# Patient Record
Sex: FEMALE | Race: WHITE | NOT HISPANIC OR LATINO | Employment: UNEMPLOYED | URBAN - METROPOLITAN AREA
[De-identification: names, ages, dates, MRNs, and addresses within clinical notes are randomized per-mention and may not be internally consistent; named-entity substitution may affect disease eponyms.]

---

## 2022-04-12 ENCOUNTER — APPOINTMENT (OUTPATIENT)
Dept: GENERAL RADIOLOGY | Facility: HOSPITAL | Age: 57
End: 2022-04-12

## 2022-04-12 ENCOUNTER — APPOINTMENT (OUTPATIENT)
Dept: CT IMAGING | Facility: HOSPITAL | Age: 57
End: 2022-04-12

## 2022-04-12 ENCOUNTER — HOSPITAL ENCOUNTER (INPATIENT)
Facility: HOSPITAL | Age: 57
LOS: 6 days | Discharge: HOME | End: 2022-04-18
Attending: EMERGENCY MEDICINE | Admitting: FAMILY MEDICINE

## 2022-04-12 DIAGNOSIS — J96.02 ACUTE RESPIRATORY FAILURE WITH HYPOXIA AND HYPERCAPNIA: Primary | ICD-10-CM

## 2022-04-12 DIAGNOSIS — R41.82 ALTERED MENTAL STATUS, UNSPECIFIED ALTERED MENTAL STATUS TYPE: ICD-10-CM

## 2022-04-12 DIAGNOSIS — R26.2 DIFFICULTY WALKING: ICD-10-CM

## 2022-04-12 DIAGNOSIS — J96.01 ACUTE RESPIRATORY FAILURE WITH HYPOXIA AND HYPERCAPNIA: Primary | ICD-10-CM

## 2022-04-12 DIAGNOSIS — J18.9 PNEUMONIA DUE TO INFECTIOUS ORGANISM, UNSPECIFIED LATERALITY, UNSPECIFIED PART OF LUNG: ICD-10-CM

## 2022-04-12 DIAGNOSIS — Z86.16 HISTORY OF COVID-19: ICD-10-CM

## 2022-04-12 PROBLEM — J96.90 RESPIRATORY FAILURE: Status: ACTIVE | Noted: 2022-04-12

## 2022-04-12 LAB
ALBUMIN SERPL-MCNC: 4.7 G/DL (ref 3.5–5.2)
ALBUMIN SERPL-MCNC: 4.9 G/DL (ref 3.5–5.2)
ALBUMIN/GLOB SERPL: 1.5 G/DL
ALBUMIN/GLOB SERPL: 1.8 G/DL
ALP SERPL-CCNC: 103 U/L (ref 39–117)
ALP SERPL-CCNC: 137 U/L (ref 39–117)
ALT SERPL W P-5'-P-CCNC: 16 U/L (ref 1–33)
ALT SERPL W P-5'-P-CCNC: 20 U/L (ref 1–33)
AMPHET+METHAMPHET UR QL: POSITIVE
ANION GAP SERPL CALCULATED.3IONS-SCNC: 12.3 MMOL/L (ref 5–15)
ANION GAP SERPL CALCULATED.3IONS-SCNC: 13.1 MMOL/L (ref 5–15)
ANION GAP SERPL CALCULATED.3IONS-SCNC: 14.9 MMOL/L (ref 5–15)
ARTERIAL PATENCY WRIST A: ABNORMAL
ARTERIAL PATENCY WRIST A: POSITIVE
AST SERPL-CCNC: 16 U/L (ref 1–32)
AST SERPL-CCNC: 25 U/L (ref 1–32)
BACTERIA BLD CULT: ABNORMAL
BARBITURATES UR QL SCN: NEGATIVE
BASE EXCESS BLDA CALC-SCNC: -3.8 MMOL/L (ref -2–2)
BASE EXCESS BLDA CALC-SCNC: 1.7 MMOL/L (ref -2–2)
BASOPHILS # BLD AUTO: 0 10*3/MM3 (ref 0–0.2)
BASOPHILS # BLD AUTO: 0.01 10*3/MM3 (ref 0–0.2)
BASOPHILS # BLD AUTO: 0.03 10*3/MM3 (ref 0–0.2)
BASOPHILS NFR BLD AUTO: 0 % (ref 0–1.5)
BASOPHILS NFR BLD AUTO: 0.1 % (ref 0–1.5)
BASOPHILS NFR BLD AUTO: 0.3 % (ref 0–1.5)
BDY SITE: ABNORMAL
BDY SITE: ABNORMAL
BENZODIAZ UR QL SCN: NEGATIVE
BILIRUB SERPL-MCNC: 0.2 MG/DL (ref 0–1.2)
BILIRUB SERPL-MCNC: 0.3 MG/DL (ref 0–1.2)
BUN SERPL-MCNC: 20 MG/DL (ref 6–20)
BUN SERPL-MCNC: 20 MG/DL (ref 6–20)
BUN SERPL-MCNC: 21 MG/DL (ref 6–20)
BUN/CREAT SERPL: 21.1 (ref 7–25)
BUN/CREAT SERPL: 25 (ref 7–25)
BUN/CREAT SERPL: 27.8 (ref 7–25)
CA-I BLDA-SCNC: 1.12 MMOL/L (ref 1.13–1.32)
CA-I BLDA-SCNC: 1.17 MMOL/L (ref 1.13–1.32)
CALCIUM SPEC-SCNC: 10 MG/DL (ref 8.6–10.5)
CALCIUM SPEC-SCNC: 9.4 MG/DL (ref 8.6–10.5)
CALCIUM SPEC-SCNC: 9.5 MG/DL (ref 8.6–10.5)
CANNABINOIDS SERPL QL: POSITIVE
CHLORIDE BLDA-SCNC: 102 MMOL/L (ref 98–106)
CHLORIDE BLDA-SCNC: 104 MMOL/L (ref 98–106)
CHLORIDE SERPL-SCNC: 101 MMOL/L (ref 98–107)
CHLORIDE SERPL-SCNC: 103 MMOL/L (ref 98–107)
CHLORIDE SERPL-SCNC: 104 MMOL/L (ref 98–107)
CO2 SERPL-SCNC: 27.9 MMOL/L (ref 22–29)
CO2 SERPL-SCNC: 28.7 MMOL/L (ref 22–29)
CO2 SERPL-SCNC: 29.1 MMOL/L (ref 22–29)
COCAINE UR QL: NEGATIVE
COHGB MFR BLD: 0.4 % (ref 0–1.5)
COHGB MFR BLD: 0.6 % (ref 0–1.5)
CREAT SERPL-MCNC: 0.72 MG/DL (ref 0.57–1)
CREAT SERPL-MCNC: 0.84 MG/DL (ref 0.57–1)
CREAT SERPL-MCNC: 0.95 MG/DL (ref 0.57–1)
D DIMER PPP FEU-MCNC: 0.69 MG/L (FEU) (ref 0–0.59)
D-LACTATE SERPL-SCNC: 1.6 MMOL/L (ref 0.5–2)
DEPRECATED RDW RBC AUTO: 45 FL (ref 37–54)
DEPRECATED RDW RBC AUTO: 45.3 FL (ref 37–54)
DEPRECATED RDW RBC AUTO: 46.5 FL (ref 37–54)
EGFRCR SERPLBLD CKD-EPI 2021: 70 ML/MIN/1.73
EGFRCR SERPLBLD CKD-EPI 2021: 81.2 ML/MIN/1.73
EGFRCR SERPLBLD CKD-EPI 2021: 97.7 ML/MIN/1.73
EOSINOPHIL # BLD AUTO: 0 10*3/MM3 (ref 0–0.4)
EOSINOPHIL # BLD AUTO: 0.01 10*3/MM3 (ref 0–0.4)
EOSINOPHIL # BLD AUTO: 0.12 10*3/MM3 (ref 0–0.4)
EOSINOPHIL NFR BLD AUTO: 0 % (ref 0.3–6.2)
EOSINOPHIL NFR BLD AUTO: 0.1 % (ref 0.3–6.2)
EOSINOPHIL NFR BLD AUTO: 1 % (ref 0.3–6.2)
ERYTHROCYTE [DISTWIDTH] IN BLOOD BY AUTOMATED COUNT: 13.8 % (ref 12.3–15.4)
ERYTHROCYTE [DISTWIDTH] IN BLOOD BY AUTOMATED COUNT: 13.8 % (ref 12.3–15.4)
ERYTHROCYTE [DISTWIDTH] IN BLOOD BY AUTOMATED COUNT: 14 % (ref 12.3–15.4)
FHHB: 3.4 % (ref 0–5)
FHHB: 7.2 % (ref 0–5)
FLUAV AG NPH QL: NEGATIVE
FLUBV AG NPH QL IA: NEGATIVE
GAS FLOW AIRWAY: 15 LPM
GAS FLOW AIRWAY: ABNORMAL L/MIN
GLOBULIN UR ELPH-MCNC: 2.6 GM/DL
GLOBULIN UR ELPH-MCNC: 3.3 GM/DL
GLUCOSE BLDA-MCNC: 166 MMOL/L (ref 65–99)
GLUCOSE BLDA-MCNC: 302 MMOL/L (ref 65–99)
GLUCOSE BLDC GLUCOMTR-MCNC: 201 MG/DL (ref 70–99)
GLUCOSE SERPL-MCNC: 162 MG/DL (ref 65–99)
GLUCOSE SERPL-MCNC: 214 MG/DL (ref 65–99)
GLUCOSE SERPL-MCNC: 269 MG/DL (ref 65–99)
HCO3 BLDA-SCNC: 26.9 MMOL/L (ref 22–26)
HCO3 BLDA-SCNC: 27.5 MMOL/L (ref 22–26)
HCT VFR BLD AUTO: 42.6 % (ref 34–46.6)
HCT VFR BLD AUTO: 43.5 % (ref 34–46.6)
HCT VFR BLD AUTO: 48.4 % (ref 34–46.6)
HGB BLD-MCNC: 14 G/DL (ref 12–15.9)
HGB BLD-MCNC: 14.1 G/DL (ref 12–15.9)
HGB BLD-MCNC: 15.3 G/DL (ref 12–15.9)
HGB BLDA-MCNC: 14.6 G/DL (ref 11.7–14.6)
HGB BLDA-MCNC: 14.8 G/DL (ref 11.7–14.6)
HOLD SPECIMEN: NORMAL
HOLD SPECIMEN: NORMAL
IMM GRANULOCYTES # BLD AUTO: 0.02 10*3/MM3 (ref 0–0.05)
IMM GRANULOCYTES # BLD AUTO: 0.04 10*3/MM3 (ref 0–0.05)
IMM GRANULOCYTES # BLD AUTO: 0.05 10*3/MM3 (ref 0–0.05)
IMM GRANULOCYTES NFR BLD AUTO: 0.3 % (ref 0–0.5)
IMM GRANULOCYTES NFR BLD AUTO: 0.4 % (ref 0–0.5)
IMM GRANULOCYTES NFR BLD AUTO: 0.5 % (ref 0–0.5)
INHALED O2 CONCENTRATION: 100 %
INHALED O2 CONCENTRATION: <21 %
L PNEUMO1 AG UR QL IA: NEGATIVE
LACTATE BLDA-SCNC: 0.64 MMOL/L (ref 0.5–2)
LACTATE BLDA-SCNC: 0.98 MMOL/L (ref 0.5–2)
LYMPHOCYTES # BLD AUTO: 0.32 10*3/MM3 (ref 0.7–3.1)
LYMPHOCYTES # BLD AUTO: 0.55 10*3/MM3 (ref 0.7–3.1)
LYMPHOCYTES # BLD AUTO: 3.68 10*3/MM3 (ref 0.7–3.1)
LYMPHOCYTES NFR BLD AUTO: 31.7 % (ref 19.6–45.3)
LYMPHOCYTES NFR BLD AUTO: 4.5 % (ref 19.6–45.3)
LYMPHOCYTES NFR BLD AUTO: 6.3 % (ref 19.6–45.3)
M PNEUMO IGM SER QL: POSITIVE
MAGNESIUM SERPL-MCNC: 1.8 MG/DL (ref 1.6–2.6)
MCH RBC QN AUTO: 28.7 PG (ref 26.6–33)
MCH RBC QN AUTO: 28.7 PG (ref 26.6–33)
MCH RBC QN AUTO: 29.3 PG (ref 26.6–33)
MCHC RBC AUTO-ENTMCNC: 31.6 G/DL (ref 31.5–35.7)
MCHC RBC AUTO-ENTMCNC: 32.2 G/DL (ref 31.5–35.7)
MCHC RBC AUTO-ENTMCNC: 33.1 G/DL (ref 31.5–35.7)
MCV RBC AUTO: 88.6 FL (ref 79–97)
MCV RBC AUTO: 89.1 FL (ref 79–97)
MCV RBC AUTO: 90.8 FL (ref 79–97)
METHADONE UR QL SCN: NEGATIVE
METHGB BLD QL: 0.2 % (ref 0–1.5)
METHGB BLD QL: 0.3 % (ref 0–1.5)
MODALITY: ABNORMAL
MODALITY: ABNORMAL
MONOCYTES # BLD AUTO: 0.1 10*3/MM3 (ref 0.1–0.9)
MONOCYTES # BLD AUTO: 0.31 10*3/MM3 (ref 0.1–0.9)
MONOCYTES # BLD AUTO: 0.94 10*3/MM3 (ref 0.1–0.9)
MONOCYTES NFR BLD AUTO: 1.4 % (ref 5–12)
MONOCYTES NFR BLD AUTO: 3.6 % (ref 5–12)
MONOCYTES NFR BLD AUTO: 8.1 % (ref 5–12)
NEUTROPHILS NFR BLD AUTO: 58.5 % (ref 42.7–76)
NEUTROPHILS NFR BLD AUTO: 6.73 10*3/MM3 (ref 1.7–7)
NEUTROPHILS NFR BLD AUTO: 6.8 10*3/MM3 (ref 1.7–7)
NEUTROPHILS NFR BLD AUTO: 7.76 10*3/MM3 (ref 1.7–7)
NEUTROPHILS NFR BLD AUTO: 89.5 % (ref 42.7–76)
NEUTROPHILS NFR BLD AUTO: 93.7 % (ref 42.7–76)
NOTE: ABNORMAL
NOTE: ABNORMAL
NRBC BLD AUTO-RTO: 0 /100 WBC (ref 0–0.2)
NT-PROBNP SERPL-MCNC: 2427 PG/ML (ref 0–900)
OPIATES UR QL: NEGATIVE
OXYCODONE UR QL SCN: NEGATIVE
OXYHGB MFR BLDV: 92 % (ref 94–99)
OXYHGB MFR BLDV: 95.9 % (ref 94–99)
PCO2 BLDA: 47.5 MM HG (ref 35–45)
PCO2 BLDA: 77.1 MM HG (ref 35–45)
PH BLDA: 7.16 PH UNITS (ref 7.35–7.45)
PH BLDA: 7.38 PH UNITS (ref 7.35–7.45)
PLATELET # BLD AUTO: 237 10*3/MM3 (ref 140–450)
PLATELET # BLD AUTO: 296 10*3/MM3 (ref 140–450)
PLATELET # BLD AUTO: 378 10*3/MM3 (ref 140–450)
PMV BLD AUTO: 10 FL (ref 6–12)
PMV BLD AUTO: 9.6 FL (ref 6–12)
PMV BLD AUTO: 9.8 FL (ref 6–12)
PO2 BLD: 111 MM[HG] (ref 0–500)
PO2 BLD: >319 MM[HG] (ref 0–500)
PO2 BLDA: 111 MM HG (ref 80–100)
PO2 BLDA: 67 MM HG (ref 80–100)
POTASSIUM BLDA-SCNC: 3.37 MMOL/L (ref 3.5–5)
POTASSIUM BLDA-SCNC: 3.99 MMOL/L (ref 3.5–5)
POTASSIUM SERPL-SCNC: 3.3 MMOL/L (ref 3.5–5.2)
POTASSIUM SERPL-SCNC: 3.6 MMOL/L (ref 3.5–5.2)
POTASSIUM SERPL-SCNC: 4.3 MMOL/L (ref 3.5–5.2)
PROCALCITONIN SERPL-MCNC: 0.15 NG/ML (ref 0–0.25)
PROT SERPL-MCNC: 7.3 G/DL (ref 6–8.5)
PROT SERPL-MCNC: 8.2 G/DL (ref 6–8.5)
QT INTERVAL: 385 MS
RBC # BLD AUTO: 4.81 10*6/MM3 (ref 3.77–5.28)
RBC # BLD AUTO: 4.88 10*6/MM3 (ref 3.77–5.28)
RBC # BLD AUTO: 5.33 10*6/MM3 (ref 3.77–5.28)
S PNEUM AG SPEC QL LA: NEGATIVE
SAO2 % BLDCOA: 92.7 % (ref 95–99)
SAO2 % BLDCOA: 96.6 % (ref 95–99)
SARS-COV-2 RNA PNL SPEC NAA+PROBE: NOT DETECTED
SODIUM BLDA-SCNC: 141.4 MMOL/L (ref 136–146)
SODIUM BLDA-SCNC: 142.4 MMOL/L (ref 136–146)
SODIUM SERPL-SCNC: 144 MMOL/L (ref 136–145)
SODIUM SERPL-SCNC: 145 MMOL/L (ref 136–145)
SODIUM SERPL-SCNC: 145 MMOL/L (ref 136–145)
TROPONIN T SERPL-MCNC: <0.01 NG/ML (ref 0–0.03)
WBC NRBC COR # BLD: 11.62 10*3/MM3 (ref 3.4–10.8)
WBC NRBC COR # BLD: 7.18 10*3/MM3 (ref 3.4–10.8)
WBC NRBC COR # BLD: 8.67 10*3/MM3 (ref 3.4–10.8)
WHOLE BLOOD HOLD SPECIMEN: NORMAL
WHOLE BLOOD HOLD SPECIMEN: NORMAL

## 2022-04-12 PROCEDURE — 25010000002 METHYLPREDNISOLONE PER 125 MG: Performed by: INTERNAL MEDICINE

## 2022-04-12 PROCEDURE — 80307 DRUG TEST PRSMV CHEM ANLYZR: CPT | Performed by: EMERGENCY MEDICINE

## 2022-04-12 PROCEDURE — 82962 GLUCOSE BLOOD TEST: CPT

## 2022-04-12 PROCEDURE — 87804 INFLUENZA ASSAY W/OPTIC: CPT | Performed by: EMERGENCY MEDICINE

## 2022-04-12 PROCEDURE — 83735 ASSAY OF MAGNESIUM: CPT | Performed by: FAMILY MEDICINE

## 2022-04-12 PROCEDURE — 70450 CT HEAD/BRAIN W/O DYE: CPT

## 2022-04-12 PROCEDURE — 25010000002 VANCOMYCIN 5 G RECONSTITUTED SOLUTION: Performed by: EMERGENCY MEDICINE

## 2022-04-12 PROCEDURE — U0004 COV-19 TEST NON-CDC HGH THRU: HCPCS | Performed by: EMERGENCY MEDICINE

## 2022-04-12 PROCEDURE — 94799 UNLISTED PULMONARY SVC/PX: CPT

## 2022-04-12 PROCEDURE — 85379 FIBRIN DEGRADATION QUANT: CPT | Performed by: PHYSICIAN ASSISTANT

## 2022-04-12 PROCEDURE — 86738 MYCOPLASMA ANTIBODY: CPT | Performed by: INTERNAL MEDICINE

## 2022-04-12 PROCEDURE — 99291 CRITICAL CARE FIRST HOUR: CPT | Performed by: INTERNAL MEDICINE

## 2022-04-12 PROCEDURE — 87147 CULTURE TYPE IMMUNOLOGIC: CPT | Performed by: EMERGENCY MEDICINE

## 2022-04-12 PROCEDURE — 94760 N-INVAS EAR/PLS OXIMETRY 1: CPT

## 2022-04-12 PROCEDURE — 94640 AIRWAY INHALATION TREATMENT: CPT

## 2022-04-12 PROCEDURE — 25010000002 FUROSEMIDE PER 20 MG: Performed by: EMERGENCY MEDICINE

## 2022-04-12 PROCEDURE — 82805 BLOOD GASES W/O2 SATURATION: CPT | Performed by: FAMILY MEDICINE

## 2022-04-12 PROCEDURE — 82375 ASSAY CARBOXYHB QUANT: CPT | Performed by: FAMILY MEDICINE

## 2022-04-12 PROCEDURE — 87040 BLOOD CULTURE FOR BACTERIA: CPT | Performed by: EMERGENCY MEDICINE

## 2022-04-12 PROCEDURE — 93005 ELECTROCARDIOGRAM TRACING: CPT | Performed by: EMERGENCY MEDICINE

## 2022-04-12 PROCEDURE — 94761 N-INVAS EAR/PLS OXIMETRY MLT: CPT

## 2022-04-12 PROCEDURE — 94660 CPAP INITIATION&MGMT: CPT

## 2022-04-12 PROCEDURE — 87150 DNA/RNA AMPLIFIED PROBE: CPT | Performed by: EMERGENCY MEDICINE

## 2022-04-12 PROCEDURE — 85025 COMPLETE CBC W/AUTO DIFF WBC: CPT | Performed by: FAMILY MEDICINE

## 2022-04-12 PROCEDURE — 82805 BLOOD GASES W/O2 SATURATION: CPT | Performed by: EMERGENCY MEDICINE

## 2022-04-12 PROCEDURE — 83050 HGB METHEMOGLOBIN QUAN: CPT | Performed by: FAMILY MEDICINE

## 2022-04-12 PROCEDURE — 25010000002 VANCOMYCIN 5 G RECONSTITUTED SOLUTION: Performed by: PHYSICIAN ASSISTANT

## 2022-04-12 PROCEDURE — 93005 ELECTROCARDIOGRAM TRACING: CPT | Performed by: FAMILY MEDICINE

## 2022-04-12 PROCEDURE — 71260 CT THORAX DX C+: CPT

## 2022-04-12 PROCEDURE — 36600 WITHDRAWAL OF ARTERIAL BLOOD: CPT | Performed by: FAMILY MEDICINE

## 2022-04-12 PROCEDURE — 83050 HGB METHEMOGLOBIN QUAN: CPT | Performed by: EMERGENCY MEDICINE

## 2022-04-12 PROCEDURE — 87899 AGENT NOS ASSAY W/OPTIC: CPT | Performed by: INTERNAL MEDICINE

## 2022-04-12 PROCEDURE — 83605 ASSAY OF LACTIC ACID: CPT | Performed by: EMERGENCY MEDICINE

## 2022-04-12 PROCEDURE — 25010000002 ADENOSINE PER 6 MG: Performed by: PHYSICIAN ASSISTANT

## 2022-04-12 PROCEDURE — 99285 EMERGENCY DEPT VISIT HI MDM: CPT

## 2022-04-12 PROCEDURE — 25010000002 METHYLPREDNISOLONE PER 125 MG: Performed by: EMERGENCY MEDICINE

## 2022-04-12 PROCEDURE — 71045 X-RAY EXAM CHEST 1 VIEW: CPT

## 2022-04-12 PROCEDURE — 25010000002 CEFEPIME PER 500 MG: Performed by: EMERGENCY MEDICINE

## 2022-04-12 PROCEDURE — 36600 WITHDRAWAL OF ARTERIAL BLOOD: CPT | Performed by: EMERGENCY MEDICINE

## 2022-04-12 PROCEDURE — 94664 DEMO&/EVAL PT USE INHALER: CPT

## 2022-04-12 PROCEDURE — 82375 ASSAY CARBOXYHB QUANT: CPT | Performed by: EMERGENCY MEDICINE

## 2022-04-12 PROCEDURE — 85025 COMPLETE CBC W/AUTO DIFF WBC: CPT | Performed by: PHYSICIAN ASSISTANT

## 2022-04-12 PROCEDURE — 80053 COMPREHEN METABOLIC PANEL: CPT | Performed by: PHYSICIAN ASSISTANT

## 2022-04-12 PROCEDURE — 83880 ASSAY OF NATRIURETIC PEPTIDE: CPT | Performed by: EMERGENCY MEDICINE

## 2022-04-12 PROCEDURE — 84484 ASSAY OF TROPONIN QUANT: CPT | Performed by: EMERGENCY MEDICINE

## 2022-04-12 PROCEDURE — 85025 COMPLETE CBC W/AUTO DIFF WBC: CPT | Performed by: EMERGENCY MEDICINE

## 2022-04-12 PROCEDURE — 99223 1ST HOSP IP/OBS HIGH 75: CPT | Performed by: FAMILY MEDICINE

## 2022-04-12 PROCEDURE — 93005 ELECTROCARDIOGRAM TRACING: CPT | Performed by: PHYSICIAN ASSISTANT

## 2022-04-12 PROCEDURE — 0 IOPAMIDOL PER 1 ML: Performed by: INTERNAL MEDICINE

## 2022-04-12 PROCEDURE — 84145 PROCALCITONIN (PCT): CPT | Performed by: INTERNAL MEDICINE

## 2022-04-12 RX ORDER — BISACODYL 10 MG
10 SUPPOSITORY, RECTAL RECTAL DAILY PRN
Status: DISCONTINUED | OUTPATIENT
Start: 2022-04-12 | End: 2022-04-18 | Stop reason: HOSPADM

## 2022-04-12 RX ORDER — ARFORMOTEROL TARTRATE 15 UG/2ML
15 SOLUTION RESPIRATORY (INHALATION)
Status: DISCONTINUED | OUTPATIENT
Start: 2022-04-12 | End: 2022-04-18 | Stop reason: HOSPADM

## 2022-04-12 RX ORDER — ARFORMOTEROL TARTRATE 15 UG/2ML
SOLUTION RESPIRATORY (INHALATION)
Status: DISPENSED
Start: 2022-04-12 | End: 2022-04-13

## 2022-04-12 RX ORDER — CEFEPIME 1 G/50ML
2 INJECTION, SOLUTION INTRAVENOUS ONCE
Status: COMPLETED | OUTPATIENT
Start: 2022-04-12 | End: 2022-04-12

## 2022-04-12 RX ORDER — METHYLPREDNISOLONE SODIUM SUCCINATE 125 MG/2ML
60 INJECTION, POWDER, LYOPHILIZED, FOR SOLUTION INTRAMUSCULAR; INTRAVENOUS EVERY 6 HOURS
Status: DISCONTINUED | OUTPATIENT
Start: 2022-04-12 | End: 2022-04-13

## 2022-04-12 RX ORDER — BUDESONIDE 0.5 MG/2ML
0.5 INHALANT ORAL
Status: DISCONTINUED | OUTPATIENT
Start: 2022-04-12 | End: 2022-04-18 | Stop reason: HOSPADM

## 2022-04-12 RX ORDER — DILTIAZEM HCL IN NACL,ISO-OSM 125 MG/125
5-15 PLASTIC BAG, INJECTION (ML) INTRAVENOUS
Status: DISCONTINUED | OUTPATIENT
Start: 2022-04-12 | End: 2022-04-13

## 2022-04-12 RX ORDER — ACETAMINOPHEN 650 MG/1
650 SUPPOSITORY RECTAL EVERY 4 HOURS PRN
Status: DISCONTINUED | OUTPATIENT
Start: 2022-04-12 | End: 2022-04-18 | Stop reason: HOSPADM

## 2022-04-12 RX ORDER — AMOXICILLIN 250 MG
2 CAPSULE ORAL 2 TIMES DAILY
Status: DISCONTINUED | OUTPATIENT
Start: 2022-04-12 | End: 2022-04-18 | Stop reason: HOSPADM

## 2022-04-12 RX ORDER — FUROSEMIDE 10 MG/ML
40 INJECTION INTRAMUSCULAR; INTRAVENOUS ONCE
Status: COMPLETED | OUTPATIENT
Start: 2022-04-12 | End: 2022-04-12

## 2022-04-12 RX ORDER — ADENOSINE 3 MG/ML
12 INJECTION INTRAVENOUS ONCE
Status: DISCONTINUED | OUTPATIENT
Start: 2022-04-12 | End: 2022-04-18

## 2022-04-12 RX ORDER — BISACODYL 5 MG/1
5 TABLET, DELAYED RELEASE ORAL DAILY PRN
Status: DISCONTINUED | OUTPATIENT
Start: 2022-04-12 | End: 2022-04-18 | Stop reason: HOSPADM

## 2022-04-12 RX ORDER — SODIUM CHLORIDE 0.9 % (FLUSH) 0.9 %
10 SYRINGE (ML) INJECTION AS NEEDED
Status: DISCONTINUED | OUTPATIENT
Start: 2022-04-12 | End: 2022-04-18 | Stop reason: HOSPADM

## 2022-04-12 RX ORDER — CLONIDINE HYDROCHLORIDE 0.3 MG/1
0.3 TABLET ORAL DAILY
Status: ON HOLD | COMMUNITY
End: 2022-04-18 | Stop reason: SDUPTHER

## 2022-04-12 RX ORDER — ADENOSINE 3 MG/ML
6 INJECTION, SOLUTION INTRAVENOUS ONCE
Status: COMPLETED | OUTPATIENT
Start: 2022-04-12 | End: 2022-04-12

## 2022-04-12 RX ORDER — ADENOSINE 3 MG/ML
6 INJECTION, SOLUTION INTRAVENOUS ONCE
Status: DISCONTINUED | OUTPATIENT
Start: 2022-04-12 | End: 2022-04-12

## 2022-04-12 RX ORDER — SODIUM CHLORIDE 0.9 % (FLUSH) 0.9 %
10 SYRINGE (ML) INJECTION EVERY 12 HOURS SCHEDULED
Status: DISCONTINUED | OUTPATIENT
Start: 2022-04-12 | End: 2022-04-18 | Stop reason: HOSPADM

## 2022-04-12 RX ORDER — ACETAMINOPHEN 160 MG/5ML
650 SOLUTION ORAL EVERY 4 HOURS PRN
Status: DISCONTINUED | OUTPATIENT
Start: 2022-04-12 | End: 2022-04-18 | Stop reason: HOSPADM

## 2022-04-12 RX ORDER — POTASSIUM CHLORIDE 750 MG/1
40 CAPSULE, EXTENDED RELEASE ORAL ONCE
Status: COMPLETED | OUTPATIENT
Start: 2022-04-13 | End: 2022-04-13

## 2022-04-12 RX ORDER — KETAMINE HYDROCHLORIDE 50 MG/ML
1 INJECTION, SOLUTION, CONCENTRATE INTRAMUSCULAR; INTRAVENOUS ONCE
Status: COMPLETED | OUTPATIENT
Start: 2022-04-12 | End: 2022-04-12

## 2022-04-12 RX ORDER — DEXTROAMPHETAMINE SACCHARATE, AMPHETAMINE ASPARTATE, DEXTROAMPHETAMINE SULFATE AND AMPHETAMINE SULFATE 5; 5; 5; 5 MG/1; MG/1; MG/1; MG/1
20 TABLET ORAL 2 TIMES DAILY
COMMUNITY
End: 2022-04-18 | Stop reason: HOSPADM

## 2022-04-12 RX ORDER — ACETAMINOPHEN 325 MG/1
650 TABLET ORAL EVERY 4 HOURS PRN
Status: DISCONTINUED | OUTPATIENT
Start: 2022-04-12 | End: 2022-04-18 | Stop reason: HOSPADM

## 2022-04-12 RX ORDER — IPRATROPIUM BROMIDE AND ALBUTEROL SULFATE 2.5; .5 MG/3ML; MG/3ML
3 SOLUTION RESPIRATORY (INHALATION)
Status: DISCONTINUED | OUTPATIENT
Start: 2022-04-12 | End: 2022-04-13

## 2022-04-12 RX ORDER — ADENOSINE 3 MG/ML
12 INJECTION INTRAVENOUS ONCE
Status: DISCONTINUED | OUTPATIENT
Start: 2022-04-12 | End: 2022-04-12

## 2022-04-12 RX ORDER — METHYLPREDNISOLONE SODIUM SUCCINATE 125 MG/2ML
125 INJECTION, POWDER, LYOPHILIZED, FOR SOLUTION INTRAMUSCULAR; INTRAVENOUS ONCE
Status: COMPLETED | OUTPATIENT
Start: 2022-04-12 | End: 2022-04-12

## 2022-04-12 RX ORDER — ONDANSETRON 2 MG/ML
4 INJECTION INTRAMUSCULAR; INTRAVENOUS EVERY 6 HOURS PRN
Status: DISCONTINUED | OUTPATIENT
Start: 2022-04-12 | End: 2022-04-18 | Stop reason: HOSPADM

## 2022-04-12 RX ORDER — POLYETHYLENE GLYCOL 3350 17 G/17G
17 POWDER, FOR SOLUTION ORAL DAILY PRN
Status: DISCONTINUED | OUTPATIENT
Start: 2022-04-12 | End: 2022-04-18 | Stop reason: HOSPADM

## 2022-04-12 RX ORDER — ALBUTEROL SULFATE 2.5 MG/3ML
2.5 SOLUTION RESPIRATORY (INHALATION) EVERY 4 HOURS PRN
Status: DISCONTINUED | OUTPATIENT
Start: 2022-04-12 | End: 2022-04-13

## 2022-04-12 RX ORDER — IPRATROPIUM BROMIDE AND ALBUTEROL SULFATE 2.5; .5 MG/3ML; MG/3ML
3 SOLUTION RESPIRATORY (INHALATION) ONCE
Status: COMPLETED | OUTPATIENT
Start: 2022-04-12 | End: 2022-04-12

## 2022-04-12 RX ADMIN — ADENOSINE 12 MG: 3 INJECTION, SOLUTION INTRAVENOUS at 21:16

## 2022-04-12 RX ADMIN — SENNOSIDES AND DOCUSATE SODIUM 2 TABLET: 50; 8.6 TABLET ORAL at 21:47

## 2022-04-12 RX ADMIN — FUROSEMIDE 40 MG: 10 INJECTION, SOLUTION INTRAMUSCULAR; INTRAVENOUS at 04:43

## 2022-04-12 RX ADMIN — SODIUM CHLORIDE 500 ML: 9 INJECTION, SOLUTION INTRAVENOUS at 20:30

## 2022-04-12 RX ADMIN — IPRATROPIUM BROMIDE AND ALBUTEROL SULFATE 3 ML: 2.5; .5 SOLUTION RESPIRATORY (INHALATION) at 12:15

## 2022-04-12 RX ADMIN — ACETAMINOPHEN 650 MG: 325 TABLET ORAL at 17:23

## 2022-04-12 RX ADMIN — Medication 2.5 MG/HR: at 21:45

## 2022-04-12 RX ADMIN — Medication 1000 MG: at 04:43

## 2022-04-12 RX ADMIN — BUDESONIDE 0.5 MG: 0.5 SUSPENSION RESPIRATORY (INHALATION) at 19:54

## 2022-04-12 RX ADMIN — METHYLPREDNISOLONE SODIUM SUCCINATE 60 MG: 125 INJECTION, POWDER, FOR SOLUTION INTRAMUSCULAR; INTRAVENOUS at 15:57

## 2022-04-12 RX ADMIN — METHYLPREDNISOLONE SODIUM SUCCINATE 60 MG: 125 INJECTION, POWDER, FOR SOLUTION INTRAMUSCULAR; INTRAVENOUS at 09:32

## 2022-04-12 RX ADMIN — IPRATROPIUM BROMIDE AND ALBUTEROL SULFATE 3 ML: 2.5; .5 SOLUTION RESPIRATORY (INHALATION) at 07:55

## 2022-04-12 RX ADMIN — ADENOSINE 6 MG: 3 INJECTION, SOLUTION INTRAVENOUS at 21:13

## 2022-04-12 RX ADMIN — Medication 10 ML: at 21:47

## 2022-04-12 RX ADMIN — METHYLPREDNISOLONE SODIUM SUCCINATE 60 MG: 125 INJECTION, POWDER, FOR SOLUTION INTRAMUSCULAR; INTRAVENOUS at 21:47

## 2022-04-12 RX ADMIN — CEFEPIME 2 G: 1 INJECTION, SOLUTION INTRAVENOUS at 04:43

## 2022-04-12 RX ADMIN — ARFORMOTEROL TARTRATE 15 MCG: 15 SOLUTION RESPIRATORY (INHALATION) at 20:04

## 2022-04-12 RX ADMIN — IOPAMIDOL 100 ML: 755 INJECTION, SOLUTION INTRAVENOUS at 13:33

## 2022-04-12 RX ADMIN — METHYLPREDNISOLONE SODIUM SUCCINATE 125 MG: 125 INJECTION, POWDER, FOR SOLUTION INTRAMUSCULAR; INTRAVENOUS at 04:43

## 2022-04-12 RX ADMIN — SODIUM CHLORIDE 500 ML: 9 INJECTION, SOLUTION INTRAVENOUS at 21:10

## 2022-04-12 RX ADMIN — METOPROLOL TARTRATE 5 MG: 5 INJECTION INTRAVENOUS at 21:02

## 2022-04-12 RX ADMIN — KETAMINE HYDROCHLORIDE 50 MG: 50 INJECTION INTRAMUSCULAR; INTRAVENOUS at 03:12

## 2022-04-12 RX ADMIN — METOPROLOL TARTRATE 5 MG: 5 INJECTION INTRAVENOUS at 21:19

## 2022-04-12 RX ADMIN — IPRATROPIUM BROMIDE AND ALBUTEROL SULFATE 3 ML: 2.5; .5 SOLUTION RESPIRATORY (INHALATION) at 19:54

## 2022-04-12 RX ADMIN — VANCOMYCIN HYDROCHLORIDE 750 MG: 5 INJECTION, POWDER, LYOPHILIZED, FOR SOLUTION INTRAVENOUS at 22:35

## 2022-04-12 RX ADMIN — IPRATROPIUM BROMIDE AND ALBUTEROL SULFATE 3 ML: 2.5; .5 SOLUTION RESPIRATORY (INHALATION) at 05:29

## 2022-04-12 NOTE — H&P
[facility]  Date: 2022   Patient Name: Yenni Camacho  : 1965  MRN: [id number]  Primary Care Physician:  Provider, No Known  Date of admission: 2022    Subjective   Subjective     Chief Complaint: Hypoxia and altered mental status  History obtained from the chart and conversation with ER due to patient's altered mental status  HPI:    Yenni Camacho is a 56 y.o. female presents to the hospital via EMS after being found severely hypoxic at home.  Patient had COVID-19 in January and has had respiratory issues since then.  Per EMS her oxygen saturation was in the low 60s and the patient was altered.  They had difficulty getting her to not pull her oxygen off so she received ketamine in the emergency department which relaxes her enough to wear the supplemental oxygen.  ABG was obtained which showed severe CO2 retention so she was started on BiPAP which she is currently tolerating.  Patient reportedly has no history of smoking asthma or COPD.  She denied taking opiates or pain medications.  She takes amphetamines for ADHD according to the chart.  During my exam the patient is still altered but is also hypersomnolent due to the ketamine so I cannot really obtain any useful history from her.      Personal History     Past Medical History:  Past Medical History:   Diagnosis Date   • ADHD    • Arthritis    • Hypertension          Past Surgical History:  Unable to obtain patient is altered      Family History:   Unable to obtain patient is altered      Social History:   Social History     Tobacco Use   • Smoking status: Never Smoker   Substance Use Topics   • Alcohol use: Never   • Drug use: Yes     Comment: medical marijuana         Home Medications:  Amphetamine-Dextroamphetamine, Metoprolol Succinate, amLODIPine Benzoate, and cloNIDine HCl    Allergies:  Not on File    Review of Systems  Unable to obtain patient is altered    Objective   Objective      Vitals:   Temp:  [97.9 °F (36.6 °C)] 97.9 °F (36.6 °C)  Heart Rate:  [109-112] 112  Resp:  [28] 28  BP: (165-182)/(116-141) 182/116    Physical Exam    Constitutional: Altered, hypersomnolent   Eyes: Pupils equal, sclerae anicteric, no conjunctival injection   HENT: NCAT, mucous membranes dry   Neck: Supple, no thyromegaly, no lymphadenopathy, trachea midline   Respiratory: Clear to auscultation bilaterally, no wheezing, faint rales bilaterally, nonlabored respirations    Cardiovascular: Tachycardic, no murmurs, rubs, or gallops, palpable pedal pulses bilaterally   Gastrointestinal: Positive bowel sounds, soft, nontender, nondistended   Musculoskeletal: No bilateral ankle edema, no clubbing or cyanosis to extremities   Psychiatric: Appropriate affect, cooperative   Neurologic: Not oriented x 3, hypersomnolent   skin: Multiple tattoos, no rashes           Assessment/Plan   Assessment / Plan     Assessment/Plan:   Altered mental status suspect secondary to hypoxia and hypercapnia but the differential is broad at this time including but not limited to toxic metabolic encephalopathy and cerebrovascular insult  Acute hypoxic hypercapnic respiratory failure without history of COPD or smoking  History of hypertension  History of ADHD      Patient is admitted to the ICU due to her tenuous respiratory status  Continue BiPAP  Consult pulmonary critical care  Obtain stat CT of the head without contrast.  Patient initially was not cooperative enough to undergo CT scan  Obtain stat drug screen in the emergency department per my request  Administer Lasix in the emergency department per my request  Monitor respiratory status.  Patient may end up needing intubation if she does not improve.  Currently tolerating BiPAP and seems to be improving  CBC in am for surveillance of any acute blood loss or thrombocytopenia  Metabolic panel in the morning to evaluate electrolytes and renal function  Reviewed today's labs: Hypercapnia and  hypoxia  Reviewed telemetry: Tachycardia  Discussed with ER physician  Risk of primary complaint: patient is at significant risk of morbidity if primary complaint is not treated especially considering the patient's comorbidities.  DVT prophylaxis:  No DVT prophylaxis order currently exists.    CODE STATUS:    Code Status (Patient has no pulse and is not breathing): CPR (Attempt to Resuscitate)  Medical Interventions (Patient has pulse or is breathing): Full Support      Admission Status:  I believe this patient meets inpatient status.    Electronically signed by Gregorio Allen DO, 04/12/22, 4:55 AM EDT.

## 2022-04-12 NOTE — CONSULTS
Pulmonary / Critical Care Consult Note      Patient Name: Yenni Camacho  : 1965  MRN: [id number]  Primary Care Physician:  Provider, No Known  Referring Physician: No Known Provider  Date of admission: 2022    Subjective   Subjective     Reason for Consult/ Chief Complaint: Altered mental status, hypercapnic respiratory failure    HPI:  Yenni Camacho is a 56 y.o. female with history of COVID-19 infection in 2022, has had respiratory issues since then.  She presented to the ER last night with worsening shortness of breath, her oxygen saturation was in low 60s on presentation.  She was altered.  She was trying to pull off her oxygen.  She was given ketamine and was started on BiPAP with acute hypercapnic respiratory failure with respiratory acidosis.  She was on BiPAP overnight and is admitted to ICU with altered mental status and hypercapnic respiratory failure.  Pulmonary and critical care service is consulted for assistance with management of her respiratory failure and altered mental status.  She is much more awake this morning, transitioning to nasal oxygen.  She still has shortness of breath but slightly better.  Has chest tightness.  She has wheezing at times.  No fever chills.  No nausea or vomiting.  She has not felt better since she had Covid in January.  Since then, patient has been limited with activities.  She was home and bedridden for most part during COVID-19 infection for close to 20 days.  She used to be a smoker, smoking 1 to 2 packs a day for many years.  She quit smoking 5 years ago.  She vapes now.    Review of Systems  General:  Fatigue, No Fever, no chills, no weight loss or loss of appetite  HEENT: No dysphagia, No Visual Changes, no rhinorrhea  Respiratory:  + cough,+Dyspnea, no phlegm, No Pleuritic Pain, + wheezing, no hemoptysis  Cardiovascular: Denies chest pain, denies palpitations,+MURPHY, +Chest Pressure  Gastrointestinal:  No Abdominal Pain, No Nausea,  No Vomiting, No Diarrhea  Genitourinary:  No Dysuria, No Frequency, No Hesitancy  Musculoskeletal: No muscle pain or swelling  Endocrine:  No Heat Intolerance, No Cold Intolerance, + Fatigue  Hematologic:  No Bleeding, No Bruising  Psychiatric:  No Anxiety, No Depression  Neurologic:  No Confusion, no Dysarthria, No Headaches  Skin:  No Rash, No Open Wounds        Personal History     Past Medical History:   Diagnosis Date   • ADHD    • Arthritis    • Hypertension        History reviewed. No pertinent surgical history.    Family History: No family history of chronic lung disease or lung cancer    Social History:  reports that she has never smoked. She does not have any smokeless tobacco history on file. She reports current drug use. She reports that she does not drink alcohol.    Home Medications:  Amphetamine-Dextroamphetamine, Metoprolol Succinate, amLODIPine Benzoate, and cloNIDine HCl    Allergies:  No Known Allergies    Objective    Objective     Vitals:   Temp:  [97.9 °F (36.6 °C)] 97.9 °F (36.6 °C)  Heart Rate:  [] 94  Resp:  [16-28] 16  BP: (112-194)/() 133/78    Physical Exam:  Vital Signs Reviewed   Thin built, malnourished female, in mild distress, has conversational dyspnea  HEENT:  PERRL, EOMI.  OP, nares clear, no sinus tenderness  Neck:  Supple, no JVD, no thyromegaly  Lymph: no axillary, cervical, supraclavicular lymphadenopathy noted bilaterally  Chest:   Bilateral diminished breath sounds, use of accessory muscles of respiration, no rhonchi or crackles, scattered wheezing expiratory, resonant percussion bilaterally  CV: RRR, no MGR, pulses 2+, equal  Abd:  Soft, NT, ND, + BS, no HSM  EXT:  no clubbing, no cyanosis, no edema, no joint tenderness  Neuro:  A&Ox3, CN grossly intact, no focal deficits, has generalized weakness  Skin: No rashes or lesions noted      Result Review    Result Review:  I have personally reviewed the results from the time of this admission to 4/12/2022 07:59 EDT  and agree with these findings:  [x]  Laboratory  [x]  Microbiology  [x]  Radiology  [x]  EKG/Telemetry   [x]  Cardiology/Vascular   []  Pathology  []  Old records  []  Other:  Most notable findings include: Initial ABG of pH 7.16, PCO2 77, PO2 111.    Serum sodium 144.  Serum potassium 3.6.  Ionized calcium 1.12.  Lactate 1.6.  Procalcitonin 0.15.  WBC count 7.18.    Chest x-ray with minimal pulmonary opacities, concerning for interstitial process.      Assessment/Plan   Assessment / Plan     Active Hospital Problems:  Active Hospital Problems    Diagnosis    • Respiratory failure (HCC)          Impression:  Likely COPD with acute exacerbation  History of COVID-19 infection recently  Acute hypercapnic respiratory failure  Altered mental status, toxic metabolic encephalopathy  History of smoking in past    Plan:  Continue with Brovana, Pulmicort and DuoNeb.  Continue with Solu-Medrol 60 mg every 6 hours.  Bronchopulmonary hygiene protocol.  BiPAP 14/7 with sleep and naps.  Her ABG shows significant improvement on BiPAP.  Mentation has improved as well.    Check CT scan of chest with contrast.  Given her significant improvement, can likely be transferred out of ICU status.  We will likely need oxygen at home.  Check urine strep and Legionella antigen.  Check sputum culture.  Procalcitonin is normal.  Antibiotics on hold for now.    Patient is critically ill in ICU with acute hypoxic and hypercapnic respiratory failure, altered mental status, COPD with acute exacerbation, history of smoking in past, recent COVID-19 infection.  I spent 33 minutes of critical care time, excluding any procedure notes, in review, analysis, obtaining history and physical, formulating care plan, and I led multi-disciplinary critical care rounds with bedside nurse, respiratory therapist, clinical pharmacist and other allied services. I have discussed the case with primary service and other consultants as well.     Electronically signed by  Doroteo Melchor MD, 4/12/2022, 07:59 EDT.

## 2022-04-12 NOTE — ED PROVIDER NOTES
Time: 2:58 AM EDT  Arrived by: Ambulance  Chief Complaint:   Chief Complaint   Patient presents with   • Respiratory Distress     History provided by: Patient and EMS  History is limited by: Respiratory distress    History of Present Illness:    Yenni Camacho is a 56 y.o. female who presents to the emergency department today with complaints of severe and constant shortness of breath. EMS reports that the patient had COVID-19 in January or February 2022. They advise that the patient does continue to have intermittent episodes of shortness of breath, but none as severe as her episode today.    Per EMS, the patient was found to be in the low 70s on room air at home today and appeared very anxious at that time. She was subsequently placed on nasal cannula and NRB, at which time her oxygen saturations michael to 100%. However, the patient was noted to not tolerate oxygen. She continues to refuse oxygen in the ED. There are no other acute complaints at this time.       History provided by:  Patient and EMS personnel  History limited by:  Severe respiratory distress   used: No    Shortness of Breath  Severity:  Severe  Onset quality:  Gradual  Duration:  1 day  Timing:  Constant  Progression:  Worsening  Chronicity:  Recurrent  Context comment:   EMS reports that the patient had COVID-19 in January or February 2022. They advise that the patient does continue to have intermittent episodes of shortness of breath, but none as severe as her episode today.  Relieved by:  Oxygen (NRB/NC)  Worsened by:  Nothing      Similar Symptoms Previously: Yes.  Recently seen: No prior visits on file.      Patient Care Team  Primary Care Provider: Provider, No Known    Past Medical History:     Allergies   Allergen Reactions   • Paxlovid [Nirmatrelvir-Ritonavir] Unknown - Low Severity   • Wellbutrin [Bupropion] Unknown - Low Severity     Past Medical History:   Diagnosis Date   • ADHD    • Arthritis    • Hypertension   "    Past Surgical History:   Procedure Laterality Date   • ARM WOUND REPAIR / CLOSURE Right      History reviewed. No pertinent family history.    Home Medications:  Prior to Admission medications    Not on File        Social History:   Social History     Tobacco Use   • Smoking status: Former Smoker   • Smokeless tobacco: Never Used   Substance Use Topics   • Alcohol use: Never   • Drug use: Yes     Frequency: 7.0 times per week     Types: Marijuana     Comment: medical marijuana     Recent travel: not applicable     Review of Systems:  Review of Systems   Unable to perform ROS: Severe respiratory distress   Respiratory: Positive for shortness of breath.    Psychiatric/Behavioral: The patient is nervous/anxious.         Physical Exam:  /98 (BP Location: Left arm, Patient Position: Sitting)   Pulse 86   Temp 97.4 °F (36.3 °C) (Oral)   Resp 20   Ht 170.2 cm (67\")   Wt 51.5 kg (113 lb 8.6 oz)   SpO2 96%   BMI 17.78 kg/m²     Physical Exam  Vitals and nursing note reviewed.   Constitutional:       Appearance: She is diaphoretic.   HENT:      Head: Normocephalic and atraumatic.      Nose: Nose normal.      Mouth/Throat:      Mouth: Mucous membranes are moist.   Eyes:      General: No scleral icterus.  Cardiovascular:      Rate and Rhythm: Regular rhythm. Tachycardia present.      Heart sounds: Normal heart sounds. No murmur heard.  Pulmonary:      Effort: No respiratory distress.      Breath sounds: Decreased breath sounds (bilateral) present.   Abdominal:      Palpations: Abdomen is soft.      Tenderness: There is no abdominal tenderness.   Musculoskeletal:         General: No tenderness. Normal range of motion.      Cervical back: Normal range of motion and neck supple.      Right lower leg: No edema.      Left lower leg: No edema.   Skin:     General: Skin is warm.   Neurological:      Mental Status: She is alert. Mental status is at baseline.   Psychiatric:         Mood and Affect: Mood is anxious.    "      Behavior: Behavior normal.                Medications in the Emergency Department:  Medications   sodium chloride 0.9 % flush 10 mL (has no administration in time range)   sodium chloride 0.9 % flush 10 mL (has no administration in time range)   sodium chloride 0.9 % flush 10 mL (has no administration in time range)   acetaminophen (TYLENOL) tablet 650 mg (650 mg Oral Given 4/12/22 1723)     Or   acetaminophen (TYLENOL) 160 MG/5ML solution 650 mg ( Oral Not Given:  See Alt 4/12/22 1723)     Or   acetaminophen (TYLENOL) suppository 650 mg ( Rectal Not Given:  See Alt 4/12/22 1723)   sennosides-docusate (PERICOLACE) 8.6-50 MG per tablet 2 tablet (2 tablets Oral Not Given 4/12/22 0925)     And   polyethylene glycol (MIRALAX) packet 17 g (has no administration in time range)     And   bisacodyl (DULCOLAX) EC tablet 5 mg (has no administration in time range)     And   bisacodyl (DULCOLAX) suppository 10 mg (has no administration in time range)   ondansetron (ZOFRAN) injection 4 mg (has no administration in time range)   albuterol (PROVENTIL) nebulizer solution 0.083% 2.5 mg/3mL (has no administration in time range)   arformoterol (BROVANA) nebulizer solution 15 mcg (15 mcg Nebulization Not Given 4/12/22 1215)   budesonide (PULMICORT) nebulizer solution 0.5 mg (0.5 mg Nebulization Not Given 4/12/22 1216)   ipratropium-albuterol (DUO-NEB) nebulizer solution 3 mL (3 mL Nebulization Given 4/12/22 1215)   methylPREDNISolone sodium succinate (SOLU-Medrol) injection 60 mg (60 mg Intravenous Given 4/12/22 6547)   ketamine (KETALAR) injection 51.5 mg (50 mg Intravenous Given 4/12/22 0312)   ipratropium-albuterol (DUO-NEB) nebulizer solution 3 mL (3 mL Nebulization Given 4/12/22 0080)   methylPREDNISolone sodium succinate (SOLU-Medrol) injection 125 mg (125 mg Intravenous Given 4/12/22 2253)   vancomycin 1000 mg/250 mL 0.9% NS IVPB ([facility]) (1,000 mg Intravenous New Bag 4/12/22 8613)   cefepime (MAXIPIME) IVPB 2 g (premix) in D5  (0 g Intravenous Stopped 4/12/22 1537)   furosemide (LASIX) injection 40 mg (40 mg Intravenous Given 4/12/22 7723)   iopamidol (ISOVUE-370) 76 % injection 100 mL (100 mL Intravenous Given 4/12/22 1333)        Labs  Lab Results (last 24 hours)     Procedure Component Value Units Date/Time    CBC & Differential [[id number]]  (Abnormal) Collected: 04/12/22 0301    Specimen: Blood Updated: 04/12/22 0318    Narrative:      The following orders were created for panel order CBC & Differential.  Procedure                               Abnormality         Status                     ---------                               -----------         ------                     CBC Auto Differential[[id number]]        Abnormal            Final result                 Please view results for these tests on the individual orders.    Comprehensive Metabolic Panel [[id number]]  (Abnormal) Collected: 04/12/22 0301    Specimen: Blood Updated: 04/12/22 0347     Glucose 269 mg/dL      BUN 21 mg/dL      Creatinine 0.84 mg/dL      Sodium 145 mmol/L      Potassium 4.3 mmol/L      Chloride 104 mmol/L      CO2 28.7 mmol/L      Calcium 10.0 mg/dL      Total Protein 8.2 g/dL      Albumin 4.90 g/dL      ALT (SGPT) 20 U/L      AST (SGOT) 25 U/L      Alkaline Phosphatase 137 U/L      Total Bilirubin 0.2 mg/dL      Globulin 3.3 gm/dL      A/G Ratio 1.5 g/dL      BUN/Creatinine Ratio 25.0     Anion Gap 12.3 mmol/L      eGFR 81.2 mL/min/1.73      Comment: National Kidney Foundation and American Society of Nephrology (ASN) Task Force recommended calculation based on the Chronic Kidney Disease Epidemiology Collaboration (CKD-EPI) equation refit without adjustment for race.       Narrative:      GFR Normal >60  Chronic Kidney Disease <60  Kidney Failure <15      BNP [[id number]]  (Abnormal) Collected: 04/12/22 0301    Specimen: Blood Updated: 04/12/22 0345     proBNP 2,427.0 pg/mL     Narrative:      Among patients with dyspnea, NT-proBNP is highly sensitive for  the detection of acute congestive heart failure. In addition NT-proBNP of <300 pg/ml effectively rules out acute congestive heart failure with 99% negative predictive value.    Results may be falsely decreased if patient taking Biotin.      Troponin [[id number]]  (Normal) Collected: 04/12/22 0301    Specimen: Blood Updated: 04/12/22 0347     Troponin T <0.010 ng/mL     Narrative:      Troponin T Reference Range:  <= 0.03 ng/mL-   Negative for AMI  >0.03 ng/mL-     Abnormal for myocardial necrosis.  Clinicians would have to utilize clinical acumen, EKG, Troponin and serial changes to determine if it is an Acute Myocardial Infarction or myocardial injury due to an underlying chronic condition.       Results may be falsely decreased if patient taking Biotin.      CBC Auto Differential [[id number]]  (Abnormal) Collected: 04/12/22 0301    Specimen: Blood Updated: 04/12/22 0318     WBC 11.62 10*3/mm3      RBC 5.33 10*6/mm3      Hemoglobin 15.3 g/dL      Hematocrit 48.4 %      MCV 90.8 fL      MCH 28.7 pg      MCHC 31.6 g/dL      RDW 13.8 %      RDW-SD 46.5 fl      MPV 9.6 fL      Platelets 378 10*3/mm3      Neutrophil % 58.5 %      Lymphocyte % 31.7 %      Monocyte % 8.1 %      Eosinophil % 1.0 %      Basophil % 0.3 %      Immature Grans % 0.4 %      Neutrophils, Absolute 6.80 10*3/mm3      Lymphocytes, Absolute 3.68 10*3/mm3      Monocytes, Absolute 0.94 10*3/mm3      Eosinophils, Absolute 0.12 10*3/mm3      Basophils, Absolute 0.03 10*3/mm3      Immature Grans, Absolute 0.05 10*3/mm3      nRBC 0.0 /100 WBC     Mycoplasma Pneumoniae Antibody, IgM - Blood, [[id number]]  (Abnormal) Collected: 04/12/22 0301    Specimen: Blood Updated: 04/12/22 1153     Mycoplasma pneumo IgM Positive    POC Glucose Once [[id number]]  (Abnormal) Collected: 04/12/22 0303    Specimen: Blood Updated: 04/12/22 0313     Glucose 201 mg/dL      Comment: Serial Number: [id number]:  [id number]       ABG with Co-Ox and Electrolytes [[id number]]   (Abnormal) Collected: 04/12/22 0320    Specimen: Arterial Blood from Arm, Right Updated: 04/12/22 0325     pH, Arterial 7.161 pH units      pCO2, Arterial 77.1 mm Hg      pO2, Arterial 111.0 mm Hg      HCO3, Arterial 26.9 mmol/L      Base Excess, Arterial -3.8 mmol/L      O2 Saturation, Arterial 96.6 %      Hemoglobin, Blood Gas 14.8 g/dL      Carboxyhemoglobin 0.4 %      Methemoglobin 0.30 %      Oxyhemoglobin 95.9 %      FHHB 3.4 %      Mo's Test --     Note --     Site Arterial: right radial     Modality Non Rebreather     FIO2 100 %      Flow Rate 15 lpm      Sodium, Arterial 142.4 mmol/L      Potassium, Arterial 3.99 mmol/L      Ionized Calcium, Arterial 1.17 mmol/L      Chloride, Arterial 104 mmol/L      Glucose, Arterial 302 mmol/L      Lactate, Arterial 0.98 mmol/L      PO2/FIO2 111    Lactic Acid, Plasma [[id number]]  (Normal) Collected: 04/12/22 0432    Specimen: Blood Updated: 04/12/22 0504     Lactate 1.6 mmol/L     Blood Culture - Blood, Arm, Right [[id number]] Collected: 04/12/22 0432    Specimen: Blood from Arm, Right Updated: 04/12/22 0439    Blood Culture - Blood, Arm, Right [[id number]] Collected: 04/12/22 0432    Specimen: Blood from Arm, Right Updated: 04/12/22 0438    COVID-19,APTIMA PANTHER(TERRI),[facility]/ [facility], NP/OP SWAB IN UTM/VTM/[facility] TRANSPORT MEDIA,24 HR TAT - Swab, Nasal Cavity [[id number]]  (Normal) Collected: 04/12/22 0433    Specimen: Swab from Nasal Cavity Updated: 04/12/22 1450     COVID19 Not Detected    Narrative:      Fact sheet for providers: https://www.fda.gov/media/055538/download     Fact sheet for patients: https://www.fda.gov/media/805399/download    Test performed by RT PCR.    Influenza Antigen, Rapid - Swab, Nasopharynx [[id number]]  (Normal) Collected: 04/12/22 0433    Specimen: Swab from Nasopharynx Updated: 04/12/22 0459     Influenza A Ag, EIA Negative     Influenza B Ag, EIA Negative    Urine Drug Screen - Urine, Clean Catch [[id number]]  (Abnormal) Collected:  04/12/22 0537    Specimen: Urine, Clean Catch Updated: 04/12/22 0633     Amphet/Methamphet, Screen Positive     Barbiturates Screen, Urine Negative     Benzodiazepine Screen, Urine Negative     Cocaine Screen, Urine Negative     Opiate Screen Negative     THC, Screen, Urine Positive     Methadone Screen, Urine Negative     Oxycodone Screen, Urine Negative    Narrative:      Negative Thresholds Per Drugs Screened:    Amphetamines                 500 ng/ml  Barbiturates                 200 ng/ml  Benzodiazepines              100 ng/ml  Cocaine                      300 ng/ml  Methadone                    300 ng/ml  Opiates                      300 ng/ml  Oxycodone                    100 ng/ml  THC                           50 ng/ml    The Normal Value for all drugs tested is negative. This report includes final unconfirmed screening results to be used for medical treatment purposes only. Unconfirmed results must not be used for non-medical purposes such as employment or legal testing. Clinical consideration should be applied to any drug of abuse test, particularly when unconfirmed results are used.            S. Pneumo Ag Urine or CSF - Urine, Urine, Clean Catch [[id number]]  (Normal) Collected: 04/12/22 0537    Specimen: Urine, Clean Catch Updated: 04/12/22 0830     Strep Pneumo Ag Negative    Legionella Antigen, Urine - Urine, Urine, Clean Catch [[id number]]  (Normal) Collected: 04/12/22 0537    Specimen: Urine, Clean Catch Updated: 04/12/22 0830     LEGIONELLA ANTIGEN, URINE Negative    ABG with Co-Ox and Electrolytes [[id number]]  (Abnormal) Collected: 04/12/22 0754    Specimen: Arterial Blood Updated: 04/12/22 0804     pH, Arterial 7.380 pH units      pCO2, Arterial 47.5 mm Hg      pO2, Arterial 67.0 mm Hg      HCO3, Arterial 27.5 mmol/L      Base Excess, Arterial 1.7 mmol/L      O2 Saturation, Arterial 92.7 %      Hemoglobin, Blood Gas 14.6 g/dL      Carboxyhemoglobin 0.6 %      Methemoglobin 0.20 %       "Oxyhemoglobin 92.0 %      FHHB 7.2 %      Mo's Test Positive     Note --     Site Arterial: right radial     Modality Nasal Cannula     FIO2 <21 %      Flow Rate --     Sodium, Arterial 141.4 mmol/L      Potassium, Arterial 3.37 mmol/L      Ionized Calcium, Arterial 1.12 mmol/L      Chloride, Arterial 102 mmol/L      Glucose, Arterial 166 mmol/L      Lactate, Arterial 0.64 mmol/L      PO2/FIO2 >319    Procalcitonin [[id number]]  (Normal) Collected: 04/12/22 0917    Specimen: Blood Updated: 04/12/22 1012     Procalcitonin 0.15 ng/mL     Narrative:      As a Marker for Sepsis (Non-Neonates):    1. <0.5 ng/mL represents a low risk of severe sepsis and/or septic shock.  2. >2 ng/mL represents a high risk of severe sepsis and/or septic shock.    As a Marker for Lower Respiratory Tract Infections that require antibiotic therapy:    PCT on Admission    Antibiotic Therapy       6-12 Hrs later    >0.5                Strongly Recommended  >0.25 - <0.5        Recommended  0.1 - 0.25          Discouraged              Remeasure/reassess PCT  <0.1                Strongly Discouraged     Remeasure/reassess PCT    As 28 day mortality risk marker: \"Change in Procalcitonin Result\" (>80% or <=80%) if Day 0 (or Day 1) and Day 4 values are available. Refer to http://www.[facility]-pct-calculator.com    Change in PCT <=80%  A decrease of PCT levels below or equal to 80% defines a positive change in PCT test result representing a higher risk for 28-day all-cause mortality of patients diagnosed with severe sepsis for septic shock.    Change in PCT >80%  A decrease of PCT levels of more than 80% defines a negative change in PCT result representing a lower risk for 28-day all-cause mortality of patients diagnosed with severe sepsis or septic shock.    This test is Prognostic not Diagnostic, if elevated correlate with clinical findings before administering antibiotic treatment.        Basic Metabolic Panel [[id number]]  (Abnormal) Collected: " 04/12/22 0917    Specimen: Blood Updated: 04/12/22 1008     Glucose 162 mg/dL      BUN 20 mg/dL      Creatinine 0.72 mg/dL      Sodium 144 mmol/L      Potassium 3.6 mmol/L      Chloride 103 mmol/L      CO2 27.9 mmol/L      Calcium 9.4 mg/dL      BUN/Creatinine Ratio 27.8     Anion Gap 13.1 mmol/L      eGFR 97.7 mL/min/1.73      Comment: National Kidney Foundation and American Society of Nephrology (ASN) Task Force recommended calculation based on the Chronic Kidney Disease Epidemiology Collaboration (CKD-EPI) equation refit without adjustment for race.       Narrative:      GFR Normal >60  Chronic Kidney Disease <60  Kidney Failure <15      CBC Auto Differential [[id number]]  (Abnormal) Collected: 04/12/22 0917    Specimen: Blood Updated: 04/12/22 0949     WBC 7.18 10*3/mm3      RBC 4.88 10*6/mm3      Hemoglobin 14.0 g/dL      Hematocrit 43.5 %      MCV 89.1 fL      MCH 28.7 pg      MCHC 32.2 g/dL      RDW 13.8 %      RDW-SD 45.0 fl      MPV 10.0 fL      Platelets 237 10*3/mm3      Neutrophil % 93.7 %      Lymphocyte % 4.5 %      Monocyte % 1.4 %      Eosinophil % 0.0 %      Basophil % 0.1 %      Immature Grans % 0.3 %      Neutrophils, Absolute 6.73 10*3/mm3      Lymphocytes, Absolute 0.32 10*3/mm3      Monocytes, Absolute 0.10 10*3/mm3      Eosinophils, Absolute 0.00 10*3/mm3      Basophils, Absolute 0.01 10*3/mm3      Immature Grans, Absolute 0.02 10*3/mm3      nRBC 0.0 /100 WBC            Imaging:  CT Head Without Contrast    Result Date: 4/12/2022  PROCEDURE: CT HEAD WO CONTRAST  COMPARISON:  None INDICATIONS: Mental status change  PROTOCOL:   Standard imaging protocol performed    RADIATION:   DLP: 1017.2mGy*cm   MA and/or KV was adjusted to minimize radiation dose.     TECHNIQUE: After obtaining the patient's consent, CT images were obtained without non-ionic intravenous contrast material.  FINDINGS:  The ventricles and CSF containing spaces are normal in size and configuration.  There is a subtle low  attenuation identified in the right parietal white matter.  No mass lesions, mass effect, acute hemorrhage or edema.  No intra or extra-axial fluid collections are seen.        1. Subtle area of low attenuation in the right parietal white matter which may be ischemic in nature.  Correlate with the patient's clinical findings.  MRI of the brain may be helpful for further characterization.     Tariq Holt MD       Electronically Signed and Approved By: Tariq Holt MD on 4/12/2022 at 7:49             CT Chest With Contrast Diagnostic    Result Date: 4/12/2022  PROCEDURE: CT CHEST W CONTRAST DIAGNOSTIC  COMPARISON:  None INDICATIONS: PE suspected, high prob,sob  TECHNIQUE: After obtaining the patient's consent, CT images were obtained with non-ionic intravenous contrast material.   PROTOCOL:   Pulmonary embolism imaging protocol performed    RADIATION:   DLP: 227.3mGy*cm   Automated exposure control was utilized to minimize radiation dose. CONTRAST: 100cc Isovue 370 I.V.  FINDINGS:  Pulmonary arteries:  There is an adequate bolus for evaluation of the pulmonary arterial system.  No evidence of filling defects are noted to suggest pulmonary embolus.  Main pulmonary artery is normal in caliber.  Mediastinum:  Heart size is within normal limits.  There is no suspicious pericardial effusion.  The aorta and origin of the great vessels is grossly unremarkable in appearance.  Mild hilar and mediastinal soft tissue compatible with reactive adenopathy..  Limited imaging of the base of the neck is unremarkable.  The esophagus demonstrates mild thickening distally favored represent atelectasis  Lungs:  Small bilateral pleural effusions are noted with dependent atelectasis.  Central airways are patent there is mild peribronchial wall thickening.  Bronchiectasis with surrounding soft tissue extent to the left apex.  This measures approximately 2.6 x 1.9 cm on the coronal images and extends approximately 3.2 cm anterior to  posterior.  A few scattered 1-2 mm noncalcified ground-glass nodular opacities are noted.  Upper abdomen:  Limited imaging of the upper abdomen is unremarkable.  Bones and soft tissues:  No acute osseous abnormality.          1. No evidence of pulmonary embolus.  2. Focal parenchymal opacity in the left upper lobe may represent post inflammatory or infectious changes or possible scarring.  Underlying malignancy cannot be excluded and short-term follow-up at 1-3 months recommended.  Alternatively follow-up PET-CT could be considered at this time.     PUMA MONTES DE OCA MD       Electronically Signed and Approved By: PUMA MONTES DE OCA MD on 4/12/2022 at 14:31             XR Chest 1 View    Result Date: 4/12/2022  PROCEDURE: XR CHEST 1 VW  COMPARISON: None.  INDICATIONS: RESPIRATORY DISTRESS.  FINDINGS: A single AP upright portable chest radiograph reveals age-indeterminate bilateral interstitial pulmonary opacities, which may represent mild pulmonary edema.  Chronic interstitial lung disease cannot be excluded.  An atypical acute infectious process is possible.  There may be borderline cardiac enlargement.  External artifacts obscure detail.  The thoracic aorta is atherosclerotic.  Chronic calcified granulomatous disease involves the chest.  There is nonspecific mild distension of the stomach.  There may be mild biapical pleural-parenchymal scarring, probably greater on the left.  No pneumothorax is seen.       Age-indeterminate interstitial pulmonary opacities are seen, which may represent mild pulmonary edema.  Borderline cardiac enlargement is suggested.  Please see above comments for further detail.     AMBROCIO HERNANDEZ JR, MD       Electronically Signed and Approved By: AMBROCIO HERNANDEZ JR, MD on 4/12/2022 at 3:36               Procedures:  Procedures    Progress  ED Course as of 04/12/22 1739   Tue Apr 12, 2022   0325 ECG 12 Lead  Sinus tachycardia of 107. PAC. Atrial enlargement. LVH, Prolonged Qtc, Normal NC  interval.  Mild ST elevation in V2, no reciprocal changes. No previous available for comparisons.This EKG was interpreted by me.  [LD]      ED Course User Index  [LD] Amy Larson MD                            Medical Decision Making:  Sepsis criteria was met in the emergency department and the Sepsis protocol (including antibiotic administration) was initiated.      SIRS criteria considered:   1.  Temperature > 100.4 or <98.6    2.  Heart Rate > 90    3.  Respiratory Rate > 22    4.  WBC > 12K or <4K.             Severe Sepsis:     Respiratory: Mechanical Ventilation or Bipap  Hypotension: SBP > 90 or MAP < 65  Renal: Creatinine > 2  Metabolic: Lactic Acid > 2  Hematologic: Platelets < 100K or INR > 1.5  Hepatic: BILI  >  2  CNS: Sudden AMS     Septic Shock:     Severe Sepsis + Persistent hypotension or Lactic Acid > 4     Normal saline bolus, Antibiotics, and final disposition was based on these definitions.        Sepsis was recognized at 0325    Antibiotics were ordered.     30 cc/kg bolus was indicated.       Total Critical Care time of 45 minutes. Total critical care time documented does not include time spent on separately billed procedures for services of nurses or physician assistants. I personally saw and examined the patient. I have reviewed all diagnostic interpretations and treatment plans as written. I was present for the key portions of any procedures performed and the inclusive time noted in any critical care statement. Critical care time includes patient management by me, time spent at the patients bedside,  time to review lab and imaging results, discussing patient care, documentation in the medical record, and time spent with family or caregiver.    MDM  Number of Diagnoses or Management Options  Diagnosis management comments: On arrival patient extremely anxious and keep pulling off her oxygen. Without O2 her oxygen sats dropped to the 70s.  Patient was given a small dose of ketamine to  assist in tolerating BiPAP.  Patient did well with BiPAP afterwards.  O2 sat came up to upper 90s.  ABG showed PO2 in the 60s.  BNP also elevated to 2100.  CT showed focal parenchymal opacity in the left upper lobe which could represent postinflammatory or infectious changes or possibly scarring underlying malignancy cannot be excluded.  She is also started on antibiotics.  Discussed patient with hospitalist and she will be admitted for further care.       Amount and/or Complexity of Data Reviewed  Clinical lab tests: ordered and reviewed  Tests in the radiology section of CPT®: ordered and reviewed  Review and summarize past medical records: yes  Independent visualization of images, tracings, or specimens: yes    Risk of Complications, Morbidity, and/or Mortality  Presenting problems: moderate  Management options: moderate         Final diagnoses:   Acute respiratory failure with hypoxia and hypercapnia (HCC)   History of COVID-19   Altered mental status, unspecified altered mental status type   Pneumonia due to infectious organism, unspecified laterality, unspecified part of lung        Disposition:  ED Disposition     ED Disposition   Decision to Admit    Condition   --    Comment   Level of Care: Critical Care [6]   Diagnosis: Respiratory failure (HCC) [027619]   Admitting Physician: JEREMY PROCTOR [[id number]]   Attending Physician: JEREMY PROCTOR [[id number]]   Certification: I Certify That Inpatient Hospital Services Are Medically Necessary For Greater Than 2 Midnights               Part of this note may be an electronic transcription/translation of spoken language to printed text using the Dragon Dictation System.     Documentation assistance provided by Jolie Miles acting as scribe for Amy Larson MD. Information recorded by the scribe was done at my direction and has been verified and validated by me.        Jolie Miles  04/12/22 0325       Jolie Miles  04/12/22 0325       Jolie Miles  04/12/22  0326       Amy Larson MD  04/12/22 [id number]

## 2022-04-13 ENCOUNTER — APPOINTMENT (OUTPATIENT)
Dept: CARDIOLOGY | Facility: HOSPITAL | Age: 57
End: 2022-04-13

## 2022-04-13 PROBLEM — E43 SEVERE MALNUTRITION: Status: ACTIVE | Noted: 2022-04-13

## 2022-04-13 LAB
ALBUMIN SERPL-MCNC: 4.3 G/DL (ref 3.5–5.2)
ALBUMIN/GLOB SERPL: 1.7 G/DL
ALP SERPL-CCNC: 94 U/L (ref 39–117)
ALT SERPL W P-5'-P-CCNC: 17 U/L (ref 1–33)
ANION GAP SERPL CALCULATED.3IONS-SCNC: 10.8 MMOL/L (ref 5–15)
AST SERPL-CCNC: 22 U/L (ref 1–32)
BASOPHILS # BLD AUTO: 0.01 10*3/MM3 (ref 0–0.2)
BASOPHILS NFR BLD AUTO: 0.1 % (ref 0–1.5)
BILIRUB SERPL-MCNC: 0.2 MG/DL (ref 0–1.2)
BUN SERPL-MCNC: 19 MG/DL (ref 6–20)
BUN/CREAT SERPL: 26 (ref 7–25)
CALCIUM SPEC-SCNC: 9.6 MG/DL (ref 8.6–10.5)
CHLORIDE SERPL-SCNC: 103 MMOL/L (ref 98–107)
CO2 SERPL-SCNC: 27.2 MMOL/L (ref 22–29)
CREAT SERPL-MCNC: 0.73 MG/DL (ref 0.57–1)
DEPRECATED RDW RBC AUTO: 46.4 FL (ref 37–54)
EGFRCR SERPLBLD CKD-EPI 2021: 96.1 ML/MIN/1.73
EOSINOPHIL # BLD AUTO: 0 10*3/MM3 (ref 0–0.4)
EOSINOPHIL NFR BLD AUTO: 0 % (ref 0.3–6.2)
ERYTHROCYTE [DISTWIDTH] IN BLOOD BY AUTOMATED COUNT: 14.1 % (ref 12.3–15.4)
GLOBULIN UR ELPH-MCNC: 2.5 GM/DL
GLUCOSE SERPL-MCNC: 159 MG/DL (ref 65–99)
HCT VFR BLD AUTO: 39.8 % (ref 34–46.6)
HGB BLD-MCNC: 13.2 G/DL (ref 12–15.9)
IMM GRANULOCYTES # BLD AUTO: 0.07 10*3/MM3 (ref 0–0.05)
IMM GRANULOCYTES NFR BLD AUTO: 0.7 % (ref 0–0.5)
L PNEUMO1 AG UR QL IA: NEGATIVE
LYMPHOCYTES # BLD AUTO: 0.36 10*3/MM3 (ref 0.7–3.1)
LYMPHOCYTES NFR BLD AUTO: 3.4 % (ref 19.6–45.3)
MAGNESIUM SERPL-MCNC: 2 MG/DL (ref 1.6–2.6)
MCH RBC QN AUTO: 29.9 PG (ref 26.6–33)
MCHC RBC AUTO-ENTMCNC: 33.2 G/DL (ref 31.5–35.7)
MCV RBC AUTO: 90 FL (ref 79–97)
MONOCYTES # BLD AUTO: 0.14 10*3/MM3 (ref 0.1–0.9)
MONOCYTES NFR BLD AUTO: 1.3 % (ref 5–12)
NEUTROPHILS NFR BLD AUTO: 9.95 10*3/MM3 (ref 1.7–7)
NEUTROPHILS NFR BLD AUTO: 94.5 % (ref 42.7–76)
NRBC BLD AUTO-RTO: 0 /100 WBC (ref 0–0.2)
PHOSPHATE SERPL-MCNC: 3.5 MG/DL (ref 2.5–4.5)
PLATELET # BLD AUTO: 241 10*3/MM3 (ref 140–450)
PMV BLD AUTO: 11.3 FL (ref 6–12)
POTASSIUM SERPL-SCNC: 4.3 MMOL/L (ref 3.5–5.2)
PROT SERPL-MCNC: 6.8 G/DL (ref 6–8.5)
RBC # BLD AUTO: 4.42 10*6/MM3 (ref 3.77–5.28)
S PNEUM AG SPEC QL LA: NEGATIVE
SODIUM SERPL-SCNC: 141 MMOL/L (ref 136–145)
VANCOMYCIN SERPL-MCNC: 8.82 MCG/ML (ref 5–40)
WBC NRBC COR # BLD: 10.53 10*3/MM3 (ref 3.4–10.8)

## 2022-04-13 PROCEDURE — 83735 ASSAY OF MAGNESIUM: CPT | Performed by: INTERNAL MEDICINE

## 2022-04-13 PROCEDURE — 99233 SBSQ HOSP IP/OBS HIGH 50: CPT | Performed by: INTERNAL MEDICINE

## 2022-04-13 PROCEDURE — 84484 ASSAY OF TROPONIN QUANT: CPT | Performed by: INTERNAL MEDICINE

## 2022-04-13 PROCEDURE — 87899 AGENT NOS ASSAY W/OPTIC: CPT | Performed by: INTERNAL MEDICINE

## 2022-04-13 PROCEDURE — 94799 UNLISTED PULMONARY SVC/PX: CPT

## 2022-04-13 PROCEDURE — 25010000002 METHYLPREDNISOLONE PER 125 MG: Performed by: INTERNAL MEDICINE

## 2022-04-13 PROCEDURE — 93306 TTE W/DOPPLER COMPLETE: CPT | Performed by: SPECIALIST

## 2022-04-13 PROCEDURE — 99232 SBSQ HOSP IP/OBS MODERATE 35: CPT | Performed by: SPECIALIST

## 2022-04-13 PROCEDURE — 36415 COLL VENOUS BLD VENIPUNCTURE: CPT | Performed by: INTERNAL MEDICINE

## 2022-04-13 PROCEDURE — 94618 PULMONARY STRESS TESTING: CPT

## 2022-04-13 PROCEDURE — 25010000002 VANCOMYCIN 5 G RECONSTITUTED SOLUTION: Performed by: PHYSICIAN ASSISTANT

## 2022-04-13 PROCEDURE — 93306 TTE W/DOPPLER COMPLETE: CPT

## 2022-04-13 PROCEDURE — 84100 ASSAY OF PHOSPHORUS: CPT | Performed by: INTERNAL MEDICINE

## 2022-04-13 PROCEDURE — 25010000002 ONDANSETRON PER 1 MG: Performed by: FAMILY MEDICINE

## 2022-04-13 PROCEDURE — 80053 COMPREHEN METABOLIC PANEL: CPT | Performed by: INTERNAL MEDICINE

## 2022-04-13 PROCEDURE — 83036 HEMOGLOBIN GLYCOSYLATED A1C: CPT | Performed by: INTERNAL MEDICINE

## 2022-04-13 PROCEDURE — 85379 FIBRIN DEGRADATION QUANT: CPT | Performed by: INTERNAL MEDICINE

## 2022-04-13 PROCEDURE — 93005 ELECTROCARDIOGRAM TRACING: CPT | Performed by: INTERNAL MEDICINE

## 2022-04-13 PROCEDURE — 25010000002 AZITHROMYCIN PER 500 MG: Performed by: INTERNAL MEDICINE

## 2022-04-13 PROCEDURE — 85025 COMPLETE CBC W/AUTO DIFF WBC: CPT | Performed by: INTERNAL MEDICINE

## 2022-04-13 PROCEDURE — 80202 ASSAY OF VANCOMYCIN: CPT | Performed by: PHYSICIAN ASSISTANT

## 2022-04-13 RX ORDER — IPRATROPIUM BROMIDE AND ALBUTEROL SULFATE 2.5; .5 MG/3ML; MG/3ML
SOLUTION RESPIRATORY (INHALATION)
Status: COMPLETED
Start: 2022-04-13 | End: 2022-04-13

## 2022-04-13 RX ORDER — LEVALBUTEROL INHALATION SOLUTION 0.63 MG/3ML
0.63 SOLUTION RESPIRATORY (INHALATION) EVERY 6 HOURS PRN
Status: DISCONTINUED | OUTPATIENT
Start: 2022-04-13 | End: 2022-04-18 | Stop reason: HOSPADM

## 2022-04-13 RX ORDER — LORAZEPAM 2 MG/ML
0.5 INJECTION INTRAMUSCULAR ONCE
Status: COMPLETED | OUTPATIENT
Start: 2022-04-14 | End: 2022-04-13

## 2022-04-13 RX ORDER — METOPROLOL SUCCINATE 25 MG/1
25 TABLET, EXTENDED RELEASE ORAL
Status: DISCONTINUED | OUTPATIENT
Start: 2022-04-13 | End: 2022-04-18 | Stop reason: HOSPADM

## 2022-04-13 RX ORDER — LORAZEPAM 2 MG/ML
INJECTION INTRAMUSCULAR
Status: DISPENSED
Start: 2022-04-13 | End: 2022-04-14

## 2022-04-13 RX ORDER — PREDNISONE 20 MG/1
40 TABLET ORAL
Status: DISCONTINUED | OUTPATIENT
Start: 2022-04-14 | End: 2022-04-18 | Stop reason: HOSPADM

## 2022-04-13 RX ADMIN — Medication 10 ML: at 08:53

## 2022-04-13 RX ADMIN — Medication 10 ML: at 20:05

## 2022-04-13 RX ADMIN — ACETAMINOPHEN 650 MG: 325 TABLET ORAL at 17:00

## 2022-04-13 RX ADMIN — IPRATROPIUM BROMIDE AND ALBUTEROL SULFATE 3 ML: 2.5; .5 SOLUTION RESPIRATORY (INHALATION) at 01:01

## 2022-04-13 RX ADMIN — ONDANSETRON 4 MG: 2 INJECTION INTRAMUSCULAR; INTRAVENOUS at 13:48

## 2022-04-13 RX ADMIN — ARFORMOTEROL TARTRATE 15 MCG: 15 SOLUTION RESPIRATORY (INHALATION) at 18:25

## 2022-04-13 RX ADMIN — LORAZEPAM 0.5 MG: 2 INJECTION INTRAMUSCULAR; INTRAVENOUS at 23:45

## 2022-04-13 RX ADMIN — METOPROLOL SUCCINATE 25 MG: 25 TABLET, FILM COATED, EXTENDED RELEASE ORAL at 08:52

## 2022-04-13 RX ADMIN — BUDESONIDE 0.5 MG: 0.5 SUSPENSION RESPIRATORY (INHALATION) at 18:25

## 2022-04-13 RX ADMIN — VANCOMYCIN HYDROCHLORIDE 750 MG: 5 INJECTION, POWDER, LYOPHILIZED, FOR SOLUTION INTRAVENOUS at 10:35

## 2022-04-13 RX ADMIN — ARFORMOTEROL TARTRATE 15 MCG: 15 SOLUTION RESPIRATORY (INHALATION) at 07:32

## 2022-04-13 RX ADMIN — IPRATROPIUM BROMIDE AND ALBUTEROL SULFATE 3 ML: 2.5; .5 SOLUTION RESPIRATORY (INHALATION) at 23:51

## 2022-04-13 RX ADMIN — METHYLPREDNISOLONE SODIUM SUCCINATE 60 MG: 125 INJECTION, POWDER, FOR SOLUTION INTRAMUSCULAR; INTRAVENOUS at 01:36

## 2022-04-13 RX ADMIN — BUDESONIDE 0.5 MG: 0.5 SUSPENSION RESPIRATORY (INHALATION) at 07:32

## 2022-04-13 RX ADMIN — IPRATROPIUM BROMIDE AND ALBUTEROL SULFATE 3 ML: 2.5; .5 SOLUTION RESPIRATORY (INHALATION) at 07:32

## 2022-04-13 RX ADMIN — ACETAMINOPHEN 650 MG: 325 TABLET ORAL at 12:19

## 2022-04-13 RX ADMIN — POTASSIUM CHLORIDE 40 MEQ: 750 CAPSULE, EXTENDED RELEASE ORAL at 01:34

## 2022-04-13 RX ADMIN — METHYLPREDNISOLONE SODIUM SUCCINATE 60 MG: 125 INJECTION, POWDER, FOR SOLUTION INTRAMUSCULAR; INTRAVENOUS at 13:47

## 2022-04-13 RX ADMIN — AZITHROMYCIN 500 MG: 500 INJECTION, POWDER, LYOPHILIZED, FOR SOLUTION INTRAVENOUS at 12:16

## 2022-04-13 RX ADMIN — METHYLPREDNISOLONE SODIUM SUCCINATE 60 MG: 125 INJECTION, POWDER, FOR SOLUTION INTRAMUSCULAR; INTRAVENOUS at 08:52

## 2022-04-13 NOTE — NURSING NOTE
HR was  SR on monitor    2044 Ann notified of increased hr 166, new orders received    2050 Bolus administered, ekg done, SVT hr 193, Contacted Ann, text page, enroute to floor, pharmacy called meds being brought to floor    2055 RRT called, Ann at bedside    2100 MD Herrera bedside    2102, 5mg lopressor iv administered    2113  6mg adenosine    2116 12mg adenosine    2119 5mg lopressor iv administered    cardiazem started rate 99 now look at emar

## 2022-04-13 NOTE — CONSULTS
"Nutrition Services    Patient Name: Yenni Camacho  YOB: 1965  MRN: [id number]  Admission date: 4/12/2022      CLINICAL NUTRITION ASSESSMENT      Reason for Assessment  BMI   H&P:    Past Medical History:   Diagnosis Date   • ADHD    • Arthritis    • Hypertension         Current Problems:   Active Hospital Problems    Diagnosis    • Respiratory failure (HCC)         Nutrition/Diet History         Narrative     RD consult for BMI of only 17.78.  In talking with pt thinks she had Covid back in January, stated lost taste & because of being so sick she did not eat for 6 days.  Pt states eating better now & hungry, RD even obtained some ice cream & peanut butter/timbo crackers to hold her over until lunch.  Pt stated UBW prior to that was 132#, lost 19#, which is a 14.4% clinically significant change.  Pt does have some upper body muscle & fat wasting but still had good lower body muscle tone.  Meets criteria for malnutrition based on ASPEN criteria.  Possible dx of COPD therefore discussed importance of small frequent meals, pt agreeable to snacks at 2 & 8 pm.     Anthropometrics        Current Height, Weight Height: 170.2 cm (67\")  Weight: 51.5 kg (113 lb 8.6 oz)   Current BMI Body mass index is 17.78 kg/m².  Underweight       Weight Hx  Wt Readings from Last 30 Encounters:   04/12/22 0309 51.5 kg (113 lb 8.6 oz)   04/12/22 0309 51.5 kg (113 lb 8.6 oz)            Wt Change Observation 19# loss, ~14.4%     Estimated/Assessed Needs       Energy Requirements    EST Needs (kcal/day) 3316-2078 (25-35 kcal/IBW)       Protein Requirements    EST Daily Needs (g/day) 62-92 (1-1.5)       Fluid Requirements     Estimated Needs (mL/day) 1540     Labs/Medications         Pertinent Labs Reviewed.   Results from last 7 days   Lab Units 04/13/22  0546 04/12/22  2108 04/12/22  0917 04/12/22  0320 04/12/22  0301   SODIUM mmol/L 141 145 144  --  145   SODIUM, ARTERIAL   --   --   --    < >  --    POTASSIUM mmol/L " 4.3 3.3* 3.6  --  4.3   CHLORIDE mmol/L 103 101 103  --  104   CO2 mmol/L 27.2 29.1* 27.9  --  28.7   BUN mg/dL 19 20 20  --  21*   CREATININE mg/dL 0.73 0.95 0.72  --  0.84   CALCIUM mg/dL 9.6 9.5 9.4  --  10.0   BILIRUBIN mg/dL 0.2 0.3  --   --  0.2   ALK PHOS U/L 94 103  --   --  137*   ALT (SGPT) U/L 17 16  --   --  20   AST (SGOT) U/L 22 16  --   --  25   GLUCOSE mg/dL 159* 214* 162*  --  269*   GLUCOSE, ARTERIAL   --   --   --    < >  --     < > = values in this interval not displayed.     Results from last 7 days   Lab Units 04/13/22  0546 04/12/22  2108   MAGNESIUM mg/dL 2.0 1.8   PHOSPHORUS mg/dL 3.5  --    HEMOGLOBIN g/dL 13.2 14.1   HEMATOCRIT % 39.8 42.6       Pertinent Medications Reviewed.     Current Nutrition Orders & Evaluation of Intake       Oral Nutrition     Current PO Diet Diet Regular   Supplement No active supplement orders       Malnutrition Severity Assessment      Patient meets criteria for : Severe Malnutrition  Malnutrition Type (last 8 hours)     Malnutrition Severity Assessment     Row Name 04/13/22 1122       Malnutrition Severity Assessment    Malnutrition Type Chronic Disease - Related Malnutrition    Row Name 04/13/22 1122       Unintentional Weight Loss     Unintentional Weight Loss Findings Severe    Unintentional Weight Loss  Weight loss of 7.5% in three months    Row Name 04/13/22 1122       Muscle Loss    Loss of Muscle Mass Findings Severe    [place] Severe - deep hollowing/scooping, lack of muscle to touch, facial bones well defined    Acromion Bone Region Severe - squared shoulders, bones, and acromion process protrusion prominent    Row Name 04/13/22 1122       Fat Loss    Subcutaneous Fat Loss Findings Severe    Orbital Region  Severe - pronounced hollowness/depression, dark circles, loose saggy skin    Upper Arm Region Moderate - some fat tissue, not ample    Row Name 04/13/22 1122       Criteria Met (Must meet criteria for severity in at least 2 of these  categories: M Wasting, Fat Loss, Fluid, Secondary Signs, Wt. Status, Intake)    Patient meets criteria for  Severe Malnutrition                 Nutrition Diagnosis         Nutrition Dx Problem 1 Severe malnutrition related to inadequate energy Intake as evidenced by decreased appetite., unintended wt change., body composition changes. and patient report.     Nutrition Intervention         Continue regular diet  Add snacks between meals, pt also knows to ask for floor stock prn     Medical Nutrition Therapy/Nutrition Education          Learner     Readiness Patient  Eager     Method     Response Explanation  Verbalizes understanding     Monitor/Evaluation        Monitor PO intake, Pertinent labs, Weight     Nutrition Discharge Plan         continue small frequent meals/snacks     Electronically signed by:  Tracy Whitney RD  04/13/22 11:24 EDT

## 2022-04-13 NOTE — SIGNIFICANT NOTE
RRT CALLED  DUE TO PATIENT BEING IN SVT RATE 'S FOLLOWING A BREATHING TREATMENT. ALPA BIANCHI NP AND DR. JEREMY PROCTOR PRESENT WITH PRIMARY NURSE AT BEDSIDE. PATIENT GIVEN 5 MG IV LOPRESSOR, 500 ML NS BOLUS, 6 MG ADENOSINE IV, 12 MG ADENOSINE IV, AND 5 ADDITIONAL MG LOPRESSOR WITH RETURN TO SINUS RHYTHM. CARDIZEM DRIP ORDERED LOW DOSE TO MAINTAIN SINUS RHYTHM HR LESS THAN 100.

## 2022-04-13 NOTE — PROGRESS NOTES
[facility]   Hospitalist Progress Note  Date: 2022  Patient Name: Yenni Camacho  : 1965  MRN: [id number]  Date of admission: 2022      Subjective   Subjective     Chief Complaint: Shortness of breath    Summary: 56 y.o. female with history of COVID-19 infection in 2022, likely COPD, who presented to the ED on  due to shortness of breath and altered mental status. Her oxygen saturation was in low 60s on presentation.  Due to her confusion and agitation, she was given ketamine and was started on BiPAP. She was on BiPAP overnight and is admitted to ICU with altered mental status and hypercapnic respiratory failure.    Pulmonology was consulted.  She was started on scheduled steroids and bronchodilators.  She did improve rapidly during her stay was transitioned to nasal cannula following morning.  Hospital course complicated by SVT requiring IV adenosine pushes.  Cardiology was consulted and she was started on oral beta-blocker with improvement.    Interval Followup: Patient went into SVT last night with rates as high as 220.  She required an IV dose of metoprolol as well as 2 doses of IV adenosine and briefly on a diltiazem drip before her heart rate improved.  She did endorse feeling palpitations like her heart was fluttering during that episode.  This morning, she states she is feeling significantly better.  No chest pain or palpitations.  Still intermittent shortness of breath but also improving.    Review of Systems   All systems reviewed and negative unless otherwise stated under interval follow-up    Objective   Objective     Vitals:   Temp:  [97.4 °F (36.3 °C)-98.5 °F (36.9 °C)] 98.2 °F (36.8 °C)  Heart Rate:  [] 99  Resp:  [16-24] 18  BP: (121-167)/() 154/88  Flow (L/min):  [2] 2  Physical Exam    Constitutional: Awake, alert, no acute distress   Eyes: Pupils equal, sclerae anicteric, no conjunctival injection   HENT: NCAT, mucous membranes moist   Neck:  Supple, no thyromegaly, no lymphadenopathy, trachea midline   Respiratory: Expiratory wheezing noted in bilateral bases, otherwise clear to auscultation bilaterally, nonlabored respirations    Cardiovascular: RRR, no murmurs, rubs, or gallops   Gastrointestinal: Positive bowel sounds, soft, nontender, nondistended   Musculoskeletal: No bilateral ankle edema, no clubbing or cyanosis to extremities   Psychiatric: Appropriate affect, cooperative   Neurologic: Oriented x 3, strength symmetric in all extremities, Cranial Nerves grossly intact to confrontation, speech clear   Skin: No rashes     Result Review    Result Review:  I have personally reviewed the results from the time of this admission to 4/13/2022 15:43 EDT and agree with these findings:  [x]  Laboratory sodium 141, potassium 4.3, creatinine 0.73, WBC 10, hemoglobin 13.2, platelets 231  [x]  Microbiology strep and Legionella antigens negative, Covid negative, flu negative  [x]  Radiology CT chest reviewed: Focal parenchymal opacity in the left upper lobe may represent post inflammatory or infectious changes or possible scarring  [x]  EKG/Telemetry telemetry reviewed showing SVT up to 200 overnight, now normal sinus rhythm with intermittent PVCs  []  Cardiology/Vascular   []  Pathology  []  Old records  []  Other:    Assessment/Plan   Assessment / Plan     Assessment/Plan:  Paroxysmal supraventricular tachycardia  COPD with acute exacerbation  Acute hypoxic and hypercapnic respiratory failure requiring NIPPV  Recent Covid infection  Metabolic encephalopathy secondary to hypoxemia  Tobacco dependence in remission    Continue to monitor on telemetry for work-up and management of the above  Pulmonology following, appreciate assistance  Consult cardiology  Start Toprol-XL 25 mg daily  Obtain 2D echo  Transition Solu-Medrol to oral prednisone 40 mg daily  Continue Pulmicort and Brovana twice daily, change to Xopenex as needed if this will be less likely to  precipitate SVT  Mycoplasma IgM positive, will treat with azithromycin 500 mg daily x3 days  Patient will benefit from an outpatient CT to follow-up soft tissue density of the left upper lobe  Continue appropriate home medications  Trend renal function and electrolytes with a.m. BMP  Trend Hgb and WBC with a.m. CBC    Discussed plan with RN, cardiology, pulmonology    DVT prophylaxis:  Mechanical DVT prophylaxis orders are present.    CODE STATUS:   Code Status (Patient has no pulse and is not breathing): CPR (Attempt to Resuscitate)  Medical Interventions (Patient has pulse or is breathing): Full Support        Electronically signed by Jose Cohen MD, 04/13/22, 3:43 PM EDT.

## 2022-04-13 NOTE — CONSULTS
[facility]   Cardiology Consult Note    Patient Name: Yenni Camacho  : 1965  MRN: [id number]  Primary Care Physician:  Provider, No Known  Referring Physician: No Known Provider  Date of admission: 2022    Subjective   Subjective     Reason for Consult/ Chief Complaint: COPD exacerbation/SVT.    HPI:  Yenni Camacho is a 56 y.o. female admitted with COPD exacerbation.  While getting her respiratory treatment had an episode of SVT converted to sinus rhythm with adenosine.  Now in sinus rhythm.  No chest pain or shortness of breath.  No previous history of SVT    Review of Systems:   Constitutional no fever,  no weight loss   Skin no rash   Otolaryngeal no difficulty swallowing   Cardiovascular See HPI   Pulmonary no cough, no sputum production   Gastrointestinal no constipation, no diarrhea   Genitourinary no dysuria, no hematuria   Hematologic no easy bruisability, no abnormal bleeding   Musculoskeletal no muscle pain   Neurologic no dizziness, no falls         Personal History       Past Medical/Surgical History:   Past Medical History:   Diagnosis Date   • ADHD    • Arthritis    • Hypertension      Past Surgical History:   Procedure Laterality Date   • ARM WOUND REPAIR / CLOSURE Right          Family History: No Family History of CAD.    Social History:  reports that she has quit smoking. She has never used smokeless tobacco. She reports current drug use. Frequency: 7.00 times per week. Drug: Marijuana. She reports that she does not drink alcohol.    Medications:  Medications Prior to Admission   Medication Sig Dispense Refill Last Dose   • amphetamine-dextroamphetamine (ADDERALL) 20 MG tablet Take 20 mg by mouth 2 (Two) Times a Day.   Unknown at Unknown time   • cloNIDine (CATAPRES) 0.3 MG tablet Take 0.3 mg by mouth Daily.   Unknown at Unknown time     Current medications:  adenosine, 12 mg, Intravenous, Once  arformoterol, 15 mcg, Nebulization, BID - RT  azithromycin, 500 mg,  Intravenous, Q24H  budesonide, 0.5 mg, Nebulization, BID - RT  methylPREDNISolone sodium succinate, 60 mg, Intravenous, Q6H  metoprolol succinate XL, 25 mg, Oral, Q24H  metoprolol tartrate, 5 mg, Intravenous, Once  senna-docusate sodium, 2 tablet, Oral, BID  sodium chloride, 10 mL, Intravenous, Q12H      Current IV drips:  dilTIAZem, 5-15 mg/hr, Last Rate: Stopped (04/13/22 0901)        Allergies:  Allergies   Allergen Reactions   • Paxlovid [Nirmatrelvir-Ritonavir] Unknown - Low Severity   • Wellbutrin [Bupropion] Unknown - Low Severity       Objective    Objective     Vitals:   Temp:  [97.4 °F (36.3 °C)-98.5 °F (36.9 °C)] 98.2 °F (36.8 °C)  Heart Rate:  [] 99  Resp:  [16-24] 18  BP: (121-167)/() 154/88  Flow (L/min):  [2] 2      Physical Exam:   Constitutional: Awake, alert, No acute distress    Eyes: PERRLA, sclerae anicteric, no conjunctival injection   HENT: NCAT, mucous membranes moist   Neck: Supple, no thyromegaly, no lymphadenopathy, trachea midline   Respiratory: Clear to auscultation bilaterally, nonlabored respirations    Cardiovascular: RRR, no murmurs, rubs, or gallops, palpable pedal pulses bilaterally   Gastrointestinal: Positive bowel sounds, soft, nontender, nondistended   Musculoskeletal: No bilateral ankle edema, no clubbing or cyanosis to extremities   Psychiatric: Appropriate affect, cooperative   Neurologic: Oriented x 3, strength symmetric in all extremities, Cranial Nerves grossly intact to confrontation, speech clear   Skin: No rashes.    Result Review    Result Review:  I have personally reviewed the results from the time of this admission to 4/13/2022 13:04 EDT and agree with these findings:  [x]  Laboratory  [x]  EKG/Telemetry   [x]  Cardiology/Vascular   []  Pathology  [x]  Old records  [x]  Medications  Basic Metabolic Panel    Sodium Sodium   Date Value Ref Range Status   04/13/2022 141 136 - 145 mmol/L Final   04/12/2022 145 136 - 145 mmol/L Final   04/12/2022 144 136 -  145 mmol/L Final   04/12/2022 145 136 - 145 mmol/L Final     Sodium, Arterial   Date Value Ref Range Status   04/12/2022 141.4 136 - 146 mmol/L Final   04/12/2022 142.4 136 - 146 mmol/L Final      Potassium Potassium   Date Value Ref Range Status   04/13/2022 4.3 3.5 - 5.2 mmol/L Final     Comment:     Specimen hemolyzed.  Results may be affected.   04/12/2022 3.3 (L) 3.5 - 5.2 mmol/L Final   04/12/2022 3.6 3.5 - 5.2 mmol/L Final   04/12/2022 4.3 3.5 - 5.2 mmol/L Final      Chloride Chloride   Date Value Ref Range Status   04/13/2022 103 98 - 107 mmol/L Final   04/12/2022 101 98 - 107 mmol/L Final   04/12/2022 103 98 - 107 mmol/L Final   04/12/2022 104 98 - 107 mmol/L Final      Bicarbonate No results found for: PLASMABICARB   BUN BUN   Date Value Ref Range Status   04/13/2022 19 6 - 20 mg/dL Final   04/12/2022 20 6 - 20 mg/dL Final   04/12/2022 20 6 - 20 mg/dL Final   04/12/2022 21 (H) 6 - 20 mg/dL Final      Creatinine Creatinine   Date Value Ref Range Status   04/13/2022 0.73 0.57 - 1.00 mg/dL Final   04/12/2022 0.95 0.57 - 1.00 mg/dL Final   04/12/2022 0.72 0.57 - 1.00 mg/dL Final   04/12/2022 0.84 0.57 - 1.00 mg/dL Final      Calcium Calcium   Date Value Ref Range Status   04/13/2022 9.6 8.6 - 10.5 mg/dL Final   04/12/2022 9.5 8.6 - 10.5 mg/dL Final   04/12/2022 9.4 8.6 - 10.5 mg/dL Final   04/12/2022 10.0 8.6 - 10.5 mg/dL Final              EKG shows sinus rhythm with no acute changes.  Telemetry reviewed    Assessment / Plan     Impression:   1.  Paroxysmal supraventricular tachycardia probably secondary to her pulmonary issues.  2.  COPD exacerbation.  3.  Recent Covid infection.    Plan:   1.  Echocardiogram.  2.  Continue Toprol-XL 25 mg once a day.  Increase to twice daily if heart rate is high.  3.  Continue current treatment plan for COPD.    Electronically signed by Chele Zaragoza MD, 04/13/22, 1:04 PM EDT.

## 2022-04-13 NOTE — PROGRESS NOTES
Reason for consultation respiratory failure    HPI  Transferred to the ICU  Arterial blood gas improved  Did have some SVT overnight which also improved  Patient states that she feels much better today  Still has some shortness of breath worse with exertion and movement better with rest  Breathing medication helping immensely  BiPAP also helps  No associated chest pains  Did have some heart palpitations  Still with cough but with no hemoptysis  No other aggravating factors no other relieving factors at this time      Review of Systems  General:  Fatigue, No Fever, no chills, no weight loss or loss of appetite  HEENT: No dysphagia, No Visual Changes, no rhinorrhea  Respiratory:  + cough,+Dyspnea, no phlegm, No Pleuritic Pain, + wheezing, no hemoptysis  Cardiovascular: Denies chest pain, denies palpitations,+MURPHY, +Chest Pressure  Gastrointestinal:  No Abdominal Pain, No Nausea, No Vomiting, No Diarrhea  Genitourinary:  No Dysuria, No Frequency, No Hesitancy  Musculoskeletal: No muscle pain or swelling            Vitals:    04/13/22 0732 04/13/22 0736 04/13/22 0800 04/13/22 0900   BP:   146/79 137/79   BP Location:       Patient Position:       Pulse: 78 86 88 93   Resp: 16 16     Temp:       TempSrc:       SpO2:       Weight:       Height:             Physical Exam:  Vital Signs Reviewed   Thin built, malnourished female, in no acute  distress, has conversational dyspnea  HEENT:  PERRL, EOMI.  OP, nares clear, no sinus tenderness  Neck:  Supple, no JVD, no thyromegaly  Lymph: no axillary, cervical, supraclavicular lymphadenopathy noted bilaterally  Chest:   Bilateral diminished breath sounds, no longer with use of accessory muscles of respiration, no rhonchi or crackles, scattered wheezing expiratory, resonant percussion bilaterally  CV: RRR, no MGR, pulses 2+, equal  Abd:  Soft, NT, ND, + BS, no HSM  EXT:  no clubbing, no cyanosis, no edema, no joint tenderness  Neuro:  A&Ox3, CN grossly intact, no focal deficits,  has generalized weakness  Skin: No rashes or lesions noted       Assessment  Acute COPD exacerbation  Acute exacerbation of bronchiectasis  Malnutrition with body mass index of 17  Acute hypercapnic respiratory failure  Respiratory acidosis  Left upper lobe bronchiectasis  Bilateral small pulmonary nodules    Plan  Continue Brovana and Pulmicort as needed albuterol at reduced dose given her recent SVT    Continue broad-spectrum antibiotics    Continue Solu-Medrol to the IV    BiPAP 16/8 to sleep    May benefit from a bedside spirometry    Patient's respiratory failure appears to be acute based upon serum bicarbonate    CT scan reviewed showing an area of some soft tissue density surrounding bronchiectasis in the left upper lobe would benefit from repeat CT scan in the future suspect this area represents an area of bronchiectasis and scarring however tumors can arise from scar tissue    Given BMI of 17 would benefit from a dietitian consult    I personally reviewed all imaging, laboratory data, and I spoke with respiratory therapy, and nursing regarding the patient's care, have also spoken with the patient's primary admitting physician regarding her plan of care.    Electronically signed by Pradeep Arauz DO, 04/13/22, 11:57 AM EDT.

## 2022-04-13 NOTE — NURSING NOTE
Exercise Oximetry    Patient Name:Yenni Camacho   MRN: [id number]   Date: 04/13/22             ROOM AIR BASELINE   SpO2% 94   Heart Rate    Blood Pressure      EXERCISE ON ROOM AIR SpO2% EXERCISE ON O2 @  LPM SpO2%   1 MINUTE 93 1 MINUTE    2 MINUTES 92 2 MINUTES    3 MINUTES 91 3 MINUTES    4 MINUTES 93 4 MINUTES    5 MINUTES 90 5 MINUTES    6 MINUTES 89 6 MINUTES               Distance Walked   Distance Walked   Dyspnea (Kassi Scale)   Dyspnea (Kassi Scale)   Fatigue (Kassi Scale)   Fatigue (Kassi Scale)   SpO2% Post Exercise  91 SpO2% Post Exercise   HR Post Exercise   HR Post Exercise   Time to Recovery   Time to Recovery     Comments:

## 2022-04-13 NOTE — PROGRESS NOTES
"Pharmacy to Dose Vancomycin Day: 1    Yenni Camacho is a 56 y.o.female admitted with respiratory distress. Pharmacy has been consulted to dose IV Vancomycin     Consulting Provider: USHA LEROY  Clinical Indication: bacteremia   Pertinent Past Medical History:   Patient COVID + January 2022 and has reportedly had respiratory issues since this time.  Goal -600 mg/L.hr  Duration of therapy: 7 days     170.2 cm (67\")       04/12/22  0309      Weight: 51.5 kg (113 lb 8.6 oz)         Estimated Creatinine Clearance: 53.1 mL/min (by C-G formula based on SCr of 0.95 mg/dL).  Results from last 7 days  Lab  Units  04/12/22  2108  04/12/22  0917  04/12/22  0301  BUN  mg/dL  20  20  21*  CREATININE  mg/dL  0.95  0.72  0.84      HD/PD/CRRT?: No    Lab Results   Component Value Date    WBC 8.67 04/12/2022      Temperature    04/12/22 0330 04/12/22 1710   Temp: 97.9 °F (36.6 °C) 97.4 °F (36.3 °C)        Contrast Administered: Isovue 4/12    Relevant Micro:   Microbiology Results (last 10 days)       Procedure Component Value - Date/Time    S. Pneumo Ag Urine or CSF - Urine, Urine, Clean Catch [[id number]]  (Normal) Collected: 04/12/22 0537    Lab Status: Final result Specimen: Urine, Clean Catch Updated: 04/12/22 0830     Strep Pneumo Ag Negative    Legionella Antigen, Urine - Urine, Urine, Clean Catch [[id number]]  (Normal) Collected: 04/12/22 0537    Lab Status: Final result Specimen: Urine, Clean Catch Updated: 04/12/22 0830     LEGIONELLA ANTIGEN, URINE Negative    COVID-19,APTIMA PANTHER(TERRI),[facility], NP/OP SWAB IN UTM/VTM/SALINE TRANSPORT MEDIA,24 HR TAT - Swab, Nasal Cavity [[id number]]  (Normal) Collected: 04/12/22 0433    Lab Status: Final result Specimen: Swab from Nasal Cavity Updated: 04/12/22 1450     COVID19 Not Detected    Narrative:      Fact sheet for providers: https://www.fda.gov/media/331327/download     Fact sheet for patients: https://www.fda.gov/media/517095/download    Test performed by RT " PCR.    Influenza Antigen, Rapid - Swab, Nasopharynx [[id number]]  (Normal) Collected: 04/12/22 0433    Lab Status: Final result Specimen: Swab from Nasopharynx Updated: 04/12/22 0459     Influenza A Ag, EIA Negative     Influenza B Ag, EIA Negative    Blood Culture - Blood, Arm, Right [[id number]]  (Abnormal) Collected: 04/12/22 0432    Lab Status: Preliminary result Specimen: Blood from Arm, Right Updated: 04/12/22 2024     Blood Culture Abnormal Stain     Gram Stain Aerobic Bottle Gram positive cocci in clusters    Blood Culture ID, PCR - Blood, Arm, Right [[id number]]  (Abnormal) Collected: 04/12/22 0432    Lab Status: Final result Specimen: Blood from Arm, Right Updated: 04/12/22 2318     BCID, PCR Staph spp, not aureus or lugdunesis. Identification by BCID2 PCR.    Mycoplasma Pneumoniae Antibody, IgM - Blood, [[id number]]  (Abnormal) Collected: 04/12/22 0301    Lab Status: Final result Specimen: Blood Updated: 04/12/22 1153     Mycoplasma pneumo IgM Positive             Relevant Radiology:   Chest Xray: Age-indeterminate interstitial pulmonary opacities are seen, which may represent mild   pulmonary edema.  Borderline cardiac enlargement is suggested.  Please see above comments for   further detail.   CT Chest: 1. No evidence of pulmonary embolus.    2. Focal parenchymal opacity in the left upper lobe may represent post inflammatory or infectious   changes or possible scarring.  Underlying malignancy cannot be excluded and short-term follow-up at   1-3 months recommended.  Alternatively follow-up PET-CT could be considered at this time.    Other Antimicrobial Therapy: None     Assessment/Plan  Loading dose: Vancomycin 1000 mg (weight changed   Regimen: Vancomycin 750 mg q 12   Exposure Target: AUC24 (range) 400-600 mg/L.hr  Patient admitted with hypoxia and altered mental status. Vancomycin 1000 mg administered in ER followed by vancomycin 750 mg x 1 dose. New labs were drawn on the evening of 4/12 which showed  0.2 increase in serum creatinine.Patient has BMP scheduled this am, will order vancomycin random to assess clearance, may need to be moved to q 24 dosing.   Labs ordered: BMP in am      Thank you for the consult.  Heather Soria, LeylaD

## 2022-04-14 ENCOUNTER — APPOINTMENT (OUTPATIENT)
Dept: GENERAL RADIOLOGY | Facility: HOSPITAL | Age: 57
End: 2022-04-14

## 2022-04-14 LAB
ALBUMIN SERPL-MCNC: 3.8 G/DL (ref 3.5–5.2)
ALBUMIN SERPL-MCNC: 4.6 G/DL (ref 3.5–5.2)
ALBUMIN/GLOB SERPL: 1.7 G/DL
ALBUMIN/GLOB SERPL: 1.7 G/DL
ALP SERPL-CCNC: 108 U/L (ref 39–117)
ALP SERPL-CCNC: 78 U/L (ref 39–117)
ALT SERPL W P-5'-P-CCNC: 19 U/L (ref 1–33)
ALT SERPL W P-5'-P-CCNC: 22 U/L (ref 1–33)
ANION GAP SERPL CALCULATED.3IONS-SCNC: 11.4 MMOL/L (ref 5–15)
ANION GAP SERPL CALCULATED.3IONS-SCNC: 6.2 MMOL/L (ref 5–15)
AORTIC DIMENSIONLESS INDEX: 0.8 (DI)
ARTERIAL PATENCY WRIST A: POSITIVE
ARTERIAL PATENCY WRIST A: POSITIVE
AST SERPL-CCNC: 17 U/L (ref 1–32)
AST SERPL-CCNC: 22 U/L (ref 1–32)
BACTERIA SPEC AEROBE CULT: ABNORMAL
BASE EXCESS BLDA CALC-SCNC: -2.8 MMOL/L (ref -2–2)
BASE EXCESS BLDA CALC-SCNC: 2 MMOL/L (ref -2–2)
BASE EXCESS BLDA CALC-SCNC: 3.2 MMOL/L (ref -2–2)
BASOPHILS # BLD AUTO: 0.01 10*3/MM3 (ref 0–0.2)
BASOPHILS # BLD AUTO: 0.03 10*3/MM3 (ref 0–0.2)
BASOPHILS NFR BLD AUTO: 0.1 % (ref 0–1.5)
BASOPHILS NFR BLD AUTO: 0.1 % (ref 0–1.5)
BDY SITE: ABNORMAL
BH CV ECHO MEAS - AO MAX PG: 5 MMHG
BH CV ECHO MEAS - AO MEAN PG: 3 MMHG
BH CV ECHO MEAS - AO ROOT DIAM: 2.4 CM
BH CV ECHO MEAS - AO V2 MAX: 109 CM/SEC
BH CV ECHO MEAS - AO V2 VTI: 16.8 CM
BH CV ECHO MEAS - EDV(MOD-SP2): 135 ML
BH CV ECHO MEAS - EDV(MOD-SP4): 144 ML
BH CV ECHO MEAS - EF(MOD-BP): 43 %
BH CV ECHO MEAS - ESV(MOD-SP2): 80.7 ML
BH CV ECHO MEAS - ESV(MOD-SP4): 81.6 ML
BH CV ECHO MEAS - IVSD: 0.8 CM
BH CV ECHO MEAS - LA A2CS (ATRIAL LENGTH): 5.8 CM
BH CV ECHO MEAS - LA DIMENSION(2D): 3.1 CM
BH CV ECHO MEAS - LAT PEAK E' VEL: 5.6 CM/SEC
BH CV ECHO MEAS - LV MAX PG: 3 MMHG
BH CV ECHO MEAS - LV MEAN PG: 1 MMHG
BH CV ECHO MEAS - LV V1 MAX: 82.1 CM/SEC
BH CV ECHO MEAS - LV V1 VTI: 13.4 CM
BH CV ECHO MEAS - LVIDD: 4.8 CM
BH CV ECHO MEAS - LVIDS: 3.8 CM
BH CV ECHO MEAS - LVOT DIAM: 2 CM
BH CV ECHO MEAS - LVPWD: 0.8 CM
BH CV ECHO MEAS - MED PEAK E' VEL: 5.3 CM/SEC
BH CV ECHO MEAS - MV A MAX VEL: 45.3 CM/SEC
BH CV ECHO MEAS - MV DEC SLOPE: 878 CM/SEC2
BH CV ECHO MEAS - MV DEC TIME: 137 MSEC
BH CV ECHO MEAS - MV E MAX VEL: 120 CM/SEC
BH CV ECHO MEAS - MV E/A: 2.6
BH CV ECHO MEAS - MV MAX PG: 10 MMHG
BH CV ECHO MEAS - MV MEAN PG: 5 MMHG
BH CV ECHO MEAS - MV P1/2T: 44 MSEC
BH CV ECHO MEAS - MV V2 VTI: 22 CM
BH CV ECHO MEAS - MVA(P1/2T): 5 CM2
BH CV ECHO MEAS - PA V2 MAX: 63 CM/SEC
BH CV ECHO MEAS - RAP SYSTOLE: 3 MMHG
BH CV ECHO MEAS - RVSP: 34 MMHG
BH CV ECHO MEAS - TR MAX PG: 31 MMHG
BH CV ECHO MEAS - TR MAX VEL: 280 CM/SEC
BH CV ECHO MEASUREMENTS AVERAGE E/E' RATIO: 22.02
BILIRUB SERPL-MCNC: 0.2 MG/DL (ref 0–1.2)
BILIRUB SERPL-MCNC: 0.2 MG/DL (ref 0–1.2)
BUN SERPL-MCNC: 22 MG/DL (ref 6–20)
BUN SERPL-MCNC: 25 MG/DL (ref 6–20)
BUN/CREAT SERPL: 26.2 (ref 7–25)
BUN/CREAT SERPL: 36.2 (ref 7–25)
CA-I BLDA-SCNC: 1.23 MMOL/L (ref 1.13–1.32)
CALCIUM SPEC-SCNC: 10 MG/DL (ref 8.6–10.5)
CALCIUM SPEC-SCNC: 9.1 MG/DL (ref 8.6–10.5)
CHLORIDE BLDA-SCNC: 103 MMOL/L (ref 98–106)
CHLORIDE SERPL-SCNC: 103 MMOL/L (ref 98–107)
CHLORIDE SERPL-SCNC: 106 MMOL/L (ref 98–107)
CO2 SERPL-SCNC: 26.6 MMOL/L (ref 22–29)
CO2 SERPL-SCNC: 29.8 MMOL/L (ref 22–29)
COHGB MFR BLD: 0.2 % (ref 0–1.5)
COHGB MFR BLD: 0.3 % (ref 0–1.5)
COHGB MFR BLD: 0.4 % (ref 0–1.5)
CREAT SERPL-MCNC: 0.69 MG/DL (ref 0.57–1)
CREAT SERPL-MCNC: 0.84 MG/DL (ref 0.57–1)
D DIMER PPP FEU-MCNC: 0.69 MG/L (FEU) (ref 0–0.59)
DEPRECATED RDW RBC AUTO: 48.2 FL (ref 37–54)
DEPRECATED RDW RBC AUTO: 49.6 FL (ref 37–54)
EGFRCR SERPLBLD CKD-EPI 2021: 101.4 ML/MIN/1.73
EGFRCR SERPLBLD CKD-EPI 2021: 81.2 ML/MIN/1.73
EOSINOPHIL # BLD AUTO: 0 10*3/MM3 (ref 0–0.4)
EOSINOPHIL # BLD AUTO: 0.02 10*3/MM3 (ref 0–0.4)
EOSINOPHIL NFR BLD AUTO: 0 % (ref 0.3–6.2)
EOSINOPHIL NFR BLD AUTO: 0.1 % (ref 0.3–6.2)
ERYTHROCYTE [DISTWIDTH] IN BLOOD BY AUTOMATED COUNT: 14.3 % (ref 12.3–15.4)
ERYTHROCYTE [DISTWIDTH] IN BLOOD BY AUTOMATED COUNT: 14.6 % (ref 12.3–15.4)
FHHB: 11.1 % (ref 0–5)
FHHB: 3.3 % (ref 0–5)
FHHB: 7.2 % (ref 0–5)
GAS FLOW AIRWAY: 2 LPM
GLOBULIN UR ELPH-MCNC: 2.3 GM/DL
GLOBULIN UR ELPH-MCNC: 2.7 GM/DL
GLUCOSE BLDA-MCNC: 67 MMOL/L (ref 65–99)
GLUCOSE BLDC GLUCOMTR-MCNC: 129 MG/DL (ref 70–99)
GLUCOSE BLDC GLUCOMTR-MCNC: 81 MG/DL (ref 70–99)
GLUCOSE BLDC GLUCOMTR-MCNC: 99 MG/DL (ref 70–99)
GLUCOSE SERPL-MCNC: 242 MG/DL (ref 65–99)
GLUCOSE SERPL-MCNC: 89 MG/DL (ref 65–99)
GRAM STN SPEC: ABNORMAL
HBA1C MFR BLD: 5.8 % (ref 4.8–5.6)
HCO3 BLDA-SCNC: 29.9 MMOL/L (ref 22–26)
HCO3 BLDA-SCNC: 30 MMOL/L (ref 22–26)
HCO3 BLDA-SCNC: 31.7 MMOL/L (ref 22–26)
HCT VFR BLD AUTO: 38.6 % (ref 34–46.6)
HCT VFR BLD AUTO: 47.3 % (ref 34–46.6)
HGB BLD-MCNC: 12.2 G/DL (ref 12–15.9)
HGB BLD-MCNC: 14.9 G/DL (ref 12–15.9)
HGB BLDA-MCNC: 12.9 G/DL (ref 11.7–14.6)
HGB BLDA-MCNC: 13.5 G/DL (ref 11.7–14.6)
HGB BLDA-MCNC: 15.4 G/DL (ref 11.7–14.6)
IMM GRANULOCYTES # BLD AUTO: 0.05 10*3/MM3 (ref 0–0.05)
IMM GRANULOCYTES # BLD AUTO: 0.18 10*3/MM3 (ref 0–0.05)
IMM GRANULOCYTES NFR BLD AUTO: 0.4 % (ref 0–0.5)
IMM GRANULOCYTES NFR BLD AUTO: 0.7 % (ref 0–0.5)
INHALED O2 CONCENTRATION: 28 %
INHALED O2 CONCENTRATION: 35 %
INHALED O2 CONCENTRATION: 50 %
ISOLATED FROM: ABNORMAL
IVRT: 77 MSEC
LACTATE BLDA-SCNC: 0.85 MMOL/L (ref 0.5–2)
LACTATE BLDA-SCNC: ABNORMAL MMOL/L
LEFT ATRIUM VOLUME INDEX: 49.6 ML/M2
LEFT ATRIUM VOLUME: 70.8 CM3
LV EF 2D ECHO EST: 45 %
LYMPHOCYTES # BLD AUTO: 0.88 10*3/MM3 (ref 0.7–3.1)
LYMPHOCYTES # BLD AUTO: 2.32 10*3/MM3 (ref 0.7–3.1)
LYMPHOCYTES NFR BLD AUTO: 7.1 % (ref 19.6–45.3)
LYMPHOCYTES NFR BLD AUTO: 8.8 % (ref 19.6–45.3)
MAGNESIUM SERPL-MCNC: 2.1 MG/DL (ref 1.6–2.6)
MAGNESIUM SERPL-MCNC: 2.5 MG/DL (ref 1.6–2.6)
MAXIMAL PREDICTED HEART RATE: 163 BPM
MCH RBC QN AUTO: 29 PG (ref 26.6–33)
MCH RBC QN AUTO: 29.3 PG (ref 26.6–33)
MCHC RBC AUTO-ENTMCNC: 31.5 G/DL (ref 31.5–35.7)
MCHC RBC AUTO-ENTMCNC: 31.6 G/DL (ref 31.5–35.7)
MCV RBC AUTO: 91.9 FL (ref 79–97)
MCV RBC AUTO: 93.1 FL (ref 79–97)
METHGB BLD QL: 0.2 % (ref 0–1.5)
METHGB BLD QL: 0.3 % (ref 0–1.5)
METHGB BLD QL: 0.3 % (ref 0–1.5)
MODALITY: ABNORMAL
MONOCYTES # BLD AUTO: 0.85 10*3/MM3 (ref 0.1–0.9)
MONOCYTES # BLD AUTO: 0.98 10*3/MM3 (ref 0.1–0.9)
MONOCYTES NFR BLD AUTO: 3.7 % (ref 5–12)
MONOCYTES NFR BLD AUTO: 6.8 % (ref 5–12)
NEUTROPHILS NFR BLD AUTO: 10.66 10*3/MM3 (ref 1.7–7)
NEUTROPHILS NFR BLD AUTO: 22.71 10*3/MM3 (ref 1.7–7)
NEUTROPHILS NFR BLD AUTO: 85.6 % (ref 42.7–76)
NEUTROPHILS NFR BLD AUTO: 86.6 % (ref 42.7–76)
NOTE: ABNORMAL
NOTE: ABNORMAL
NRBC BLD AUTO-RTO: 0 /100 WBC (ref 0–0.2)
NRBC BLD AUTO-RTO: 0 /100 WBC (ref 0–0.2)
OXYHGB MFR BLDV: 88.4 % (ref 94–99)
OXYHGB MFR BLDV: 92.1 % (ref 94–99)
OXYHGB MFR BLDV: 96.2 % (ref 94–99)
PCO2 BLDA: 116.8 MM HG (ref 35–45)
PCO2 BLDA: 55.5 MM HG (ref 35–45)
PCO2 BLDA: 62.6 MM HG (ref 35–45)
PH BLDA: 7.05 PH UNITS (ref 7.35–7.45)
PH BLDA: 7.3 PH UNITS (ref 7.35–7.45)
PH BLDA: 7.35 PH UNITS (ref 7.35–7.45)
PHOSPHATE SERPL-MCNC: 3.3 MG/DL (ref 2.5–4.5)
PHOSPHATE SERPL-MCNC: 5 MG/DL (ref 2.5–4.5)
PLATELET # BLD AUTO: 243 10*3/MM3 (ref 140–450)
PLATELET # BLD AUTO: 383 10*3/MM3 (ref 140–450)
PMV BLD AUTO: 10.3 FL (ref 6–12)
PMV BLD AUTO: 9.9 FL (ref 6–12)
PO2 BLD: 198 MM[HG] (ref 0–500)
PO2 BLD: 201 MM[HG] (ref 0–500)
PO2 BLD: 284 MM[HG] (ref 0–500)
PO2 BLDA: 56.3 MM HG (ref 80–100)
PO2 BLDA: 99.2 MM HG (ref 80–100)
PO2 BLDA: 99.3 MM HG (ref 80–100)
POTASSIUM BLDA-SCNC: 4.42 MMOL/L (ref 3.5–5)
POTASSIUM SERPL-SCNC: 4.4 MMOL/L (ref 3.5–5.2)
POTASSIUM SERPL-SCNC: 5 MMOL/L (ref 3.5–5.2)
PROT SERPL-MCNC: 6.1 G/DL (ref 6–8.5)
PROT SERPL-MCNC: 7.3 G/DL (ref 6–8.5)
QT INTERVAL: 274 MS
QT INTERVAL: 346 MS
QT INTERVAL: 451 MS
RBC # BLD AUTO: 4.2 10*6/MM3 (ref 3.77–5.28)
RBC # BLD AUTO: 5.08 10*6/MM3 (ref 3.77–5.28)
SAO2 % BLDCOA: 88.8 % (ref 95–99)
SAO2 % BLDCOA: 92.7 % (ref 95–99)
SAO2 % BLDCOA: 96.7 % (ref 95–99)
SODIUM BLDA-SCNC: 140.7 MMOL/L (ref 136–146)
SODIUM SERPL-SCNC: 141 MMOL/L (ref 136–145)
SODIUM SERPL-SCNC: 142 MMOL/L (ref 136–145)
STRESS TARGET HR: 139 BPM
TROPONIN T SERPL-MCNC: <0.01 NG/ML (ref 0–0.03)
WBC NRBC COR # BLD: 12.45 10*3/MM3 (ref 3.4–10.8)
WBC NRBC COR # BLD: 26.24 10*3/MM3 (ref 3.4–10.8)

## 2022-04-14 PROCEDURE — 63710000001 PREDNISONE PER 1 MG: Performed by: INTERNAL MEDICINE

## 2022-04-14 PROCEDURE — 94660 CPAP INITIATION&MGMT: CPT

## 2022-04-14 PROCEDURE — 25010000002 AZITHROMYCIN PER 500 MG: Performed by: INTERNAL MEDICINE

## 2022-04-14 PROCEDURE — 83735 ASSAY OF MAGNESIUM: CPT | Performed by: INTERNAL MEDICINE

## 2022-04-14 PROCEDURE — 99291 CRITICAL CARE FIRST HOUR: CPT | Performed by: INTERNAL MEDICINE

## 2022-04-14 PROCEDURE — 36600 WITHDRAWAL OF ARTERIAL BLOOD: CPT | Performed by: INTERNAL MEDICINE

## 2022-04-14 PROCEDURE — 63710000001 LEVALBUTEROL PER 0.5 MG: Performed by: NURSE PRACTITIONER

## 2022-04-14 PROCEDURE — 25010000002 LORAZEPAM PER 2 MG: Performed by: FAMILY MEDICINE

## 2022-04-14 PROCEDURE — 25010000002 MORPHINE PER 10 MG: Performed by: PHYSICIAN ASSISTANT

## 2022-04-14 PROCEDURE — 94799 UNLISTED PULMONARY SVC/PX: CPT

## 2022-04-14 PROCEDURE — 82805 BLOOD GASES W/O2 SATURATION: CPT | Performed by: PHYSICIAN ASSISTANT

## 2022-04-14 PROCEDURE — 99232 SBSQ HOSP IP/OBS MODERATE 35: CPT | Performed by: SPECIALIST

## 2022-04-14 PROCEDURE — 25010000002 SULFUR HEXAFLUORIDE MICROSPH 60.7-25 MG RECONSTITUTED SUSPENSION: Performed by: INTERNAL MEDICINE

## 2022-04-14 PROCEDURE — 82805 BLOOD GASES W/O2 SATURATION: CPT | Performed by: INTERNAL MEDICINE

## 2022-04-14 PROCEDURE — 82962 GLUCOSE BLOOD TEST: CPT

## 2022-04-14 PROCEDURE — 71045 X-RAY EXAM CHEST 1 VIEW: CPT

## 2022-04-14 PROCEDURE — 85025 COMPLETE CBC W/AUTO DIFF WBC: CPT | Performed by: INTERNAL MEDICINE

## 2022-04-14 PROCEDURE — 83050 HGB METHEMOGLOBIN QUAN: CPT | Performed by: INTERNAL MEDICINE

## 2022-04-14 PROCEDURE — 36600 WITHDRAWAL OF ARTERIAL BLOOD: CPT | Performed by: PHYSICIAN ASSISTANT

## 2022-04-14 PROCEDURE — 84100 ASSAY OF PHOSPHORUS: CPT | Performed by: INTERNAL MEDICINE

## 2022-04-14 PROCEDURE — 99233 SBSQ HOSP IP/OBS HIGH 50: CPT | Performed by: INTERNAL MEDICINE

## 2022-04-14 PROCEDURE — 80053 COMPREHEN METABOLIC PANEL: CPT | Performed by: INTERNAL MEDICINE

## 2022-04-14 PROCEDURE — 83050 HGB METHEMOGLOBIN QUAN: CPT | Performed by: PHYSICIAN ASSISTANT

## 2022-04-14 PROCEDURE — 82375 ASSAY CARBOXYHB QUANT: CPT | Performed by: INTERNAL MEDICINE

## 2022-04-14 PROCEDURE — 82375 ASSAY CARBOXYHB QUANT: CPT | Performed by: PHYSICIAN ASSISTANT

## 2022-04-14 RX ORDER — NICOTINE POLACRILEX 4 MG
24 LOZENGE BUCCAL
Status: DISCONTINUED | OUTPATIENT
Start: 2022-04-14 | End: 2022-04-18 | Stop reason: HOSPADM

## 2022-04-14 RX ORDER — DEXTROSE MONOHYDRATE 100 MG/ML
25 INJECTION, SOLUTION INTRAVENOUS
Status: DISCONTINUED | OUTPATIENT
Start: 2022-04-14 | End: 2022-04-18 | Stop reason: HOSPADM

## 2022-04-14 RX ORDER — MORPHINE SULFATE 2 MG/ML
1 INJECTION, SOLUTION INTRAMUSCULAR; INTRAVENOUS ONCE
Status: COMPLETED | OUTPATIENT
Start: 2022-04-14 | End: 2022-04-14

## 2022-04-14 RX ORDER — DEXMEDETOMIDINE HYDROCHLORIDE 4 UG/ML
.2-1.5 INJECTION, SOLUTION INTRAVENOUS
Status: DISCONTINUED | OUTPATIENT
Start: 2022-04-14 | End: 2022-04-14

## 2022-04-14 RX ADMIN — BUDESONIDE 0.5 MG: 0.5 SUSPENSION RESPIRATORY (INHALATION) at 18:12

## 2022-04-14 RX ADMIN — DEXMEDETOMIDINE HYDROCHLORIDE 0.2 MCG/KG/HR: 400 INJECTION, SOLUTION INTRAVENOUS at 02:03

## 2022-04-14 RX ADMIN — MORPHINE SULFATE 1 MG: 2 INJECTION, SOLUTION INTRAMUSCULAR; INTRAVENOUS at 00:08

## 2022-04-14 RX ADMIN — AZITHROMYCIN 500 MG: 500 INJECTION, POWDER, LYOPHILIZED, FOR SOLUTION INTRAVENOUS at 11:33

## 2022-04-14 RX ADMIN — ARFORMOTEROL TARTRATE 15 MCG: 15 SOLUTION RESPIRATORY (INHALATION) at 06:19

## 2022-04-14 RX ADMIN — ARFORMOTEROL TARTRATE 15 MCG: 15 SOLUTION RESPIRATORY (INHALATION) at 18:12

## 2022-04-14 RX ADMIN — PREDNISONE 40 MG: 20 TABLET ORAL at 08:47

## 2022-04-14 RX ADMIN — SENNOSIDES AND DOCUSATE SODIUM 2 TABLET: 50; 8.6 TABLET ORAL at 08:09

## 2022-04-14 RX ADMIN — LEVALBUTEROL HYDROCHLORIDE 0.63 MG: 0.63 SOLUTION RESPIRATORY (INHALATION) at 00:20

## 2022-04-14 RX ADMIN — SULFUR HEXAFLUORIDE 2 ML: KIT at 01:32

## 2022-04-14 RX ADMIN — METOPROLOL SUCCINATE 25 MG: 25 TABLET, FILM COATED, EXTENDED RELEASE ORAL at 08:10

## 2022-04-14 RX ADMIN — Medication 10 ML: at 20:54

## 2022-04-14 RX ADMIN — Medication 10 ML: at 08:11

## 2022-04-14 RX ADMIN — BUDESONIDE 0.5 MG: 0.5 SUSPENSION RESPIRATORY (INHALATION) at 06:18

## 2022-04-14 NOTE — PLAN OF CARE
Goal Outcome Evaluation:   Pt rrt on floor and sent to ccu with bipap. Pt started on precedex.  pt wearing bipap, pt more alert and cooperative.

## 2022-04-14 NOTE — SIGNIFICANT NOTE
RRT CALLED FOR PATIENT WITH SHORTNESS OF AIR, AIR HUNGER, RETRACTIONS, LUNGS VERY DIMINISHED, DECREASED SATS, DIAPHORETIC, VERY AGITATED AND RESTLESS. ABGS, LABS, CXR, EKG ORDERED. RAD DUNCAN AT BEDSIDE WITH RRT. PANIC LOW PH AND HI PCO2, PATIENT PLACED ON BIPAP, MEDICATED WITH ATIVAN AND MORPHINE, MOVED TO CCU 1. HAD TO WAIT FOR BED IN CCU TO BE CLEANED, PATIENT REMAINED  WITH RRT NURSE UNTIL MOVED AT 0215.

## 2022-04-14 NOTE — PROGRESS NOTES
Pulmonary / Critical Care  Progress Note      Patient Name: Yenni Camacho  : 1965  MRN: [id number]  Primary Care Physician:  Provider, No Known  Date of admission: 2022  ICU: 11h      Subjective   Subjective     57y/o female critically ill in CCU with COPD exacerbation     Date of Admission: 2022  CCU day: 1  Oxygen requirement: Nasal cannula  Sedation: None  Pressors: Previously on Precedex  Critical drips: None  Antibiotic regimen: Azithromycin  Lines and insertion date: Peripherals     Over the last 24 hours....  RRT called overnight  Episodes of shortness of breath with desatting, was having significant hypercapnia.   Started on bipap and transferred to ICU.   Remained on bipap till this morning.   Started on Precedex drip    This morning....   Patient is awake alert  Sitting up in bed  Off BiPAP at this time  Weaning Precedex drip  Reports to be feeling better      ROS  General: Denied complaints  HEENT: Denied complaints  Respiratory: Cough, shortness of breath, wheezing otherwise denied complaints  Cardiovascular: Chest tightness otherwise denied complaints  GI: Denied complaints  MSK: Denied complaints  Neurologic: Denied complaints  Skin: Denied complaints         Objective   Objective     Vitals:   Temp:  [98.1 °F (36.7 °C)-98.8 °F (37.1 °C)] 98.8 °F (37.1 °C)  Heart Rate:  [] 81  Resp:  [13-32] 16  BP: ()/() 129/80  Flow (L/min):  [2-15] 2    Physical Exam   Vital Signs Reviewed   General:  WDWN, Alert, in mild distress    HEENT:  PERRL, EOMI.  OP, nares clear  Neck:  Supple, no JVD, no thyromegaly  Chest:  good aeration, clear to auscultation bilaterally, no work of breathing noted  CV: RRR, no MGR, pulses 2+, equal.  Abd:  Soft, NT, ND, + BS, no HSM  EXT:  no clubbing, no cyanosis, no edema  Neuro:  A&Ox3, CN grossly intact, no focal deficits.  Skin: No rashes or lesions noted      Result Review    Result Review:  I have personally reviewed the results from the  time of this admission to 4/14/2022 13:01 EDT and agree with these findings:  [x]  Laboratory  [x]  Microbiology  [x]  Radiology  []  EKG/Telemetry   []  Cardiology/Vascular   []  Pathology  []  Old records  []  Other:  Most notable findings include:     LAB RESULTS:      Lab 04/14/22  0845 04/14/22  0629 04/13/22 2350 04/13/22  0546 04/12/22 2108 04/12/22  0917 04/12/22  0754 04/12/22  0432 04/12/22  0320   WBC 12.45*  --  26.24* 10.53 8.67 7.18  --   --   --    HEMOGLOBIN 12.2  --  14.9 13.2 14.1 14.0  --   --   --    HEMATOCRIT 38.6  --  47.3* 39.8 42.6 43.5  --   --   --    PLATELETS 243  --  383 241 296 237  --   --   --    NEUTROS ABS 10.66*  --  22.71* 9.95* 7.76* 6.73  --   --   --    IMMATURE GRANS (ABS) 0.05  --  0.18* 0.07* 0.04 0.02  --   --   --    LYMPHS ABS 0.88  --  2.32 0.36* 0.55* 0.32*  --   --   --    MONOS ABS 0.85  --  0.98* 0.14 0.31 0.10  --   --   --    EOS ABS 0.00  --  0.02 0.00 0.01 0.00  --   --   --    MCV 91.9  --  93.1 90.0 88.6 89.1  --   --   --    PROCALCITONIN  --   --   --   --   --  0.15  --   --   --    LACTATE  --   --   --   --   --   --   --  1.6  --    LACTATE, ARTERIAL  --  0.85  --   --   --   --  0.64  --  0.98         Lab 04/14/22  0845 04/14/22  0629 04/13/22 2350 04/13/22  0546 04/12/22 2108 04/12/22  0917 04/12/22  0754 04/12/22  0320   SODIUM 142  --  141 141 145 144  --   --    SODIUM, ARTERIAL  --  140.7  --   --   --   --  141.4 142.4   POTASSIUM 4.4  --  5.0 4.3 3.3* 3.6  --   --    CHLORIDE 106  --  103 103 101 103  --   --    CO2 29.8*  --  26.6 27.2 29.1* 27.9  --   --    ANION GAP 6.2  --  11.4 10.8 14.9 13.1  --   --    BUN 25*  --  22* 19 20 20  --   --    CREATININE 0.69  --  0.84 0.73 0.95 0.72  --   --    EGFR 101.4  --  81.2 96.1 70.0 97.7  --   --    GLUCOSE 89  --  242* 159* 214* 162*  --   --    GLUCOSE, ARTERIAL  --  67  --   --   --   --  166* 302*   CALCIUM 9.1  --  10.0 9.6 9.5 9.4  --   --    IONIZED CALCIUM  --  1.23  --   --   --   --   1.12* 1.17   MAGNESIUM 2.1  --  2.5 2.0 1.8  --   --   --    PHOSPHORUS 3.3  --  5.0* 3.5  --   --   --   --          Lab 04/14/22  0845 04/13/22  2350 04/13/22  0546 04/12/22  2108 04/12/22  0301   TOTAL PROTEIN 6.1 7.3 6.8 7.3 8.2   ALBUMIN 3.80 4.60 4.30 4.70 4.90   GLOBULIN 2.3 2.7 2.5 2.6 3.3   ALT (SGPT) 19 22 17 16 20   AST (SGOT) 17 22 22 16 25   BILIRUBIN 0.2 0.2 0.2 0.3 0.2   ALK PHOS 78 108 94 103 137*         Lab 04/13/22  2350 04/12/22  0301   PROBNP  --  2,427.0*   TROPONIN T <0.010 <0.010                 Lab 04/14/22  0940 04/14/22  0629 04/14/22  0006   PH, ARTERIAL 7.351 7.297* 7.051*   PCO2, ARTERIAL 55.5* 62.6* 116.8*   PO2 ART 56.3* 99.3 99.2   O2 SATURATION ART 88.8* 96.7 92.7*   FIO2 28 35 50   HCO3 ART 30.0* 29.9* 31.7*   BASE EXCESS ART 3.2* 2.0 -2.8*   CARBOXYHEMOGLOBIN 0.2 0.3 0.4     Brief Urine Lab Results     None        Microbiology Results (last 10 days)     Procedure Component Value - Date/Time    S. Pneumo Ag Urine or CSF - Urine, Urine, Clean Catch [[id number]]  (Normal) Collected: 04/13/22 1046    Lab Status: Final result Specimen: Urine, Clean Catch Updated: 04/13/22 1122     Strep Pneumo Ag Negative    Legionella Antigen, Urine - Urine, Urine, Clean Catch [[id number]]  (Normal) Collected: 04/13/22 1046    Lab Status: Final result Specimen: Urine, Clean Catch Updated: 04/13/22 1122     LEGIONELLA ANTIGEN, URINE Negative    S. Pneumo Ag Urine or CSF - Urine, Urine, Clean Catch [[id number]]  (Normal) Collected: 04/12/22 0537    Lab Status: Final result Specimen: Urine, Clean Catch Updated: 04/12/22 0830     Strep Pneumo Ag Negative    Legionella Antigen, Urine - Urine, Urine, Clean Catch [[id number]]  (Normal) Collected: 04/12/22 0537    Lab Status: Final result Specimen: Urine, Clean Catch Updated: 04/12/22 0830     LEGIONELLA ANTIGEN, URINE Negative    COVID-19,APTIMA PANTHER(TERRI),[facility], NP/OP SWAB IN UTM/VTM/SALINE TRANSPORT MEDIA,24 HR TAT - Swab, Nasal Cavity [[id number]]   (Normal) Collected: 04/12/22 0433    Lab Status: Final result Specimen: Swab from Nasal Cavity Updated: 04/12/22 1450     COVID19 Not Detected    Narrative:      Fact sheet for providers: https://www.fda.gov/media/630402/download     Fact sheet for patients: https://www.fda.gov/media/322572/download    Test performed by RT PCR.    Influenza Antigen, Rapid - Swab, Nasopharynx [[id number]]  (Normal) Collected: 04/12/22 0433    Lab Status: Final result Specimen: Swab from Nasopharynx Updated: 04/12/22 0459     Influenza A Ag, EIA Negative     Influenza B Ag, EIA Negative    Blood Culture - Blood, Arm, Right [[id number]]  (Normal) Collected: 04/12/22 0432    Lab Status: Preliminary result Specimen: Blood from Arm, Right Updated: 04/14/22 0445     Blood Culture No growth at 2 days    Blood Culture - Blood, Arm, Right [[id number]]  (Abnormal) Collected: 04/12/22 0432    Lab Status: Final result Specimen: Blood from Arm, Right Updated: 04/14/22 0814     Blood Culture Staphylococcus, coagulase negative     Isolated from Aerobic Bottle     Gram Stain Aerobic Bottle Gram positive cocci in clusters    Narrative:      Probable contaminant requires clinical correlation, susceptibility not performed unless requested by physician.      Blood Culture ID, PCR - Blood, Arm, Right [[id number]]  (Abnormal) Collected: 04/12/22 0432    Lab Status: Final result Specimen: Blood from Arm, Right Updated: 04/12/22 2318     BCID, PCR Staph spp, not aureus or lugdunesis. Identification by BCID2 PCR.    Mycoplasma Pneumoniae Antibody, IgM - Blood, [[id number]]  (Abnormal) Collected: 04/12/22 0301    Lab Status: Final result Specimen: Blood Updated: 04/12/22 1153     Mycoplasma pneumo IgM Positive        CT Chest With Contrast Diagnostic    Result Date: 4/12/2022     1. No evidence of pulmonary embolus.  2. Focal parenchymal opacity in the left upper lobe may represent post inflammatory or infectious changes or possible scarring.  Underlying  malignancy cannot be excluded and short-term follow-up at 1-3 months recommended.  Alternatively follow-up PET-CT could be considered at this time.     PUMA MONTES DE OCA MD       Electronically Signed and Approved By: PUMA MONTES DE OCA MD on 4/12/2022 at 14:31             XR Chest 1 View    Result Date: 4/14/2022   Favorable progression is suggested radiographically with slight decrease in pulmonary edema since 4/12/2022 at 0304 hours.      COMMENT:  Part of this note is an electronic transcription of spoken language to printed text. The electronic translation/transcription may permit erroneous, or at times, nonsensical words or phrases to be inadvertently transcribed; this  has reviewed the note for such errors; however, some may still exist.  AMBROCIO HERNANDEZ JR, MD       Electronically Signed and Approved By: AMBROCIO HERNANDEZ JR, MD on 4/14/2022 at 0:07                  Assessment/Plan   Assessment / Plan   COPD with acute exacerbation  History of COVID-19 infection recently  Acute hypercapnic respiratory failure  Altered mental status, toxic metabolic encephalopathy  History of smoking in past  Mycoplasma pneumonia    Active Hospital Problems:  Active Hospital Problems    Diagnosis    • Severe malnutrition (CMS/HCC)    • Respiratory failure (HCC)          Plan:   Continue bipap with sleep and naps.   Weaning precedex drip this morning.   Repeat ABG.  Consult RT  consult. Will need NIPPV at home given chronic resp failure with hypercapnia, COPD with recurrent exacerbations.  Check overnight oximetry once patient is somewhat stable.    Continue supplemental O2 as needed to keep O2 saturations greater than 90%  Continue breathing treatments and BPH protocol  Continue prednisone, may need to switch to IV solu-medrol.   Continue azithromycin  Off Precedex  Continue SSI  Trend renal panel and electrolytes  We will replace electrolytes as needed  Bowel regimen       DVT prophylaxis:  Mechanical DVT  prophylaxis orders are present.    CODE STATUS:   Code Status (Patient has no pulse and is not breathing): CPR (Attempt to Resuscitate)  Medical Interventions (Patient has pulse or is breathing): Full Support      The above plan is a product of multidisciplinary rounds performed this morning on 4/14/2022 at bedside with the critical care team consisting of the on-call physician, Dr. Melchor,  and myself Sherry Vera PA-C, bedside RN, pharmacist, respiratory therapist, dietitian and other Allied health services.    Reviewed labs, imaging and provider notes.   Discussed with bedside nurse and primary service.   Critical care time spent 32 mins excluding any procedures.     Dictated utilizing Dragon Dictation.    Scribed for Dr. Melchor by Sherry Rhodes.    Electronically signed by Doroteo Melchor MD, 04/14/22, 2:22 PM EDT.

## 2022-04-14 NOTE — PROGRESS NOTES
[facility]     Cardiology Progress Note    Patient Name: Yenni Camacho  : 1965  MRN: [id number]  Primary Care Physician:  Provider, No Known  Date of admission: 2022    Subjective   Subjective   CC: More short of breath last night transferred to CCU.    HPI:    Yenni Camacho is a 56 y.o. female with history of with COPD exacerbation.  Still short of breath.  No chest pain.  No further palpitations.  No SVT.    Objective     ROS:  Respiratory: No Cough, No Dyspnea  Cardiovascular: No CP Classic for Angina    Vitals:   Temp:  [98.1 °F (36.7 °C)-98.6 °F (37 °C)] 98.1 °F (36.7 °C)  Heart Rate:  [] 87  Resp:  [13-32] 13  BP: ()/() 99/56  Flow (L/min):  [2-15] 15  Physical Exam    Constitutional: Awake, alert, No acute distress   Eyes: PERRLA, sclerae anicteric, no conjunctival injection   HENT: NCAT, mucous membranes moist   Neck: Supple, no thyromegaly, no lymphadenopathy, trachea midline   Respiratory: Clear to auscultation bilaterally, nonlabored respirations    Cardiovascular: RRR, no murmurs, rubs, or gallops, palpable pedal pulses bilaterally   Musculoskeletal: No bilateral ankle edema, no clubbing or cyanosis to extremities   Neurologic: Oriented x 3, strength symmetric in all extremities, speech clear  Current medications:  adenosine, 12 mg, Intravenous, Once  arformoterol, 15 mcg, Nebulization, BID - RT  azithromycin, 500 mg, Intravenous, Q24H  budesonide, 0.5 mg, Nebulization, BID - RT  insulin lispro, 0-9 Units, Subcutaneous, TID AC  LORazepam, , ,   metoprolol succinate XL, 25 mg, Oral, Q24H  metoprolol tartrate, 5 mg, Intravenous, Once  predniSONE, 40 mg, Oral, Daily With Breakfast  senna-docusate sodium, 2 tablet, Oral, BID  sodium chloride, 10 mL, Intravenous, Q12H      Current IV drips:  dexmedetomidine, 0.2-1.5 mcg/kg/hr, Last Rate: 0.2 mcg/kg/hr (22 0203)      Results for orders placed during the hospital encounter of 22    Adult  Transthoracic Echo Complete w/ Color, Spectral and Contrast if necessary per protocol    Interpretation Summary  Mild diffuse hypokinesis of the left ankle with mildly reduced left ventricular systolic function ejection fraction around 45%.  Trace tricuspid regurgitation.  Trace mitral regurgitation.     Result Review       Result Review:  I have personally reviewed the results from the time of this admission to 4/14/2022 09:17 EDT and agree with these findings:  [x]  Laboratory  []  Microbiology  [x]  Radiology  [x]  EKG  [x]  Cardiology/Vascular   CBC    CBC 4/12/22 4/12/22 4/12/22 4/13/22 4/13/22 4/14/22    0301 0917 2108 0546 2350    WBC 11.62 (A) 7.18 8.67 10.53 26.24 (A) 12.45 (A)   RBC 5.33 (A) 4.88 4.81 4.42 5.08 4.20   Hemoglobin 15.3 14.0 14.1 13.2 14.9 12.2   Hematocrit 48.4 (A) 43.5 42.6 39.8 47.3 (A) 38.6   MCV 90.8 89.1 88.6 90.0 93.1 91.9   MCH 28.7 28.7 29.3 29.9 29.3 29.0   MCHC 31.6 32.2 33.1 33.2 31.5 31.6   RDW 13.8 13.8 14.0 14.1 14.6 14.3   Platelets 378 237 296 241 383 243   (A) Abnormal value            CMP    CMP 4/12/22 4/12/22 4/12/22 4/12/22 4/12/22 4/13/22 4/13/22 4/14/22    0301 0320 0754 0917 2108 0546 2350    Glucose 269 (A) 302 (A) 166 (A) 162 (A) 214 (A) 159 (A) 242 (A) 67   BUN 21 (A)   20 20 19 22 (A)    Creatinine 0.84   0.72 0.95 0.73 0.84    Sodium 145 142.4 141.4 144 145 141 141 140.7   Potassium 4.3   3.6 3.3 (A) 4.3 5.0    Chloride 104   103 101 103 103    Calcium 10.0   9.4 9.5 9.6 10.0    Albumin 4.90    4.70 4.30 4.60    Total Bilirubin 0.2    0.3 0.2 0.2    Alkaline Phosphatase 137 (A)    103 94 108    AST (SGOT) 25    16 22 22    ALT (SGPT) 20    16 17 22    (A) Abnormal value       Comments are available for some flowsheets but are not being displayed.                      Telemetry reviewed  Cardiac Medications Reviewed.    Assessment/Plan   Assessment / Plan   1.  Severe COPD with exacerbation.  2.  Paroxysmal SVT now sinus rhythm.  3.  Nonischemic cardiomyopathy  with mildly reduced left ventricular systolic function.  Plan:  1.  Continue low-dose beta-blockers.  2.  Continue current treatment for COPD.  3.  We will sign off and see as needed.      Electronically signed by Chele Zaragoza MD, 04/14/22, 9:17 AM EDT.

## 2022-04-14 NOTE — NURSING NOTE
Came back from CT, complains of soa, sats 95 on 3l, educated on breathing tech, mild distressed    Became moderate distress sats 90% increased o2 to 5l, educated on breathing tech, resp has been called for prn breathing tx    2330 RRT called distressed sats 85% on 6l nc, currently getting prn neb tx, Ann LEROY at bedside, new orders received.    Labs and meds have been given, RRT at bedside, labs received, waiting on room in unit

## 2022-04-14 NOTE — PROGRESS NOTES
[facility]   Hospitalist Progress Note  Date: 2022  Patient Name: Yenni Camacho  : 1965  MRN: [id number]  Date of admission: 2022      Subjective   Subjective     Chief Complaint: Shortness of breath    Summary: 56 y.o. female with history of COVID-19 infection in 2022, likely COPD, who presented to the ED on  due to shortness of breath and altered mental status. Her oxygen saturation was in low 60s on presentation.  Due to her confusion and agitation, she was given ketamine and was started on BiPAP. She was on BiPAP overnight and is admitted to ICU with altered mental status and hypercapnic respiratory failure.    Pulmonology was consulted.  She was started on scheduled steroids and bronchodilators.  She did improve rapidly during her stay was transitioned to nasal cannula following morning.  Hospital course complicated by SVT requiring IV adenosine pushes.  Cardiology was consulted and she was started on oral beta-blocker with improvement.  On the evening of , she had another episode of acute hypoxia and hypercapnia requiring BiPAP and transferred to the ICU.  Pulmonology following.  She will likely need NIPPV set up for home use at discharge.  Of note, she did have an irregularity of her CT chest which appears to be scarring.  However, she will need outpatient chest CT in 3 to 6 months or per pulmonology recommendations for follow-up.    Interval Followup: Patient had acute hypoxemia overnight, satting in the 80s on 6 L nasal cannula.  RRT called.  She required BiPAP and was transferred to the ICU for Precedex drip due to a agitation and confusion.  This morning, she is awake and alert, answering all questions appropriately.  Precedex drip has been weaned off.  She tells me she is feeling better.  She states she was not sleeping prior to the event.    Review of Systems   All systems reviewed and negative unless otherwise stated under interval follow-up    Objective    Objective     Vitals:   Temp:  [98.1 °F (36.7 °C)-98.8 °F (37.1 °C)] 98.8 °F (37.1 °C)  Heart Rate:  [] 85  Resp:  [13-32] 16  BP: ()/() 130/75  Flow (L/min):  [2-15] 2  Physical Exam    Constitutional: Awake, alert, no acute distress, BiPAP in place   Eyes: Pupils equal, sclerae anicteric, no conjunctival injection   HENT: NCAT, mucous membranes moist   Neck: Supple, no thyromegaly, no lymphadenopathy, trachea midline   Respiratory: Good aeration, diminished breath sounds in bilateral bases, otherwise clear to auscultation bilaterally, BiPAP in place, nonlabored respirations    Cardiovascular: RRR, no murmurs, rubs, or gallops   Gastrointestinal: Positive bowel sounds, soft, nontender, nondistended   Musculoskeletal: No bilateral ankle edema, no clubbing or cyanosis to extremities   Psychiatric: Appropriate affect, cooperative   Neurologic: Oriented x 3, strength symmetric in all extremities, Cranial Nerves grossly intact to confrontation, speech clear   Skin: No rashes     Result Review    Result Review:  I have personally reviewed the results from the time of this admission to 4/14/2022 15:48 EDT and agree with these findings:  [x]  Laboratory sodium 142, potassium 4.4, creatinine 0.69, liver enzymes within normal limits, WBC 12, hemoglobin 12, platelets 243  [x]  Microbiology infectious work-up negative  [x]  Radiology   [x]  EKG/Telemetry telemetry reviewed showing normal sinus rhythm with rates in the 70s  []  Cardiology/Vascular   []  Pathology  []  Old records  []  Other:    Assessment/Plan   Assessment / Plan     Assessment/Plan:  Acute hypoxic and hypercapnic respiratory failure requiring NIPPV  Paroxysmal supraventricular tachycardia  COPD with acute exacerbation  Acute agitation requiring Precedex drip  Recent Covid infection  Nonischemic cardiomyopathy with mildly reduced systolic function  Metabolic encephalopathy secondary to hypoxemia and hypercapnia  Tobacco dependence in  remission    Continue to monitor in the ICU for work-up and management of the above  Pulmonology and cardiology following, appreciate assistance  Continue BiPAP at night and with naps, RT  has been consulted, the patient has proven she will need NIPPV at home given her recurrent respiratory failure and hypercapnia as well as COPD  2D echo reviewed, she does have EF 45% with mild hypokinesis.  No further cardiac work-up indicated per Dr. Zaragoza and cardiology, they can follow-up with cardiology on outpatient basis  Continue Toprol-XL  Continue prednisone 40 mg oral daily, if no improvement, may need to switch back to IV steroids  Continue Pulmicort and Brovana twice daily, Xopenex as needed  Mycoplasma IgM positive, will treat with azithromycin 500 mg daily x3 days  Patient will benefit from an outpatient CT to follow-up soft tissue density of the left upper lobe  Continue appropriate home medications  Trend renal function and electrolytes with a.m. BMP  Trend Hgb and WBC with a.m. CBC    Discussed plan with RN, cardiology, pulmonology    DVT prophylaxis:  Mechanical DVT prophylaxis orders are present.    CODE STATUS:   Code Status (Patient has no pulse and is not breathing): CPR (Attempt to Resuscitate)  Medical Interventions (Patient has pulse or is breathing): Full Support

## 2022-04-15 LAB
ALBUMIN SERPL-MCNC: 4.1 G/DL (ref 3.5–5.2)
ANION GAP SERPL CALCULATED.3IONS-SCNC: 8.9 MMOL/L (ref 5–15)
BASOPHILS # BLD AUTO: 0.01 10*3/MM3 (ref 0–0.2)
BASOPHILS NFR BLD AUTO: 0.1 % (ref 0–1.5)
BUN SERPL-MCNC: 23 MG/DL (ref 6–20)
BUN/CREAT SERPL: 30.3 (ref 7–25)
CALCIUM SPEC-SCNC: 9.4 MG/DL (ref 8.6–10.5)
CHLORIDE SERPL-SCNC: 106 MMOL/L (ref 98–107)
CO2 SERPL-SCNC: 30.1 MMOL/L (ref 22–29)
CREAT SERPL-MCNC: 0.76 MG/DL (ref 0.57–1)
DEPRECATED RDW RBC AUTO: 48.6 FL (ref 37–54)
EGFRCR SERPLBLD CKD-EPI 2021: 91.5 ML/MIN/1.73
EOSINOPHIL # BLD AUTO: 0 10*3/MM3 (ref 0–0.4)
EOSINOPHIL NFR BLD AUTO: 0 % (ref 0.3–6.2)
ERYTHROCYTE [DISTWIDTH] IN BLOOD BY AUTOMATED COUNT: 14.6 % (ref 12.3–15.4)
GLUCOSE BLDC GLUCOMTR-MCNC: 114 MG/DL (ref 70–99)
GLUCOSE BLDC GLUCOMTR-MCNC: 148 MG/DL (ref 70–99)
GLUCOSE BLDC GLUCOMTR-MCNC: 89 MG/DL (ref 70–99)
GLUCOSE SERPL-MCNC: 92 MG/DL (ref 65–99)
HCT VFR BLD AUTO: 41.9 % (ref 34–46.6)
HGB BLD-MCNC: 13.4 G/DL (ref 12–15.9)
IMM GRANULOCYTES # BLD AUTO: 0.06 10*3/MM3 (ref 0–0.05)
IMM GRANULOCYTES NFR BLD AUTO: 0.5 % (ref 0–0.5)
LYMPHOCYTES # BLD AUTO: 1.36 10*3/MM3 (ref 0.7–3.1)
LYMPHOCYTES NFR BLD AUTO: 11.7 % (ref 19.6–45.3)
MAGNESIUM SERPL-MCNC: 2.1 MG/DL (ref 1.6–2.6)
MCH RBC QN AUTO: 29.3 PG (ref 26.6–33)
MCHC RBC AUTO-ENTMCNC: 32 G/DL (ref 31.5–35.7)
MCV RBC AUTO: 91.5 FL (ref 79–97)
MONOCYTES # BLD AUTO: 0.91 10*3/MM3 (ref 0.1–0.9)
MONOCYTES NFR BLD AUTO: 7.8 % (ref 5–12)
NEUTROPHILS NFR BLD AUTO: 79.9 % (ref 42.7–76)
NEUTROPHILS NFR BLD AUTO: 9.28 10*3/MM3 (ref 1.7–7)
NRBC BLD AUTO-RTO: 0 /100 WBC (ref 0–0.2)
PHOSPHATE SERPL-MCNC: 2.6 MG/DL (ref 2.5–4.5)
PLATELET # BLD AUTO: 277 10*3/MM3 (ref 140–450)
PMV BLD AUTO: 10 FL (ref 6–12)
POTASSIUM SERPL-SCNC: 4.2 MMOL/L (ref 3.5–5.2)
RBC # BLD AUTO: 4.58 10*6/MM3 (ref 3.77–5.28)
SODIUM SERPL-SCNC: 145 MMOL/L (ref 136–145)
WBC NRBC COR # BLD: 11.62 10*3/MM3 (ref 3.4–10.8)

## 2022-04-15 PROCEDURE — 99291 CRITICAL CARE FIRST HOUR: CPT | Performed by: INTERNAL MEDICINE

## 2022-04-15 PROCEDURE — 94799 UNLISTED PULMONARY SVC/PX: CPT

## 2022-04-15 PROCEDURE — 63710000001 PREDNISONE PER 1 MG: Performed by: INTERNAL MEDICINE

## 2022-04-15 PROCEDURE — 99233 SBSQ HOSP IP/OBS HIGH 50: CPT | Performed by: INTERNAL MEDICINE

## 2022-04-15 PROCEDURE — 63710000001 LEVALBUTEROL PER 0.5 MG: Performed by: NURSE PRACTITIONER

## 2022-04-15 PROCEDURE — 94660 CPAP INITIATION&MGMT: CPT

## 2022-04-15 PROCEDURE — 85025 COMPLETE CBC W/AUTO DIFF WBC: CPT | Performed by: INTERNAL MEDICINE

## 2022-04-15 PROCEDURE — 80069 RENAL FUNCTION PANEL: CPT | Performed by: INTERNAL MEDICINE

## 2022-04-15 PROCEDURE — 82962 GLUCOSE BLOOD TEST: CPT

## 2022-04-15 PROCEDURE — 25010000002 AZITHROMYCIN PER 500 MG: Performed by: INTERNAL MEDICINE

## 2022-04-15 PROCEDURE — 83735 ASSAY OF MAGNESIUM: CPT | Performed by: INTERNAL MEDICINE

## 2022-04-15 PROCEDURE — 97161 PT EVAL LOW COMPLEX 20 MIN: CPT

## 2022-04-15 RX ORDER — CLONIDINE HYDROCHLORIDE 0.3 MG/1
0.3 TABLET ORAL DAILY
Status: DISCONTINUED | OUTPATIENT
Start: 2022-04-15 | End: 2022-04-17

## 2022-04-15 RX ADMIN — ARFORMOTEROL TARTRATE 15 MCG: 15 SOLUTION RESPIRATORY (INHALATION) at 05:45

## 2022-04-15 RX ADMIN — BUDESONIDE 0.5 MG: 0.5 SUSPENSION RESPIRATORY (INHALATION) at 18:39

## 2022-04-15 RX ADMIN — Medication 10 ML: at 20:08

## 2022-04-15 RX ADMIN — BUDESONIDE 0.5 MG: 0.5 SUSPENSION RESPIRATORY (INHALATION) at 05:45

## 2022-04-15 RX ADMIN — CLONIDINE HYDROCHLORIDE 0.3 MG: 0.3 TABLET ORAL at 10:20

## 2022-04-15 RX ADMIN — AZITHROMYCIN 500 MG: 500 INJECTION, POWDER, LYOPHILIZED, FOR SOLUTION INTRAVENOUS at 12:55

## 2022-04-15 RX ADMIN — LEVALBUTEROL HYDROCHLORIDE 0.63 MG: 0.63 SOLUTION RESPIRATORY (INHALATION) at 05:46

## 2022-04-15 RX ADMIN — Medication 10 ML: at 08:28

## 2022-04-15 RX ADMIN — ACETAMINOPHEN 650 MG: 325 TABLET ORAL at 05:32

## 2022-04-15 RX ADMIN — METOPROLOL SUCCINATE 25 MG: 25 TABLET, FILM COATED, EXTENDED RELEASE ORAL at 08:28

## 2022-04-15 RX ADMIN — PREDNISONE 40 MG: 20 TABLET ORAL at 08:28

## 2022-04-15 RX ADMIN — ARFORMOTEROL TARTRATE 15 MCG: 15 SOLUTION RESPIRATORY (INHALATION) at 18:39

## 2022-04-15 NOTE — PLAN OF CARE
Goal Outcome Evaluation:  Plan of Care Reviewed With: patient           Outcome Evaluation: Pt presents with no strength, and functional mobility deficits. She does not need PT Services in the hospital. She will benefit from use of a walker upon return home and pulmonary rehab.

## 2022-04-15 NOTE — PLAN OF CARE
Goal Outcome Evaluation:  Hypertensive at times-home meds have not been reordered;sinus rhythmn;bipap worn  At night at 24% fio2;ambulating to bsc with minimal assistance

## 2022-04-15 NOTE — PROGRESS NOTES
Pulmonary / Critical Care  Progress Note      Patient Name: Yenni Camacho  : 1965  MRN: [id number]  Primary Care Physician:  Provider, No Known  Date of admission: 2022  ICU: 1d 9h      Subjective   Subjective     55y/o female critically ill in CCU with COPD exacerbation     Date of Admission: 2022  CCU day: 2  Oxygen requirement: Nasal cannula  Sedation: None  Pressors: Was on Precedex drip  Critical drips: None  Antibiotic regimen: Azithromycin  Lines and insertion date: Peripherals     No acute events overnight    This morning....   Patient is awake and alert  Sitting up in bed  Reports to be feeling better  Denies any complaints or concerns at this time  ABG with improved hypercapnia, still has some resp acidosis.  Has HTN.   No nausea, vomiting.  No fever, chills.   Still has chest tightness.       ROS  General: Denied complaints  HEENT: Denied complaints  Respiratory: Cough, shortness of breath, wheezing otherwise denied complaints  Cardiovascular: Chest tightness otherwise denied complaints  GI: Denied complaints  MSK: Denied complaints  Neurologic: Denied complaints  Skin: Denied complaints         Objective   Objective     Vitals:   Temp:  [98.4 °F (36.9 °C)-98.7 °F (37.1 °C)] 98.4 °F (36.9 °C)  Heart Rate:  [] 89  Resp:  [15-22] 20  BP: (116-188)/() 166/89  Flow (L/min):  [2-3] 3    Physical Exam -reviewed from exam performed on 2022 and appropriate changes made if any  Vital Signs Reviewed   General: This is a looking female, in no acute distress, has some conversational dyspnea, HEENT:  PERRL, EOMI.  OP, nares clear  Neck:  Supple, no JVD, no thyromegaly  Chest: Poor aeration, bilateral scattered wheezing, no rhonchi or crackles, mild work of breathing noted  CV: RRR, no MGR, pulses 2+, equal.  Abd:  Soft, NT, ND, + BS, no HSM  EXT:  no clubbing, no cyanosis, no edema  Neuro:  A&Ox3, CN grossly intact, no focal deficits.  Skin: No rashes or lesions  noted      Result Review    Result Review:  I have personally reviewed the results from the time of this admission to 4/15/2022 11:26 EDT and agree with these findings:  [x]  Laboratory  [x]  Microbiology  [x]  Radiology  []  EKG/Telemetry   [x]  Cardiology/Vascular   []  Pathology  []  Old records  []  Other:  Most notable findings include:     LAB RESULTS:      Lab 04/15/22  0309 04/14/22  0845 04/14/22  0629 04/13/22  2350 04/13/22  0546 04/12/22  2108 04/12/22  0917 04/12/22  0754 04/12/22  0432 04/12/22  0320   WBC 11.62* 12.45*  --  26.24* 10.53 8.67 7.18  --   --   --    HEMOGLOBIN 13.4 12.2  --  14.9 13.2 14.1 14.0  --   --   --    HEMATOCRIT 41.9 38.6  --  47.3* 39.8 42.6 43.5  --   --   --    PLATELETS 277 243  --  383 241 296 237  --   --   --    NEUTROS ABS 9.28* 10.66*  --  22.71* 9.95* 7.76* 6.73  --   --   --    IMMATURE GRANS (ABS) 0.06* 0.05  --  0.18* 0.07* 0.04 0.02  --   --   --    LYMPHS ABS 1.36 0.88  --  2.32 0.36* 0.55* 0.32*  --   --   --    MONOS ABS 0.91* 0.85  --  0.98* 0.14 0.31 0.10  --   --   --    EOS ABS 0.00 0.00  --  0.02 0.00 0.01 0.00  --   --   --    MCV 91.5 91.9  --  93.1 90.0 88.6 89.1  --   --   --    PROCALCITONIN  --   --   --   --   --   --  0.15  --   --   --    LACTATE  --   --   --   --   --   --   --   --  1.6  --    LACTATE, ARTERIAL  --   --  0.85  --   --   --   --  0.64  --  0.98         Lab 04/15/22  0309 04/14/22  0845 04/14/22  0629 04/13/22  2350 04/13/22  0546 04/12/22  2108 04/12/22  0917 04/12/22  0754 04/12/22  0320   SODIUM 145 142  --  141 141 145   < >  --   --    SODIUM, ARTERIAL  --   --  140.7  --   --   --   --  141.4 142.4   POTASSIUM 4.2 4.4  --  5.0 4.3 3.3*   < >  --   --    CHLORIDE 106 106  --  103 103 101   < >  --   --    CO2 30.1* 29.8*  --  26.6 27.2 29.1*   < >  --   --    ANION GAP 8.9 6.2  --  11.4 10.8 14.9   < >  --   --    BUN 23* 25*  --  22* 19 20   < >  --   --    CREATININE 0.76 0.69  --  0.84 0.73 0.95   < >  --   --    EGFR  91.5 101.4  --  81.2 96.1 70.0   < >  --   --    GLUCOSE 92 89  --  242* 159* 214*   < >  --   --    GLUCOSE, ARTERIAL  --   --  67  --   --   --   --  166* 302*   CALCIUM 9.4 9.1  --  10.0 9.6 9.5   < >  --   --    IONIZED CALCIUM  --   --  1.23  --   --   --   --  1.12* 1.17   MAGNESIUM 2.1 2.1  --  2.5 2.0 1.8  --   --   --    PHOSPHORUS 2.6 3.3  --  5.0* 3.5  --   --   --   --    HEMOGLOBIN A1C  --   --   --  5.80*  --   --   --   --   --     < > = values in this interval not displayed.         Lab 04/15/22  0309 04/14/22  0845 04/13/22  2350 04/13/22  0546 04/12/22  2108 04/12/22  0301   TOTAL PROTEIN  --  6.1 7.3 6.8 7.3 8.2   ALBUMIN 4.10 3.80 4.60 4.30 4.70 4.90   GLOBULIN  --  2.3 2.7 2.5 2.6 3.3   ALT (SGPT)  --  19 22 17 16 20   AST (SGOT)  --  17 22 22 16 25   BILIRUBIN  --  0.2 0.2 0.2 0.3 0.2   ALK PHOS  --  78 108 94 103 137*         Lab 04/13/22  2350 04/12/22  0301   PROBNP  --  2,427.0*   TROPONIN T <0.010 <0.010                 Lab 04/14/22  0940 04/14/22  0629 04/14/22  0006   PH, ARTERIAL 7.351 7.297* 7.051*   PCO2, ARTERIAL 55.5* 62.6* 116.8*   PO2 ART 56.3* 99.3 99.2   O2 SATURATION ART 88.8* 96.7 92.7*   FIO2 28 35 50   HCO3 ART 30.0* 29.9* 31.7*   BASE EXCESS ART 3.2* 2.0 -2.8*   CARBOXYHEMOGLOBIN 0.2 0.3 0.4     Brief Urine Lab Results     None        Microbiology Results (last 10 days)     Procedure Component Value - Date/Time    S. Pneumo Ag Urine or CSF - Urine, Urine, Clean Catch [[id number]]  (Normal) Collected: 04/13/22 1046    Lab Status: Final result Specimen: Urine, Clean Catch Updated: 04/13/22 1122     Strep Pneumo Ag Negative    Legionella Antigen, Urine - Urine, Urine, Clean Catch [[id number]]  (Normal) Collected: 04/13/22 1046    Lab Status: Final result Specimen: Urine, Clean Catch Updated: 04/13/22 1122     LEGIONELLA ANTIGEN, URINE Negative    S. Pneumo Ag Urine or CSF - Urine, Urine, Clean Catch [[id number]]  (Normal) Collected: 04/12/22 0537    Lab Status: Final result  Specimen: Urine, Clean Catch Updated: 04/12/22 0830     Strep Pneumo Ag Negative    Legionella Antigen, Urine - Urine, Urine, Clean Catch [[id number]]  (Normal) Collected: 04/12/22 0537    Lab Status: Final result Specimen: Urine, Clean Catch Updated: 04/12/22 0830     LEGIONELLA ANTIGEN, URINE Negative    COVID-19,APTIMA PANTHER(TERRI),[facility], NP/OP SWAB IN UTM/VTM/[facility] TRANSPORT MEDIA,24 HR TAT - Swab, Nasal Cavity [[id number]]  (Normal) Collected: 04/12/22 0433    Lab Status: Final result Specimen: Swab from Nasal Cavity Updated: 04/12/22 1450     COVID19 Not Detected    Narrative:      Fact sheet for providers: https://www.fda.gov/media/612678/download     Fact sheet for patients: https://www.fda.gov/media/354664/download    Test performed by RT PCR.    Influenza Antigen, Rapid - Swab, Nasopharynx [[id number]]  (Normal) Collected: 04/12/22 0433    Lab Status: Final result Specimen: Swab from Nasopharynx Updated: 04/12/22 0459     Influenza A Ag, EIA Negative     Influenza B Ag, EIA Negative    Blood Culture - Blood, Arm, Right [[id number]]  (Normal) Collected: 04/12/22 0432    Lab Status: Preliminary result Specimen: Blood from Arm, Right Updated: 04/15/22 0446     Blood Culture No growth at 3 days    Blood Culture - Blood, Arm, Right [[id number]]  (Abnormal) Collected: 04/12/22 0432    Lab Status: Final result Specimen: Blood from Arm, Right Updated: 04/14/22 0814     Blood Culture Staphylococcus, coagulase negative     Isolated from Aerobic Bottle     Gram Stain Aerobic Bottle Gram positive cocci in clusters    Narrative:      Probable contaminant requires clinical correlation, susceptibility not performed unless requested by physician.      Blood Culture ID, PCR - Blood, Arm, Right [[id number]]  (Abnormal) Collected: 04/12/22 0432    Lab Status: Final result Specimen: Blood from Arm, Right Updated: 04/12/22 3801     BCID, PCR Staph spp, not aureus or lugdunesis. Identification by BCID2 PCR.    Mycoplasma  Pneumoniae Antibody, IgM - Blood, [[id number]]  (Abnormal) Collected: 04/12/22 0301    Lab Status: Final result Specimen: Blood Updated: 04/12/22 1153     Mycoplasma pneumo IgM Positive        XR Chest 1 View    Result Date: 4/14/2022   Favorable progression is suggested radiographically with slight decrease in pulmonary edema since 4/12/2022 at 0304 hours.      COMMENT:  Part of this note is an electronic transcription of spoken language to printed text. The electronic translation/transcription may permit erroneous, or at times, nonsensical words or phrases to be inadvertently transcribed; this  has reviewed the note for such errors; however, some may still exist.  AMBROCIO HERNANDEZ JR, MD       Electronically Signed and Approved By: AMBROCIO HERNANDEZ JR, MD on 4/14/2022 at 0:07                  Assessment/Plan   Assessment / Plan   COPD with acute exacerbation  History of COVID-19 infection recently  Acute hypercapnic respiratory failure  Altered mental status, toxic metabolic encephalopathy  History of smoking in past  Mycoplasma pneumonia    Active Hospital Problems:  Active Hospital Problems    Diagnosis    • Severe malnutrition (CMS/HCC)    • Respiratory failure (HCC)          Plan:   Continue bipap with sleep and naps.  Hypercapnia and shortness of breath is improving some.  Repeat ABG today.  RT  consulted to arrange for home BiPAP  Check overnight oximetry once patient is somewhat stable.    Continue supplemental O2 as needed to keep O2 saturations greater than 90%  Continue breathing treatments and BPH protocol  As severe hypertension, start clonidine at home dose.  Restart home metoprolol 25 mg extended release daily.  Continue prednisone, may need to switch to IV solu-medrol.   Continue azithromycin  Continue SSI  Trend renal panel and electrolytes  We will replace electrolytes as needed  Bowel regimen       DVT prophylaxis:  Mechanical DVT prophylaxis orders are present.    CODE STATUS:    Code Status (Patient has no pulse and is not breathing): CPR (Attempt to Resuscitate)  Medical Interventions (Patient has pulse or is breathing): Full Support      The above plan is a product of multidisciplinary rounds performed this morning on 4/15/2022 at bedside with the critical care team consisting of the on-call physician, Dr. Melchor,  and myself Sherry Vera PA-C, bedside RN, pharmacist, respiratory therapist, dietitian and other Allied health services.    Reviewed labs, imaging and provider notes.   Discussed with bedside nurse and primary service.   Critical care time spent 31 mins excluding any procedures.     Dictated utilizing Dragon Dictation.    Scribed for Dr. Melchor by Sherry Rhodes.      Electronically signed by Doroteo Melchor MD, 04/15/22, 12:29 PM EDT.

## 2022-04-15 NOTE — THERAPY EVALUATION
Acute Care - Physical Therapy Initial Evaluation  [facility]     Patient Name: Yenni Camacho  : 1965  MRN: [id number]  Today's Date: 4/15/2022      Visit Dx: Admit date: 2022     Referring Physician: Sedrick Allen MD     Surgery Date:* No surgery found *            ICD-10-CM ICD-9-CM   1. Acute respiratory failure with hypoxia and hypercapnia (HCC)  J96.01 518.81    J96.02    2. History of COVID-19  Z86.16 V12.09   3. Altered mental status, unspecified altered mental status type  R41.82 780.97   4. Pneumonia due to infectious organism, unspecified laterality, unspecified part of lung  J18.9 486   5. Difficulty walking  R26.2 719.7     Patient Active Problem List   Diagnosis   • Respiratory failure (HCC)   • Severe malnutrition (CMS/HCC)     Past Medical History:   Diagnosis Date   • ADHD    • Arthritis    • Hypertension      Past Surgical History:   Procedure Laterality Date   • ARM WOUND REPAIR / CLOSURE Right      PT Assessment (last 12 hours)     PT Evaluation and Treatment     Row Name 04/15/22 1400          Physical Therapy Time and Intention    Subjective Information no complaints  -DP     Document Type evaluation  -DP     Mode of Treatment individual therapy;physical therapy  -DP     Patient Effort good  -DP     Row Name 04/15/22 1400          General Information    Patient Profile Reviewed yes  -DP     Patient Observations alert;cooperative;agree to therapy  -DP     Prior Level of Function independent:;gait;transfer;bed mobility;ADL's  -DP     Equipment Currently Used at Home none  -DP     Existing Precautions/Restrictions oxygen therapy device and L/min  -DP     Row Name 04/15/22 1400          Living Environment    Current Living Arrangements home  -DP     Home Accessibility stairs to enter home  -DP     People in Home alone  -DP     Row Name 04/15/22 1400          Home Main Entrance    Number of Stairs, Main Entrance two  -DP     Row Name 04/15/22 1400          Range of Motion (ROM)     Range of Motion bilateral lower extremities;ROM is WFL  -DP     Row Name 04/15/22 1400          Strength (Manual Muscle Testing)    Strength (Manual Muscle Testing) bilateral lower extremities;strength is WFL  -DP     Row Name 04/15/22 1400          Bed Mobility    Bed Mobility supine-sit-supine  -DP     Supine-Sit-Supine Cade (Bed Mobility) supervision  -DP     Row Name 04/15/22 1400          Transfers    Transfers sit-stand transfer  -DP     Sit-Stand Cade (Transfers) supervision  -DP     Row Name 04/15/22 1400          Gait/Stairs (Locomotion)    Gait/Stairs Locomotion gait/ambulation independence  -DP     Cade Level (Gait) supervision  -DP     Assistive Device (Gait) other (see comments)  none  -DP     Distance in Feet (Gait) 10  -DP     Comment, (Gait/Stairs) Pt was unable to ambulate further as she was connected to multiple IVs. She participated in marching on the spot x 10 reps and heel raises x 10 reps without an assistive device.  -DP     Row Name 04/15/22 1400          Plan of Care Review    Plan of Care Reviewed With patient  -DP     Outcome Evaluation Pt presents with no strength, and functional mobility deficits. She does not need PT Services in the hospital. She will benefit from use of a walker upon return home and pulmonary rehab.  -DP     Row Name 04/15/22 1400          Therapy Assessment/Plan (PT)    Therapy Frequency (PT) evaluation only  -DP     Row Name 04/15/22 1400          PT Evaluation Complexity    History, PT Evaluation Complexity no personal factors and/or comorbidities  -DP     Examination of Body Systems (PT Eval Complexity) total of 4 or more elements  -DP     Clinical Presentation (PT Evaluation Complexity) stable  -DP     Clinical Decision Making (PT Evaluation Complexity) low complexity  -DP     Overall Complexity (PT Evaluation Complexity) low complexity  -DP           User Key  (r) = Recorded By, (t) = Taken By, (c) = Cosigned By    Initials Name Provider  Type    Moncia Medrano PT Physical Therapist                  PT Recommendation and Plan  Anticipated Discharge Disposition (PT): home with home health  Therapy Frequency (PT): evaluation only  Plan of Care Reviewed With: patient  Outcome Evaluation: Pt presents with no strength, and functional mobility deficits. She does not need PT Services in the hospital. She will benefit from use of a walker upon return home and pulmonary rehab.   Outcome Measures     Row Name 04/15/22 1400             How much help from another person do you currently need...    Turning from your back to your side while in flat bed without using bedrails? 4  -DP      Moving from lying on back to sitting on the side of a flat bed without bedrails? 4  -DP      Moving to and from a bed to a chair (including a wheelchair)? 4  -DP      Standing up from a chair using your arms (e.g., wheelchair, bedside chair)? 4  -DP      Climbing 3-5 steps with a railing? 3  -DP      To walk in hospital room? 3  -DP      AM-PAC 6 Clicks Score (PT) 22  -DP              Functional Assessment    Outcome Measure Options AM-PAC 6 Clicks Basic Mobility (PT)  -DP            User Key  (r) = Recorded By, (t) = Taken By, (c) = Cosigned By    Initials Name Provider Type    Monica Medrano PT Physical Therapist                 Time Calculation:    PT Charges     Row Name 04/15/22 1458             Time Calculation    PT Received On 04/15/22  -DP              Untimed Charges    PT Eval/Re-eval Minutes 40  -DP              Total Minutes    Untimed Charges Total Minutes 40  -DP       Total Minutes 40  -DP            User Key  (r) = Recorded By, (t) = Taken By, (c) = Cosigned By    Initials Name Provider Type    Monica Medrano PT Physical Therapist              Therapy Charges for Today     Code Description Service Date Service Provider Modifiers Qty    [id number] HC PT EVAL LOW COMPLEXITY 3 4/15/2022 Monica Shook PT GP 1          PT G-Codes  Outcome Measure  Options: AM-PAC 6 Clicks Basic Mobility (PT)  AM-PAC 6 Clicks Score (PT): 22    Monica Shook, PT  4/15/2022

## 2022-04-15 NOTE — SIGNIFICANT NOTE
04/15/22 1203   Coping/Psychosocial   Observed Emotional State happy;pleasant   Verbalized Emotional State hopefulness   Trust Relationship/Rapport care explained;emotional support provided   Spiritual Care   Spiritual Care Visit Type initial   Spiritual Care Source  initiative   Receptivity to Spiritual Care visit welcomed   Spiritual Care Request coping/stress of illness support;mood/anxiety support   Spiritual Care Interventions supportive conversation provided   Response to Spiritual Care thanks expressed;visit helpful   Use of Spiritual Resources non-Worship use of spiritual care   Spiritual Care Follow-Up follow-up planned regularly for general support   Visit welcomed. Talked with pt about hopefully moving to another unit today. Pt positive and stated she is feeling a lot better. Supported pt.

## 2022-04-16 LAB
ALBUMIN SERPL-MCNC: 3.8 G/DL (ref 3.5–5.2)
ALBUMIN/GLOB SERPL: 1.9 G/DL
ALP SERPL-CCNC: 78 U/L (ref 39–117)
ALT SERPL W P-5'-P-CCNC: 7 U/L (ref 1–33)
ANION GAP SERPL CALCULATED.3IONS-SCNC: 6.9 MMOL/L (ref 5–15)
AST SERPL-CCNC: 11 U/L (ref 1–32)
BASOPHILS # BLD AUTO: 0.01 10*3/MM3 (ref 0–0.2)
BASOPHILS NFR BLD AUTO: 0.1 % (ref 0–1.5)
BILIRUB SERPL-MCNC: 0.2 MG/DL (ref 0–1.2)
BUN SERPL-MCNC: 18 MG/DL (ref 6–20)
BUN/CREAT SERPL: 26.5 (ref 7–25)
CALCIUM SPEC-SCNC: 8.9 MG/DL (ref 8.6–10.5)
CHLORIDE SERPL-SCNC: 105 MMOL/L (ref 98–107)
CO2 SERPL-SCNC: 32.1 MMOL/L (ref 22–29)
CREAT SERPL-MCNC: 0.68 MG/DL (ref 0.57–1)
DEPRECATED RDW RBC AUTO: 47.1 FL (ref 37–54)
EGFRCR SERPLBLD CKD-EPI 2021: 101.7 ML/MIN/1.73
EOSINOPHIL # BLD AUTO: 0.01 10*3/MM3 (ref 0–0.4)
EOSINOPHIL NFR BLD AUTO: 0.1 % (ref 0.3–6.2)
ERYTHROCYTE [DISTWIDTH] IN BLOOD BY AUTOMATED COUNT: 14.2 % (ref 12.3–15.4)
GLOBULIN UR ELPH-MCNC: 2 GM/DL
GLUCOSE BLDC GLUCOMTR-MCNC: 110 MG/DL (ref 70–99)
GLUCOSE BLDC GLUCOMTR-MCNC: 87 MG/DL (ref 70–99)
GLUCOSE SERPL-MCNC: 126 MG/DL (ref 65–99)
HCT VFR BLD AUTO: 37.7 % (ref 34–46.6)
HGB BLD-MCNC: 12.4 G/DL (ref 12–15.9)
IMM GRANULOCYTES # BLD AUTO: 0.03 10*3/MM3 (ref 0–0.05)
IMM GRANULOCYTES NFR BLD AUTO: 0.4 % (ref 0–0.5)
LYMPHOCYTES # BLD AUTO: 0.94 10*3/MM3 (ref 0.7–3.1)
LYMPHOCYTES NFR BLD AUTO: 13 % (ref 19.6–45.3)
MAGNESIUM SERPL-MCNC: 2 MG/DL (ref 1.6–2.6)
MCH RBC QN AUTO: 29.8 PG (ref 26.6–33)
MCHC RBC AUTO-ENTMCNC: 32.9 G/DL (ref 31.5–35.7)
MCV RBC AUTO: 90.6 FL (ref 79–97)
MONOCYTES # BLD AUTO: 0.86 10*3/MM3 (ref 0.1–0.9)
MONOCYTES NFR BLD AUTO: 11.9 % (ref 5–12)
NEUTROPHILS NFR BLD AUTO: 5.36 10*3/MM3 (ref 1.7–7)
NEUTROPHILS NFR BLD AUTO: 74.5 % (ref 42.7–76)
NRBC BLD AUTO-RTO: 0 /100 WBC (ref 0–0.2)
PHOSPHATE SERPL-MCNC: 2.9 MG/DL (ref 2.5–4.5)
PLATELET # BLD AUTO: 229 10*3/MM3 (ref 140–450)
PMV BLD AUTO: 10.3 FL (ref 6–12)
POTASSIUM SERPL-SCNC: 4 MMOL/L (ref 3.5–5.2)
PROT SERPL-MCNC: 5.8 G/DL (ref 6–8.5)
RBC # BLD AUTO: 4.16 10*6/MM3 (ref 3.77–5.28)
SODIUM SERPL-SCNC: 144 MMOL/L (ref 136–145)
WBC NRBC COR # BLD: 7.21 10*3/MM3 (ref 3.4–10.8)

## 2022-04-16 PROCEDURE — 99291 CRITICAL CARE FIRST HOUR: CPT | Performed by: INTERNAL MEDICINE

## 2022-04-16 PROCEDURE — 94799 UNLISTED PULMONARY SVC/PX: CPT

## 2022-04-16 PROCEDURE — 85025 COMPLETE CBC W/AUTO DIFF WBC: CPT | Performed by: INTERNAL MEDICINE

## 2022-04-16 PROCEDURE — 84100 ASSAY OF PHOSPHORUS: CPT | Performed by: INTERNAL MEDICINE

## 2022-04-16 PROCEDURE — 94762 N-INVAS EAR/PLS OXIMTRY CONT: CPT

## 2022-04-16 PROCEDURE — 99233 SBSQ HOSP IP/OBS HIGH 50: CPT | Performed by: INTERNAL MEDICINE

## 2022-04-16 PROCEDURE — 80053 COMPREHEN METABOLIC PANEL: CPT | Performed by: INTERNAL MEDICINE

## 2022-04-16 PROCEDURE — 63710000001 PREDNISONE PER 1 MG: Performed by: INTERNAL MEDICINE

## 2022-04-16 PROCEDURE — 82962 GLUCOSE BLOOD TEST: CPT

## 2022-04-16 PROCEDURE — 83735 ASSAY OF MAGNESIUM: CPT | Performed by: INTERNAL MEDICINE

## 2022-04-16 RX ADMIN — ARFORMOTEROL TARTRATE 15 MCG: 15 SOLUTION RESPIRATORY (INHALATION) at 08:24

## 2022-04-16 RX ADMIN — Medication 10 ML: at 20:45

## 2022-04-16 RX ADMIN — SENNOSIDES AND DOCUSATE SODIUM 2 TABLET: 50; 8.6 TABLET ORAL at 09:25

## 2022-04-16 RX ADMIN — ARFORMOTEROL TARTRATE 15 MCG: 15 SOLUTION RESPIRATORY (INHALATION) at 18:50

## 2022-04-16 RX ADMIN — PREDNISONE 40 MG: 20 TABLET ORAL at 09:25

## 2022-04-16 RX ADMIN — CLONIDINE HYDROCHLORIDE 0.3 MG: 0.3 TABLET ORAL at 09:26

## 2022-04-16 RX ADMIN — BUDESONIDE 0.5 MG: 0.5 SUSPENSION RESPIRATORY (INHALATION) at 18:50

## 2022-04-16 RX ADMIN — BUDESONIDE 0.5 MG: 0.5 SUSPENSION RESPIRATORY (INHALATION) at 08:24

## 2022-04-16 RX ADMIN — METOPROLOL SUCCINATE 25 MG: 25 TABLET, FILM COATED, EXTENDED RELEASE ORAL at 09:26

## 2022-04-16 RX ADMIN — Medication 10 ML: at 09:26

## 2022-04-16 NOTE — PROGRESS NOTES
Pulmonary / Critical Care  Progress Note      Patient Name: Yenni Camacho  : 1965  MRN: [id number]  Primary Care Physician:  Provider, No Known  Date of admission: 2022  ICU: 2d 10h      Subjective   Subjective     55y/o female critically ill in CCU with COPD exacerbation     Date of Admission: 2022  CCU day: 3  Oxygen requirement: Nasal cannula  Sedation: None  Pressors: None  Critical drips: None  Antibiotic regimen: Azithromycin  Lines and insertion date: Peripherals    Over last 24 hours,  Remained on steroids, antibiotics and airway clearance therapies.  Also remained on nebulizers.  Had some increased work of breathing.  Has been bedridden and weak.     No acute events overnight    This morning....  Pt is awake and alert  Remains weak and bedridden.  Has some wheezing but improving.  No nausea or vomiting.  No chest pain or chest tightness.  Remains on high flow nasal oxygen.  Sitting up in bed  Denies any complaints or concerns  HTN this morning, but per RN, has not received morning HTN medications        ROS  General: Denied complaints  HEENT: Denied complaints  Respiratory: Cough, shortness of breath, wheezing otherwise denied complaints  Cardiovascular: Chest tightness otherwise denied complaints  GI: Denied complaints  MSK: Denied complaints  Neurologic: Denied complaints  Skin: Denied complaints         Objective   Objective     Vitals:   Temp:  [97.8 °F (36.6 °C)-98.9 °F (37.2 °C)] 97.8 °F (36.6 °C)  Heart Rate:  [72-99] 88  Resp:  [17-23] 18  BP: (112-171)/() 159/92  Flow (L/min):  [2-3] 2    Physical Exam -reviewed from exam performed on 2022 and appropriate changes made if any  Vital Signs Reviewed   General: This is a looking female, in no acute distress, has some conversational dyspnea, HEENT:  PERRL, EOMI.  OP, nares clear  Neck:  Supple, no JVD, no thyromegaly  Chest: Poor aeration, bilateral scattered wheezing, no rhonchi or crackles, mild work of breathing  noted  CV: RRR, no MGR, pulses 2+, equal.  Abd:  Soft, NT, ND, + BS, no HSM  EXT:  no clubbing, no cyanosis, no edema  Neuro:  A&Ox3, CN grossly intact, no focal deficits.  Skin: No rashes or lesions noted      Result Review    Result Review:  I have personally reviewed the results from the time of this admission to 4/16/2022 12:10 EDT and agree with these findings:  [x]  Laboratory  [x]  Microbiology  [x]  Radiology  []  EKG/Telemetry   [x]  Cardiology/Vascular   []  Pathology  []  Old records  []  Other:  Most notable findings include:     LAB RESULTS:      Lab 04/16/22  0323 04/15/22  0309 04/14/22  0845 04/14/22  0629 04/13/22  2350 04/13/22  0546 04/12/22  2108 04/12/22  0917 04/12/22  0754 04/12/22  0432 04/12/22  0320   WBC 7.21 11.62* 12.45*  --  26.24* 10.53   < > 7.18  --   --   --    HEMOGLOBIN 12.4 13.4 12.2  --  14.9 13.2   < > 14.0  --   --   --    HEMATOCRIT 37.7 41.9 38.6  --  47.3* 39.8   < > 43.5  --   --   --    PLATELETS 229 277 243  --  383 241   < > 237  --   --   --    NEUTROS ABS 5.36 9.28* 10.66*  --  22.71* 9.95*   < > 6.73  --   --   --    IMMATURE GRANS (ABS) 0.03 0.06* 0.05  --  0.18* 0.07*   < > 0.02  --   --   --    LYMPHS ABS 0.94 1.36 0.88  --  2.32 0.36*   < > 0.32*  --   --   --    MONOS ABS 0.86 0.91* 0.85  --  0.98* 0.14   < > 0.10  --   --   --    EOS ABS 0.01 0.00 0.00  --  0.02 0.00   < > 0.00  --   --   --    MCV 90.6 91.5 91.9  --  93.1 90.0   < > 89.1  --   --   --    PROCALCITONIN  --   --   --   --   --   --   --  0.15  --   --   --    LACTATE  --   --   --   --   --   --   --   --   --  1.6  --    LACTATE, ARTERIAL  --   --   --  0.85  --   --   --   --  0.64  --  0.98    < > = values in this interval not displayed.         Lab 04/16/22  0323 04/15/22  0309 04/14/22  0845 04/14/22  0629 04/13/22  2350 04/13/22  0546 04/12/22  0917 04/12/22  0754 04/12/22  0320   SODIUM 144 145 142  --  141 141   < >  --   --    SODIUM, ARTERIAL  --   --   --  140.7  --   --   --  141.4  142.4   POTASSIUM 4.0 4.2 4.4  --  5.0 4.3   < >  --   --    CHLORIDE 105 106 106  --  103 103   < >  --   --    CO2 32.1* 30.1* 29.8*  --  26.6 27.2   < >  --   --    ANION GAP 6.9 8.9 6.2  --  11.4 10.8   < >  --   --    BUN 18 23* 25*  --  22* 19   < >  --   --    CREATININE 0.68 0.76 0.69  --  0.84 0.73   < >  --   --    EGFR 101.7 91.5 101.4  --  81.2 96.1   < >  --   --    GLUCOSE 126* 92 89  --  242* 159*   < >  --   --    GLUCOSE, ARTERIAL  --   --   --  67  --   --   --  166* 302*   CALCIUM 8.9 9.4 9.1  --  10.0 9.6   < >  --   --    IONIZED CALCIUM  --   --   --  1.23  --   --   --  1.12* 1.17   MAGNESIUM 2.0 2.1 2.1  --  2.5 2.0   < >  --   --    PHOSPHORUS 2.9 2.6 3.3  --  5.0* 3.5  --   --   --    HEMOGLOBIN A1C  --   --   --   --  5.80*  --   --   --   --     < > = values in this interval not displayed.         Lab 04/16/22  0323 04/15/22  0309 04/14/22  0845 04/13/22  2350 04/13/22  0546 04/12/22  2108   TOTAL PROTEIN 5.8*  --  6.1 7.3 6.8 7.3   ALBUMIN 3.80 4.10 3.80 4.60 4.30 4.70   GLOBULIN 2.0  --  2.3 2.7 2.5 2.6   ALT (SGPT) 7  --  19 22 17 16   AST (SGOT) 11  --  17 22 22 16   BILIRUBIN 0.2  --  0.2 0.2 0.2 0.3   ALK PHOS 78  --  78 108 94 103         Lab 04/13/22  2350 04/12/22  0301   PROBNP  --  2,427.0*   TROPONIN T <0.010 <0.010                 Lab 04/14/22  0940 04/14/22  0629 04/14/22  0006   PH, ARTERIAL 7.351 7.297* 7.051*   PCO2, ARTERIAL 55.5* 62.6* 116.8*   PO2 ART 56.3* 99.3 99.2   O2 SATURATION ART 88.8* 96.7 92.7*   FIO2 28 35 50   HCO3 ART 30.0* 29.9* 31.7*   BASE EXCESS ART 3.2* 2.0 -2.8*   CARBOXYHEMOGLOBIN 0.2 0.3 0.4     Brief Urine Lab Results     None        Microbiology Results (last 10 days)     Procedure Component Value - Date/Time    S. Pneumo Ag Urine or CSF - Urine, Urine, Clean Catch [[id number]]  (Normal) Collected: 04/13/22 1046    Lab Status: Final result Specimen: Urine, Clean Catch Updated: 04/13/22 1122     Strep Pneumo Ag Negative    Legionella Antigen, Urine  - Urine, Urine, Clean Catch [[id number]]  (Normal) Collected: 04/13/22 1046    Lab Status: Final result Specimen: Urine, Clean Catch Updated: 04/13/22 1122     LEGIONELLA ANTIGEN, URINE Negative    S. Pneumo Ag Urine or CSF - Urine, Urine, Clean Catch [[id number]]  (Normal) Collected: 04/12/22 0537    Lab Status: Final result Specimen: Urine, Clean Catch Updated: 04/12/22 0830     Strep Pneumo Ag Negative    Legionella Antigen, Urine - Urine, Urine, Clean Catch [[id number]]  (Normal) Collected: 04/12/22 0537    Lab Status: Final result Specimen: Urine, Clean Catch Updated: 04/12/22 0830     LEGIONELLA ANTIGEN, URINE Negative    COVID-19,APTIMA PANTHER(TERRI),[facility], NP/OP SWAB IN UTM/VTM/[facility]ALINE TRANSPORT MEDIA,24 HR TAT - Swab, Nasal Cavity [[id number]]  (Normal) Collected: 04/12/22 0433    Lab Status: Final result Specimen: Swab from Nasal Cavity Updated: 04/12/22 1450     COVID19 Not Detected    Narrative:      Fact sheet for providers: https://www.fda.gov/media/697970/download     Fact sheet for patients: https://www.fda.gov/media/974128/download    Test performed by RT PCR.    Influenza Antigen, Rapid - Swab, Nasopharynx [[id number]]  (Normal) Collected: 04/12/22 0433    Lab Status: Final result Specimen: Swab from Nasopharynx Updated: 04/12/22 0459     Influenza A Ag, EIA Negative     Influenza B Ag, EIA Negative    Blood Culture - Blood, Arm, Right [[id number]]  (Normal) Collected: 04/12/22 0432    Lab Status: Preliminary result Specimen: Blood from Arm, Right Updated: 04/16/22 0447     Blood Culture No growth at 4 days    Blood Culture - Blood, Arm, Right [[id number]]  (Abnormal) Collected: 04/12/22 0432    Lab Status: Final result Specimen: Blood from Arm, Right Updated: 04/14/22 0814     Blood Culture Staphylococcus, coagulase negative     Isolated from Aerobic Bottle     Gram Stain Aerobic Bottle Gram positive cocci in clusters    Narrative:      Probable contaminant requires clinical correlation,  susceptibility not performed unless requested by physician.      Blood Culture ID, PCR - Blood, Arm, Right [[id number]]  (Abnormal) Collected: 04/12/22 0432    Lab Status: Final result Specimen: Blood from Arm, Right Updated: 04/12/22 2318     BCID, PCR Staph spp, not aureus or lugdunesis. Identification by BCID2 PCR.    Mycoplasma Pneumoniae Antibody, IgM - Blood, [[id number]]  (Abnormal) Collected: 04/12/22 0301    Lab Status: Final result Specimen: Blood Updated: 04/12/22 1153     Mycoplasma pneumo IgM Positive        No radiology results for the last day      Assessment/Plan   Assessment / Plan   COPD with acute exacerbation  History of COVID-19 infection recently  Acute hypercapnic respiratory failure  Altered mental status, toxic metabolic encephalopathy  History of smoking in past  Mycoplasma pneumonia    Active Hospital Problems:  Active Hospital Problems    Diagnosis    • Severe malnutrition (CMS/HCC)    • Respiratory failure (HCC)          Plan:   Continue bipap with sleep and naps. Continue supplemental O2 as needed to keep O2 saturations > 90%  RT  consulted to arrange for home BiPAP  Check overnight oximetry tonight.    Continue supplemental O2 as needed to keep O2 saturations greater than 90%  Continue breathing treatments and BPH protocol  Continue home antiHTN medications  Restart home metoprolol 25 mg extended release daily.  Continue prednisone, may need to switch to IV solu-medrol.   Continue azithromycin  Continue SSI  Trend renal panel and electrolytes  We will replace electrolytes as needed  Bowel regimen    DVT prophylaxis:  Mechanical DVT prophylaxis orders are present.    CODE STATUS:   Code Status (Patient has no pulse and is not breathing): CPR (Attempt to Resuscitate)  Medical Interventions (Patient has pulse or is breathing): Full Support      The above plan is a product of multidisciplinary rounds performed this morning on 4/16/2022 at bedside with the critical care team  consisting of the on-call physician, Dr. Melchor,  and myself Sherry Vera PA-C, bedside RN, pharmacist, respiratory therapist, dietitian and other Allied health services.    Reviewed labs, imaging and provider notes.   Discussed with bedside nurse and primary service.   Critical care time spent 31 mins excluding any procedures.     Dictated utilizing Dragon Dictation.    Scribed for Dr. Melchor by Sherry Rhodes.    Electronically signed by Doroteo Melchor MD, 04/16/22, 2:17 PM EDT.

## 2022-04-16 NOTE — PROGRESS NOTES
[facility]   Hospitalist Progress Note  Date: 2022  Patient Name: Yenni Camacho  : 1965  MRN: [id number]  Date of admission: 2022  Consultants:   -Pulmonology/Critical Care: Dr. Doroteo Melchor, Dr. Pradeep Arauz  -Cardiology: Dr. Chele Zaragoza    Subjective   Subjective     Chief Complaint: Shortness of breath    Summary:   Yenni Camacho is a 56 y.o. female with COPD and history of COVID-19 infection in 2022 who presented to the ED with complaints of shortness of breath and altered mental status.  O2 sats in the low 70s on presentation and due to confusion/agitation patient given ketamine and started on BiPAP.  Pulmonology/Critical Care consulted to assist in management.  Patient started on scheduled steroids and bronchodilators.  Patient's condition initially improved quickly, however, hospitalization complicated by SVT requiring IV adenosine pushes.  Cardiology consulted and patient started on oral beta-blocker with improvement.  Patient had additional episode of acute hypoxia and hypercapnia requiring rescue BiPAP and she was transferred to ICU.  Patient's x-ray status again improved and supplemental O2 requirement able to be weaned and patient able to transfer out of ICU.  Of note, patient had irregularity on CT chest which appears to be scarring and will need outpatient CT chest done in 3 to 6 months per pulmonology recommendation.    Interval Followup:   No acute events overnight.  Patient stated that her shortness of air was much improved.  She denied any chest pain, abdominal pain, nausea or vomiting.  Patient wore 2 L nasal cannula throughout the night and vital signs remained stable.  Tolerating diet without issue.  Nursing with no additional acute issues to report.    Review of Systems   All systems reviewed and negative unless stated otherwise under subjective.    Objective   Objective     Vitals:   Temp:  [97.6 °F (36.4 °C)-98.9 °F (37.2 °C)] 97.8 °F (36.6  °C)  Heart Rate:  [72-99] 88  Resp:  [17-23] 18  BP: (112-171)/() 159/92  Flow (L/min):  [2-3] 2  Physical Exam   Gen: No acute distress, Conversant, Pleasant, sitting up in bed eating breakfast watching TV  HEENT: MMM, Atraumatic  Neck: Supple, Trachea midline  Resp: Improved aeration, bilateral wheezing (improved), no rhonchi or crackles, equal chest rise bilaterally, no conversational dyspnea noted  Card: RRR, No m/r/g  Abd: Soft, Nontender, Nondistended, + bowel sounds  Ext: No cyanosis, No clubbing  Neuro: CN II-XII grossly intact, No focal deficits appreciated  Psych: AAO x 3, Normal mood, Normal affect    Result Review    Result Review:  I have personally reviewed the results from the time of this admission to 4/16/2022 10:48 EDT and agree with these findings:  [x]  Laboratory: Creatinine and electrolytes stable.  Blood glucose well controlled.  Hemoglobin and platelets stable.  WBC continues to downtrend.  []  Microbiology:   []  Radiology:   [x]  EKG/Telemetry: No acute events.  Tachycardia.  Sinus rhythm.  []  Cardiology/Vascular:    []  Pathology:  []  Old records:  []  Other:    Assessment/Plan   Assessment / Plan     Assessment:  Acute hypoxic and hypercapnic respiratory failure requiring NIPPV (improving)  Acute COPD exacerbation (improved)  Hypertension (stable)  Paroxysmal supraventricular tachycardia  Acute agitation requiring Precedex drip (resolved)  History of COVID-19 infection (January 2022)  Nonischemic cardiomyopathy with mildly reduced systolic function  Metabolic encephalopathy secondary to hypoxemia and hypercapnia (improved)  Tobacco dependence in remission    Plan:  -Pulmonology/Critical Care and Cardiology consulted and following, appreciate assistance and recommendations in the care of this patient.  -Continue BiPAP at night and with naps  -Continue supplemental O2 to maintain sats greater than 90%, wean as tolerated  -Continue home clonidine, blood pressure improved  -Continue  home metoprolol  -Continue prednisone, patient's respiratory status stabilized do not think patient will require IV steroids at this time.  Continue to monitor closely.  -Continue Brovana, Pulmicort and as needed Xopenex  -Continue azithromycin for treatment of mycoplasma pneumonia  -Given soft tissue density seen in left upper lobe on CT chest patient will need outpatient CT done in 3 - 6 months per pulmonology  -Continue appropriate home medications  -Monitor electrolytes and renal function with BMP and magnesium level in the AM  -Monitor WBC and Hgb with CBC in the AM  -Clinical course will dictate further management     DVT Prophylaxis: SCDs   Diet: Regular   Dispo: Home when medically appropriate for discharge  Code Status: Full Code     Personally reviewed patients labs and imaging, discussed with patient and nurse at bedside. Discussed case with the following consultants: Pulmonology/Critical Care.     Part of this note may be an electronic transcription/translation of spoken language to printed text using the Dragon dictation system.    DVT prophylaxis:  Mechanical DVT prophylaxis orders are present.    CODE STATUS:   Code Status (Patient has no pulse and is not breathing): CPR (Attempt to Resuscitate)  Medical Interventions (Patient has pulse or is breathing): Full Support      Electronically signed by Sedrick Allen MD, 04/16/22, 10:48 AM EDT.

## 2022-04-16 NOTE — NURSING NOTE
4/16 @ 1045: patient was requesting that her adderall be restarted for her ADHD. Presumably held d/t SVT and other complications earlier on in her hospital stay. Messaged Dr. Allen who stated after review that it was okay to restart but we do not have this medication in our formulary. Patient is going to reach out to her son to bring in her home meds. When he brings it in, check through pharmacy prior to administration.

## 2022-04-16 NOTE — PLAN OF CARE
Goal Outcome Evaluation:  Plan of Care Reviewed With: patient        Progress: improving  Outcome Evaluation: Weaned to room air and satting > 90%. Requesting her adderall; not in formularly; Okay per Dr. Allen for patient to have the medication brought in but needs to be checked by pharmacy. She is a/o x 4; up to bedside commode independently/as standby assist. Pleasant, calm, cooperative. HR in 80s-90s. BP controlled with scheduled BP meds.

## 2022-04-16 NOTE — PLAN OF CARE
Remained on 2L NC throughout the night, gets up to BSC standby assist and tolerates well, vitals stable, no complaints this AM. Awaiting tele bed. Will monitor. SHAHBAZ Cedillo RN

## 2022-04-17 LAB
ANION GAP SERPL CALCULATED.3IONS-SCNC: 8.3 MMOL/L (ref 5–15)
BACTERIA SPEC AEROBE CULT: NORMAL
BUN SERPL-MCNC: 20 MG/DL (ref 6–20)
BUN/CREAT SERPL: 30.8 (ref 7–25)
CALCIUM SPEC-SCNC: 9.4 MG/DL (ref 8.6–10.5)
CHLORIDE SERPL-SCNC: 104 MMOL/L (ref 98–107)
CO2 SERPL-SCNC: 30.7 MMOL/L (ref 22–29)
CREAT SERPL-MCNC: 0.65 MG/DL (ref 0.57–1)
DEPRECATED RDW RBC AUTO: 44.4 FL (ref 37–54)
EGFRCR SERPLBLD CKD-EPI 2021: 102.8 ML/MIN/1.73
ERYTHROCYTE [DISTWIDTH] IN BLOOD BY AUTOMATED COUNT: 13.8 % (ref 12.3–15.4)
GLUCOSE BLDC GLUCOMTR-MCNC: 113 MG/DL (ref 70–99)
GLUCOSE BLDC GLUCOMTR-MCNC: 93 MG/DL (ref 70–99)
GLUCOSE SERPL-MCNC: 108 MG/DL (ref 65–99)
HCT VFR BLD AUTO: 40 % (ref 34–46.6)
HGB BLD-MCNC: 13.2 G/DL (ref 12–15.9)
MAGNESIUM SERPL-MCNC: 2 MG/DL (ref 1.6–2.6)
MCH RBC QN AUTO: 29 PG (ref 26.6–33)
MCHC RBC AUTO-ENTMCNC: 33 G/DL (ref 31.5–35.7)
MCV RBC AUTO: 87.9 FL (ref 79–97)
PLATELET # BLD AUTO: 274 10*3/MM3 (ref 140–450)
PMV BLD AUTO: 9.9 FL (ref 6–12)
POTASSIUM SERPL-SCNC: 3.7 MMOL/L (ref 3.5–5.2)
RBC # BLD AUTO: 4.55 10*6/MM3 (ref 3.77–5.28)
SODIUM SERPL-SCNC: 143 MMOL/L (ref 136–145)
WBC NRBC COR # BLD: 7.79 10*3/MM3 (ref 3.4–10.8)

## 2022-04-17 PROCEDURE — 94799 UNLISTED PULMONARY SVC/PX: CPT

## 2022-04-17 PROCEDURE — 85027 COMPLETE CBC AUTOMATED: CPT | Performed by: INTERNAL MEDICINE

## 2022-04-17 PROCEDURE — 63710000001 PREDNISONE PER 1 MG: Performed by: INTERNAL MEDICINE

## 2022-04-17 PROCEDURE — 80048 BASIC METABOLIC PNL TOTAL CA: CPT | Performed by: INTERNAL MEDICINE

## 2022-04-17 PROCEDURE — 99233 SBSQ HOSP IP/OBS HIGH 50: CPT | Performed by: INTERNAL MEDICINE

## 2022-04-17 PROCEDURE — 99232 SBSQ HOSP IP/OBS MODERATE 35: CPT | Performed by: FAMILY MEDICINE

## 2022-04-17 PROCEDURE — 82962 GLUCOSE BLOOD TEST: CPT

## 2022-04-17 PROCEDURE — 83735 ASSAY OF MAGNESIUM: CPT | Performed by: INTERNAL MEDICINE

## 2022-04-17 RX ORDER — CLONIDINE HYDROCHLORIDE 0.3 MG/1
0.3 TABLET ORAL EVERY 12 HOURS SCHEDULED
Status: DISCONTINUED | OUTPATIENT
Start: 2022-04-17 | End: 2022-04-18 | Stop reason: HOSPADM

## 2022-04-17 RX ADMIN — Medication 10 ML: at 20:35

## 2022-04-17 RX ADMIN — CLONIDINE HYDROCHLORIDE 0.3 MG: 0.3 TABLET ORAL at 20:35

## 2022-04-17 RX ADMIN — ARFORMOTEROL TARTRATE 15 MCG: 15 SOLUTION RESPIRATORY (INHALATION) at 18:54

## 2022-04-17 RX ADMIN — CLONIDINE HYDROCHLORIDE 0.3 MG: 0.3 TABLET ORAL at 08:50

## 2022-04-17 RX ADMIN — PREDNISONE 40 MG: 20 TABLET ORAL at 08:52

## 2022-04-17 RX ADMIN — BUDESONIDE 0.5 MG: 0.5 SUSPENSION RESPIRATORY (INHALATION) at 06:36

## 2022-04-17 RX ADMIN — Medication 10 ML: at 08:52

## 2022-04-17 RX ADMIN — METOPROLOL SUCCINATE 25 MG: 25 TABLET, FILM COATED, EXTENDED RELEASE ORAL at 08:51

## 2022-04-17 RX ADMIN — BUDESONIDE 0.5 MG: 0.5 SUSPENSION RESPIRATORY (INHALATION) at 18:56

## 2022-04-17 RX ADMIN — ARFORMOTEROL TARTRATE 15 MCG: 15 SOLUTION RESPIRATORY (INHALATION) at 06:36

## 2022-04-17 NOTE — PLAN OF CARE
Goal Outcome Evaluation:  Plan of Care Reviewed With: patient        Progress: improving. Respirations are easy. Overnight pulse ox study completed, as ordered.

## 2022-04-17 NOTE — PLAN OF CARE
Problem: ARDS (Acute Respiratory Distress Syndrome)  Goal: Effective Oxygenation  Outcome: Ongoing, Progressing   Goal Outcome Evaluation:

## 2022-04-17 NOTE — PROGRESS NOTES
[facility]   Hospitalist Progress Note  Date: 2022  Patient Name: Yenni Camacho  : 1965  MRN: [id number]  Date of admission: 2022  Consultants:   -Pulmonology/Critical Care: Dr. Doroteo Melchor, Dr. Pradeep Arauz  -Cardiology: Dr. Chele Zaragoza    Subjective   Subjective     Chief Complaint: Shortness of breath    Summary:   Yenni Camacho is a 56 y.o. female with COPD and history of COVID-19 infection in 2022 who presented to the ED with complaints of shortness of breath and altered mental status.  O2 sats in the low 70s on presentation and due to confusion/agitation patient given ketamine and started on BiPAP.  Pulmonology/Critical Care consulted to assist in management.  Patient started on scheduled steroids and bronchodilators.  Patient's condition initially improved quickly, however, hospitalization complicated by SVT requiring IV adenosine pushes.  Cardiology consulted and patient started on oral beta-blocker with improvement.  Patient had additional episode of acute hypoxia and hypercapnia requiring rescue BiPAP and she was transferred to ICU.  Patient's x-ray status again improved and supplemental O2 requirement able to be weaned and patient able to transfer out of ICU.  Of note, patient had irregularity on CT chest which appears to be scarring and will need outpatient CT chest done in 3 to 6 months per pulmonology recommendation.  Respiratory function improved.  Respiratory case management arranging for home BiPAP.  Patient can likely discharge home with outpatient pulmonary follow-up once path arranged.    Interval Followup:   Patient sitting up in bed resting comfortably.  No acute events overnight per nursing.  Patient stated that her shortness of air is improved.  She denied any chest pain, abdominal pain, nausea or vomiting.  Patient reports some difficulty sleeping overnight.  Patient did have overnight sleep study.  Tolerating diet without issue.  Nursing  with no additional acute issues to report.  Afebrile overnight.  Sinus rhythm 70s 80s on telemetry review.  Blood pressure trending up in the evenings on review.  Discussion with the patient she states she takes clonidine twice daily has been getting it daily while inpatient.  Patient satting well on room air.  Blood sugars well controlled.  Bicarb trending down.  CBC within normal limits.  RT case management to arrange for home BiPAP.  Patient can likely discharge in a.m.    Review of Systems   All systems reviewed and negative unless stated otherwise under subjective.    Objective   Objective     Vitals:   Temp:  [98.1 °F (36.7 °C)-99 °F (37.2 °C)] 98.8 °F (37.1 °C)  Heart Rate:  [80-89] 83  Resp:  [17-18] 18  BP: (125-187)/() 125/70  Physical Exam   Gen. well-developed appearing stated age in no acute distress  HEENT: Normocephalic atraumatic moist membranes pupils equal round reactive light, no scleral icterus no conjunctival injection  Cardiovascular: regular rate and rhythm no murmurs rubs or gallops S1-S2, no lower extremity edema appreciated  Pulmonary: Clear to auscultation bilaterally scant expiratory wheezes no rales or rhonchi symmetric chest expansion, unlabored, no conversational dyspnea appreciated  Gastrointestinal: Soft nontender nondistended positive bowel sounds all 4 quadrants no rebound or guarding  Musculoskeletal: No clubbing cyanosis, warm and well-perfused, calves soft symmetric nontender bilaterally  Skin: Clean dry without rashs  Neuro: Cranial nerves II through XII intact grossly no sensorimotor deficits appreciated bilateral upper and lower extremities  Psych: Patient is calm cooperative and appropriate with exam not responding to internal stimuli  : No Delgado catheter no bladder distention no suprapubic tenderness     Result Review    Result Review:  I have personally reviewed the results from the time of this admission to 4/17/2022 15:56 EDT and agree with these findings:  [x]   Laboratory: Creatinine and electrolytes stable.  Blood glucose well controlled.  Hemoglobin and platelets stable.  WBC continues to downtrend.  []  Microbiology:   []  Radiology:   [x]  EKG/Telemetry:   []  Cardiology/Vascular:    []  Pathology:  []  Old records:  []  Other:    Assessment/Plan   Assessment / Plan     Assessment:  Acute hypoxic and hypercapnic respiratory failure requiring NIPPV (improving)  Acute COPD exacerbation (improved)  Hypertension (stable)  Paroxysmal supraventricular tachycardia  Acute agitation requiring Precedex drip (resolved)  History of COVID-19 infection (January 2022)  Nonischemic cardiomyopathy with mildly reduced systolic function  Metabolic encephalopathy secondary to hypoxemia and hypercapnia (improved)  Tobacco dependence in remission    Plan:  -Pulmonology/Critical Care and Cardiology consulted and following, appreciate assistance and recommendations in the care of this patient.  -Continue BiPAP at night and with naps  -Respiratory case management to arrange for home BiPAP per pulmonology  -Continue supplemental O2 as needed to maintain sats greater than 90%, wean as tolerated  -Continue home clonidine twice daily  -Continue metoprolol  -Continue prednisone per pulmonology  -Continue Brovana, Pulmicort and as needed Xopenex  -Completed course of azithromycin for treatment of mycoplasma pneumonia  -Given soft tissue density seen in left upper lobe on CT chest patient will need outpatient CT done in 3 - 6 months per pulmonology  -Continue appropriate home medications  -Clinical course will dictate further management     DVT Prophylaxis: SCDs   Diet: Regular   Dispo: Home once BiPAP arranged  Code Status: Full Code     Personally reviewed patients labs and imaging, discussed with patient and nurse at bedside. Discussed case with the following consultants: Pulmonology/Critical Care.     Part of this note may be an electronic transcription/translation of spoken language to printed  text using the Dragon dictation system.    DVT prophylaxis:  Mechanical DVT prophylaxis orders are present.    CODE STATUS:   Code Status (Patient has no pulse and is not breathing): CPR (Attempt to Resuscitate)  Medical Interventions (Patient has pulse or is breathing): Full Support

## 2022-04-17 NOTE — PROGRESS NOTES
Pulmonary / Critical Care  Progress Note      Patient Name: Yenni Camacho  : 1965  MRN: [id number]  Primary Care Physician:  Provider, No Known  Date of admission: 2022  ICU: 2d 13h      Subjective   Subjective     Follow-up for COPD exacerbation, hypercapnic respite failure     Over last 24 hours,  Remained on steroids, antibiotics and airway clearance therapies.  Also remained on nebulizers.  Had some increased work of breathing.  Has been bedridden and weak.  Overnight oximetry was done.     No acute events overnight    This morning....   Sleeping, arousable.  Remains weak and bedridden.  Wheezing is better.  No nausea or vomiting.  No chest pain or chest tightness.  Remains on high flow nasal oxygen.  Sitting up in bed.  Denies any complaints or concerns.      ROS  General:  No Fatigue, No Fever  HEENT: No dysphagia, No Visual Changes, no rhinorrhea  Respiratory:  +cough,+Dyspnea, scant phlegm, No Pleuritic Pain, + wheezing, no hemoptysis  Cardiovascular: Denies chest pain, denies palpitations,+MURPHY, No Chest Pressure  Gastrointestinal:  No Abdominal Pain, No Nausea, No Vomiting, No Diarrhea  Genitourinary:  No Dysuria, No Frequency, No Hesitancy  Musculoskeletal: No muscle pain or swelling  Endocrine:  No Heat Intolerance, No Cold Intolerance, No Fatigue  Hematologic:  No Bleeding, No Bruising  Neurologic:  No Confusion, no Dysarthria, No Headaches  Skin:  No Rash, No Open Wounds           Objective   Objective     Vitals:   Temp:  [97.8 °F (36.6 °C)-99 °F (37.2 °C)] 98.4 °F (36.9 °C)  Heart Rate:  [71-99] 89  Resp:  [17-18] 18  BP: (129-171)/() 159/100  Flow (L/min):  [2] 2    Physical Exam   Vital Signs Reviewed   General: This is a looking female, in no acute distress, has some conversational dyspnea, HEENT:  PERRL, EOMI.  OP, nares clear  Neck:  Supple, no JVD, no thyromegaly  Chest: Poor aeration, bilateral scattered wheezing, no rhonchi or crackles, mild work of breathing  noted  CV: RRR, no MGR, pulses 2+, equal.  Abd:  Soft, NT, ND, + BS, no HSM  EXT:  no clubbing, no cyanosis, no edema  Neuro:  A&Ox3, CN grossly intact, no focal deficits.  Skin: No rashes or lesions noted      Result Review    Result Review:  I have personally reviewed the results from the time of this admission to 4/17/2022 06:46 EDT and agree with these findings:  [x]  Laboratory  [x]  Microbiology  [x]  Radiology  []  EKG/Telemetry   [x]  Cardiology/Vascular   []  Pathology  []  Old records  []  Other:  Most notable findings include:     LAB RESULTS:      Lab 04/17/22  0416 04/16/22  0323 04/15/22  0309 04/14/22  0845 04/14/22  0629 04/13/22  2350 04/13/22  0546 04/12/22  2108 04/12/22  0917 04/12/22  0754 04/12/22  0432 04/12/22  0320   WBC 7.79 7.21 11.62* 12.45*  --  26.24* 10.53   < > 7.18  --   --   --    HEMOGLOBIN 13.2 12.4 13.4 12.2  --  14.9 13.2   < > 14.0  --   --   --    HEMATOCRIT 40.0 37.7 41.9 38.6  --  47.3* 39.8   < > 43.5  --   --   --    PLATELETS 274 229 277 243  --  383 241   < > 237  --   --   --    NEUTROS ABS  --  5.36 9.28* 10.66*  --  22.71* 9.95*   < > 6.73  --   --   --    IMMATURE GRANS (ABS)  --  0.03 0.06* 0.05  --  0.18* 0.07*   < > 0.02  --   --   --    LYMPHS ABS  --  0.94 1.36 0.88  --  2.32 0.36*   < > 0.32*  --   --   --    MONOS ABS  --  0.86 0.91* 0.85  --  0.98* 0.14   < > 0.10  --   --   --    EOS ABS  --  0.01 0.00 0.00  --  0.02 0.00   < > 0.00  --   --   --    MCV 87.9 90.6 91.5 91.9  --  93.1 90.0   < > 89.1  --   --   --    PROCALCITONIN  --   --   --   --   --   --   --   --  0.15  --   --   --    LACTATE  --   --   --   --   --   --   --   --   --   --  1.6  --    LACTATE, ARTERIAL  --   --   --   --  0.85  --   --   --   --  0.64  --  0.98    < > = values in this interval not displayed.         Lab 04/17/22  0416 04/16/22  0323 04/15/22  0309 04/14/22  0845 04/14/22  0629 04/13/22  2350 04/13/22  0546 04/12/22  0917 04/12/22  0754 04/12/22  0320   SODIUM 143 144  145 142  --  141 141   < >  --   --    SODIUM, ARTERIAL  --   --   --   --  140.7  --   --   --  141.4 142.4   POTASSIUM 3.7 4.0 4.2 4.4  --  5.0 4.3   < >  --   --    CHLORIDE 104 105 106 106  --  103 103   < >  --   --    CO2 30.7* 32.1* 30.1* 29.8*  --  26.6 27.2   < >  --   --    ANION GAP 8.3 6.9 8.9 6.2  --  11.4 10.8   < >  --   --    BUN 20 18 23* 25*  --  22* 19   < >  --   --    CREATININE 0.65 0.68 0.76 0.69  --  0.84 0.73   < >  --   --    EGFR 102.8 101.7 91.5 101.4  --  81.2 96.1   < >  --   --    GLUCOSE 108* 126* 92 89  --  242* 159*   < >  --   --    GLUCOSE, ARTERIAL  --   --   --   --  67  --   --   --  166* 302*   CALCIUM 9.4 8.9 9.4 9.1  --  10.0 9.6   < >  --   --    IONIZED CALCIUM  --   --   --   --  1.23  --   --   --  1.12* 1.17   MAGNESIUM 2.0 2.0 2.1 2.1  --  2.5 2.0   < >  --   --    PHOSPHORUS  --  2.9 2.6 3.3  --  5.0* 3.5  --   --   --    HEMOGLOBIN A1C  --   --   --   --   --  5.80*  --   --   --   --     < > = values in this interval not displayed.         Lab 04/16/22  0323 04/15/22  0309 04/14/22  0845 04/13/22  2350 04/13/22  0546 04/12/22  2108   TOTAL PROTEIN 5.8*  --  6.1 7.3 6.8 7.3   ALBUMIN 3.80 4.10 3.80 4.60 4.30 4.70   GLOBULIN 2.0  --  2.3 2.7 2.5 2.6   ALT (SGPT) 7  --  19 22 17 16   AST (SGOT) 11  --  17 22 22 16   BILIRUBIN 0.2  --  0.2 0.2 0.2 0.3   ALK PHOS 78  --  78 108 94 103         Lab 04/13/22  2350 04/12/22  0301   PROBNP  --  2,427.0*   TROPONIN T <0.010 <0.010                 Lab 04/14/22  0940 04/14/22  0629 04/14/22  0006   PH, ARTERIAL 7.351 7.297* 7.051*   PCO2, ARTERIAL 55.5* 62.6* 116.8*   PO2 ART 56.3* 99.3 99.2   O2 SATURATION ART 88.8* 96.7 92.7*   FIO2 28 35 50   HCO3 ART 30.0* 29.9* 31.7*   BASE EXCESS ART 3.2* 2.0 -2.8*   CARBOXYHEMOGLOBIN 0.2 0.3 0.4     Brief Urine Lab Results     None        Microbiology Results (last 10 days)     Procedure Component Value - Date/Time    S. Pneumo Ag Urine or CSF - Urine, Urine, Clean Catch [[id number]]   (Normal) Collected: 04/13/22 1046    Lab Status: Final result Specimen: Urine, Clean Catch Updated: 04/13/22 1122     Strep Pneumo Ag Negative    Legionella Antigen, Urine - Urine, Urine, Clean Catch [[id number]]  (Normal) Collected: 04/13/22 1046    Lab Status: Final result Specimen: Urine, Clean Catch Updated: 04/13/22 1122     LEGIONELLA ANTIGEN, URINE Negative    S. Pneumo Ag Urine or CSF - Urine, Urine, Clean Catch [[id number]]  (Normal) Collected: 04/12/22 0537    Lab Status: Final result Specimen: Urine, Clean Catch Updated: 04/12/22 0830     Strep Pneumo Ag Negative    Legionella Antigen, Urine - Urine, Urine, Clean Catch [[id number]]  (Normal) Collected: 04/12/22 0537    Lab Status: Final result Specimen: Urine, Clean Catch Updated: 04/12/22 0830     LEGIONELLA ANTIGEN, URINE Negative    COVID-19,APTIMA PANTHER(TERRI),[facility], NP/OP SWAB IN UTM/VTM/[facility] [employer]SPORT MEDIA,24 HR TAT - Swab, Nasal Cavity [[id number]]  (Normal) Collected: 04/12/22 0433    Lab Status: Final result Specimen: Swab from Nasal Cavity Updated: 04/12/22 1450     COVID19 Not Detected    Narrative:      Fact sheet for providers: https://www.fda.gov/media/174865/download     Fact sheet for patients: https://www.fda.gov/media/943079/download    Test performed by RT PCR.    Influenza Antigen, Rapid - Swab, Nasopharynx [[id number]]  (Normal) Collected: 04/12/22 0433    Lab Status: Final result Specimen: Swab from Nasopharynx Updated: 04/12/22 0459     Influenza A Ag, EIA Negative     Influenza B Ag, EIA Negative    Blood Culture - Blood, Arm, Right [[id number]]  (Normal) Collected: 04/12/22 0432    Lab Status: Final result Specimen: Blood from Arm, Right Updated: 04/17/22 0447     Blood Culture No growth at 5 days    Blood Culture - Blood, Arm, Right [[id number]]  (Abnormal) Collected: 04/12/22 0432    Lab Status: Final result Specimen: Blood from Arm, Right Updated: 04/14/22 0814     Blood Culture Staphylococcus, coagulase negative      Isolated from Aerobic Bottle     Gram Stain Aerobic Bottle Gram positive cocci in clusters    Narrative:      Probable contaminant requires clinical correlation, susceptibility not performed unless requested by physician.      Blood Culture ID, PCR - Blood, Arm, Right [[id number]]  (Abnormal) Collected: 04/12/22 0432    Lab Status: Final result Specimen: Blood from Arm, Right Updated: 04/12/22 2318     BCID, PCR Staph spp, not aureus or lugdunesis. Identification by BCID2 PCR.    Mycoplasma Pneumoniae Antibody, IgM - Blood, [[id number]]  (Abnormal) Collected: 04/12/22 0301    Lab Status: Final result Specimen: Blood Updated: 04/12/22 1153     Mycoplasma pneumo IgM Positive        No radiology results for the last day      Assessment/Plan   Assessment / Plan   COPD with acute exacerbation  History of COVID-19 infection recently  Acute hypercapnic respiratory failure  Altered mental status, toxic metabolic encephalopathy  History of smoking in past  Mycoplasma pneumonia    Active Hospital Problems:  Active Hospital Problems    Diagnosis    • Severe malnutrition (CMS/HCC)    • Respiratory failure (HCC)          Plan:   Continue bipap with sleep and naps. Continue supplemental O2 as needed to keep O2 saturations > 90%  RT  consulted to arrange for home BiPAP  Had overnight oximetry last night. Waiting for results.     Continue supplemental O2 as needed to keep O2 saturations greater than 90%  Continue breathing treatments and BPH protocol  Continue home antiHTN medications  Restart home metoprolol 25 mg extended release daily.  Continue prednisone, may need to switch to IV solu-medrol.   Continue azithromycin  Continue SSI  Trend renal panel and electrolytes  We will replace electrolytes as needed  Bowel regimen    DVT prophylaxis:  Mechanical DVT prophylaxis orders are present.    CODE STATUS:   Code Status (Patient has no pulse and is not breathing): CPR (Attempt to Resuscitate)  Medical Interventions  (Patient has pulse or is breathing): Full Support    Reviewed labs, imaging and provider notes.   Discussed with bedside nurse and primary service.     Electronically signed by Doroteo Melchor MD, 04/17/22, 6:46 AM EDT.

## 2022-04-18 ENCOUNTER — READMISSION MANAGEMENT (OUTPATIENT)
Dept: CALL CENTER | Facility: HOSPITAL | Age: 57
End: 2022-04-18

## 2022-04-18 VITALS
WEIGHT: 129.85 LBS | HEIGHT: 67 IN | OXYGEN SATURATION: 96 % | SYSTOLIC BLOOD PRESSURE: 167 MMHG | TEMPERATURE: 98.3 F | RESPIRATION RATE: 18 BRPM | BODY MASS INDEX: 20.38 KG/M2 | HEART RATE: 87 BPM | DIASTOLIC BLOOD PRESSURE: 104 MMHG

## 2022-04-18 PROBLEM — I50.22 CHRONIC SYSTOLIC HEART FAILURE: Status: ACTIVE | Noted: 2022-04-18

## 2022-04-18 PROBLEM — J96.01 ACUTE RESPIRATORY FAILURE WITH HYPOXIA AND HYPERCAPNIA: Status: ACTIVE | Noted: 2022-04-18

## 2022-04-18 PROBLEM — J44.1 COPD WITH ACUTE EXACERBATION: Status: ACTIVE | Noted: 2022-04-18

## 2022-04-18 PROBLEM — I47.20 VENTRICULAR TACHYCARDIA (PAROXYSMAL): Status: ACTIVE | Noted: 2022-04-18

## 2022-04-18 PROBLEM — I42.8 NICM (NONISCHEMIC CARDIOMYOPATHY): Status: ACTIVE | Noted: 2022-04-18

## 2022-04-18 PROBLEM — J96.02 ACUTE RESPIRATORY FAILURE WITH HYPOXIA AND HYPERCAPNIA: Status: ACTIVE | Noted: 2022-04-18

## 2022-04-18 PROBLEM — I47.29 VENTRICULAR TACHYCARDIA (PAROXYSMAL): Status: ACTIVE | Noted: 2022-04-18

## 2022-04-18 LAB — GLUCOSE BLDC GLUCOMTR-MCNC: 102 MG/DL (ref 70–99)

## 2022-04-18 PROCEDURE — 82962 GLUCOSE BLOOD TEST: CPT

## 2022-04-18 PROCEDURE — 63710000001 PREDNISONE PER 1 MG: Performed by: INTERNAL MEDICINE

## 2022-04-18 PROCEDURE — 94799 UNLISTED PULMONARY SVC/PX: CPT

## 2022-04-18 PROCEDURE — 94761 N-INVAS EAR/PLS OXIMETRY MLT: CPT

## 2022-04-18 PROCEDURE — 94660 CPAP INITIATION&MGMT: CPT

## 2022-04-18 PROCEDURE — 99239 HOSP IP/OBS DSCHRG MGMT >30: CPT | Performed by: FAMILY MEDICINE

## 2022-04-18 PROCEDURE — 99232 SBSQ HOSP IP/OBS MODERATE 35: CPT | Performed by: INTERNAL MEDICINE

## 2022-04-18 RX ORDER — METOPROLOL SUCCINATE 25 MG/1
25 TABLET, EXTENDED RELEASE ORAL
Qty: 30 TABLET | Refills: 0 | Status: SHIPPED | OUTPATIENT
Start: 2022-04-19 | End: 2023-03-07 | Stop reason: SDUPTHER

## 2022-04-18 RX ORDER — PREDNISONE 20 MG/1
40 TABLET ORAL
Qty: 4 TABLET | Refills: 0 | Status: SHIPPED | OUTPATIENT
Start: 2022-04-19 | End: 2022-04-21

## 2022-04-18 RX ORDER — CLONIDINE HYDROCHLORIDE 0.3 MG/1
0.3 TABLET ORAL 2 TIMES DAILY
Qty: 60 TABLET | Refills: 0
Start: 2022-04-18 | End: 2023-03-07 | Stop reason: SDUPTHER

## 2022-04-18 RX ORDER — ALBUTEROL SULFATE 90 UG/1
2 AEROSOL, METERED RESPIRATORY (INHALATION) EVERY 4 HOURS PRN
Qty: 8 G | Refills: 0 | Status: SHIPPED | OUTPATIENT
Start: 2022-04-18 | End: 2023-03-15 | Stop reason: SDUPTHER

## 2022-04-18 RX ORDER — TIOTROPIUM BROMIDE AND OLODATEROL 3.124; 2.736 UG/1; UG/1
2 SPRAY, METERED RESPIRATORY (INHALATION)
Qty: 4 G | Refills: 0 | OUTPATIENT
Start: 2022-04-18 | End: 2023-03-07

## 2022-04-18 RX ADMIN — BUDESONIDE 0.5 MG: 0.5 SUSPENSION RESPIRATORY (INHALATION) at 07:05

## 2022-04-18 RX ADMIN — PREDNISONE 40 MG: 20 TABLET ORAL at 08:50

## 2022-04-18 RX ADMIN — Medication 10 ML: at 08:50

## 2022-04-18 RX ADMIN — CLONIDINE HYDROCHLORIDE 0.3 MG: 0.3 TABLET ORAL at 08:50

## 2022-04-18 RX ADMIN — SENNOSIDES AND DOCUSATE SODIUM 2 TABLET: 50; 8.6 TABLET ORAL at 08:50

## 2022-04-18 RX ADMIN — ARFORMOTEROL TARTRATE 15 MCG: 15 SOLUTION RESPIRATORY (INHALATION) at 07:05

## 2022-04-18 RX ADMIN — METOPROLOL SUCCINATE 25 MG: 25 TABLET, FILM COATED, EXTENDED RELEASE ORAL at 08:50

## 2022-04-18 NOTE — CONSULTS
RT CM consulted for possible bilevel need on discharge for pt admitted to ICU with worsening shortness of air after being found severely hypoxic at home by EMS. Ms Camacho is a former 1-2 pack day smoker of at least 30 years but reports quitting 4 years ago.  Other than having COVID this past January, she reports she has no other lung/breathing problems or chronic lung diagnosis.  She is not following with a pulmonologist, has never had a PSG or PFT and is not on home oxygen or respiratory medications.  Overnight pulse oximetry was performed on room air and pt only had 3 min of desaturation which will not qualify her for bilevel.  RT CM discussed results with Dr Phillips, who agrees that pt will not need bilevel or NIPPV since respiratory failure was acute not chronic.  RT CM discussed COPD disease process and and management with pt. During the discussion, she did reveal that the pulmonologist feels she has undiagnosed COPD/emphysema.  RT CM explained the role of maintenance and rescue medications with pt and the importance of establishing with a pulmonologist for COPD care and PFT's.  RT CM provided pt with spacer and teaching on Stiolto respimat for maintenance.  Outpatient PFT orders placed for cosign and pulmonary appointment requested.

## 2022-04-18 NOTE — OUTREACH NOTE
Prep Survey    Flowsheet Row Responses   [facility] patient discharged from? [facility]   Is LACE score < 7 ? No   Emergency Room discharge w/ pulse ox? No   Eligibility [facility]   Date of Admission 04/12/22   Date of Discharge 04/18/22   Discharge Disposition Home or Self Care   Discharge diagnosis Acute COPD exacerbation    Does the patient have one of the following disease processes/diagnoses(primary or secondary)? COPD/Pneumonia   Does the patient have Home health ordered? No   Is there a DME ordered? No   Prep survey completed? Yes          ANASTASIIA BOUCHER - Registered Nurse

## 2022-04-18 NOTE — DISCHARGE SUMMARY
[facility]         HOSPITALIST  DISCHARGE SUMMARY    Patient Name: Yenni Camacho  : 1965  MRN: [id number]    Date of Admission: 2022  Date of Discharge: 2022  Primary Care Physician: Provider, No Known    Consults     Date and Time Order Name Status Description    2022  8:33 AM Inpatient Cardiology Consult      2022  9:04 AM Inpatient Intensivist Consult      2022  7:21 AM Inpatient Intensivist Consult      2022  4:11 AM Inpatient Hospitalist Consult            Active and Resolved Hospital Problems:  Acute hypoxic and hypercapnic respiratory failure requiring NIPPV (improving)  Acute COPD exacerbation (improved)  Hypertension (stable)  Paroxysmal supraventricular tachycardia  Acute agitation requiring Precedex drip (resolved)  History of COVID-19 infection (2022)  Nonischemic cardiomyopathy with mildly reduced systolic function  Metabolic encephalopathy secondary to hypoxemia and hypercapnia (improved)  Tobacco dependence in remission  Active Hospital Problems    Diagnosis POA   • Chronic systolic heart failure (CMS/HCC) [I50.22] Unknown   • Acute respiratory failure with hypoxia and hypercapnia (HCC) [J96.01, J96.02] Unknown   • COPD with acute exacerbation (HCC) [J44.1] Unknown   • Ventricular tachycardia (paroxysmal) (HCC) [I47.2] Unknown   • NICM (nonischemic cardiomyopathy) (HCC) [I42.8] Unknown   • Severe malnutrition (CMS/HCC) [E43] Yes   • Respiratory failure (HCC) [J96.90] Yes      Resolved Hospital Problems   No resolved problems to display.       Hospital Course     Hospital Course:  Yenni Camacho is a 56 y.o. female with COPD and history of COVID-19 infection in 2022 who presented to the ED with complaints of shortness of breath and altered mental status.  O2 sats in the low 70s on presentation and due to confusion/agitation patient given ketamine and started on BiPAP.  Pulmonology/Critical Care consulted to assist in  management.  Patient started on scheduled steroids and bronchodilators.  Patient's condition initially improved quickly, however, hospitalization complicated by SVT requiring IV adenosine pushes.  Cardiology consulted and patient started on oral beta-blocker with improvement.  Patient had additional episode of acute hypoxia and hypercapnia requiring rescue BiPAP and she was transferred to ICU.  Patient's x-ray status again improved and supplemental O2 requirement able to be weaned and patient able to transfer out of ICU.  Of note, patient had irregularity on CT chest which appears to be scarring and will need outpatient CT chest done in 3 to 6 months per pulmonology recommendation.  Respiratory function improved.  Patient was able to be titrated off supplemental oxygen.  Patient seen and evaluated by myself and pulmonology on day of discharge and thought stable for discharge home with the following instructions:    Acute hypoxic and hypercapnic respiratory failure thought secondary to COPD with acute exacerbation  -Start Stiolto 2 puffs daily  -Start albuterol 2 puffs every 4 hours as needed for shortness of breath or wheezing  -Start prednisone 40 mg daily for 2 more days starting 4/19/2022  -Follow-up with pulmonology in 2 to 3 weeks      Paroxysmal supraventricular tachycardia  -Stop home Adderall  -Stop home Primatene  -Start metoprolol 25 mg daily    Hypertension  -Continue home clonidine twice daily  -Start metoprolol 25 mg daily    Nonischemic cardiomyopathy  -Start metoprolol as above  -Follow-up with cardiology in 3 weeks    Please take all medications per discharge medication list.  Please follow-up with primary care provider 1 to 2 weeks.  Please follow-up with pulmonology in 2 to 3 weeks.  Follow-up with cardiology in 3 weeks.  Please seek immediate medical attention for increased shortness of breath, fever, chills, chest pain, palpitations.      DISCHARGE Follow Up Recommendations for labs and  diagnostics: As above      Day of Discharge     Vital Signs:  Temp:  [97.5 °F (36.4 °C)-98.7 °F (37.1 °C)] 97.6 °F (36.4 °C)  Heart Rate:  [70-88] 72  Resp:  [14-20] 14  BP: (135-171)/() 135/77  Physical Exam:   Gen. well-developed appearing stated age in no acute distress  HEENT: Normocephalic atraumatic moist membranes pupils equal round reactive light, no scleral icterus no conjunctival injection  Cardiovascular: regular rate and rhythm no murmurs rubs or gallops S1-S2, no lower extremity edema appreciated  Pulmonary: Clear to auscultation bilaterally scant expiratory wheezes no rales or rhonchi symmetric chest expansion, unlabored, no conversational dyspnea appreciated  Gastrointestinal: Soft nontender nondistended positive bowel sounds all 4 quadrants no rebound or guarding  Musculoskeletal: No clubbing cyanosis, warm and well-perfused, calves soft symmetric nontender bilaterally  Skin: Clean dry without rashs  Neuro: Cranial nerves II through XII intact grossly no sensorimotor deficits appreciated bilateral upper and lower extremities  Psych: Patient is calm cooperative and appropriate with exam not responding to internal stimuli  : No Delgado catheter no bladder distention no suprapubic tenderness      Discharge Details        Discharge Medications      New Medications      Instructions Start Date   albuterol sulfate  (90 Base) MCG/ACT inhaler  Commonly known as: PROVENTIL HFA;VENTOLIN HFA;PROAIR HFA   2 puffs, Inhalation, Every 4 Hours PRN      metoprolol succinate XL 25 MG 24 hr tablet  Commonly known as: TOPROL-XL   25 mg, Oral, Every 24 Hours Scheduled   Start Date: April 19, 2022     predniSONE 20 MG tablet  Commonly known as: DELTASONE   40 mg, Oral, Daily With Breakfast   Start Date: April 19, 2022     Stiolto Respimat 2.5-2.5 MCG/ACT aerosol solution inhaler  Generic drug: tiotropium bromide-olodaterol   2 puffs, Inhalation, Daily - RT         Changes to Medications      Instructions  Start Date   cloNIDine 0.3 MG tablet  Commonly known as: CATAPRES  What changed: when to take this   0.3 mg, Oral, 2 Times Daily         Stop These Medications    amphetamine-dextroamphetamine 20 MG tablet  Commonly known as: ADDERALL            Allergies   Allergen Reactions   • Paxlovid [Nirmatrelvir-Ritonavir] Unknown - Low Severity   • Wellbutrin [Bupropion] Unknown - Low Severity       Discharge Disposition:  Home or Self Care    Diet:  Hospital:  Diet Order   Procedures   • Diet Regular       Discharge Activity:   Activity Instructions     Gradually Increase Activity Until at Pre-Hospitalization Level            CODE STATUS:  Code Status and Medical Interventions:   Ordered at: 04/12/22 0500     Code Status (Patient has no pulse and is not breathing):    CPR (Attempt to Resuscitate)     Medical Interventions (Patient has pulse or is breathing):    Full Support         No future appointments.    Additional Instructions for the Follow-ups that You Need to Schedule     Discharge Follow-up with PCP   As directed       Currently Documented PCP:    Provider, No Known    PCP Phone Number:    None     Follow Up Details: Hospital discharge follow-up acute hypoxic hypercapnic respiratory failure, COPD, supraventricular tachycardia         [place]charge [place] [facility] [place] [place]la Melchor [place]; 2 Weeks   As directed      [place] [facility] Regla Melchor [place]    Follow Up: 2 Weeks    Follow Up Details: Hospital discharge follow-up acute hypoxic and hypercapnic respiratory failure, concern for COPD, SVT.         Discharge Follow-up with Specified Provider: Dr. Zaragoza cardiology; 3 Weeks   As directed      To: Dr. Zaragoza cardiology    Follow Up: 3 Weeks    Follow Up Details: Hospital discharge follow-up nonischemic cardiomyopathy, paroxysmal SVT               Pertinent  and/or Most Recent Results     PROCEDURES:   None    LAB RESULTS:      Lab 04/17/22  0416 04/16/22  0323  04/15/22  0309 04/14/22  0845 04/14/22  0629 04/13/22  2350 04/13/22  0546 04/12/22  2108 04/12/22  0917 04/12/22  0754 04/12/22  0432 04/12/22  0320   WBC 7.79 7.21 11.62* 12.45*  --  26.24* 10.53   < > 7.18  --   --   --    HEMOGLOBIN 13.2 12.4 13.4 12.2  --  14.9 13.2   < > 14.0  --   --   --    HEMATOCRIT 40.0 37.7 41.9 38.6  --  47.3* 39.8   < > 43.5  --   --   --    PLATELETS 274 229 277 243  --  383 241   < > 237  --   --   --    NEUTROS ABS  --  5.36 9.28* 10.66*  --  22.71* 9.95*   < > 6.73  --   --   --    IMMATURE GRANS (ABS)  --  0.03 0.06* 0.05  --  0.18* 0.07*   < > 0.02  --   --   --    LYMPHS ABS  --  0.94 1.36 0.88  --  2.32 0.36*   < > 0.32*  --   --   --    MONOS ABS  --  0.86 0.91* 0.85  --  0.98* 0.14   < > 0.10  --   --   --    EOS ABS  --  0.01 0.00 0.00  --  0.02 0.00   < > 0.00  --   --   --    MCV 87.9 90.6 91.5 91.9  --  93.1 90.0   < > 89.1  --   --   --    PROCALCITONIN  --   --   --   --   --   --   --   --  0.15  --   --   --    LACTATE  --   --   --   --   --   --   --   --   --   --  1.6  --    LACTATE, ARTERIAL  --   --   --   --  0.85  --   --   --   --  0.64  --  0.98    < > = values in this interval not displayed.         Lab 04/17/22  0416 04/16/22  0323 04/15/22  0309 04/14/22  0845 04/14/22  0629 04/13/22  2350 04/13/22  0546 04/12/22  0917 04/12/22  0754 04/12/22  0320   SODIUM 143 144 145 142  --  141 141   < >  --   --    SODIUM, ARTERIAL  --   --   --   --  140.7  --   --   --  141.4 142.4   POTASSIUM 3.7 4.0 4.2 4.4  --  5.0 4.3   < >  --   --    CHLORIDE 104 105 106 106  --  103 103   < >  --   --    CO2 30.7* 32.1* 30.1* 29.8*  --  26.6 27.2   < >  --   --    ANION GAP 8.3 6.9 8.9 6.2  --  11.4 10.8   < >  --   --    BUN 20 18 23* 25*  --  22* 19   < >  --   --    CREATININE 0.65 0.68 0.76 0.69  --  0.84 0.73   < >  --   --    EGFR 102.8 101.7 91.5 101.4  --  81.2 96.1   < >  --   --    GLUCOSE 108* 126* 92 89  --  242* 159*   < >  --   --    GLUCOSE, ARTERIAL  --   --    --   --  67  --   --   --  166* 302*   CALCIUM 9.4 8.9 9.4 9.1  --  10.0 9.6   < >  --   --    IONIZED CALCIUM  --   --   --   --  1.23  --   --   --  1.12* 1.17   MAGNESIUM 2.0 2.0 2.1 2.1  --  2.5 2.0   < >  --   --    PHOSPHORUS  --  2.9 2.6 3.3  --  5.0* 3.5  --   --   --    HEMOGLOBIN A1C  --   --   --   --   --  5.80*  --   --   --   --     < > = values in this interval not displayed.         Lab 04/16/22  0323 04/15/22  0309 04/14/22  0845 04/13/22  2350 04/13/22  0546 04/12/22  2108   TOTAL PROTEIN 5.8*  --  6.1 7.3 6.8 7.3   ALBUMIN 3.80 4.10 3.80 4.60 4.30 4.70   GLOBULIN 2.0  --  2.3 2.7 2.5 2.6   ALT (SGPT) 7  --  19 22 17 16   AST (SGOT) 11  --  17 22 22 16   BILIRUBIN 0.2  --  0.2 0.2 0.2 0.3   ALK PHOS 78  --  78 108 94 103         Lab 04/13/22  2350 04/12/22  0301   PROBNP  --  2,427.0*   TROPONIN T <0.010 <0.010                 Lab 04/14/22  0940 04/14/22  0629 04/14/22  0006   PH, ARTERIAL 7.351 7.297* 7.051*   PCO2, ARTERIAL 55.5* 62.6* 116.8*   PO2 ART 56.3* 99.3 99.2   O2 SATURATION ART 88.8* 96.7 92.7*   FIO2 28 35 50   HCO3 ART 30.0* 29.9* 31.7*   BASE EXCESS ART 3.2* 2.0 -2.8*   CARBOXYHEMOGLOBIN 0.2 0.3 0.4     Brief Urine Lab Results     None        Microbiology Results (last 10 days)     Procedure Component Value - Date/Time    S. Pneumo Ag Urine or CSF - Urine, Urine, Clean Catch [[id number]]  (Normal) Collected: 04/13/22 1046    Lab Status: Final result Specimen: Urine, Clean Catch Updated: 04/13/22 1122     Strep Pneumo Ag Negative    Legionella Antigen, Urine - Urine, Urine, Clean Catch [[id number]]  (Normal) Collected: 04/13/22 1046    Lab Status: Final result Specimen: Urine, Clean Catch Updated: 04/13/22 1122     LEGIONELLA ANTIGEN, URINE Negative    S. Pneumo Ag Urine or CSF - Urine, Urine, Clean Catch [[id number]]  (Normal) Collected: 04/12/22 0537    Lab Status: Final result Specimen: Urine, Clean Catch Updated: 04/12/22 0830     Strep Pneumo Ag Negative    Legionella Antigen,  Urine - Urine, Urine, Clean Catch [[id number]]  (Normal) Collected: 04/12/22 0537    Lab Status: Final result Specimen: Urine, Clean Catch Updated: 04/12/22 0830     LEGIONELLA ANTIGEN, URINE Negative    COVID-19,APTIMA PANTHER(TERRI),[facility], NP/OP SWAB IN UTM/VTM/[facility]ALINE TRANSPORT MEDIA,24 HR TAT - Swab, Nasal Cavity [[id number]]  (Normal) Collected: 04/12/22 0433    Lab Status: Final result Specimen: Swab from Nasal Cavity Updated: 04/12/22 1450     COVID19 Not Detected    Narrative:      Fact sheet for providers: https://www.fda.gov/media/596459/download     Fact sheet for patients: https://www.fda.gov/media/472255/download    Test performed by RT PCR.    Influenza Antigen, Rapid - Swab, Nasopharynx [[id number]]  (Normal) Collected: 04/12/22 0433    Lab Status: Final result Specimen: Swab from Nasopharynx Updated: 04/12/22 0459     Influenza A Ag, EIA Negative     Influenza B Ag, EIA Negative    Blood Culture - Blood, Arm, Right [[id number]]  (Normal) Collected: 04/12/22 0432    Lab Status: Final result Specimen: Blood from Arm, Right Updated: 04/17/22 0447     Blood Culture No growth at 5 days    Blood Culture - Blood, Arm, Right [[id number]]  (Abnormal) Collected: 04/12/22 0432    Lab Status: Final result Specimen: Blood from Arm, Right Updated: 04/14/22 0814     Blood Culture Staphylococcus, coagulase negative     Isolated from Aerobic Bottle     Gram Stain Aerobic Bottle Gram positive cocci in clusters    Narrative:      Probable contaminant requires clinical correlation, susceptibility not performed unless requested by physician.      Blood Culture ID, PCR - Blood, Arm, Right [[id number]]  (Abnormal) Collected: 04/12/22 0432    Lab Status: Final result Specimen: Blood from Arm, Right Updated: 04/12/22 2314     BCID, PCR Staph spp, not aureus or lugdunesis. Identification by BCID2 PCR.    Mycoplasma Pneumoniae Antibody, IgM - Blood, [[id number]]  (Abnormal) Collected: 04/12/22 0301    Lab Status: Final result  Specimen: Blood Updated: 04/12/22 1153     Mycoplasma pneumo IgM Positive          CT Head Without Contrast    Result Date: 4/12/2022  Impression:   1. Subtle area of low attenuation in the right parietal white matter which may be ischemic in nature.  Correlate with the patient's clinical findings.  MRI of the brain may be helpful for further characterization.     Tariq Holt MD       Electronically Signed and Approved By: Tariq Holt MD on 4/12/2022 at 7:49             CT Chest With Contrast Diagnostic    Result Date: 4/12/2022  Impression:    1. No evidence of pulmonary embolus.  2. Focal parenchymal opacity in the left upper lobe may represent post inflammatory or infectious changes or possible scarring.  Underlying malignancy cannot be excluded and short-term follow-up at 1-3 months recommended.  Alternatively follow-up PET-CT could be considered at this time.     PUMA MONTES DE OCA MD       Electronically Signed and Approved By: PUMA MONTES DE OCA MD on 4/12/2022 at 14:31             XR Chest 1 View    Result Date: 4/14/2022  Impression:  Favorable progression is suggested radiographically with slight decrease in pulmonary edema since 4/12/2022 at 0304 hours.      COMMENT:  Part of this note is an electronic transcription of spoken language to printed text. The electronic translation/transcription may permit erroneous, or at times, nonsensical words or phrases to be inadvertently transcribed; this  has reviewed the note for such errors; however, some may still exist.  AMBROCIO HERNANDEZ JR, MD       Electronically Signed and Approved By: AMBROCIO HERNANDEZ JR, MD on 4/14/2022 at 0:07              XR Chest 1 View    Result Date: 4/12/2022  Impression:  Age-indeterminate interstitial pulmonary opacities are seen, which may represent mild pulmonary edema.  Borderline cardiac enlargement is suggested.  Please see above comments for further detail.     AMBROCIO HERNANDEZ JR, MD       Electronically Signed and  Approved By: AMBROCIO HERNANDEZ JR, MD on 4/12/2022 at 3:36                       Results for orders placed during the hospital encounter of 04/12/22    Adult Transthoracic Echo Complete w/ Color, Spectral and Contrast if necessary per protocol    Interpretation Summary  Mild diffuse hypokinesis of the left ankle with mildly reduced left ventricular systolic function ejection fraction around 45%.  Trace tricuspid regurgitation.  Trace mitral regurgitation.      Labs Pending at Discharge:        Time spent on Discharge including face to face service: Greater than 35 minutes    Electronically signed by Edson Segovia MD, 04/18/22, 12:36 PM EDT.

## 2022-04-18 NOTE — PROGRESS NOTES
Pulmonary / Critical Care  Progress Note      Patient Name: Yenni Camacho  : 1965  MRN: [id number]  Primary Care Physician:  Provider, No Known  Date of admission: 2022  ICU: 2d 13h      Subjective   Subjective     Follow-up for COPD exacerbation, hypercapnic respite failure     Over last 24 hours,  Remained on steroids, nebulizer and airway clearance therapies.  On room air since      No acute events overnight    This morning....  Awake and sitting up in bed  Wants to go home  Continues on room air  Overnight pulse oximetry performed over the weekend; no significant prolonged desaturations noted on room air  Dyspnea proved.  Most with activity  Has scant cough with no sputum production or hemoptysis  No wheezing  No chest pain  No nausea, fevers or chills     ROS  General: Fatigue, weakness; otherwise denied complaints  HEENT: Denied complaints  Respiratory: Shortness of breath is improving, nonproductive cough; otherwise otherwise denied complaints  Cardiovascular: Denied complaints  GI: Denied complaints  Neurologic: Denied complaints  Skin: Denied complaints           Objective   Objective     Vitals:   Temp:  [97.5 °F (36.4 °C)-98.8 °F (37.1 °C)] 97.9 °F (36.6 °C)  Heart Rate:  [70-88] 84  Resp:  [18-20] 20  BP: (125-171)/() 153/94    Physical Exam   Vital Signs Reviewed  General: WDWN, Alert, NAD.    HEENT:  PERRL, EOMI.  OP and nares clear, MMM  Chest:  good aeration, barrel chested, clear to auscultation bilaterally, equal rise and fall of chest, no work of breathing noted at rest   CV: RRR, no MGR, pulses 2+, equal.  Abd:  Soft, NT, ND, + BS, no HSM  Extremities:  no clubbing, no cyanosis, no edema, no joint tenderness  Neuro:  A&Ox3, CN grossly intact, no focal deficits.  Skin: No rashes or lesions noted, tattoos noted      Result Review    Result Review:  I have personally reviewed the results from the time of this admission to 2022 07:59 EDT and agree with these  findings:  [x]  Laboratory  [x]  Microbiology  [x]  Radiology  []  EKG/Telemetry   [x]  Cardiology/Vascular   []  Pathology  []  Old records  []  Other:  Most notable findings include:       Lab 04/17/22  0416 04/16/22  0323 04/15/22  0309 04/14/22  0845 04/14/22  0629 04/13/22  2350 04/13/22  0546 04/12/22  2108 04/12/22  0320 04/12/22  0301   WBC 7.79 7.21 11.62* 12.45*  --  26.24* 10.53 8.67   < > 11.62*   HEMOGLOBIN 13.2 12.4 13.4 12.2  --  14.9 13.2 14.1   < > 15.3   HEMATOCRIT 40.0 37.7 41.9 38.6  --  47.3* 39.8 42.6   < > 48.4*   PLATELETS 274 229 277 243  --  383 241 296   < > 378   SODIUM 143 144 145 142  --  141 141 145   < > 145   SODIUM, ARTERIAL  --   --   --   --  140.7  --   --   --    < >  --    POTASSIUM 3.7 4.0 4.2 4.4  --  5.0 4.3 3.3*   < > 4.3   CHLORIDE 104 105 106 106  --  103 103 101   < > 104   CO2 30.7* 32.1* 30.1* 29.8*  --  26.6 27.2 29.1*   < > 28.7   BUN 20 18 23* 25*  --  22* 19 20   < > 21*   CREATININE 0.65 0.68 0.76 0.69  --  0.84 0.73 0.95   < > 0.84   GLUCOSE 108* 126* 92 89  --  242* 159* 214*   < > 269*   GLUCOSE, ARTERIAL  --   --   --   --  67  --   --   --    < >  --    CALCIUM 9.4 8.9 9.4 9.1  --  10.0 9.6 9.5   < > 10.0   PHOSPHORUS  --  2.9 2.6 3.3  --  5.0* 3.5  --   --   --    TOTAL PROTEIN  --  5.8*  --  6.1  --  7.3 6.8 7.3  --  8.2   ALBUMIN  --  3.80 4.10 3.80  --  4.60 4.30 4.70  --  4.90   GLOBULIN  --  2.0  --  2.3  --  2.7 2.5 2.6  --  3.3    < > = values in this interval not displayed.     Chest CT personally viewed showing left upper lobe scarring/bronchiectasis        Assessment/Plan   Assessment / Plan     Active Hospital Problems:  Active Hospital Problems    Diagnosis    • Severe malnutrition (CMS/HCC)    • Respiratory failure (HCC)    COPD with acute exacerbation  History of COVID-19 infection recently  Acute hypoxemic and hypercapnic respiratory failure  Altered mental status, toxic metabolic encephalopathy  History of smoking in past  Mycoplasma  pneumonia abnormal chest CT with left upper lobe scarring versus bronchiectasis      Plan:   Currently on room air.  Has not required O2 per documentation since 4/16 midmorning.  O2 ordered if needed  Overnight pulse oximetry without significant desaturations noted on room air.    RT  on board.  Patient currently without indication for home BIPAP   Continue breathing treatments and BPH protocol.  Discharge on Stiolto plus albuterol as needed  Repeat noncontrast chest in 3 months to reevaluate left upper lobe scarring/bronchiectasis.  Appears to be chronic in nature  Continue home antiHTN medications  Continue home metoprolol 25 mg extended release daily.  Continue  Prednisone to complete 7 days.    Completed Azithromycin  Continue SSI  Bowel regimen        From Pulmonary standpoint is stable for discharge home.  Recommend discharge home with Stiolto and to complete 7 days of prednsione.  Currently on day #5.  Follow-up with us in 2 weeks as an outpatient       DVT prophylaxis:  Mechanical DVT prophylaxis orders are present.    CODE STATUS:   Code Status (Patient has no pulse and is not breathing): CPR (Attempt to Resuscitate)  Medical Interventions (Patient has pulse or is breathing): Full Support      Personally reviewed labs, imaging, microbiology, notes and medications  Discussed with primary    Electronically signed by RAD Villalba, 04/18/22, 11:47 AM EDT.    I, Dr. Braden Phillips, have spent more than 50% of the total time managing the patient in this encounter today.  This included personally reviewing all pertinent labs, imaging, microbiology and documentation. Also discussing the case with the patient and any available family, the admitting physician and any available ancillary staff.    Electronically signed by Braden Phillips MD, 04/18/22, 12:18 PM EDT.

## 2022-04-18 NOTE — NURSING NOTE
Exercise Oximetry    Patient Name:Yenin Camacho   MRN: [id number]   Date: 04/18/22             ROOM AIR BASELINE   SpO2% 96%   Heart Rate 82   Blood Pressure      EXERCISE ON ROOM AIR SpO2% EXERCISE ON O2 @  LPM SpO2%   1 MINUTE 91% 1 MINUTE    2 MINUTES 95% 2 MINUTES    3 MINUTES 93% 3 MINUTES    4 MINUTES 92% 4 MINUTES    5 MINUTES 88% 5 MINUTES    6 MINUTES 96% 6 MINUTES               Distance Walked  50 feet Distance Walked   Dyspnea (Kassi Scale)   Dyspnea (Kassi Scale)   Fatigue (Kassi Scale)   Fatigue (Kassi Scale)   SpO2% Post Exercise  96% SpO2% Post Exercise   HR Post Exercise  93 HR Post Exercise   Time to Recovery   Time to Recovery     Comments: PASSED  No respiratory distress or shortness of air with exertion.  Pt denied dizziness or light-headness.

## 2022-04-19 ENCOUNTER — TRANSITIONAL CARE MANAGEMENT TELEPHONE ENCOUNTER (OUTPATIENT)
Dept: CALL CENTER | Facility: HOSPITAL | Age: 57
End: 2022-04-19

## 2022-04-19 ENCOUNTER — TELEPHONE (OUTPATIENT)
Dept: SOCIAL WORK | Facility: HOSPITAL | Age: 57
End: 2022-04-19

## 2022-04-19 NOTE — TELEPHONE ENCOUNTER
RT LILI accepted call from Ms Camacho, stating that she was unable to  any of her medications from the pharmacy due to missing information from the hospital.  Ms Camacho went on to explain that she was visiting from [place] and someone was assisting her with getting insurance.  RT LILI stated it looks like she was approved for Medicaid and the Rx was sent to [facility].  RT PEARSON reached out to Monserrat Hernandez, in social work to confirm.  [facility] was contacted and they need a facesheet with insurance info.   Facesheet and Stiolto voucher faxed to [facility].

## 2022-04-19 NOTE — OUTREACH NOTE
Call Center TCM Note    Flowsheet Row Responses   [facility] patient discharged from? [facility]   Does the patient have one of the following disease processes/diagnoses(primary or secondary)? COPD/Pneumonia   Was the primary reason for admission: COPD exacerbation   TCM attempt successful? No   Unsuccessful attempts Attempt 2   Call Status --  [Number issues]          Nella Claudio RN    4/19/2022, 14:57 EDT

## 2022-04-19 NOTE — SIGNIFICANT NOTE
04/18/22 1330   Coping/Psychosocial   Observed Emotional State calm;cooperative;happy   Verbalized Emotional State hopefulness   Trust Relationship/Rapport empathic listening provided   Involvement in Care interacting with patient   Additional Documentation Spiritual Care (Group)   Spiritual Care   Spiritual Care Visit Type initial   Spiritual Care Source  initiative   Receptivity to Spiritual Care visit welcomed   Spiritual Care Interventions supportive conversation provided   Response to Spiritual Care engaged in conversation   Use of Spiritual Resources non-Anglican use of spiritual care   Spiritual Care Follow-Up follow-up, none required as presently assessed

## 2022-04-19 NOTE — OUTREACH NOTE
Call Center TCM Note    Flowsheet Row Responses   [facility] patient discharged from? [facility]   Does the patient have one of the following disease processes/diagnoses(primary or secondary)? COPD/Pneumonia   Was the primary reason for admission: COPD exacerbation   TCM attempt successful? No   Unsuccessful attempts Attempt 1          Nella Claudio RN    4/19/2022, 08:40 EDT

## 2022-04-20 ENCOUNTER — TRANSITIONAL CARE MANAGEMENT TELEPHONE ENCOUNTER (OUTPATIENT)
Dept: CALL CENTER | Facility: HOSPITAL | Age: 57
End: 2022-04-20

## 2022-04-20 NOTE — OUTREACH NOTE
Call Center TCM Note    Flowsheet Row Responses   [facility] patient discharged from? [facility]   Does the patient have one of the following disease processes/diagnoses(primary or secondary)? COPD/Pneumonia   Was the primary reason for admission: COPD exacerbation   TCM attempt successful? No   Unsuccessful attempts Attempt 3          DORI PYLE RN    4/20/2022, 13:00 EDT

## 2022-04-23 ENCOUNTER — HOSPITAL ENCOUNTER (EMERGENCY)
Facility: HOSPITAL | Age: 57
Discharge: HOME | End: 2022-04-23
Attending: EMERGENCY MEDICINE | Admitting: EMERGENCY MEDICINE

## 2022-04-23 ENCOUNTER — APPOINTMENT (OUTPATIENT)
Dept: GENERAL RADIOLOGY | Facility: HOSPITAL | Age: 57
End: 2022-04-23

## 2022-04-23 VITALS
BODY MASS INDEX: 19.05 KG/M2 | SYSTOLIC BLOOD PRESSURE: 160 MMHG | TEMPERATURE: 99 F | HEART RATE: 101 BPM | OXYGEN SATURATION: 93 % | HEIGHT: 68 IN | RESPIRATION RATE: 16 BRPM | WEIGHT: 125.66 LBS | DIASTOLIC BLOOD PRESSURE: 89 MMHG

## 2022-04-23 DIAGNOSIS — S23.9XXA THORACIC SPRAIN: ICD-10-CM

## 2022-04-23 DIAGNOSIS — S63.502A SPRAIN OF LEFT FOREARM, INITIAL ENCOUNTER: ICD-10-CM

## 2022-04-23 DIAGNOSIS — S63.92XA HAND SPRAIN, LEFT, INITIAL ENCOUNTER: Primary | ICD-10-CM
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
## 2022-04-23 PROCEDURE — 73090 X-RAY EXAM OF FOREARM: CPT

## 2022-04-23 PROCEDURE — 99283 EMERGENCY DEPT VISIT LOW MDM: CPT

## 2022-04-23 PROCEDURE — 73130 X-RAY EXAM OF HAND: CPT

## 2022-04-23 PROCEDURE — 72072 X-RAY EXAM THORAC SPINE 3VWS: CPT

## 2022-04-23 RX ORDER — DEXTROAMPHETAMINE SACCHARATE, AMPHETAMINE ASPARTATE, DEXTROAMPHETAMINE SULFATE AND AMPHETAMINE SULFATE 5; 5; 5; 5 MG/1; MG/1; MG/1; MG/1
20 TABLET ORAL 3 TIMES DAILY
COMMUNITY
End: 2023-03-15
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
## 2023-03-06 ENCOUNTER — NURSE TRIAGE (OUTPATIENT)
Dept: CALL CENTER | Facility: HOSPITAL | Age: 58
End: 2023-03-06
Payer: MEDICAID

## 2023-03-06 NOTE — TELEPHONE ENCOUNTER
"I am under a lot of stress and anxiety and has HTN,P runs 190/120 and need BP Meds and anxiety meds, what to do, has chronic headaches, told her needs to be seen today, and then call for new pt. Appt. She says has [place] and her PCP at home has been doing refills and now out of some of her meds. Told her her BP is to high and with chronic headaches she needs to be seen     Reason for Disposition  • [1] Systolic BP  >= 160 OR Diastolic >= 100 AND [2] cardiac or neurologic symptoms (e.g., chest pain, difficulty breathing, unsteady gait, blurred vision)    Additional Information  • Negative: Difficult to awaken or acting confused (e.g., disoriented, slurred speech)  • Negative: Severe difficulty breathing (e.g., struggling for each breath, speaks in single words)  • Negative: [1] Weakness of the face, arm or leg on one side of the body AND [2] new onset  • Negative: [1] Numbness (i.e., loss of sensation) of the face, arm or leg on one side of the body AND [2] new onset  • Negative: [1] Chest pain lasts > 5 minutes AND [2] history of heart disease  (i.e., heart attack, bypass surgery, angina, angioplasty, CHF)  • Negative: [1] Chest pain AND [2] took nitrogylcerin AND [3] pain was not relieved  • Negative: Sounds like a life-threatening emergency to the triager  • Negative: Symptom is main concern  (e.g., headache, chest pain)  • Negative: Low blood pressure is main concern  • Negative: [1] Pregnant > 20 weeks (or postpartum < 6 weeks) AND [2] new hand or face swelling  • Negative: [1] Pregnant > 20 weeks AND [2] BP Systolic BP  >= 140 OR Diastolic >= 90  • Negative: [1] Systolic BP  >= 200 OR Diastolic >= 120  AND [2] having NO cardiac or neurologic symptoms  • Negative: [1] Postpartum < 6 weeks AND [2] BP Systolic BP  >= 140 OR Diastolic >= 90    Answer Assessment - Initial Assessment Questions  1. BLOOD PRESSURE: \"What is the blood pressure?\" \"Did you take at least two measurements 5 minutes apart?\"      " "190/120   2. ONSET: \"When did you take your blood pressure?\"      Yesterday was last time, taken no home cuff  3. HOW: \"How did you obtain the blood pressure?\" (e.g., visiting nurse, automatic home BP monitor)      Automatic cuff  4. HISTORY: \"Do you have a history of high blood pressure?\"      yes  5. MEDICATIONS: \"Are you taking any medications for blood pressure?\" \"Have you missed any doses recently?\"      Yes taking have missed some  6. OTHER SYMPTOMS: \"Do you have any symptoms?\" (e.g., headache, chest pain, blurred vision, difficulty breathing, weakness)      Headache chronic,   7. PREGNANCY: \"Is there any chance you are pregnant?\" \"When was your last menstrual period?\"      no    Protocols used: HIGH BLOOD PRESSURE-ADULT-AH      "

## 2023-03-07 ENCOUNTER — HOSPITAL ENCOUNTER (EMERGENCY)
Facility: HOSPITAL | Age: 58
Discharge: HOME | End: 2023-03-07
Attending: EMERGENCY MEDICINE | Admitting: EMERGENCY MEDICINE
Payer: MEDICAID

## 2023-03-07 VITALS
RESPIRATION RATE: 16 BRPM | OXYGEN SATURATION: 97 % | TEMPERATURE: 97.9 F | DIASTOLIC BLOOD PRESSURE: 106 MMHG | SYSTOLIC BLOOD PRESSURE: 156 MMHG | HEART RATE: 108 BPM | HEIGHT: 67 IN | BODY MASS INDEX: 23.43 KG/M2 | WEIGHT: 149.25 LBS

## 2023-03-07 DIAGNOSIS — Z76.0 ENCOUNTER FOR MEDICATION REFILL: Primary | ICD-10-CM

## 2023-03-07 LAB
ALBUMIN SERPL-MCNC: 4.8 G/DL (ref 3.5–5.2)
ALBUMIN/GLOB SERPL: 1.8 G/DL
ALP SERPL-CCNC: 141 U/L (ref 39–117)
ALT SERPL W P-5'-P-CCNC: 34 U/L (ref 1–33)
ANION GAP SERPL CALCULATED.3IONS-SCNC: 9.9 MMOL/L (ref 5–15)
AST SERPL-CCNC: 24 U/L (ref 1–32)
BASOPHILS # BLD AUTO: 0.02 10*3/MM3 (ref 0–0.2)
BASOPHILS NFR BLD AUTO: 0.4 % (ref 0–1.5)
BILIRUB SERPL-MCNC: 0.2 MG/DL (ref 0–1.2)
BUN SERPL-MCNC: 11 MG/DL (ref 6–20)
BUN/CREAT SERPL: 17.5 (ref 7–25)
CALCIUM SPEC-SCNC: 9.7 MG/DL (ref 8.6–10.5)
CHLORIDE SERPL-SCNC: 101 MMOL/L (ref 98–107)
CO2 SERPL-SCNC: 29.1 MMOL/L (ref 22–29)
CREAT SERPL-MCNC: 0.63 MG/DL (ref 0.57–1)
DEPRECATED RDW RBC AUTO: 43.1 FL (ref 37–54)
EGFRCR SERPLBLD CKD-EPI 2021: 103 ML/MIN/1.73
EOSINOPHIL # BLD AUTO: 0.45 10*3/MM3 (ref 0–0.4)
EOSINOPHIL NFR BLD AUTO: 7.9 % (ref 0.3–6.2)
ERYTHROCYTE [DISTWIDTH] IN BLOOD BY AUTOMATED COUNT: 13.3 % (ref 12.3–15.4)
GLOBULIN UR ELPH-MCNC: 2.6 GM/DL
GLUCOSE SERPL-MCNC: 135 MG/DL (ref 65–99)
HCT VFR BLD AUTO: 46 % (ref 34–46.6)
HGB BLD-MCNC: 15.3 G/DL (ref 12–15.9)
IMM GRANULOCYTES # BLD AUTO: 0.03 10*3/MM3 (ref 0–0.05)
IMM GRANULOCYTES NFR BLD AUTO: 0.5 % (ref 0–0.5)
LYMPHOCYTES # BLD AUTO: 1.26 10*3/MM3 (ref 0.7–3.1)
LYMPHOCYTES NFR BLD AUTO: 22.1 % (ref 19.6–45.3)
MCH RBC QN AUTO: 29.4 PG (ref 26.6–33)
MCHC RBC AUTO-ENTMCNC: 33.3 G/DL (ref 31.5–35.7)
MCV RBC AUTO: 88.3 FL (ref 79–97)
MONOCYTES # BLD AUTO: 0.5 10*3/MM3 (ref 0.1–0.9)
MONOCYTES NFR BLD AUTO: 8.8 % (ref 5–12)
NEUTROPHILS NFR BLD AUTO: 3.45 10*3/MM3 (ref 1.7–7)
NEUTROPHILS NFR BLD AUTO: 60.3 % (ref 42.7–76)
NRBC BLD AUTO-RTO: 0 /100 WBC (ref 0–0.2)
PLATELET # BLD AUTO: 353 10*3/MM3 (ref 140–450)
PMV BLD AUTO: 8.6 FL (ref 6–12)
POTASSIUM SERPL-SCNC: 3.7 MMOL/L (ref 3.5–5.2)
PROT SERPL-MCNC: 7.4 G/DL (ref 6–8.5)
RBC # BLD AUTO: 5.21 10*6/MM3 (ref 3.77–5.28)
SODIUM SERPL-SCNC: 140 MMOL/L (ref 136–145)
WBC NRBC COR # BLD: 5.71 10*3/MM3 (ref 3.4–10.8)

## 2023-03-07 PROCEDURE — 80053 COMPREHEN METABOLIC PANEL: CPT | Performed by: EMERGENCY MEDICINE

## 2023-03-07 PROCEDURE — 99282 EMERGENCY DEPT VISIT SF MDM: CPT

## 2023-03-07 PROCEDURE — 85025 COMPLETE CBC W/AUTO DIFF WBC: CPT | Performed by: EMERGENCY MEDICINE

## 2023-03-07 PROCEDURE — 36415 COLL VENOUS BLD VENIPUNCTURE: CPT | Performed by: EMERGENCY MEDICINE

## 2023-03-07 RX ORDER — METOPROLOL SUCCINATE 25 MG/1
25 TABLET, EXTENDED RELEASE ORAL
Qty: 30 TABLET | Refills: 0 | Status: SHIPPED | OUTPATIENT
Start: 2023-03-07 | End: 2023-03-28 | Stop reason: SDUPTHER

## 2023-03-07 RX ORDER — TIOTROPIUM BROMIDE AND OLODATEROL 3.124; 2.736 UG/1; UG/1
2 SPRAY, METERED RESPIRATORY (INHALATION)
Qty: 4 G | Refills: 0 | Status: SHIPPED | OUTPATIENT
Start: 2023-03-07

## 2023-03-07 RX ORDER — HYDROXYZINE HYDROCHLORIDE 25 MG/1
25 TABLET, FILM COATED ORAL 3 TIMES DAILY PRN
Qty: 15 TABLET | Refills: 0 | Status: SHIPPED | OUTPATIENT
Start: 2023-03-07

## 2023-03-07 RX ORDER — ALBUTEROL SULFATE 0.63 MG/3ML
1 SOLUTION RESPIRATORY (INHALATION) EVERY 6 HOURS PRN
Qty: 30 EACH | Refills: 0 | Status: SHIPPED | OUTPATIENT
Start: 2023-03-07 | End: 2023-03-15

## 2023-03-07 RX ORDER — CLONIDINE HYDROCHLORIDE 0.3 MG/1
0.3 TABLET ORAL 2 TIMES DAILY
Qty: 60 TABLET | Refills: 0 | Status: SHIPPED | OUTPATIENT
Start: 2023-03-07 | End: 2023-03-28 | Stop reason: SDUPTHER

## 2023-03-07 NOTE — ED PROVIDER NOTES
Time: 3:13 PM EST  Date of encounter:  3/7/2023  Independent Historian/Clinical History and Information was obtained by:   Patient  Chief Complaint   Patient presents with   • Med Refill       History is limited by: N/A    History of Present Illness:  Patient is a 57 y.o. year old female who presents to the emergency department for evaluation of medication refill.  Patient states she is out of hypertension and anxiety meds and she needs a refill.  Patient states she called her PCP but they cannot make her an appointment for 1 month so they told her to come to the ER.  Patient denies any symptoms.  (Provider in triage, Victor Hugo Taylor PA-C)    HPI    Patient Care Team  Primary Care Provider: Narciso Jara APRN    Past Medical History:     Allergies   Allergen Reactions   • Paxlovid [Nirmatrelvir-Ritonavir] Unknown - Low Severity   • Wellbutrin [Bupropion] Unknown - Low Severity     Past Medical History:   Diagnosis Date   • ADHD    • Arthritis    • Hypertension      Past Surgical History:   Procedure Laterality Date   • ARM WOUND REPAIR / CLOSURE Right      History reviewed. No pertinent family history.    Home Medications:  Prior to Admission medications    Medication Sig Start Date End Date Taking? Authorizing Provider   albuterol sulfate  (90 Base) MCG/ACT inhaler Inhale 2 puffs Every 4 (Four) Hours As Needed for Wheezing. 4/18/22   Edson Segovia MD   amphetamine-dextroamphetamine (ADDERALL) 20 MG tablet Take 20 mg by mouth 3 (Three) Times a Day.    Provider, MD Madhavi   cloNIDine (CATAPRES) 0.3 MG tablet Take 1 tablet by mouth 2 (Two) Times a Day for 30 days. 4/18/22 5/18/22  Edson Segovia MD   diclofenac (VOLTAREN) 50 MG EC tablet Take 1 tablet by mouth 3 (Three) Times a Day. 4/23/22   Calista Almanzar APRN   metoprolol succinate XL (TOPROL-XL) 25 MG 24 hr tablet Take 1 tablet by mouth Daily for 30 days. 4/19/22 5/19/22  Edson Segovia MD   tiotropium bromide-olodaterol (Stiolto Respimat)  "2.5-2.5 MCG/ACT aerosol solution inhaler Inhale 2 puffs Daily. 4/18/22   Edson Segovia MD        Social History:   Social History     Tobacco Use   • Smoking status: Former   • Smokeless tobacco: Never   Substance Use Topics   • Alcohol use: Never   • Drug use: Yes     Frequency: 7.0 times per week     Types: Marijuana     Comment: medical marijuana         Review of Systems:  Review of Systems   Constitutional: Negative for fever.   Cardiovascular: Negative for chest pain.        Physical Exam:  BP (!) 156/106 (BP Location: Left arm, Patient Position: Sitting)   Pulse 108   Temp 97.9 °F (36.6 °C) (Oral)   Resp 16   Ht 170.2 cm (67\")   Wt 67.7 kg (149 lb 4 oz)   SpO2 97%   BMI 23.38 kg/m²     Physical Exam  HENT:      Head: Normocephalic.      Mouth/Throat:      Mouth: Mucous membranes are moist.   Eyes:      Pupils: Pupils are equal, round, and reactive to light.   Pulmonary:      Effort: Pulmonary effort is normal. No respiratory distress.   Abdominal:      General: There is no distension.   Musculoskeletal:      Cervical back: Neck supple.   Skin:     General: Skin is warm and dry.   Neurological:      General: No focal deficit present.      Mental Status: She is alert and oriented to person, place, and time.   Psychiatric:         Mood and Affect: Mood normal.         Behavior: Behavior normal.                  Procedures:  Procedures      Medical Decision Making:      Comorbidities that affect care:    ADHD, arthritis, Hypertension    External Notes reviewed:    None      The following orders were placed and all results were independently analyzed by me:  Orders Placed This Encounter   Procedures   • Comprehensive Metabolic Panel   • CBC Auto Differential   • CBC & Differential       Medications Given in the Emergency Department:  Medications - No data to display     ED Course:    The patient was initially evaluated in the triage area where orders were placed. The patient was later dispositioned by " LETITIA Laurent.      The patient was advised to stay for completion of workup which includes but is not limited to communication of labs and radiological results, reassessment and plan. The patient was advised that leaving prior to disposition by a provider could result in critical findings that are not communicated to the patient.     ED Course as of 03/14/23 1036   Tue Mar 07, 2023   1514 PROVIDER IN TRIAGE  Patient was evaluated by me in triage, Victor Hugo Taylor PA-C.  Orders were placed and patient is currently awaiting final results and disposition.  [MD]      ED Course User Index  [MD] Victor Hugo Taylor PA-C       Labs:    Lab Results (last 24 hours)     ** No results found for the last 24 hours. **           Imaging:    No Radiology Exams Resulted Within Past 24 Hours      Differential Diagnosis and Discussion:      noncompliance, poor access to healthcare    All labs were reviewed and interpreted by me.    [facility]         Patient Care Considerations:    I considered prescribing all of the medications that she requested however somewhat controlled and I did not feel comfortable with this.      Consultants/Shared Management Plan:    None    Social Determinants of Health:    Patient is independent, reliable, and has access to care.       Disposition and Care Coordination:    Discharged: The patient is suitable and stable for discharge with no need for consideration of observation or admission.    I have explained the patient´s condition, diagnoses and treatment plan based on the information available to me at this time. I have answered questions and addressed any concerns. The patient has a good  understanding of the patient´s diagnosis, condition, and treatment plan as can be expected at this point. The vital signs have been stable. The patient´s condition is stable and appropriate for discharge from the emergency department.      The patient will pursue further outpatient evaluation with the primary care  physician or other designated or consulting physician as outlined in the discharge instructions. They are agreeable to this plan of care and follow-up instructions have been explained in detail. The patient has received these instructions in written format and have expressed an understanding of the discharge instructions. The patient is aware that any significant change in condition or worsening of symptoms should prompt an immediate return to this or the closest emergency department or call to 911.    Final diagnoses:   Encounter for medication refill        ED Disposition     ED Disposition   Discharge    Condition   Stable    Comment   --             This medical record created using voice recognition software.           Betty Lorenzana, APRN  03/14/23 1036

## 2023-03-15 ENCOUNTER — OFFICE VISIT (OUTPATIENT)
Dept: INTERNAL MEDICINE | Facility: CLINIC | Age: 58
End: 2023-03-15
Payer: MEDICAID

## 2023-03-15 VITALS
HEIGHT: 67 IN | BODY MASS INDEX: 23.54 KG/M2 | WEIGHT: 150 LBS | TEMPERATURE: 98.1 F | OXYGEN SATURATION: 95 % | HEART RATE: 88 BPM | RESPIRATION RATE: 20 BRPM | DIASTOLIC BLOOD PRESSURE: 110 MMHG | SYSTOLIC BLOOD PRESSURE: 180 MMHG

## 2023-03-15 DIAGNOSIS — F41.1 GAD (GENERALIZED ANXIETY DISORDER): ICD-10-CM

## 2023-03-15 DIAGNOSIS — H10.13 ALLERGIC CONJUNCTIVITIS OF BOTH EYES: ICD-10-CM

## 2023-03-15 DIAGNOSIS — Z00.00 ENCOUNTER FOR MEDICAL EXAMINATION TO ESTABLISH CARE: Primary | ICD-10-CM

## 2023-03-15 DIAGNOSIS — E78.2 MIXED HYPERLIPIDEMIA: ICD-10-CM

## 2023-03-15 DIAGNOSIS — Z23 NEED FOR IMMUNIZATION AGAINST INFLUENZA: ICD-10-CM

## 2023-03-15 DIAGNOSIS — E87.0 HYPERNATREMIA: ICD-10-CM

## 2023-03-15 DIAGNOSIS — I10 UNCONTROLLED HYPERTENSION: ICD-10-CM

## 2023-03-15 DIAGNOSIS — Z00.00 ANNUAL PHYSICAL EXAM: ICD-10-CM

## 2023-03-15 DIAGNOSIS — F90.9 ATTENTION DEFICIT HYPERACTIVITY DISORDER (ADHD), UNSPECIFIED ADHD TYPE: ICD-10-CM

## 2023-03-15 DIAGNOSIS — M54.2 NECK PAIN: ICD-10-CM

## 2023-03-15 LAB
ALBUMIN SERPL-MCNC: 4.6 G/DL (ref 3.5–5.2)
ALBUMIN/GLOB SERPL: 1.7 G/DL
ALP SERPL-CCNC: 136 U/L (ref 39–117)
ALT SERPL W P-5'-P-CCNC: 19 U/L (ref 1–33)
ANION GAP SERPL CALCULATED.3IONS-SCNC: 12 MMOL/L (ref 5–15)
AST SERPL-CCNC: 19 U/L (ref 1–32)
BASOPHILS # BLD AUTO: 0.01 10*3/MM3 (ref 0–0.2)
BASOPHILS NFR BLD AUTO: 0.2 % (ref 0–1.5)
BILIRUB SERPL-MCNC: 0.2 MG/DL (ref 0–1.2)
BUN SERPL-MCNC: 12 MG/DL (ref 6–20)
BUN/CREAT SERPL: 19 (ref 7–25)
CALCIUM SPEC-SCNC: 9.8 MG/DL (ref 8.6–10.5)
CHLORIDE SERPL-SCNC: 103 MMOL/L (ref 98–107)
CHOLEST SERPL-MCNC: 250 MG/DL (ref 0–200)
CO2 SERPL-SCNC: 32 MMOL/L (ref 22–29)
CREAT SERPL-MCNC: 0.63 MG/DL (ref 0.57–1)
DEPRECATED RDW RBC AUTO: 39.7 FL (ref 37–54)
EGFRCR SERPLBLD CKD-EPI 2021: 103 ML/MIN/1.73
EOSINOPHIL # BLD AUTO: 0.45 10*3/MM3 (ref 0–0.4)
EOSINOPHIL NFR BLD AUTO: 9.1 % (ref 0.3–6.2)
ERYTHROCYTE [DISTWIDTH] IN BLOOD BY AUTOMATED COUNT: 12.6 % (ref 12.3–15.4)
GLOBULIN UR ELPH-MCNC: 2.7 GM/DL
GLUCOSE SERPL-MCNC: 102 MG/DL (ref 65–99)
HCT VFR BLD AUTO: 42.4 % (ref 34–46.6)
HDLC SERPL-MCNC: 69 MG/DL (ref 40–60)
HGB BLD-MCNC: 14.4 G/DL (ref 12–15.9)
IMM GRANULOCYTES # BLD AUTO: 0.02 10*3/MM3 (ref 0–0.05)
IMM GRANULOCYTES NFR BLD AUTO: 0.4 % (ref 0–0.5)
LDLC SERPL CALC-MCNC: 168 MG/DL (ref 0–100)
LDLC/HDLC SERPL: 2.39 {RATIO}
LYMPHOCYTES # BLD AUTO: 1.02 10*3/MM3 (ref 0.7–3.1)
LYMPHOCYTES NFR BLD AUTO: 20.6 % (ref 19.6–45.3)
MCH RBC QN AUTO: 29.2 PG (ref 26.6–33)
MCHC RBC AUTO-ENTMCNC: 34 G/DL (ref 31.5–35.7)
MCV RBC AUTO: 86 FL (ref 79–97)
MONOCYTES # BLD AUTO: 0.53 10*3/MM3 (ref 0.1–0.9)
MONOCYTES NFR BLD AUTO: 10.7 % (ref 5–12)
NEUTROPHILS NFR BLD AUTO: 2.92 10*3/MM3 (ref 1.7–7)
NEUTROPHILS NFR BLD AUTO: 59 % (ref 42.7–76)
NRBC BLD AUTO-RTO: 0 /100 WBC (ref 0–0.2)
PLATELET # BLD AUTO: 354 10*3/MM3 (ref 140–450)
PMV BLD AUTO: 9.9 FL (ref 6–12)
POTASSIUM SERPL-SCNC: 3.9 MMOL/L (ref 3.5–5.2)
PROT SERPL-MCNC: 7.3 G/DL (ref 6–8.5)
RBC # BLD AUTO: 4.93 10*6/MM3 (ref 3.77–5.28)
SODIUM SERPL-SCNC: 147 MMOL/L (ref 136–145)
TRIGL SERPL-MCNC: 79 MG/DL (ref 0–150)
TSH SERPL DL<=0.05 MIU/L-ACNC: 0.67 UIU/ML (ref 0.27–4.2)
VLDLC SERPL-MCNC: 13 MG/DL (ref 5–40)
WBC NRBC COR # BLD: 4.95 10*3/MM3 (ref 3.4–10.8)

## 2023-03-15 PROCEDURE — 84443 ASSAY THYROID STIM HORMONE: CPT | Performed by: NURSE PRACTITIONER

## 2023-03-15 PROCEDURE — 85025 COMPLETE CBC W/AUTO DIFF WBC: CPT | Performed by: NURSE PRACTITIONER

## 2023-03-15 PROCEDURE — 3077F SYST BP >= 140 MM HG: CPT | Performed by: NURSE PRACTITIONER

## 2023-03-15 PROCEDURE — 3080F DIAST BP >= 90 MM HG: CPT | Performed by: NURSE PRACTITIONER

## 2023-03-15 PROCEDURE — 80053 COMPREHEN METABOLIC PANEL: CPT | Performed by: NURSE PRACTITIONER

## 2023-03-15 PROCEDURE — 99204 OFFICE O/P NEW MOD 45 MIN: CPT | Performed by: NURSE PRACTITIONER

## 2023-03-15 PROCEDURE — 80061 LIPID PANEL: CPT | Performed by: NURSE PRACTITIONER

## 2023-03-15 RX ORDER — BUSPIRONE HYDROCHLORIDE 5 MG/1
5 TABLET ORAL 3 TIMES DAILY
Qty: 90 TABLET | Refills: 0 | Status: SHIPPED | OUTPATIENT
Start: 2023-03-15 | End: 2023-03-15

## 2023-03-15 RX ORDER — AMLODIPINE BESYLATE 5 MG/1
5 TABLET ORAL DAILY
Qty: 30 TABLET | Refills: 1 | Status: SHIPPED | OUTPATIENT
Start: 2023-03-15 | End: 2023-03-15

## 2023-03-15 RX ORDER — OLOPATADINE HYDROCHLORIDE 2 MG/ML
1 SOLUTION/ DROPS OPHTHALMIC DAILY
Qty: 2.5 ML | Refills: 1 | Status: SHIPPED | OUTPATIENT
Start: 2023-03-15

## 2023-03-15 RX ORDER — ALBUTEROL SULFATE 90 UG/1
2 AEROSOL, METERED RESPIRATORY (INHALATION) EVERY 4 HOURS PRN
Qty: 8 G | Refills: 0 | Status: SHIPPED | OUTPATIENT
Start: 2023-03-15

## 2023-03-15 RX ORDER — AMLODIPINE BESYLATE 5 MG/1
5 TABLET ORAL DAILY
Qty: 30 TABLET | Refills: 1 | Status: SHIPPED | OUTPATIENT
Start: 2023-03-15 | End: 2023-03-28 | Stop reason: SDUPTHER

## 2023-03-15 RX ORDER — BUSPIRONE HYDROCHLORIDE 5 MG/1
5 TABLET ORAL 3 TIMES DAILY
Qty: 90 TABLET | Refills: 0 | Status: SHIPPED | OUTPATIENT
Start: 2023-03-15

## 2023-03-15 NOTE — ASSESSMENT & PLAN NOTE
Discussed significantly elevated blood pressure at today's visit.  Discussed risks of blood pressure elevation including death, heart attack, stroke, chronic kidney disease, blindness.  Starting on amlodipine 5 mg today.  She will continue with clonidine and metoprolol.  Follow a low-salt diet and increase exercise.  Discussed importance of medication compliance and avoidance of missing doses.   We will monitor at follow-up and adjust blood pressure medications if necessary.  Encouraged her to monitor blood pressures at home.  Discussed goal of less than 130/80.  ER with chest pain, palpitations, vision loss, unilateral weakness, or altered mental status.  Patient verbalized understanding

## 2023-03-15 NOTE — PROGRESS NOTES
Chief Complaint  Anxiety    Subjective          Yenni Camacho presents to [facility]  History of Present Illness  Establish care visit  Recently moved back from [place]-  Hx of anxiety and adhd. She reports being off adderall at present due to issues getting this since being in [place].     HTN-she reports that she was taking toprol and clonidine as well as one other medication. She reports that someone messed up her meds and did not give her the right thing.  She is uncertain of the other medication that she had previously been taking.  She reports that her blood pressure is up due to her anxiety mostly.  She denies chest pain and headache.    She is needing an albuterol inhaler  She reports that she was given albuterol nebulizer and she does not have a nebulizer at home but needs only an inhaler.  She reports that she does not use this frequently but does have COPD.  Denies acute symptoms today.    Allergic conjunvtivitis.  She states that her eyes have been watery for the last few weeks.  She is unsure what eyedrops she has tried for this.  She states that she currently is taking an allergy pill but also states this is not helping with her anxiety.    She reports generalized anxiety for many years.  She states that she has been on several medications in the past.  She reports that many of the medication she previously took because depression and suicidal thoughts.  She reports issues with TCAs and MAOIs.  She is unsure of the names of these medications.  She is requesting something for anxiety today.  She reports that the hydroxyzine given in the ER is not helping.  She does not recall trying BuSpar previously.    Denies having issues with depression other than when she was taking certain medications.  She denies feeling depressed today.  Denies SI.    Patient states multiple times during the visit that she may have to move back out of state if she is unable to  "get her Adderall filled here.  She reports initially moving back to [place] after living in [place] for several years since her boyfriend recently passed away.    Patient also requesting medication for neck pain today.  She reports that she requested this in the ER but that nothing was sent to the pharmacy.  It appears per the ER note that diclofenac was sent in for the patient.  Patient does not recall picking this up.  Labs from the ER reviewed  Objective   Vital Signs:   BP (!) 180/110   Pulse 88   Temp 98.1 °F (36.7 °C)   Resp 20   Ht 170.2 cm (67\")   Wt 68 kg (150 lb)   SpO2 95%   BMI 23.49 kg/m²     Physical Exam  Vitals and nursing note reviewed.   Constitutional:       General: She is not in acute distress.     Appearance: Normal appearance.   HENT:      Head: Normocephalic and atraumatic.      Right Ear: External ear normal.      Left Ear: External ear normal.      Nose: Nose normal.      Mouth/Throat:      Mouth: Mucous membranes are moist.   Eyes:      Conjunctiva/sclera: Conjunctivae normal.   Cardiovascular:      Rate and Rhythm: Normal rate and regular rhythm.      Pulses: Normal pulses.      Heart sounds: Normal heart sounds. No murmur heard.    No friction rub. No gallop.   Pulmonary:      Effort: Pulmonary effort is normal. No respiratory distress.      Breath sounds: No wheezing, rhonchi or rales.   Musculoskeletal:      Cervical back: Neck supple.      Right lower leg: No edema.      Left lower leg: No edema.   Skin:     General: Skin is warm and dry.   Neurological:      General: No focal deficit present.      Mental Status: She is alert and oriented to person, place, and time.   Psychiatric:         Mood and Affect: Mood is anxious.         Behavior: Behavior is hyperactive. Behavior is cooperative.         Judgment: Judgment is impulsive.        Result Review :          Procedures      Assessment and Plan    Diagnoses and all orders for this visit:    1. Encounter for medical " examination to establish care (Primary)    2. Need for immunization against influenza    3. BEST (generalized anxiety disorder)  -     Ambulatory Referral to Psychiatry    4. Attention deficit hyperactivity disorder (ADHD), unspecified ADHD type  -     Ambulatory Referral to Psychiatry    5. Annual physical exam  Comments:  Checking annual physical labs  Orders:  -     Comprehensive Metabolic Panel  -     CBC & Differential  -     TSH  -     Lipid Panel    6. Allergic conjunctivitis of both eyes  Comments:  We will try Pataday    7. Uncontrolled hypertension  Assessment & Plan:  Discussed significantly elevated blood pressure at today's visit.  Discussed risks of blood pressure elevation including death, heart attack, stroke, chronic kidney disease, blindness.  Starting on amlodipine 5 mg today.  She will continue with clonidine and metoprolol.  Follow a low-salt diet and increase exercise.  Discussed importance of medication compliance and avoidance of missing doses.   We will monitor at follow-up and adjust blood pressure medications if necessary.  Encouraged her to monitor blood pressures at home.  Discussed goal of less than 130/80.  ER with chest pain, palpitations, vision loss, unilateral weakness, or altered mental status.  Patient verbalized understanding      8. Neck pain  Comments:  Patient declines further work-up at this time but is requesting pain medication.  Voltaren prescribed    Other orders  -     Discontinue: amLODIPine (NORVASC) 5 MG tablet; Take 1 tablet by mouth Daily.  Dispense: 30 tablet; Refill: 1  -     Discontinue: busPIRone (BUSPAR) 5 MG tablet; Take 1 tablet by mouth 3 (Three) Times a Day.  Dispense: 90 tablet; Refill: 0  -     olopatadine (PATADAY) 0.2 % solution ophthalmic solution; Administer 1 drop to both eyes Daily.  Dispense: 2.5 mL; Refill: 1  -     amLODIPine (NORVASC) 5 MG tablet; Take 1 tablet by mouth Daily.  Dispense: 30 tablet; Refill: 1  -     busPIRone (BUSPAR) 5 MG  tablet; Take 1 tablet by mouth 3 (Three) Times a Day.  Dispense: 90 tablet; Refill: 0  -     diclofenac (VOLTAREN) 50 MG EC tablet; Take 1 tablet by mouth 2 (Two) Times a Day.  Dispense: 60 tablet; Refill: 0  -     albuterol sulfate  (90 Base) MCG/ACT inhaler; Inhale 2 puffs Every 4 (Four) Hours As Needed for Wheezing.  Dispense: 8 g; Refill: 0    Referral to psychiatry for evaluation of ADHD and anxiety.  Some concern for ting.  Patient seemed very irritated when I explained she would need to see psychiatry and that I would not prescribe her Adderall today.  Patient also requesting benzo for treatment of anxiety which again she seemed to become more agitated when I explained that I could only write her for BuSpar at today's visit.  Patient stated multiple times that she would move out of the state in order to get her Adderall refilled.      Voltaren prescribed for neck pain.  Kidney function normal and reviewed        Follow Up   Return in about 2 months (around 5/15/2023).  Patient was given instructions and counseling regarding her condition or for health maintenance advice. Please see specific information pulled into the AVS if appropriate.

## 2023-03-16 RX ORDER — ATORVASTATIN CALCIUM 10 MG/1
10 TABLET, FILM COATED ORAL DAILY
Qty: 90 TABLET | Refills: 0 | Status: SHIPPED | OUTPATIENT
Start: 2023-03-16

## 2023-03-24 ENCOUNTER — TELEPHONE (OUTPATIENT)
Dept: INTERNAL MEDICINE | Facility: CLINIC | Age: 58
End: 2023-03-24

## 2023-03-24 ENCOUNTER — TELEPHONE (OUTPATIENT)
Dept: INTERNAL MEDICINE | Facility: CLINIC | Age: 58
End: 2023-03-24
Payer: MEDICAID

## 2023-03-24 NOTE — TELEPHONE ENCOUNTER
Caller: Yenni Camacoh    Relationship: Self    Best call back number: [phone]    What is the medical concern/diagnosis:  TRYING TO GET ADDERALL REFILLED     What specialty or service is being requested: PSYCHIATRY    Any additional details: PATIENT HAS NOT HEARD FROM PSYCHIATRY AND HAS BEEN WITHOUT HER ADDERALL MEDICATION.

## 2023-03-24 NOTE — TELEPHONE ENCOUNTER
Caller: Yenni Camacho    Relationship: Self    Best call back number: [phone]    What medication are you requesting: TRAMADOL    What are your current symptoms: HEADACHES AND SEVERE PAIN IN BACK AFTER TAKING diclofenac (VOLTAREN) 50 MG EC tablet    How long have you been experiencing symptoms: 03/17/2023    Have you had these symptoms before:    [x] Yes  [] No    Have you been treated for these symptoms before:   [x] Yes  [] No    If a prescription is needed, what is your preferred pharmacy and phone number: [id number] - [place], [place] - [address] [facility] AT [facility] [address] & [place] - [phone]  - [phone] FX

## 2023-03-27 NOTE — TELEPHONE ENCOUNTER
Patient is requesting a prescription for Tramadol instead of taking the Diclofenac due to it causing headaches and severe pain.

## 2023-03-27 NOTE — TELEPHONE ENCOUNTER
Left message to call the office . Providers don't normally refill Adderall if Psychiatry has previously prescribed

## 2023-03-28 RX ORDER — CLONIDINE HYDROCHLORIDE 0.3 MG/1
0.3 TABLET ORAL 2 TIMES DAILY
Qty: 60 TABLET | Refills: 1 | Status: SHIPPED | OUTPATIENT
Start: 2023-03-28 | End: 2023-04-27

## 2023-03-28 RX ORDER — METOPROLOL SUCCINATE 25 MG/1
25 TABLET, EXTENDED RELEASE ORAL
Qty: 30 TABLET | Refills: 1 | Status: SHIPPED | OUTPATIENT
Start: 2023-03-28 | End: 2023-04-27

## 2023-03-28 RX ORDER — AMLODIPINE BESYLATE 5 MG/1
5 TABLET ORAL DAILY
Qty: 30 TABLET | Refills: 1 | Status: SHIPPED | OUTPATIENT
Start: 2023-03-28

## 2023-03-28 NOTE — TELEPHONE ENCOUNTER
Patient is wanting an update on Psychiatry referral due she is out of Adderall and [facility] will not prescribe.

## 2023-03-29 ENCOUNTER — TELEPHONE (OUTPATIENT)
Dept: INTERNAL MEDICINE | Facility: CLINIC | Age: 58
End: 2023-03-29

## 2023-03-29 NOTE — TELEPHONE ENCOUNTER
Spoke with patient and made aware appointment must be scheduled to discuss this issue, patient has an appointment scheduled.

## 2023-03-29 NOTE — TELEPHONE ENCOUNTER
Calling pt to give her the number to behavioral. Referral is good for a year, doesn't need updated one

## 2023-04-24 ENCOUNTER — OFFICE VISIT (OUTPATIENT)
Dept: INTERNAL MEDICINE | Facility: CLINIC | Age: 58
End: 2023-04-24
Payer: MEDICAID

## 2023-04-24 VITALS
BODY MASS INDEX: 23.7 KG/M2 | HEART RATE: 61 BPM | SYSTOLIC BLOOD PRESSURE: 130 MMHG | OXYGEN SATURATION: 97 % | DIASTOLIC BLOOD PRESSURE: 80 MMHG | HEIGHT: 67 IN | WEIGHT: 151 LBS | TEMPERATURE: 97.3 F | RESPIRATION RATE: 18 BRPM

## 2023-04-24 DIAGNOSIS — F90.9 ATTENTION DEFICIT HYPERACTIVITY DISORDER (ADHD), UNSPECIFIED ADHD TYPE: ICD-10-CM

## 2023-04-24 DIAGNOSIS — G89.29 CHRONIC BILATERAL LOW BACK PAIN WITHOUT SCIATICA: ICD-10-CM

## 2023-04-24 DIAGNOSIS — E87.0 HYPERNATREMIA: ICD-10-CM

## 2023-04-24 DIAGNOSIS — Z00.00 ANNUAL PHYSICAL EXAM: Primary | ICD-10-CM

## 2023-04-24 DIAGNOSIS — M54.50 CHRONIC BILATERAL LOW BACK PAIN WITHOUT SCIATICA: ICD-10-CM

## 2023-04-24 DIAGNOSIS — M54.2 NECK PAIN: ICD-10-CM

## 2023-04-24 DIAGNOSIS — I10 PRIMARY HYPERTENSION: ICD-10-CM

## 2023-04-24 DIAGNOSIS — F41.1 GAD (GENERALIZED ANXIETY DISORDER): ICD-10-CM

## 2023-04-24 LAB
ANION GAP SERPL CALCULATED.3IONS-SCNC: 8.4 MMOL/L (ref 5–15)
BUN SERPL-MCNC: 17 MG/DL (ref 6–20)
BUN/CREAT SERPL: 27.9 (ref 7–25)
CALCIUM SPEC-SCNC: 9.7 MG/DL (ref 8.6–10.5)
CHLORIDE SERPL-SCNC: 102 MMOL/L (ref 98–107)
CO2 SERPL-SCNC: 31.6 MMOL/L (ref 22–29)
CREAT SERPL-MCNC: 0.61 MG/DL (ref 0.57–1)
EGFRCR SERPLBLD CKD-EPI 2021: 103.8 ML/MIN/1.73
GLUCOSE SERPL-MCNC: 101 MG/DL (ref 65–99)
POTASSIUM SERPL-SCNC: 4.3 MMOL/L (ref 3.5–5.2)
SODIUM SERPL-SCNC: 142 MMOL/L (ref 136–145)

## 2023-04-24 PROCEDURE — 36415 COLL VENOUS BLD VENIPUNCTURE: CPT | Performed by: NURSE PRACTITIONER

## 2023-04-24 PROCEDURE — 3075F SYST BP GE 130 - 139MM HG: CPT | Performed by: NURSE PRACTITIONER

## 2023-04-24 PROCEDURE — 99396 PREV VISIT EST AGE 40-64: CPT | Performed by: NURSE PRACTITIONER

## 2023-04-24 PROCEDURE — 3079F DIAST BP 80-89 MM HG: CPT | Performed by: NURSE PRACTITIONER

## 2023-04-24 PROCEDURE — 1159F MED LIST DOCD IN RCRD: CPT | Performed by: NURSE PRACTITIONER

## 2023-04-24 PROCEDURE — 1160F RVW MEDS BY RX/DR IN RCRD: CPT | Performed by: NURSE PRACTITIONER

## 2023-04-24 RX ORDER — MELOXICAM 7.5 MG/1
7.5 TABLET ORAL DAILY
Qty: 30 TABLET | Refills: 1 | Status: SHIPPED | OUTPATIENT
Start: 2023-04-24

## 2023-04-24 NOTE — PROGRESS NOTES
"Chief Complaint  ADHD (Discuss refill Adderall) and Back Pain (Discuss medication )  Annual physical  Subjective          Yenni Camacho presents to [facility]  History of Present Illness  Annual physical  HTN-she reports that bp have been good at home when she is in a good mood  Denies chest pain, dizziness  Hypernatremia    BEST/ADHD-reports not ever getting a call from psych  Her referral note, staff attempted to contact patient x3 and was unable to reach her for appointment.  Referral was closed.    Back pain-ongoing for years. Denies new injury  Chronic low back and neck pain. Previous workup for years. She was using medical marijuana but unable got get this her. She would like to see pain management  Mri of cervical and lumbar reviewed from 2016    Declines mammogram, colon cancer screening, pap smear    Objective   Vital Signs:   /80 (BP Location: Right arm, Patient Position: Sitting, Cuff Size: Adult)   Pulse 61   Temp 97.3 °F (36.3 °C)   Resp 18   Ht 170.2 cm (67\")   Wt 68.5 kg (151 lb)   SpO2 97%   BMI 23.65 kg/m²     Physical Exam  Vitals and nursing note reviewed.   Constitutional:       General: She is not in acute distress.     Appearance: Normal appearance.   HENT:      Head: Normocephalic and atraumatic.      Right Ear: External ear normal.      Left Ear: External ear normal.      Nose: Nose normal.      Mouth/Throat:      Mouth: Mucous membranes are moist.   Eyes:      Conjunctiva/sclera: Conjunctivae normal.   Cardiovascular:      Rate and Rhythm: Normal rate and regular rhythm.      Pulses: Normal pulses.      Heart sounds: Normal heart sounds. No murmur heard.    No friction rub. No gallop.   Pulmonary:      Effort: Pulmonary effort is normal. No respiratory distress.      Breath sounds: No wheezing, rhonchi or rales.   Musculoskeletal:      Cervical back: Neck supple.      Right lower leg: No edema.      Left lower leg: No edema. "   Skin:     General: Skin is warm and dry.   Neurological:      General: No focal deficit present.      Mental Status: She is alert and oriented to person, place, and time.   Psychiatric:         Mood and Affect: Mood normal.         Behavior: Behavior normal.        Result Review :          Procedures      Assessment and Plan    Diagnoses and all orders for this visit:    1. Annual physical exam (Primary)    2. Primary hypertension  Comments:  Currently improved.  She will continue to monitor at home.  Continue current meds    3. BEST (generalized anxiety disorder)  Comments:  Psychiatry referral as discussed previously.  New referral placed.  Patient given number in the office today to contact them for appointment.  Orders:  -     Ambulatory Referral to Psychiatry    4. Attention deficit hyperactivity disorder (ADHD), unspecified ADHD type  Comments:  Psychiatry referral as above  Orders:  -     Ambulatory Referral to Psychiatry    5. Chronic bilateral low back pain without sciatica  Comments:  Pain management referral  Orders:  -     Ambulatory Referral to Pain Management    6. Neck pain  Comments:  Pain management referral  Orders:  -     Ambulatory Referral to Pain Management    7. Hypernatremia  Comments:  Repeating labs today    Other orders  -     meloxicam (Mobic) 7.5 MG tablet; Take 1 tablet by mouth Daily.  Dispense: 30 tablet; Refill: 1        40 to 64: Counseling/Anticipatory Guidance Discussed: nutrition, physical activity, healthy weight, injury prevention, misuse of tobacco, alcohol and drugs, mental health, immunizations, screenings and self-breast exam      Follow Up   Return in about 6 months (around 10/24/2023), or if symptoms worsen or fail to improve.  Patient was given instructions and counseling regarding her condition or for health maintenance advice. Please see specific information pulled into the AVS if appropriate.

## 2023-05-15 ENCOUNTER — OFFICE VISIT (OUTPATIENT)
Dept: PSYCHIATRY | Facility: CLINIC | Age: 58
End: 2023-05-15
Payer: MEDICAID

## 2023-05-15 VITALS
DIASTOLIC BLOOD PRESSURE: 105 MMHG | HEART RATE: 104 BPM | SYSTOLIC BLOOD PRESSURE: 193 MMHG | HEIGHT: 67 IN | BODY MASS INDEX: 27.47 KG/M2 | WEIGHT: 175 LBS

## 2023-05-15 DIAGNOSIS — F90.0 ADHD (ATTENTION DEFICIT HYPERACTIVITY DISORDER), INATTENTIVE TYPE: ICD-10-CM

## 2023-05-15 DIAGNOSIS — F41.1 GENERALIZED ANXIETY DISORDER: Primary | ICD-10-CM

## 2023-05-15 PROCEDURE — 1159F MED LIST DOCD IN RCRD: CPT | Performed by: NURSE PRACTITIONER

## 2023-05-15 PROCEDURE — 1160F RVW MEDS BY RX/DR IN RCRD: CPT | Performed by: NURSE PRACTITIONER

## 2023-05-15 PROCEDURE — 90792 PSYCH DIAG EVAL W/MED SRVCS: CPT | Performed by: NURSE PRACTITIONER

## 2023-05-15 PROCEDURE — 3080F DIAST BP >= 90 MM HG: CPT | Performed by: NURSE PRACTITIONER

## 2023-05-15 PROCEDURE — 3077F SYST BP >= 140 MM HG: CPT | Performed by: NURSE PRACTITIONER

## 2023-05-15 RX ORDER — HYDROCODONE BITARTRATE AND ACETAMINOPHEN 7.5; 325 MG/1; MG/1
TABLET ORAL
COMMUNITY

## 2023-05-15 RX ORDER — CLONIDINE HYDROCHLORIDE 0.3 MG/1
TABLET ORAL
COMMUNITY
End: 2023-05-15 | Stop reason: SDUPTHER

## 2023-05-15 RX ORDER — METOPROLOL SUCCINATE 25 MG/1
TABLET, EXTENDED RELEASE ORAL
COMMUNITY

## 2023-05-15 RX ORDER — NAPROXEN SODIUM 220 MG
TABLET ORAL EVERY 12 HOURS
COMMUNITY

## 2023-05-15 RX ORDER — ESCITALOPRAM OXALATE 5 MG/1
5 TABLET ORAL DAILY
Qty: 30 TABLET | Refills: 0 | Status: SHIPPED | OUTPATIENT
Start: 2023-05-15

## 2023-05-15 RX ORDER — DIPHENHYDRAMINE HCL 25 MG
TABLET ORAL EVERY 4 HOURS
COMMUNITY

## 2023-05-15 RX ORDER — NALOXONE HYDROCHLORIDE 4 MG/.1ML
SPRAY NASAL
COMMUNITY

## 2023-05-15 NOTE — PROGRESS NOTES
Subjective   Yenni Camacho is a 57 y.o. female who presents today for initial evaluation   This provider is located at [address] [place] [address], [address] in [place], [place]. In-person visit consisting of the patient and I only. The patient is accepting of and agreeable to this appointment.       Chief Complaint:  Anxiety, ADHD    History of Present Illness:     Anxiety: Onset childhood (Dad  when 4, Spiritism school), increased past year (moved to [place] )  Anxious, nervous or worried on most days about a number of events or activities- endorses  No control over worries- endorses- working on positive coping skills  Irritability- denies  Fatigue: endorses  Difficulty concentrating- endorses  Sleep disturbance- endorses  Restlessness- endorses  Tension in muscles- denies    ADHD:   Inattention:   Often fails to give close attention to details or makes careless mistakes in schoolwork, at work, or during other activities- y  Often has difficulty sustaining attention in tasks- y  Often does not seem to listen when spoken to directly- y  Often does not follow through on instructions and fails to finish duties in the workplace- y  Often has difficulty organizing tasks and activities- y  Often avoids, dislikes or is reluctant to engage in tasks that require sustained mental effort- y  Often loses things necessary for tasks or activities- y  Is often easily distracted by extraneous stimuli- y  Is often forgetful in daily activities- y  Hyperactivity and Impulsivity:   Often fidgets with or taps hands or feet-y  Often leaves seat in situations when remaining seated is expected- y  Often feels restless- y  Is often “on the go”, acting as if “driven by a motor”- y  Often talks excessively- y  Often blurts out an answer before a question has been completed- y  Often has difficulty waiting their turn- y  Often interrupts or intrudes on others- y        Psychiatric Review of Systems: Patient denies any current or  previous hallucinations/delusions, paranoia, manic symptoms or PTSD.     PHQ-9 Depression Screening  PHQ-9 Total Score:      Little interest or pleasure in doing things?     Feeling down, depressed, or hopeless?     Trouble falling or staying asleep, or sleeping too much?     Feeling tired or having little energy?     Poor appetite or overeating?     Feeling bad about yourself - or that you are a failure or have let yourself or your family down?     Trouble concentrating on things, such as reading the newspaper or watching television?     Moving or speaking so slowly that other people could have noticed? Or the opposite - being so fidgety or restless that you have been moving around a lot more than usual?     Thoughts that you would be better off dead, or of hurting yourself in some way?     PHQ-9 Total Score       BEST-7  Feeling nervous, anxious or on edge: More than half the days  Not being able to stop or control worrying: Several days  Worrying too much about different things: Several days  Trouble Relaxing: More than half the days  Being so restless that it is hard to sit still: Several days  Feeling afraid as if something awful might happen: Several days  Becoming easily annoyed or irritable: More than half the days  BEST 7 Total Score: 10  If you checked any problems, how difficult have these problems made it for you to do your work, take care of things at home, or get along with other people: Somewhat difficult      Past Surgical History:  Past Surgical History:   Procedure Laterality Date   • ARM WOUND REPAIR / CLOSURE Right        Problem List:  Patient Active Problem List   Diagnosis   • Respiratory failure   • Severe malnutrition (CMS/HCC)   • Chronic systolic heart failure (CMS/HCC)   • Acute respiratory failure with hypoxia and hypercapnia   • COPD with acute exacerbation   • Ventricular tachycardia (paroxysmal)   • NICM (nonischemic cardiomyopathy)   • Uncontrolled hypertension       Allergy:    Allergies   Allergen Reactions   • Paxlovid [Nirmatrelvir-Ritonavir] Unknown - Low Severity   • Wellbutrin [Bupropion] Unknown - Low Severity        Discontinued Medications:  Medications Discontinued During This Encounter   Medication Reason   • cloNIDine (CATAPRES) 0.3 MG tablet Duplicate order   • metoprolol succinate XL (TOPROL-XL) 25 MG 24 hr tablet Duplicate order       Current Medications:   Current Outpatient Medications   Medication Sig Dispense Refill   • albuterol sulfate  (90 Base) MCG/ACT inhaler Inhale 2 puffs Every 4 (Four) Hours As Needed for Wheezing. 8 g 0   • amLODIPine (NORVASC) 5 MG tablet Take 1 tablet by mouth Daily. 30 tablet 1   • atorvastatin (Lipitor) 10 MG tablet Take 1 tablet by mouth Daily. 90 tablet 0   • cloNIDine (CATAPRES) 0.3 MG tablet Take 1 tablet by mouth 2 (Two) Times a Day for 30 days. 60 tablet 1   • diphenhydrAMINE (BENADRYL) 25 MG tablet Every 4 (Four) Hours.     • HYDROcodone-acetaminophen (NORCO) 7.5-325 MG per tablet hydrocodone 7.5 mg-acetaminophen 325 mg tablet   TAKE 1 TABLET BY MOUTH EVERY 12 HOURS AS NEEDED     • metoprolol succinate XL (TOPROL-XL) 25 MG 24 hr tablet metoprolol succinate ER 25 mg tablet,extended release 24 hr     • naloxone (NARCAN) 4 MG/0.1ML nasal spray naloxone 4 mg/actuation nasal spray   Take 1 spray by nasal route as directed.     • naproxen sodium (ALEVE) 220 MG tablet Every 12 (Twelve) Hours.     • escitalopram (LEXAPRO) 5 MG tablet Take 1 tablet by mouth Daily. 30 tablet 0   • meloxicam (Mobic) 7.5 MG tablet Take 1 tablet by mouth Daily. (Patient not taking: Reported on 5/15/2023) 30 tablet 1     No current facility-administered medications for this visit.       Past Medical History:  Past Medical History:   Diagnosis Date   • ADHD    • Arthritis    • Hypertension        Past Psychiatric History:  Began Treatment: 1990s  Diagnoses: Anxiety, ADHD  Psychiatrist: Luz Marina  Therapist: Denies  Admission History: Denies  Medication Trials:  "Adderall, ritalin, xanax, buspar, paxil, prozac, wellbutrin  Self Harm: Denies  Suicide Attempts: Denies    Substance Abuse History:   Types: Denies  Withdrawal Symptoms: Not applicable  Longest Period Sober: Not applicable  AA: Not applicable    Social History:  Martial Status:  x3  Employed: Not currently  Kids: One living  House: Self   History: Denies    Social History     Socioeconomic History   • Marital status:    Tobacco Use   • Smoking status: Former   • Smokeless tobacco: Never   Substance and Sexual Activity   • Alcohol use: Never   • Drug use: Yes     Frequency: 7.0 times per week     Types: Marijuana     Comment: medical marijuana   • Sexual activity: Defer       Family History:   Suicide Attempts: Denies  Suicide Completions: Denies      History reviewed. No pertinent family history.    Developmental History:   Born: [place]  Siblings: None living  Childhood: Denies  High School: Graduate  College: Denies    Access to Firearms: Denies    Mental Status Exam:     Appearance: good eye contact, normal street clothes, groomed, sitting in chair   Behavior: pleasant and cooperative  Motor: no abnormal  Speech: normal rhythm, rate, volume, tone, not hyperverbal, not pressured, normal prosidy  Mood: \" Okay \"  Affect: euthymic  Thought Content: negative suicidal ideations, negative homicidal ideations, negative obsessions  Perceptions: negative auditory hallucinations, negative visual hallucinations, negative delusions, negative paranoia  Thought Process: goal directed, linear  Insight/Judgement: fair/fair  Cognition: grossly intact  Attention: intact  Orientation: person, place, time and situation  Memory: intact    Review of Systems:     Constitutional: Denies fatigue, night sweats  Eyes: Denies double vision, blurred vision  HENT: Denies vertigo, recent head injury  Cardiovascular: Denies chest pain, irregular heartbeats  Respiratory: Denies productive cough, shortness of " "breath  Gastrointestinal: Denies nausea, vomiting  Genitourinary: Denies dysuria, urinary retention  Integument: Denies hair growth change, new skin lesions  Neurologic: Denies altered mental status, seizures  Musculoskeletal: Denies joint swelling, limitation of motion  Endocrine: Denies cold intolerance, heat intolerance  Psychiatric: Admits anxiety, inattnetion. Denies depression, ting, post-traumatic stress disorder, obsessive compulsive disorder, psychosis.   Allergic-immunologic: Denies frequent illnesses      Vital Signs:   BP (!) 193/105   Pulse 104   Ht 170.2 cm (67.01\")   Wt 79.4 kg (175 lb)   BMI 27.40 kg/m²      Lab Results:   Office Visit on 03/15/2023   Component Date Value Ref Range Status   • Glucose 03/15/2023 102 (H)  65 - 99 mg/dL Final   • BUN 03/15/2023 12  6 - 20 mg/dL Final   • Creatinine 03/15/2023 0.63  0.57 - 1.00 mg/dL Final   • Sodium 03/15/2023 147 (H)  136 - 145 mmol/L Final   • Potassium 03/15/2023 3.9  3.5 - 5.2 mmol/L Final   • Chloride 03/15/2023 103  98 - 107 mmol/L Final   • CO2 03/15/2023 32.0 (H)  22.0 - 29.0 mmol/L Final   • Calcium 03/15/2023 9.8  8.6 - 10.5 mg/dL Final   • Total Protein 03/15/2023 7.3  6.0 - 8.5 g/dL Final   • Albumin 03/15/2023 4.6  3.5 - 5.2 g/dL Final   • ALT (SGPT) 03/15/2023 19  1 - 33 U/L Final   • AST (SGOT) 03/15/2023 19  1 - 32 U/L Final   • Alkaline Phosphatase 03/15/2023 136 (H)  39 - 117 U/L Final   • Total Bilirubin 03/15/2023 0.2  0.0 - 1.2 mg/dL Final   • Globulin 03/15/2023 2.7  gm/dL Final   • A/G Ratio 03/15/2023 1.7  g/dL Final   • BUN/Creatinine Ratio 03/15/2023 19.0  7.0 - 25.0 Final   • Anion Gap 03/15/2023 12.0  5.0 - 15.0 mmol/L Final   • eGFR 03/15/2023 103.0  >60.0 mL/min/1.73 Final   • TSH 03/15/2023 0.667  0.270 - 4.200 uIU/mL Final   • Total Cholesterol 03/15/2023 250 (H)  0 - 200 mg/dL Final   • Triglycerides 03/15/2023 79  0 - 150 mg/dL Final   • HDL Cholesterol 03/15/2023 69 (H)  40 - 60 mg/dL Final   • LDL Cholesterol  " 03/15/2023 168 (H)  0 - 100 mg/dL Final   • VLDL Cholesterol 03/15/2023 13  5 - 40 mg/dL Final   • LDL/HDL Ratio 03/15/2023 2.39   Final   • WBC 03/15/2023 4.95  3.40 - 10.80 10*3/mm3 Final   • RBC 03/15/2023 4.93  3.77 - 5.28 10*6/mm3 Final   • Hemoglobin 03/15/2023 14.4  12.0 - 15.9 g/dL Final   • Hematocrit 03/15/2023 42.4  34.0 - 46.6 % Final   • MCV 03/15/2023 86.0  79.0 - 97.0 fL Final   • MCH 03/15/2023 29.2  26.6 - 33.0 pg Final   • MCHC 03/15/2023 34.0  31.5 - 35.7 g/dL Final   • RDW 03/15/2023 12.6  12.3 - 15.4 % Final   • RDW-SD 03/15/2023 39.7  37.0 - 54.0 fl Final   • MPV 03/15/2023 9.9  6.0 - 12.0 fL Final   • Platelets 03/15/2023 354  140 - 450 10*3/mm3 Final   • Neutrophil % 03/15/2023 59.0  42.7 - 76.0 % Final   • Lymphocyte % 03/15/2023 20.6  19.6 - 45.3 % Final   • Monocyte % 03/15/2023 10.7  5.0 - 12.0 % Final   • Eosinophil % 03/15/2023 9.1 (H)  0.3 - 6.2 % Final   • Basophil % 03/15/2023 0.2  0.0 - 1.5 % Final   • Immature Grans % 03/15/2023 0.4  0.0 - 0.5 % Final   • Neutrophils, Absolute 03/15/2023 2.92  1.70 - 7.00 10*3/mm3 Final   • Lymphocytes, Absolute 03/15/2023 1.02  0.70 - 3.10 10*3/mm3 Final   • Monocytes, Absolute 03/15/2023 0.53  0.10 - 0.90 10*3/mm3 Final   • Eosinophils, Absolute 03/15/2023 0.45 (H)  0.00 - 0.40 10*3/mm3 Final   • Basophils, Absolute 03/15/2023 0.01  0.00 - 0.20 10*3/mm3 Final   • Immature Grans, Absolute 03/15/2023 0.02  0.00 - 0.05 10*3/mm3 Final   • nRBC 03/15/2023 0.0  0.0 - 0.2 /100 WBC Final   • Glucose 04/24/2023 101 (H)  65 - 99 mg/dL Final   • BUN 04/24/2023 17  6 - 20 mg/dL Final   • Creatinine 04/24/2023 0.61  0.57 - 1.00 mg/dL Final   • Sodium 04/24/2023 142  136 - 145 mmol/L Final   • Potassium 04/24/2023 4.3  3.5 - 5.2 mmol/L Final   • Chloride 04/24/2023 102  98 - 107 mmol/L Final   • CO2 04/24/2023 31.6 (H)  22.0 - 29.0 mmol/L Final   • Calcium 04/24/2023 9.7  8.6 - 10.5 mg/dL Final   • BUN/Creatinine Ratio 04/24/2023 27.9 (H)  7.0 - 25.0 Final   •  Anion Gap 04/24/2023 8.4  5.0 - 15.0 mmol/L Final   • eGFR 04/24/2023 103.8  >60.0 mL/min/1.73 Final   Admission on 03/07/2023, Discharged on 03/07/2023   Component Date Value Ref Range Status   • Glucose 03/07/2023 135 (H)  65 - 99 mg/dL Final   • BUN 03/07/2023 11  6 - 20 mg/dL Final   • Creatinine 03/07/2023 0.63  0.57 - 1.00 mg/dL Final   • Sodium 03/07/2023 140  136 - 145 mmol/L Final   • Potassium 03/07/2023 3.7  3.5 - 5.2 mmol/L Final   • Chloride 03/07/2023 101  98 - 107 mmol/L Final   • CO2 03/07/2023 29.1 (H)  22.0 - 29.0 mmol/L Final   • Calcium 03/07/2023 9.7  8.6 - 10.5 mg/dL Final   • Total Protein 03/07/2023 7.4  6.0 - 8.5 g/dL Final   • Albumin 03/07/2023 4.8  3.5 - 5.2 g/dL Final   • ALT (SGPT) 03/07/2023 34 (H)  1 - 33 U/L Final   • AST (SGOT) 03/07/2023 24  1 - 32 U/L Final   • Alkaline Phosphatase 03/07/2023 141 (H)  39 - 117 U/L Final   • Total Bilirubin 03/07/2023 0.2  0.0 - 1.2 mg/dL Final   • Globulin 03/07/2023 2.6  gm/dL Final   • A/G Ratio 03/07/2023 1.8  g/dL Final   • BUN/Creatinine Ratio 03/07/2023 17.5  7.0 - 25.0 Final   • Anion Gap 03/07/2023 9.9  5.0 - 15.0 mmol/L Final   • eGFR 03/07/2023 103.0  >60.0 mL/min/1.73 Final   • WBC 03/07/2023 5.71  3.40 - 10.80 10*3/mm3 Final   • RBC 03/07/2023 5.21  3.77 - 5.28 10*6/mm3 Final   • Hemoglobin 03/07/2023 15.3  12.0 - 15.9 g/dL Final   • Hematocrit 03/07/2023 46.0  34.0 - 46.6 % Final   • MCV 03/07/2023 88.3  79.0 - 97.0 fL Final   • MCH 03/07/2023 29.4  26.6 - 33.0 pg Final   • MCHC 03/07/2023 33.3  31.5 - 35.7 g/dL Final   • RDW 03/07/2023 13.3  12.3 - 15.4 % Final   • RDW-SD 03/07/2023 43.1  37.0 - 54.0 fl Final   • MPV 03/07/2023 8.6  6.0 - 12.0 fL Final   • Platelets 03/07/2023 353  140 - 450 10*3/mm3 Final   • Neutrophil % 03/07/2023 60.3  42.7 - 76.0 % Final   • Lymphocyte % 03/07/2023 22.1  19.6 - 45.3 % Final   • Monocyte % 03/07/2023 8.8  5.0 - 12.0 % Final   • Eosinophil % 03/07/2023 7.9 (H)  0.3 - 6.2 % Final   • Basophil %  03/07/2023 0.4  0.0 - 1.5 % Final   • Immature Grans % 03/07/2023 0.5  0.0 - 0.5 % Final   • Neutrophils, Absolute 03/07/2023 3.45  1.70 - 7.00 10*3/mm3 Final   • Lymphocytes, Absolute 03/07/2023 1.26  0.70 - 3.10 10*3/mm3 Final   • Monocytes, Absolute 03/07/2023 0.50  0.10 - 0.90 10*3/mm3 Final   • Eosinophils, Absolute 03/07/2023 0.45 (H)  0.00 - 0.40 10*3/mm3 Final   • Basophils, Absolute 03/07/2023 0.02  0.00 - 0.20 10*3/mm3 Final   • Immature Grans, Absolute 03/07/2023 0.03  0.00 - 0.05 10*3/mm3 Final   • nRBC 03/07/2023 0.0  0.0 - 0.2 /100 WBC Final       EKG Results:  No orders to display       Imaging Results:  No Images in the past 120 days found..      Assessment & Plan   Diagnoses and all orders for this visit:    1. Generalized anxiety disorder (Primary)  -     escitalopram (LEXAPRO) 5 MG tablet; Take 1 tablet by mouth Daily.  Dispense: 30 tablet; Refill: 0    2. ADHD (attention deficit hyperactivity disorder), inattentive type      Presents with history of ADHD and anxiety. Will obtain records of previous diagnosis and treatment. Will obtain UDS and clearance from PCP related to cardiac history. Hx HTN. Blood pressure 193/105 today, no symptoms reported. Start escitalopram to target anxiety. 16 minutes of supportive psychotherapy with goal to strengthen defenses, promote problems solving, restore adaptive functioning and provide symptom relief. The therapeutic alliance was strengthened to encourage the patient to express their thoughts and feelings. Esteem building was enhanced through praise, reassurance, normalizing and encouragement. Coping skills were enhanced to build distress tolerance skills and emotional regulation. Allowed patient to freely discuss issues without interruption or judgement with unconditional positive regard, active listening skills, and empathy. Provided a safe, confidential environment to facilitate the development of a positive therapeutic relationship and encourage open,  honest communication. Assisted patient in identifying risk factors which would indicate the need for higher level of care including thoughts to harm self or others and/or self-harming behavior and encouraged patient to contact this office, call 911, or present to the nearest emergency room should any of these events occur. Assisted patient in processing session content; acknowledged and normalized patient's thoughts, feelings, and concerns by utilizing a person-centered approach in efforts to build appropriate rapport and a positive therapeutic relationship with open and honest communication. Patient given education on medication side effects, diagnosis/illness and relapse symptoms. Plan to continue supportive psychotherapy in next appointment to provide symptom relief. 4 weeks    Diagnoses: as above  Symptoms: as above  Functional status: good  Mental Status Exam: as above     Treatment plan: medication management and supportive psychotherapy  Prognosis: good  Progress: initial visit    Visit Diagnoses:    ICD-10-CM ICD-9-CM   1. Generalized anxiety disorder  F41.1 300.02   2. ADHD (attention deficit hyperactivity disorder), inattentive type  F90.0 314.00       PLAN:  1. Safety: No acute safety concerns.   2. Therapy: Declines  3. Risk Assessment: Risk of self-harm acutely is moderate.  Risk factors include anxiety disorder, mood disorder, and recent psychosocial stressors (pandemic). Protective factors include no family history, denies access to guns/weapons, no present SI, no history of suicide attempts or self-harm in the past, minimal AODA, healthcare seeking, future orientation, willingness to engage in care.  Risk of self-harm chronically is also moderate, but could be further elevated in the event of treatment noncompliance and/or AODA.  4. Medications: Start escitalopram 5mg po qday to target anxiety. Risks, benefits, alternatives discussed with patient including GI upset, nausea vomiting diarrhea,  theoretical decrease of seizure threshold predisposing the patient to a slightly higher seizure risk, headaches, sexual dysfunction, serotonin syndrome, bleeding risk, increased suicidality in patients 24 years and younger, switching to ting/hypomania.  After discussion of these risks and benefits, the patient voiced understanding and agreed to proceed.  5. Labs/studies: UDS  6. Follow-up: 4 weeks    Patient screened positive for depression based on a PHQ-9 score of 9 on 5/15/2023. Follow-up recommendations include: Suicide Risk Assessment performed.         TREATMENT PLAN/GOALS: Continue supportive psychotherapy efforts and medications as indicated. Treatment and medication options discussed during today's visit. Patient ackowledged and verbally consented to continue with current treatment plan and was educated on the importance of compliance with treatment and follow-up appointments.      MEDICATION ISSUES:  NATE reviewed as expected.  Discussed medication options and treatment plan of prescribed medication as well as the risks, benefits, and side effects including potential falls, possible impaired driving and metabolic adversities among others. Patient is agreeable to call the office with any worsening of symptoms or onset of side effects. Patient is agreeable to call 911 or go to the nearest ER should he/she begin having SI/HI. No medication side effects or related complaints today.     MEDS ORDERED DURING VISIT:  New Medications Ordered This Visit   Medications   • escitalopram (LEXAPRO) 5 MG tablet     Sig: Take 1 tablet by mouth Daily.     Dispense:  30 tablet     Refill:  0       Return in about 4 weeks (around 6/12/2023) for Next scheduled follow up.         This document has been electronically signed by LETITIA Chery  May 15, 2023 10:56 EDT      Part of this note may be an electronic transcription/translation of spoken language to printed text using the Dragon Dictation System.

## 2023-05-30 ENCOUNTER — HOSPITAL ENCOUNTER (OUTPATIENT)
Dept: GENERAL RADIOLOGY | Facility: HOSPITAL | Age: 58
Discharge: HOME | End: 2023-05-30
Admitting: ANESTHESIOLOGY

## 2023-05-30 ENCOUNTER — TRANSCRIBE ORDERS (OUTPATIENT)
Dept: ADMINISTRATIVE | Facility: HOSPITAL | Age: 58
End: 2023-05-30

## 2023-05-30 DIAGNOSIS — M54.12 CERVICAL RADICULOPATHY: Primary | ICD-10-CM

## 2023-05-30 DIAGNOSIS — M54.12 CERVICAL RADICULOPATHY: ICD-10-CM

## 2023-05-30 PROCEDURE — 72100 X-RAY EXAM L-S SPINE 2/3 VWS: CPT

## 2023-05-30 PROCEDURE — 72040 X-RAY EXAM NECK SPINE 2-3 VW: CPT

## 2023-06-06 ENCOUNTER — OFFICE VISIT (OUTPATIENT)
Dept: INTERNAL MEDICINE | Facility: CLINIC | Age: 58
End: 2023-06-06
Payer: MEDICAID

## 2023-06-06 VITALS
OXYGEN SATURATION: 97 % | TEMPERATURE: 98 F | HEIGHT: 67 IN | BODY MASS INDEX: 22.85 KG/M2 | DIASTOLIC BLOOD PRESSURE: 80 MMHG | SYSTOLIC BLOOD PRESSURE: 140 MMHG | HEART RATE: 89 BPM | WEIGHT: 145.6 LBS | RESPIRATION RATE: 16 BRPM

## 2023-06-06 DIAGNOSIS — I10 PRIMARY HYPERTENSION: ICD-10-CM

## 2023-06-06 DIAGNOSIS — I47.1 PAROXYSMAL SVT (SUPRAVENTRICULAR TACHYCARDIA): Primary | ICD-10-CM

## 2023-06-06 DIAGNOSIS — I42.8 NONISCHEMIC CARDIOMYOPATHY: ICD-10-CM

## 2023-06-06 PROCEDURE — 3077F SYST BP >= 140 MM HG: CPT | Performed by: NURSE PRACTITIONER

## 2023-06-06 PROCEDURE — 3079F DIAST BP 80-89 MM HG: CPT | Performed by: NURSE PRACTITIONER

## 2023-06-06 PROCEDURE — 99213 OFFICE O/P EST LOW 20 MIN: CPT | Performed by: NURSE PRACTITIONER

## 2023-06-06 RX ORDER — ALBUTEROL SULFATE 90 UG/1
2 AEROSOL, METERED RESPIRATORY (INHALATION) EVERY 4 HOURS PRN
Qty: 8 G | Refills: 0 | Status: SHIPPED | OUTPATIENT
Start: 2023-06-06

## 2023-06-06 RX ORDER — METOPROLOL SUCCINATE 25 MG/1
25 TABLET, EXTENDED RELEASE ORAL DAILY
Qty: 90 TABLET | Refills: 0 | Status: SHIPPED | OUTPATIENT
Start: 2023-06-06 | End: 2023-06-07

## 2023-06-06 NOTE — PROGRESS NOTES
"Chief Complaint  Atrial Fibrillation (Mental health doctor Seven Stratton saw in patients chart that she had A Fib in the past and wanted to get a check up it. Patient was unaware.)    Subjective          Yenni Camacho presents to [facility]  History of Present Illness  Patient here today for further eval of heart issue. She reports that she was not able to get her adderall due to having a hx of afib. She had hospitalization on 4/12/22 for soa. Abnormal echo and EKG at that time. Patient failed to have follow up after this hospitalization      Objective   Vital Signs:   /80 (BP Location: Right arm, Patient Position: Sitting, Cuff Size: Adult)   Pulse 89   Temp 98 °F (36.7 °C)   Resp 16   Ht 170.2 cm (67.01\")   Wt 66 kg (145 lb 9.6 oz)   SpO2 97%   BMI 22.80 kg/m²     Physical Exam  Vitals and nursing note reviewed.   Constitutional:       General: She is not in acute distress.     Appearance: Normal appearance.   HENT:      Head: Normocephalic and atraumatic.      Right Ear: External ear normal.      Left Ear: External ear normal.      Nose: Nose normal.      Mouth/Throat:      Mouth: Mucous membranes are moist.   Eyes:      Conjunctiva/sclera: Conjunctivae normal.   Cardiovascular:      Rate and Rhythm: Normal rate and regular rhythm.      Pulses: Normal pulses.      Heart sounds: Normal heart sounds. No murmur heard.    No friction rub. No gallop.   Pulmonary:      Effort: Pulmonary effort is normal. No respiratory distress.      Breath sounds: No wheezing, rhonchi or rales.   Musculoskeletal:      Cervical back: Neck supple.      Right lower leg: No edema.      Left lower leg: No edema.   Skin:     General: Skin is warm and dry.   Neurological:      General: No focal deficit present.      Mental Status: She is alert and oriented to person, place, and time.   Psychiatric:         Mood and Affect: Mood normal.         Behavior: Behavior normal.    "   Result Review :          Procedures      Assessment and Plan    Diagnoses and all orders for this visit:    1. Paroxysmal SVT (supraventricular tachycardia) (Primary)  -     Ambulatory Referral to Cardiology    2. Nonischemic cardiomyopathy  -     Ambulatory Referral to Cardiology    3. Primary hypertension  -     Ambulatory Referral to Cardiology    Other orders  -     albuterol sulfate  (90 Base) MCG/ACT inhaler; Inhale 2 puffs Every 4 (Four) Hours As Needed for Wheezing.  Dispense: 8 g; Refill: 0  -     metoprolol succinate XL (TOPROL-XL) 25 MG 24 hr tablet; Take 1 tablet by mouth Daily.  Dispense: 90 tablet; Refill: 0    Sending to cardiology for further work-up.  Stimulant medications not recommended at this time given history of SVT.          Follow Up   Return if symptoms worsen or fail to improve.  Patient was given instructions and counseling regarding her condition or for health maintenance advice. Please see specific information pulled into the AVS if appropriate.

## 2023-06-07 ENCOUNTER — TELEPHONE (OUTPATIENT)
Dept: INTERNAL MEDICINE | Facility: CLINIC | Age: 58
End: 2023-06-07
Payer: MEDICAID

## 2023-06-07 DIAGNOSIS — M21.612 BILATERAL BUNIONS: Primary | ICD-10-CM

## 2023-06-07 DIAGNOSIS — M21.611 BILATERAL BUNIONS: Primary | ICD-10-CM

## 2023-06-07 RX ORDER — METOPROLOL SUCCINATE 25 MG/1
TABLET, EXTENDED RELEASE ORAL
Qty: 30 TABLET | Refills: 3 | Status: SHIPPED | OUTPATIENT
Start: 2023-06-07

## 2023-06-07 RX ORDER — CLONIDINE HYDROCHLORIDE 0.3 MG/1
TABLET ORAL
Qty: 60 TABLET | Refills: 1 | Status: SHIPPED | OUTPATIENT
Start: 2023-06-07

## 2023-06-07 NOTE — TELEPHONE ENCOUNTER
Caller: Yenni Camacho    Relationship: Self    Best call back number: [phone]    What is the best time to reach you: ANY     Who are you requesting to speak with (clinical staff, provider,  specific staff member): CLINICAL     What was the call regarding: PATIENT CALLED STATING WHEN SHE WAS SEEN YESTERDAY VIDYA HAD TOLD HER TO SPEAK WITH THE  ABOUT REACHING OUT TO HER PREVIOUS PROVIDER TO OBTAIN PAST MEDICAL RECORDS. THE PROVIDERS NAME IS CONNOR PRUITT AND HIS ADDRESS IS [address] [place] [id number]. PLEASE ADVISE.

## 2023-06-07 NOTE — TELEPHONE ENCOUNTER
Caller: Yenni Camacho    Relationship: Self    Best call back number: [phone]    What is the medical concern/diagnosis: BUNIONS ON BOTH FEET     What specialty or service is being requested: PODIATRY     What is the provider, practice or medical service name: [facility] REC[facility]

## 2023-06-07 NOTE — TELEPHONE ENCOUNTER
Patient was seen by Dr Stepan Alanis MD in [facility] for her bunions and would like to see someone again pended referral.

## 2023-06-11 DIAGNOSIS — F41.1 GENERALIZED ANXIETY DISORDER: ICD-10-CM

## 2023-06-12 RX ORDER — ESCITALOPRAM OXALATE 5 MG/1
5 TABLET ORAL DAILY
Qty: 30 TABLET | Refills: 0 | Status: SHIPPED | OUTPATIENT
Start: 2023-06-12 | End: 2023-06-15

## 2023-06-12 NOTE — TELEPHONE ENCOUNTER
Medication refill request for Escitalopram 5mg.     escitalopram (LEXAPRO) 5 MG tablet (05/15/2023)     Medication appears to have been discontinued by PCP.     Office Visit with Allyssa Pritchett APRN (06/06/2023)     Order refused and pended.

## 2023-06-14 ENCOUNTER — TELEPHONE (OUTPATIENT)
Dept: PSYCHIATRY | Facility: CLINIC | Age: 58
End: 2023-06-14

## 2023-06-14 NOTE — TELEPHONE ENCOUNTER
Patient called and left voicemail requesting that Jaiden Stratton put in an order for a UDS to be sent to [facility].     Please advise

## 2023-06-15 ENCOUNTER — OFFICE VISIT (OUTPATIENT)
Dept: PSYCHIATRY | Facility: CLINIC | Age: 58
End: 2023-06-15
Payer: MEDICAID

## 2023-06-15 VITALS
SYSTOLIC BLOOD PRESSURE: 196 MMHG | HEART RATE: 68 BPM | BODY MASS INDEX: 22.82 KG/M2 | WEIGHT: 145.4 LBS | HEIGHT: 67 IN | DIASTOLIC BLOOD PRESSURE: 90 MMHG

## 2023-06-15 DIAGNOSIS — F90.0 ADHD (ATTENTION DEFICIT HYPERACTIVITY DISORDER), INATTENTIVE TYPE: ICD-10-CM

## 2023-06-15 DIAGNOSIS — F41.1 GENERALIZED ANXIETY DISORDER: Primary | ICD-10-CM

## 2023-06-15 PROBLEM — M47.812 CERVICAL SPONDYLOSIS WITHOUT MYELOPATHY: Status: ACTIVE | Noted: 2023-06-15

## 2023-06-15 PROBLEM — M47.817 LUMBOSACRAL SPONDYLOSIS WITHOUT MYELOPATHY: Status: ACTIVE | Noted: 2023-06-15

## 2023-06-15 RX ORDER — ATOMOXETINE 25 MG/1
25 CAPSULE ORAL DAILY
Qty: 30 CAPSULE | Refills: 0 | Status: SHIPPED | OUTPATIENT
Start: 2023-06-15 | End: 2023-07-15

## 2023-06-15 NOTE — PROGRESS NOTES
Subjective   Yenni Camacho is a 57 y.o. female who presents today for follow up.   This provider is located at [address] [place] [address], [address] in [place], [place]. In-person visit consisting of the patient and I only. The patient is accepting of and agreeable to this appointment.       Chief Complaint:  Anxiety, ADHD    History of Present Illness:     Anxiety: has been high at times  New job at [employer]- training  Denies excessive worries   Irritability   Working on positive coping skills  Sleep disturbance: denies  Low energy: denies  Low motivation/Interest: denies  Appetite change: denies  Medication compliant  Side effects: denies  Refills needed  Denies SI HI AVH    ADHD:   Inattention:   Often fails to give close attention to details or makes careless mistakes in schoolwork, at work, or during other activities-y  Often has difficulty sustaining attention in tasks- y  Often does not seem to listen when spoken to directly- y  Often does not follow through on instructions and fails to finish duties in the workplace- y  Often has difficulty organizing tasks and activities- y  Often avoids, dislikes or is reluctant to engage in tasks that require sustained mental effort- y  Often loses things necessary for tasks or activities- y  Is often easily distracted by extraneous stimuli- y  Is often forgetful in daily activities- y  Hyperactivity and Impulsivity:   Often fidgets with or taps hands or feet-n  Often leaves seat in situations when remaining seated is expected- n  Often feels restless- n  Is often “on the go”, acting as if “driven by a motor”- n  Often talks excessively- n  Often blurts out an answer before a question has been completed- n  Often has difficulty waiting their turn- n  Often interrupts or intrudes on others- n    5/15/23 Anxiety: Onset childhood (Dad  when 4, [employer] school), increased past year (moved to [place] )  Anxious, nervous or worried on most days about a number of events  or activities- endorses  No control over worries- endorses- working on positive coping skills  Irritability- denies  Fatigue: endorses  Difficulty concentrating- endorses  Sleep disturbance- endorses  Restlessness- endorses  Tension in muscles- denies    ADHD:   Inattention:   Often fails to give close attention to details or makes careless mistakes in schoolwork, at work, or during other activities- y  Often has difficulty sustaining attention in tasks- y  Often does not seem to listen when spoken to directly- y  Often does not follow through on instructions and fails to finish duties in the workplace- y  Often has difficulty organizing tasks and activities- y  Often avoids, dislikes or is reluctant to engage in tasks that require sustained mental effort- y  Often loses things necessary for tasks or activities- y  Is often easily distracted by extraneous stimuli- y  Is often forgetful in daily activities- y  Hyperactivity and Impulsivity:   Often fidgets with or taps hands or feet-y  Often leaves seat in situations when remaining seated is expected- y  Often feels restless- y  Is often “on the go”, acting as if “driven by a motor”- y  Often talks excessively- y  Often blurts out an answer before a question has been completed- y  Often has difficulty waiting their turn- y  Often interrupts or intrudes on others- y        Psychiatric Review of Systems: Patient denies any current or previous hallucinations/delusions, paranoia, manic symptoms or PTSD.     PHQ-9 Depression Screening  PHQ-9 Total Score:      Little interest or pleasure in doing things?     Feeling down, depressed, or hopeless?     Trouble falling or staying asleep, or sleeping too much?     Feeling tired or having little energy?     Poor appetite or overeating?     Feeling bad about yourself - or that you are a failure or have let yourself or your family down?     Trouble concentrating on things, such as reading the newspaper or watching television?      Moving or speaking so slowly that other people could have noticed? Or the opposite - being so fidgety or restless that you have been moving around a lot more than usual?     Thoughts that you would be better off dead, or of hurting yourself in some way?     PHQ-9 Total Score       BEST-7         Past Surgical History:  Past Surgical History:   Procedure Laterality Date    ARM WOUND REPAIR / CLOSURE Right     COLONOSCOPY      FRACTURE SURGERY         Problem List:  Patient Active Problem List   Diagnosis    Respiratory failure    Severe malnutrition    Chronic systolic heart failure    Acute respiratory failure with hypoxia and hypercapnia    COPD with acute exacerbation    Ventricular tachycardia (paroxysmal)    NICM (nonischemic cardiomyopathy)    Uncontrolled hypertension    Cervical spondylosis without myelopathy    Lumbosacral spondylosis without myelopathy       Allergy:   Allergies   Allergen Reactions    Lexapro [Escitalopram] Mental Status Change     Worsened depression     Paxlovid [Nirmatrelvir-Ritonavir] Unknown - Low Severity    Wellbutrin [Bupropion] Unknown - Low Severity        Discontinued Medications:  Medications Discontinued During This Encounter   Medication Reason    escitalopram (LEXAPRO) 5 MG tablet Historical Med - Therapy completed         Current Medications:   Current Outpatient Medications   Medication Sig Dispense Refill    albuterol sulfate  (90 Base) MCG/ACT inhaler Inhale 2 puffs Every 4 (Four) Hours As Needed for Wheezing. 8 g 0    amLODIPine (NORVASC) 5 MG tablet Take 1 tablet by mouth Daily. 30 tablet 1    cloNIDine (CATAPRES) 0.3 MG tablet TAKE ONE TABLET BY MOUTH TWICE A DAY 60 tablet 1    HYDROcodone-acetaminophen (NORCO) 7.5-325 MG per tablet hydrocodone 7.5 mg-acetaminophen 325 mg tablet   TAKE 1 TABLET BY MOUTH EVERY 12 HOURS AS NEEDED      metoprolol succinate XL (TOPROL-XL) 25 MG 24 hr tablet TAKE ONE TABLET BY MOUTH DAILY 30 tablet 3    naloxone (NARCAN) 4 MG/0.1ML  nasal spray naloxone 4 mg/actuation nasal spray   Take 1 spray by nasal route as directed.      atomoxetine (Strattera) 25 MG capsule Take 1 capsule by mouth Daily for 30 days. 30 capsule 0     No current facility-administered medications for this visit.       Past Medical History:  Past Medical History:   Diagnosis Date    ADHD     Anxiety     Arthritis     Chronic pain disorder     Hypertension     PTSD (post-traumatic stress disorder)        Past Psychiatric History:  Began Treatment:   Diagnoses: Anxiety, ADHD  Psychiatrist: Denies  Therapist: Denies  Admission History: Denies  Medication Trials: Adderall, ritalin, xanax, buspar, paxil, prozac, wellbutrin, escitalopram  Self Harm: Denies  Suicide Attempts: Denies    Substance Abuse History:   Types: Denies  Withdrawal Symptoms: Not applicable  Longest Period Sober: Not applicable  AA: Not applicable    Social History:  Martial Status:  x3  Employed: Not currently  Kids: One living  House: Self   History: Denies    Social History     Socioeconomic History    Marital status:    Tobacco Use    Smoking status: Former     Packs/day: 2.00     Types: Cigarettes     Start date: 1978     Quit date: 2018     Years since quittin.1    Smokeless tobacco: Never   Substance and Sexual Activity    Alcohol use: Not Currently    Drug use: Not Currently     Frequency: 7.0 times per week     Types: Hydrocodone, Marijuana     Comment: Pain meds for back. MM in [facility] w pain too    Sexual activity: Not Currently     Partners: Male       Family History:   Suicide Attempts: Denies  Suicide Completions: Denies      Family History   Problem Relation Age of Onset    ADD / ADHD Mother     Anxiety disorder Mother        Developmental History:   Born: [place]  Siblings: None living  Childhood: Denies  High School: Graduate  College: Denies    Access to Firearms: Denies    Mental Status Exam:     Appearance: good eye contact, normal street clothes,  "groomed, sitting in chair   Behavior: pleasant and cooperative  Motor: no abnormal  Speech: normal rhythm, rate, volume, tone, not hyperverbal, not pressured, normal prosidy  Mood: \" Okay \"  Affect: euthymic  Thought Content: negative suicidal ideations, negative homicidal ideations, negative obsessions  Perceptions: negative auditory hallucinations, negative visual hallucinations, negative delusions, negative paranoia  Thought Process: goal directed, linear  Insight/Judgement: fair/fair  Cognition: grossly intact  Attention: intact  Orientation: person, place, time and situation  Memory: intact    Review of Systems:     Constitutional: Denies fatigue, night sweats  Eyes: Denies double vision, blurred vision  HENT: Denies vertigo, recent head injury  Cardiovascular: Denies chest pain, irregular heartbeats  Respiratory: Denies productive cough, shortness of breath  Gastrointestinal: Denies nausea, vomiting  Genitourinary: Denies dysuria, urinary retention  Integument: Denies hair growth change, new skin lesions  Neurologic: Denies altered mental status, seizures  Musculoskeletal: Denies joint swelling, limitation of motion  Endocrine: Denies cold intolerance, heat intolerance  Psychiatric: Admits anxiety, inattnetion. Denies depression, ting, post-traumatic stress disorder, obsessive compulsive disorder, psychosis.   Allergic-immunologic: Denies frequent illnesses      Vital Signs:   BP (!) 196/90   Pulse 68   Ht 170.2 cm (67\")   Wt 66 kg (145 lb 6.4 oz)   BMI 22.77 kg/m²      Lab Results:   Office Visit on 03/15/2023   Component Date Value Ref Range Status    Glucose 03/15/2023 102 (H)  65 - 99 mg/dL Final    BUN 03/15/2023 12  6 - 20 mg/dL Final    Creatinine 03/15/2023 0.63  0.57 - 1.00 mg/dL Final    Sodium 03/15/2023 147 (H)  136 - 145 mmol/L Final    Potassium 03/15/2023 3.9  3.5 - 5.2 mmol/L Final    Chloride 03/15/2023 103  98 - 107 mmol/L Final    CO2 03/15/2023 32.0 (H)  22.0 - 29.0 mmol/L Final    " Calcium 03/15/2023 9.8  8.6 - 10.5 mg/dL Final    Total Protein 03/15/2023 7.3  6.0 - 8.5 g/dL Final    Albumin 03/15/2023 4.6  3.5 - 5.2 g/dL Final    ALT (SGPT) 03/15/2023 19  1 - 33 U/L Final    AST (SGOT) 03/15/2023 19  1 - 32 U/L Final    Alkaline Phosphatase 03/15/2023 136 (H)  39 - 117 U/L Final    Total Bilirubin 03/15/2023 0.2  0.0 - 1.2 mg/dL Final    Globulin 03/15/2023 2.7  gm/dL Final    A/G Ratio 03/15/2023 1.7  g/dL Final    BUN/Creatinine Ratio 03/15/2023 19.0  7.0 - 25.0 Final    Anion Gap 03/15/2023 12.0  5.0 - 15.0 mmol/L Final    eGFR 03/15/2023 103.0  >60.0 mL/min/1.73 Final    TSH 03/15/2023 0.667  0.270 - 4.200 uIU/mL Final    Total Cholesterol 03/15/2023 250 (H)  0 - 200 mg/dL Final    Triglycerides 03/15/2023 79  0 - 150 mg/dL Final    HDL Cholesterol 03/15/2023 69 (H)  40 - 60 mg/dL Final    LDL Cholesterol  03/15/2023 168 (H)  0 - 100 mg/dL Final    VLDL Cholesterol 03/15/2023 13  5 - 40 mg/dL Final    LDL/HDL Ratio 03/15/2023 2.39   Final    WBC 03/15/2023 4.95  3.40 - 10.80 10*3/mm3 Final    RBC 03/15/2023 4.93  3.77 - 5.28 10*6/mm3 Final    Hemoglobin 03/15/2023 14.4  12.0 - 15.9 g/dL Final    Hematocrit 03/15/2023 42.4  34.0 - 46.6 % Final    MCV 03/15/2023 86.0  79.0 - 97.0 fL Final    MCH 03/15/2023 29.2  26.6 - 33.0 pg Final    MCHC 03/15/2023 34.0  31.5 - 35.7 g/dL Final    RDW 03/15/2023 12.6  12.3 - 15.4 % Final    RDW-SD 03/15/2023 39.7  37.0 - 54.0 fl Final    MPV 03/15/2023 9.9  6.0 - 12.0 fL Final    Platelets 03/15/2023 354  140 - 450 10*3/mm3 Final    Neutrophil % 03/15/2023 59.0  42.7 - 76.0 % Final    Lymphocyte % 03/15/2023 20.6  19.6 - 45.3 % Final    Monocyte % 03/15/2023 10.7  5.0 - 12.0 % Final    Eosinophil % 03/15/2023 9.1 (H)  0.3 - 6.2 % Final    Basophil % 03/15/2023 0.2  0.0 - 1.5 % Final    Immature Grans % 03/15/2023 0.4  0.0 - 0.5 % Final    Neutrophils, Absolute 03/15/2023 2.92  1.70 - 7.00 10*3/mm3 Final    Lymphocytes, Absolute 03/15/2023 1.02  0.70 -  3.10 10*3/mm3 Final    Monocytes, Absolute 03/15/2023 0.53  0.10 - 0.90 10*3/mm3 Final    Eosinophils, Absolute 03/15/2023 0.45 (H)  0.00 - 0.40 10*3/mm3 Final    Basophils, Absolute 03/15/2023 0.01  0.00 - 0.20 10*3/mm3 Final    Immature Grans, Absolute 03/15/2023 0.02  0.00 - 0.05 10*3/mm3 Final    nRBC 03/15/2023 0.0  0.0 - 0.2 /100 WBC Final    Glucose 04/24/2023 101 (H)  65 - 99 mg/dL Final    BUN 04/24/2023 17  6 - 20 mg/dL Final    Creatinine 04/24/2023 0.61  0.57 - 1.00 mg/dL Final    Sodium 04/24/2023 142  136 - 145 mmol/L Final    Potassium 04/24/2023 4.3  3.5 - 5.2 mmol/L Final    Chloride 04/24/2023 102  98 - 107 mmol/L Final    CO2 04/24/2023 31.6 (H)  22.0 - 29.0 mmol/L Final    Calcium 04/24/2023 9.7  8.6 - 10.5 mg/dL Final    BUN/Creatinine Ratio 04/24/2023 27.9 (H)  7.0 - 25.0 Final    Anion Gap 04/24/2023 8.4  5.0 - 15.0 mmol/L Final    eGFR 04/24/2023 103.8  >60.0 mL/min/1.73 Final   Admission on 03/07/2023, Discharged on 03/07/2023   Component Date Value Ref Range Status    Glucose 03/07/2023 135 (H)  65 - 99 mg/dL Final    BUN 03/07/2023 11  6 - 20 mg/dL Final    Creatinine 03/07/2023 0.63  0.57 - 1.00 mg/dL Final    Sodium 03/07/2023 140  136 - 145 mmol/L Final    Potassium 03/07/2023 3.7  3.5 - 5.2 mmol/L Final    Chloride 03/07/2023 101  98 - 107 mmol/L Final    CO2 03/07/2023 29.1 (H)  22.0 - 29.0 mmol/L Final    Calcium 03/07/2023 9.7  8.6 - 10.5 mg/dL Final    Total Protein 03/07/2023 7.4  6.0 - 8.5 g/dL Final    Albumin 03/07/2023 4.8  3.5 - 5.2 g/dL Final    ALT (SGPT) 03/07/2023 34 (H)  1 - 33 U/L Final    AST (SGOT) 03/07/2023 24  1 - 32 U/L Final    Alkaline Phosphatase 03/07/2023 141 (H)  39 - 117 U/L Final    Total Bilirubin 03/07/2023 0.2  0.0 - 1.2 mg/dL Final    Globulin 03/07/2023 2.6  gm/dL Final    A/G Ratio 03/07/2023 1.8  g/dL Final    BUN/Creatinine Ratio 03/07/2023 17.5  7.0 - 25.0 Final    Anion Gap 03/07/2023 9.9  5.0 - 15.0 mmol/L Final    eGFR 03/07/2023 103.0  >60.0  mL/min/1.73 Final    WBC 03/07/2023 5.71  3.40 - 10.80 10*3/mm3 Final    RBC 03/07/2023 5.21  3.77 - 5.28 10*6/mm3 Final    Hemoglobin 03/07/2023 15.3  12.0 - 15.9 g/dL Final    Hematocrit 03/07/2023 46.0  34.0 - 46.6 % Final    MCV 03/07/2023 88.3  79.0 - 97.0 fL Final    MCH 03/07/2023 29.4  26.6 - 33.0 pg Final    MCHC 03/07/2023 33.3  31.5 - 35.7 g/dL Final    RDW 03/07/2023 13.3  12.3 - 15.4 % Final    RDW-SD 03/07/2023 43.1  37.0 - 54.0 fl Final    MPV 03/07/2023 8.6  6.0 - 12.0 fL Final    Platelets 03/07/2023 353  140 - 450 10*3/mm3 Final    Neutrophil % 03/07/2023 60.3  42.7 - 76.0 % Final    Lymphocyte % 03/07/2023 22.1  19.6 - 45.3 % Final    Monocyte % 03/07/2023 8.8  5.0 - 12.0 % Final    Eosinophil % 03/07/2023 7.9 (H)  0.3 - 6.2 % Final    Basophil % 03/07/2023 0.4  0.0 - 1.5 % Final    Immature Grans % 03/07/2023 0.5  0.0 - 0.5 % Final    Neutrophils, Absolute 03/07/2023 3.45  1.70 - 7.00 10*3/mm3 Final    Lymphocytes, Absolute 03/07/2023 1.26  0.70 - 3.10 10*3/mm3 Final    Monocytes, Absolute 03/07/2023 0.50  0.10 - 0.90 10*3/mm3 Final    Eosinophils, Absolute 03/07/2023 0.45 (H)  0.00 - 0.40 10*3/mm3 Final    Basophils, Absolute 03/07/2023 0.02  0.00 - 0.20 10*3/mm3 Final    Immature Grans, Absolute 03/07/2023 0.03  0.00 - 0.05 10*3/mm3 Final    nRBC 03/07/2023 0.0  0.0 - 0.2 /100 WBC Final       EKG Results:  No orders to display       Imaging Results:  No Images in the past 120 days found..      Assessment & Plan   Diagnoses and all orders for this visit:    1. Generalized anxiety disorder (Primary)    2. ADHD (attention deficit hyperactivity disorder), inattentive type  -     atomoxetine (Strattera) 25 MG capsule; Take 1 capsule by mouth Daily for 30 days.  Dispense: 30 capsule; Refill: 0        Stop escitalopram. Start atomoxetine to target ADHD. Psychotherapy for anxiety. 16 minutes of supportive psychotherapy with goal to strengthen defenses, promote problems solving, restore adaptive  functioning and provide symptom relief. The therapeutic alliance was strengthened to encourage the patient to express their thoughts and feelings. Esteem building was enhanced through praise, reassurance, normalizing and encouragement. Coping skills were enhanced to build distress tolerance skills and emotional regulation. Allowed patient to freely discuss issues without interruption or judgement with unconditional positive regard, active listening skills, and empathy. Provided a safe, confidential environment to facilitate the development of a positive therapeutic relationship and encourage open, honest communication. Assisted patient in identifying risk factors which would indicate the need for higher level of care including thoughts to harm self or others and/or self-harming behavior and encouraged patient to contact this office, call 911, or present to the nearest emergency room should any of these events occur. Assisted patient in processing session content; acknowledged and normalized patient's thoughts, feelings, and concerns by utilizing a person-centered approach in efforts to build appropriate rapport and a positive therapeutic relationship with open and honest communication. Patient given education on medication side effects, diagnosis/illness and relapse symptoms. Plan to continue supportive psychotherapy in next appointment to provide symptom relief. 4 weeks    Diagnoses: as above  Symptoms: as above  Functional status: good  Mental Status Exam: as above     Treatment plan: medication management and supportive psychotherapy  Prognosis: good  Progress: anxiety s/sx    Visit Diagnoses:    ICD-10-CM ICD-9-CM   1. Generalized anxiety disorder  F41.1 300.02   2. ADHD (attention deficit hyperactivity disorder), inattentive type  F90.0 314.00         PLAN:  Safety: No acute safety concerns.   Therapy: Declines  Risk Assessment: Risk of self-harm acutely is moderate.  Risk factors include anxiety disorder, mood  disorder, and recent psychosocial stressors (pandemic). Protective factors include no family history, denies access to guns/weapons, no present SI, no history of suicide attempts or self-harm in the past, minimal AODA, healthcare seeking, future orientation, willingness to engage in care.  Risk of self-harm chronically is also moderate, but could be further elevated in the event of treatment noncompliance and/or AODA.  Medications: stop escitalopram. Start atomoxetine 25mg po qday to target ADHD. Risks, benefits, alternatives discussed with patient including nausea, GI upset, sedation, exacerbation of irritability, dry mouth, constipation, urinary hesitancy. After discussion of these risks and benefits, the patient voiced understanding and agreed to proceed.  Labs/studies: UDS  Follow-up: 4 weeks    Patient screened positive for depression based on a PHQ-9 score of 9 on 5/15/2023. Follow-up recommendations include: Suicide Risk Assessment performed.         TREATMENT PLAN/GOALS: Continue supportive psychotherapy efforts and medications as indicated. Treatment and medication options discussed during today's visit. Patient ackowledged and verbally consented to continue with current treatment plan and was educated on the importance of compliance with treatment and follow-up appointments.      MEDICATION ISSUES:  NATE reviewed as expected.  Discussed medication options and treatment plan of prescribed medication as well as the risks, benefits, and side effects including potential falls, possible impaired driving and metabolic adversities among others. Patient is agreeable to call the office with any worsening of symptoms or onset of side effects. Patient is agreeable to call 911 or go to the nearest ER should he/she begin having SI/HI. No medication side effects or related complaints today.     MEDS ORDERED DURING VISIT:  New Medications Ordered This Visit   Medications    atomoxetine (Strattera) 25 MG capsule      Sig: Take 1 capsule by mouth Daily for 30 days.     Dispense:  30 capsule     Refill:  0       Return in about 4 weeks (around 7/13/2023) for Next scheduled follow up.         This document has been electronically signed by LETITIA Chery  Adilia 15, 2023 10:28 EDT      Part of this note may be an electronic transcription/translation of spoken language to printed text using the Dragon Dictation System.

## 2023-08-01 ENCOUNTER — TELEPHONE (OUTPATIENT)
Dept: PODIATRY | Facility: CLINIC | Age: 58
End: 2023-08-01
Payer: MEDICAID

## 2023-08-10 RX ORDER — CLONIDINE HYDROCHLORIDE 0.3 MG/1
TABLET ORAL
Qty: 60 TABLET | Refills: 1 | Status: SHIPPED | OUTPATIENT
Start: 2023-08-10

## 2023-09-06 RX ORDER — METOPROLOL SUCCINATE 25 MG/1
TABLET, EXTENDED RELEASE ORAL
Qty: 90 TABLET | Refills: 0 | Status: SHIPPED | OUTPATIENT
Start: 2023-09-06

## 2023-09-28 RX ORDER — ATORVASTATIN CALCIUM 10 MG/1
10 TABLET, FILM COATED ORAL DAILY
Qty: 90 TABLET | Refills: 0 | Status: SHIPPED | OUTPATIENT
Start: 2023-09-28 | End: 2023-10-02

## 2023-10-02 RX ORDER — ATORVASTATIN CALCIUM 10 MG/1
10 TABLET, FILM COATED ORAL DAILY
Qty: 90 TABLET | Refills: 0 | Status: SHIPPED | OUTPATIENT
Start: 2023-10-02

## 2023-11-08 RX ORDER — CLONIDINE HYDROCHLORIDE 0.3 MG/1
TABLET ORAL
Qty: 60 TABLET | Refills: 1 | Status: SHIPPED | OUTPATIENT
Start: 2023-11-08

## 2023-11-30 RX ORDER — METOPROLOL SUCCINATE 25 MG/1
TABLET, EXTENDED RELEASE ORAL
Qty: 90 TABLET | Refills: 0 | Status: SHIPPED | OUTPATIENT
Start: 2023-11-30

## 2023-12-28 ENCOUNTER — HOSPITAL ENCOUNTER (INPATIENT)
Facility: HOSPITAL | Age: 58
LOS: 3 days | Discharge: HOME | End: 2024-01-01
Attending: EMERGENCY MEDICINE | Admitting: STUDENT IN AN ORGANIZED HEALTH CARE EDUCATION/TRAINING PROGRAM
Payer: MEDICAID

## 2023-12-28 ENCOUNTER — APPOINTMENT (OUTPATIENT)
Dept: GENERAL RADIOLOGY | Facility: HOSPITAL | Age: 58
End: 2023-12-28
Payer: MEDICAID

## 2023-12-28 DIAGNOSIS — J44.1 COPD EXACERBATION: ICD-10-CM

## 2023-12-28 DIAGNOSIS — R06.2 WHEEZING: ICD-10-CM

## 2023-12-28 DIAGNOSIS — J96.01 ACUTE RESPIRATORY FAILURE WITH HYPOXIA: Primary | ICD-10-CM

## 2023-12-28 LAB
ALBUMIN SERPL-MCNC: 4.1 G/DL (ref 3.5–5.2)
ALBUMIN/GLOB SERPL: 1.2 G/DL
ALP SERPL-CCNC: 116 U/L (ref 39–117)
ALT SERPL W P-5'-P-CCNC: 11 U/L (ref 1–33)
ANION GAP SERPL CALCULATED.3IONS-SCNC: 12 MMOL/L (ref 5–15)
AST SERPL-CCNC: 16 U/L (ref 1–32)
BASOPHILS # BLD AUTO: 0.01 10*3/MM3 (ref 0–0.2)
BASOPHILS NFR BLD AUTO: 0.2 % (ref 0–1.5)
BILIRUB SERPL-MCNC: 0.2 MG/DL (ref 0–1.2)
BUN SERPL-MCNC: 12 MG/DL (ref 6–20)
BUN/CREAT SERPL: 15.6 (ref 7–25)
CALCIUM SPEC-SCNC: 9.6 MG/DL (ref 8.6–10.5)
CHLORIDE SERPL-SCNC: 101 MMOL/L (ref 98–107)
CHOLEST SERPL-MCNC: 211 MG/DL (ref 0–200)
CO2 SERPL-SCNC: 28 MMOL/L (ref 22–29)
CREAT SERPL-MCNC: 0.77 MG/DL (ref 0.57–1)
DEPRECATED RDW RBC AUTO: 43.3 FL (ref 37–54)
EGFRCR SERPLBLD CKD-EPI 2021: 89 ML/MIN/1.73
EOSINOPHIL # BLD AUTO: 0.13 10*3/MM3 (ref 0–0.4)
EOSINOPHIL NFR BLD AUTO: 2.3 % (ref 0.3–6.2)
ERYTHROCYTE [DISTWIDTH] IN BLOOD BY AUTOMATED COUNT: 13.5 % (ref 12.3–15.4)
FLUAV SUBTYP SPEC NAA+PROBE: NOT DETECTED
FLUBV RNA ISLT QL NAA+PROBE: NOT DETECTED
GLOBULIN UR ELPH-MCNC: 3.5 GM/DL
GLUCOSE SERPL-MCNC: 101 MG/DL (ref 65–99)
HCT VFR BLD AUTO: 45.7 % (ref 34–46.6)
HDLC SERPL-MCNC: 57 MG/DL (ref 40–60)
HGB BLD-MCNC: 14.6 G/DL (ref 12–15.9)
HOLD SPECIMEN: NORMAL
HOLD SPECIMEN: NORMAL
IMM GRANULOCYTES # BLD AUTO: 0.02 10*3/MM3 (ref 0–0.05)
IMM GRANULOCYTES NFR BLD AUTO: 0.4 % (ref 0–0.5)
LDLC SERPL CALC-MCNC: 116 MG/DL (ref 0–100)
LDLC/HDLC SERPL: 1.93 {RATIO}
LYMPHOCYTES # BLD AUTO: 1 10*3/MM3 (ref 0.7–3.1)
LYMPHOCYTES NFR BLD AUTO: 17.9 % (ref 19.6–45.3)
MCH RBC QN AUTO: 27.9 PG (ref 26.6–33)
MCHC RBC AUTO-ENTMCNC: 31.9 G/DL (ref 31.5–35.7)
MCV RBC AUTO: 87.2 FL (ref 79–97)
MONOCYTES # BLD AUTO: 0.62 10*3/MM3 (ref 0.1–0.9)
MONOCYTES NFR BLD AUTO: 11.1 % (ref 5–12)
NEUTROPHILS NFR BLD AUTO: 3.8 10*3/MM3 (ref 1.7–7)
NEUTROPHILS NFR BLD AUTO: 68.1 % (ref 42.7–76)
NRBC BLD AUTO-RTO: 0 /100 WBC (ref 0–0.2)
NT-PROBNP SERPL-MCNC: 369.6 PG/ML (ref 0–900)
PLATELET # BLD AUTO: 313 10*3/MM3 (ref 140–450)
PMV BLD AUTO: 9 FL (ref 6–12)
POTASSIUM SERPL-SCNC: 3.7 MMOL/L (ref 3.5–5.2)
PROT SERPL-MCNC: 7.6 G/DL (ref 6–8.5)
RBC # BLD AUTO: 5.24 10*6/MM3 (ref 3.77–5.28)
RSV RNA NPH QL NAA+NON-PROBE: NOT DETECTED
SARS-COV-2 RNA RESP QL NAA+PROBE: NOT DETECTED
SODIUM SERPL-SCNC: 141 MMOL/L (ref 136–145)
TRIGL SERPL-MCNC: 219 MG/DL (ref 0–150)
TROPONIN T SERPL HS-MCNC: 8 NG/L
VLDLC SERPL-MCNC: 38 MG/DL (ref 5–40)
WBC NRBC COR # BLD AUTO: 5.58 10*3/MM3 (ref 3.4–10.8)
WHOLE BLOOD HOLD COAG: NORMAL
WHOLE BLOOD HOLD SPECIMEN: NORMAL

## 2023-12-28 PROCEDURE — 80053 COMPREHEN METABOLIC PANEL: CPT | Performed by: EMERGENCY MEDICINE

## 2023-12-28 PROCEDURE — 94640 AIRWAY INHALATION TREATMENT: CPT

## 2023-12-28 PROCEDURE — 86738 MYCOPLASMA ANTIBODY: CPT | Performed by: FAMILY MEDICINE

## 2023-12-28 PROCEDURE — 83050 HGB METHEMOGLOBIN QUAN: CPT | Performed by: EMERGENCY MEDICINE

## 2023-12-28 PROCEDURE — 93010 ELECTROCARDIOGRAM REPORT: CPT | Performed by: INTERNAL MEDICINE

## 2023-12-28 PROCEDURE — 84484 ASSAY OF TROPONIN QUANT: CPT | Performed by: EMERGENCY MEDICINE

## 2023-12-28 PROCEDURE — 85025 COMPLETE CBC W/AUTO DIFF WBC: CPT

## 2023-12-28 PROCEDURE — 83880 ASSAY OF NATRIURETIC PEPTIDE: CPT | Performed by: EMERGENCY MEDICINE

## 2023-12-28 PROCEDURE — 94799 UNLISTED PULMONARY SVC/PX: CPT

## 2023-12-28 PROCEDURE — 25010000002 METHYLPREDNISOLONE PER 125 MG: Performed by: EMERGENCY MEDICINE

## 2023-12-28 PROCEDURE — 99285 EMERGENCY DEPT VISIT HI MDM: CPT

## 2023-12-28 PROCEDURE — 36415 COLL VENOUS BLD VENIPUNCTURE: CPT

## 2023-12-28 PROCEDURE — 25010000002 ONDANSETRON PER 1 MG: Performed by: EMERGENCY MEDICINE

## 2023-12-28 PROCEDURE — 93005 ELECTROCARDIOGRAM TRACING: CPT

## 2023-12-28 PROCEDURE — 25010000002 HYDRALAZINE PER 20 MG: Performed by: EMERGENCY MEDICINE

## 2023-12-28 PROCEDURE — 36600 WITHDRAWAL OF ARTERIAL BLOOD: CPT | Performed by: EMERGENCY MEDICINE

## 2023-12-28 PROCEDURE — 87637 SARSCOV2&INF A&B&RSV AMP PRB: CPT | Performed by: EMERGENCY MEDICINE

## 2023-12-28 PROCEDURE — 82805 BLOOD GASES W/O2 SATURATION: CPT | Performed by: EMERGENCY MEDICINE

## 2023-12-28 PROCEDURE — 93005 ELECTROCARDIOGRAM TRACING: CPT | Performed by: EMERGENCY MEDICINE

## 2023-12-28 PROCEDURE — 94760 N-INVAS EAR/PLS OXIMETRY 1: CPT

## 2023-12-28 PROCEDURE — 71045 X-RAY EXAM CHEST 1 VIEW: CPT

## 2023-12-28 PROCEDURE — 82375 ASSAY CARBOXYHB QUANT: CPT | Performed by: EMERGENCY MEDICINE

## 2023-12-28 RX ORDER — METHYLPREDNISOLONE SODIUM SUCCINATE 125 MG/2ML
125 INJECTION, POWDER, LYOPHILIZED, FOR SOLUTION INTRAMUSCULAR; INTRAVENOUS ONCE
Status: COMPLETED | OUTPATIENT
Start: 2023-12-28 | End: 2023-12-28

## 2023-12-28 RX ORDER — SODIUM CHLORIDE 0.9 % (FLUSH) 0.9 %
10 SYRINGE (ML) INJECTION AS NEEDED
Status: DISCONTINUED | OUTPATIENT
Start: 2023-12-28 | End: 2024-01-01 | Stop reason: HOSPADM

## 2023-12-28 RX ORDER — IPRATROPIUM BROMIDE AND ALBUTEROL SULFATE 2.5; .5 MG/3ML; MG/3ML
3 SOLUTION RESPIRATORY (INHALATION) ONCE
Status: COMPLETED | OUTPATIENT
Start: 2023-12-28 | End: 2023-12-28

## 2023-12-28 RX ORDER — IPRATROPIUM BROMIDE AND ALBUTEROL SULFATE 2.5; .5 MG/3ML; MG/3ML
6 SOLUTION RESPIRATORY (INHALATION) ONCE
Status: COMPLETED | OUTPATIENT
Start: 2023-12-28 | End: 2023-12-28

## 2023-12-28 RX ORDER — ALBUTEROL SULFATE 2.5 MG/3ML
5 SOLUTION RESPIRATORY (INHALATION) ONCE
Status: COMPLETED | OUTPATIENT
Start: 2023-12-29 | End: 2023-12-28

## 2023-12-28 RX ORDER — HYDRALAZINE HYDROCHLORIDE 20 MG/ML
20 INJECTION INTRAMUSCULAR; INTRAVENOUS ONCE
Status: COMPLETED | OUTPATIENT
Start: 2023-12-28 | End: 2023-12-28

## 2023-12-28 RX ORDER — ONDANSETRON 2 MG/ML
4 INJECTION INTRAMUSCULAR; INTRAVENOUS ONCE
Status: COMPLETED | OUTPATIENT
Start: 2023-12-28 | End: 2023-12-28

## 2023-12-28 RX ADMIN — ONDANSETRON 4 MG: 2 INJECTION INTRAMUSCULAR; INTRAVENOUS at 22:59

## 2023-12-28 RX ADMIN — ALBUTEROL SULFATE 5 MG: 2.5 SOLUTION RESPIRATORY (INHALATION) at 23:55

## 2023-12-28 RX ADMIN — METHYLPREDNISOLONE SODIUM SUCCINATE 125 MG: 125 INJECTION INTRAMUSCULAR; INTRAVENOUS at 22:08

## 2023-12-28 RX ADMIN — HYDRALAZINE HYDROCHLORIDE 20 MG: 20 INJECTION, SOLUTION INTRAMUSCULAR; INTRAVENOUS at 22:08

## 2023-12-28 RX ADMIN — IPRATROPIUM BROMIDE AND ALBUTEROL SULFATE 3 ML: .5; 3 SOLUTION RESPIRATORY (INHALATION) at 21:33

## 2023-12-28 RX ADMIN — IPRATROPIUM BROMIDE AND ALBUTEROL SULFATE 6 ML: .5; 3 SOLUTION RESPIRATORY (INHALATION) at 21:36

## 2023-12-28 RX ADMIN — Medication 10 ML: at 23:00

## 2023-12-28 NOTE — LETTER
January 1, 2024     Patient: Yenni Camacho   YOB: 1965   Date of Visit: 12/28/2023    Yenni Camacho as admitted to [facility] on 12/28/23. She is discharge 1/124. Per MD, she may return to work Wednesday January 3, 2024.            Sincerely,    Jana Hurtado RN    No name on file    CC:   No Recipients

## 2023-12-29 ENCOUNTER — APPOINTMENT (OUTPATIENT)
Dept: CT IMAGING | Facility: HOSPITAL | Age: 58
End: 2023-12-29
Payer: MEDICAID

## 2023-12-29 PROBLEM — R09.02 HYPOXIA: Status: ACTIVE | Noted: 2023-12-29

## 2023-12-29 LAB
ANION GAP SERPL CALCULATED.3IONS-SCNC: 14.7 MMOL/L (ref 5–15)
ARTERIAL PATENCY WRIST A: POSITIVE
BASE EXCESS BLDA CALC-SCNC: 0.3 MMOL/L (ref -2–2)
BASOPHILS # BLD AUTO: 0 10*3/MM3 (ref 0–0.2)
BASOPHILS NFR BLD AUTO: 0 % (ref 0–1.5)
BDY SITE: ABNORMAL
BUN SERPL-MCNC: 17 MG/DL (ref 6–20)
BUN/CREAT SERPL: 18.3 (ref 7–25)
CALCIUM SPEC-SCNC: 9.6 MG/DL (ref 8.6–10.5)
CHLORIDE SERPL-SCNC: 101 MMOL/L (ref 98–107)
CO2 SERPL-SCNC: 23.3 MMOL/L (ref 22–29)
COHGB MFR BLD: 0.5 % (ref 0–1.5)
CREAT SERPL-MCNC: 0.93 MG/DL (ref 0.57–1)
DEPRECATED RDW RBC AUTO: 44.7 FL (ref 37–54)
EGFRCR SERPLBLD CKD-EPI 2021: 70.9 ML/MIN/1.73
EOSINOPHIL # BLD AUTO: 0 10*3/MM3 (ref 0–0.4)
EOSINOPHIL NFR BLD AUTO: 0 % (ref 0.3–6.2)
ERYTHROCYTE [DISTWIDTH] IN BLOOD BY AUTOMATED COUNT: 13.8 % (ref 12.3–15.4)
FHHB: 15.8 % (ref 0–5)
GAS FLOW AIRWAY: 0 LPM
GLUCOSE BLDC GLUCOMTR-MCNC: 120 MG/DL (ref 70–99)
GLUCOSE BLDC GLUCOMTR-MCNC: 124 MG/DL (ref 70–99)
GLUCOSE BLDC GLUCOMTR-MCNC: 156 MG/DL (ref 70–99)
GLUCOSE BLDC GLUCOMTR-MCNC: 255 MG/DL (ref 70–99)
GLUCOSE SERPL-MCNC: 348 MG/DL (ref 65–99)
HBA1C MFR BLD: 6.5 % (ref 4.8–5.6)
HCO3 BLDA-SCNC: 25.7 MMOL/L (ref 22–26)
HCT VFR BLD AUTO: 41.2 % (ref 34–46.6)
HGB BLD-MCNC: 13.3 G/DL (ref 12–15.9)
HGB BLDA-MCNC: 14.7 G/DL (ref 11.7–14.6)
IMM GRANULOCYTES # BLD AUTO: 0.02 10*3/MM3 (ref 0–0.05)
IMM GRANULOCYTES NFR BLD AUTO: 0.3 % (ref 0–0.5)
INHALED O2 CONCENTRATION: 21 %
LACTATE BLDA-SCNC: ABNORMAL MMOL/L
LYMPHOCYTES # BLD AUTO: 0.28 10*3/MM3 (ref 0.7–3.1)
LYMPHOCYTES NFR BLD AUTO: 4 % (ref 19.6–45.3)
MCH RBC QN AUTO: 28.3 PG (ref 26.6–33)
MCHC RBC AUTO-ENTMCNC: 32.3 G/DL (ref 31.5–35.7)
MCV RBC AUTO: 87.7 FL (ref 79–97)
METHGB BLD QL: 0.3 % (ref 0–1.5)
MODALITY: ABNORMAL
MONOCYTES # BLD AUTO: 0.05 10*3/MM3 (ref 0.1–0.9)
MONOCYTES NFR BLD AUTO: 0.7 % (ref 5–12)
NEUTROPHILS NFR BLD AUTO: 6.71 10*3/MM3 (ref 1.7–7)
NEUTROPHILS NFR BLD AUTO: 95 % (ref 42.7–76)
NRBC BLD AUTO-RTO: 0 /100 WBC (ref 0–0.2)
OXYHGB MFR BLDV: 83.4 % (ref 94–99)
PCO2 BLDA: 44.1 MM HG (ref 35–45)
PH BLDA: 7.38 PH UNITS (ref 7.35–7.45)
PLATELET # BLD AUTO: 311 10*3/MM3 (ref 140–450)
PMV BLD AUTO: 9.8 FL (ref 6–12)
PO2 BLD: 243 MM[HG] (ref 0–500)
PO2 BLDA: 51 MM HG (ref 80–100)
POTASSIUM SERPL-SCNC: 3.4 MMOL/L (ref 3.5–5.2)
QT INTERVAL: 423 MS
QTC INTERVAL: 521 MS
RBC # BLD AUTO: 4.7 10*6/MM3 (ref 3.77–5.28)
SAO2 % BLDCOA: 84.1 % (ref 95–99)
SODIUM SERPL-SCNC: 139 MMOL/L (ref 136–145)
WBC NRBC COR # BLD AUTO: 7.06 10*3/MM3 (ref 3.4–10.8)

## 2023-12-29 PROCEDURE — 82948 REAGENT STRIP/BLOOD GLUCOSE: CPT

## 2023-12-29 PROCEDURE — 85025 COMPLETE CBC W/AUTO DIFF WBC: CPT | Performed by: STUDENT IN AN ORGANIZED HEALTH CARE EDUCATION/TRAINING PROGRAM

## 2023-12-29 PROCEDURE — 25010000002 METHYLPREDNISOLONE PER 40 MG: Performed by: STUDENT IN AN ORGANIZED HEALTH CARE EDUCATION/TRAINING PROGRAM

## 2023-12-29 PROCEDURE — G0378 HOSPITAL OBSERVATION PER HR: HCPCS

## 2023-12-29 PROCEDURE — 63710000001 INSULIN LISPRO (HUMAN) PER 5 UNITS: Performed by: FAMILY MEDICINE

## 2023-12-29 PROCEDURE — 94799 UNLISTED PULMONARY SVC/PX: CPT

## 2023-12-29 PROCEDURE — 80048 BASIC METABOLIC PNL TOTAL CA: CPT | Performed by: STUDENT IN AN ORGANIZED HEALTH CARE EDUCATION/TRAINING PROGRAM

## 2023-12-29 PROCEDURE — 25010000002 HEPARIN (PORCINE) PER 1000 UNITS: Performed by: STUDENT IN AN ORGANIZED HEALTH CARE EDUCATION/TRAINING PROGRAM

## 2023-12-29 PROCEDURE — 94664 DEMO&/EVAL PT USE INHALER: CPT

## 2023-12-29 PROCEDURE — 71250 CT THORAX DX C-: CPT

## 2023-12-29 PROCEDURE — 99222 1ST HOSP IP/OBS MODERATE 55: CPT | Performed by: STUDENT IN AN ORGANIZED HEALTH CARE EDUCATION/TRAINING PROGRAM

## 2023-12-29 PROCEDURE — 83036 HEMOGLOBIN GLYCOSYLATED A1C: CPT | Performed by: FAMILY MEDICINE

## 2023-12-29 RX ORDER — HYDROCODONE BITARTRATE AND ACETAMINOPHEN 5; 325 MG/1; MG/1
1 TABLET ORAL EVERY 6 HOURS PRN
Status: DISCONTINUED | OUTPATIENT
Start: 2023-12-29 | End: 2024-01-01 | Stop reason: HOSPADM

## 2023-12-29 RX ORDER — POTASSIUM CHLORIDE 750 MG/1
20 CAPSULE, EXTENDED RELEASE ORAL ONCE
Status: COMPLETED | OUTPATIENT
Start: 2023-12-29 | End: 2023-12-29

## 2023-12-29 RX ORDER — ARFORMOTEROL TARTRATE 15 UG/2ML
15 SOLUTION RESPIRATORY (INHALATION)
Status: DISCONTINUED | OUTPATIENT
Start: 2023-12-29 | End: 2024-01-01 | Stop reason: HOSPADM

## 2023-12-29 RX ORDER — HYDROCODONE BITARTRATE AND ACETAMINOPHEN 10; 325 MG/1; MG/1
1 TABLET ORAL EVERY 6 HOURS PRN
Status: DISCONTINUED | OUTPATIENT
Start: 2023-12-29 | End: 2024-01-01 | Stop reason: HOSPADM

## 2023-12-29 RX ORDER — HEPARIN SODIUM 5000 [USP'U]/ML
5000 INJECTION, SOLUTION INTRAVENOUS; SUBCUTANEOUS EVERY 12 HOURS SCHEDULED
Status: DISCONTINUED | OUTPATIENT
Start: 2023-12-29 | End: 2024-01-01 | Stop reason: HOSPADM

## 2023-12-29 RX ORDER — METOPROLOL SUCCINATE 25 MG/1
25 TABLET, EXTENDED RELEASE ORAL DAILY
Status: DISCONTINUED | OUTPATIENT
Start: 2023-12-29 | End: 2024-01-01 | Stop reason: HOSPADM

## 2023-12-29 RX ORDER — MORPHINE SULFATE 2 MG/ML
2 INJECTION, SOLUTION INTRAMUSCULAR; INTRAVENOUS EVERY 6 HOURS PRN
Status: DISCONTINUED | OUTPATIENT
Start: 2023-12-29 | End: 2024-01-01 | Stop reason: HOSPADM

## 2023-12-29 RX ORDER — ACETAMINOPHEN 325 MG/1
650 TABLET ORAL EVERY 4 HOURS PRN
Status: DISCONTINUED | OUTPATIENT
Start: 2023-12-29 | End: 2024-01-01 | Stop reason: HOSPADM

## 2023-12-29 RX ORDER — BISACODYL 10 MG
10 SUPPOSITORY, RECTAL RECTAL DAILY PRN
Status: DISCONTINUED | OUTPATIENT
Start: 2023-12-29 | End: 2024-01-01 | Stop reason: HOSPADM

## 2023-12-29 RX ORDER — BUDESONIDE 0.5 MG/2ML
0.5 INHALANT ORAL
Status: DISCONTINUED | OUTPATIENT
Start: 2023-12-29 | End: 2024-01-01 | Stop reason: HOSPADM

## 2023-12-29 RX ORDER — BISACODYL 5 MG/1
5 TABLET, DELAYED RELEASE ORAL DAILY PRN
Status: DISCONTINUED | OUTPATIENT
Start: 2023-12-29 | End: 2024-01-01 | Stop reason: HOSPADM

## 2023-12-29 RX ORDER — AMOXICILLIN 250 MG
2 CAPSULE ORAL 2 TIMES DAILY
Status: DISCONTINUED | OUTPATIENT
Start: 2023-12-29 | End: 2024-01-01 | Stop reason: HOSPADM

## 2023-12-29 RX ORDER — HYDROCODONE BITARTRATE AND ACETAMINOPHEN 7.5; 325 MG/1; MG/1
1 TABLET ORAL EVERY 12 HOURS PRN
Status: DISCONTINUED | OUTPATIENT
Start: 2023-12-29 | End: 2023-12-29

## 2023-12-29 RX ORDER — IPRATROPIUM BROMIDE AND ALBUTEROL SULFATE 2.5; .5 MG/3ML; MG/3ML
3 SOLUTION RESPIRATORY (INHALATION)
Status: DISCONTINUED | OUTPATIENT
Start: 2023-12-29 | End: 2023-12-29

## 2023-12-29 RX ORDER — METHYLPREDNISOLONE SODIUM SUCCINATE 40 MG/ML
40 INJECTION, POWDER, LYOPHILIZED, FOR SOLUTION INTRAMUSCULAR; INTRAVENOUS EVERY 8 HOURS
Status: DISCONTINUED | OUTPATIENT
Start: 2023-12-29 | End: 2023-12-31

## 2023-12-29 RX ORDER — DEXTROSE MONOHYDRATE 25 G/50ML
25 INJECTION, SOLUTION INTRAVENOUS
Status: DISCONTINUED | OUTPATIENT
Start: 2023-12-29 | End: 2024-01-01 | Stop reason: HOSPADM

## 2023-12-29 RX ORDER — SODIUM CHLORIDE 0.9 % (FLUSH) 0.9 %
10 SYRINGE (ML) INJECTION EVERY 12 HOURS SCHEDULED
Status: DISCONTINUED | OUTPATIENT
Start: 2023-12-29 | End: 2024-01-01 | Stop reason: HOSPADM

## 2023-12-29 RX ORDER — IBUPROFEN 600 MG/1
1 TABLET ORAL
Status: DISCONTINUED | OUTPATIENT
Start: 2023-12-29 | End: 2024-01-01 | Stop reason: HOSPADM

## 2023-12-29 RX ORDER — DOXYCYCLINE 100 MG/1
100 CAPSULE ORAL EVERY 12 HOURS SCHEDULED
Status: DISCONTINUED | OUTPATIENT
Start: 2023-12-29 | End: 2024-01-01 | Stop reason: HOSPADM

## 2023-12-29 RX ORDER — IPRATROPIUM BROMIDE AND ALBUTEROL SULFATE 2.5; .5 MG/3ML; MG/3ML
3 SOLUTION RESPIRATORY (INHALATION)
Status: DISCONTINUED | OUTPATIENT
Start: 2023-12-29 | End: 2024-01-01 | Stop reason: HOSPADM

## 2023-12-29 RX ORDER — POLYETHYLENE GLYCOL 3350 17 G/17G
17 POWDER, FOR SOLUTION ORAL DAILY PRN
Status: DISCONTINUED | OUTPATIENT
Start: 2023-12-29 | End: 2024-01-01 | Stop reason: HOSPADM

## 2023-12-29 RX ORDER — SODIUM CHLORIDE 9 MG/ML
40 INJECTION, SOLUTION INTRAVENOUS AS NEEDED
Status: DISCONTINUED | OUTPATIENT
Start: 2023-12-29 | End: 2024-01-01 | Stop reason: HOSPADM

## 2023-12-29 RX ORDER — BACLOFEN 5 MG/1
5 TABLET ORAL EVERY 8 HOURS PRN
COMMUNITY

## 2023-12-29 RX ORDER — CLONIDINE HYDROCHLORIDE 0.3 MG/1
0.3 TABLET ORAL 2 TIMES DAILY
Status: DISCONTINUED | OUTPATIENT
Start: 2023-12-29 | End: 2024-01-01 | Stop reason: HOSPADM

## 2023-12-29 RX ORDER — AMLODIPINE BESYLATE 5 MG/1
5 TABLET ORAL DAILY
Status: DISCONTINUED | OUTPATIENT
Start: 2023-12-29 | End: 2024-01-01 | Stop reason: HOSPADM

## 2023-12-29 RX ORDER — NICOTINE POLACRILEX 4 MG
15 LOZENGE BUCCAL
Status: DISCONTINUED | OUTPATIENT
Start: 2023-12-29 | End: 2024-01-01 | Stop reason: HOSPADM

## 2023-12-29 RX ORDER — INSULIN LISPRO 100 [IU]/ML
2-7 INJECTION, SOLUTION INTRAVENOUS; SUBCUTANEOUS
Status: DISCONTINUED | OUTPATIENT
Start: 2023-12-29 | End: 2024-01-01 | Stop reason: HOSPADM

## 2023-12-29 RX ORDER — ACETAMINOPHEN 325 MG/1
650 TABLET ORAL EVERY 6 HOURS PRN
COMMUNITY

## 2023-12-29 RX ORDER — HYDRALAZINE HYDROCHLORIDE 20 MG/ML
10 INJECTION INTRAMUSCULAR; INTRAVENOUS EVERY 6 HOURS PRN
Status: DISCONTINUED | OUTPATIENT
Start: 2023-12-29 | End: 2024-01-01 | Stop reason: HOSPADM

## 2023-12-29 RX ORDER — SODIUM CHLORIDE 0.9 % (FLUSH) 0.9 %
10 SYRINGE (ML) INJECTION AS NEEDED
Status: DISCONTINUED | OUTPATIENT
Start: 2023-12-29 | End: 2024-01-01 | Stop reason: HOSPADM

## 2023-12-29 RX ADMIN — METHYLPREDNISOLONE SODIUM SUCCINATE 40 MG: 40 INJECTION, POWDER, LYOPHILIZED, FOR SOLUTION INTRAMUSCULAR; INTRAVENOUS at 15:08

## 2023-12-29 RX ADMIN — IPRATROPIUM BROMIDE AND ALBUTEROL SULFATE 3 ML: 2.5; .5 SOLUTION RESPIRATORY (INHALATION) at 07:42

## 2023-12-29 RX ADMIN — BUDESONIDE 0.5 MG: 0.5 SUSPENSION RESPIRATORY (INHALATION) at 07:42

## 2023-12-29 RX ADMIN — ACETAMINOPHEN 650 MG: 325 TABLET ORAL at 06:58

## 2023-12-29 RX ADMIN — METHYLPREDNISOLONE SODIUM SUCCINATE 40 MG: 40 INJECTION, POWDER, LYOPHILIZED, FOR SOLUTION INTRAMUSCULAR; INTRAVENOUS at 22:08

## 2023-12-29 RX ADMIN — METHYLPREDNISOLONE SODIUM SUCCINATE 40 MG: 40 INJECTION, POWDER, LYOPHILIZED, FOR SOLUTION INTRAMUSCULAR; INTRAVENOUS at 06:45

## 2023-12-29 RX ADMIN — CLONIDINE HYDROCHLORIDE 0.3 MG: 0.3 TABLET ORAL at 11:45

## 2023-12-29 RX ADMIN — INSULIN LISPRO 2 UNITS: 100 INJECTION, SOLUTION INTRAVENOUS; SUBCUTANEOUS at 12:57

## 2023-12-29 RX ADMIN — INSULIN LISPRO 4 UNITS: 100 INJECTION, SOLUTION INTRAVENOUS; SUBCUTANEOUS at 08:37

## 2023-12-29 RX ADMIN — DOXYCYCLINE 100 MG: 100 CAPSULE ORAL at 21:04

## 2023-12-29 RX ADMIN — HYDROCODONE BITARTRATE AND ACETAMINOPHEN 1 TABLET: 7.5; 325 TABLET ORAL at 03:16

## 2023-12-29 RX ADMIN — AMLODIPINE BESYLATE 5 MG: 5 TABLET ORAL at 08:35

## 2023-12-29 RX ADMIN — HEPARIN SODIUM 5000 UNITS: 5000 INJECTION INTRAVENOUS; SUBCUTANEOUS at 08:31

## 2023-12-29 RX ADMIN — METOPROLOL SUCCINATE 25 MG: 25 TABLET, EXTENDED RELEASE ORAL at 08:35

## 2023-12-29 RX ADMIN — BUDESONIDE 0.5 MG: 0.5 SUSPENSION RESPIRATORY (INHALATION) at 19:29

## 2023-12-29 RX ADMIN — POTASSIUM CHLORIDE 20 MEQ: 10 CAPSULE, COATED, EXTENDED RELEASE ORAL at 08:32

## 2023-12-29 RX ADMIN — Medication 10 ML: at 08:33

## 2023-12-29 RX ADMIN — DOXYCYCLINE 100 MG: 100 CAPSULE ORAL at 08:31

## 2023-12-29 RX ADMIN — IPRATROPIUM BROMIDE AND ALBUTEROL SULFATE 3 ML: 2.5; .5 SOLUTION RESPIRATORY (INHALATION) at 19:29

## 2023-12-29 RX ADMIN — ARFORMOTEROL TARTRATE 15 MCG: 15 SOLUTION RESPIRATORY (INHALATION) at 19:29

## 2023-12-29 RX ADMIN — Medication 10 ML: at 21:05

## 2023-12-29 RX ADMIN — CLONIDINE HYDROCHLORIDE 0.3 MG: 0.3 TABLET ORAL at 21:05

## 2023-12-29 RX ADMIN — HYDROCODONE BITARTRATE AND ACETAMINOPHEN 1 TABLET: 10; 325 TABLET ORAL at 12:59

## 2023-12-29 RX ADMIN — HEPARIN SODIUM 5000 UNITS: 5000 INJECTION INTRAVENOUS; SUBCUTANEOUS at 21:04

## 2023-12-29 RX ADMIN — ARFORMOTEROL TARTRATE 15 MCG: 15 SOLUTION RESPIRATORY (INHALATION) at 07:42

## 2023-12-29 RX ADMIN — IPRATROPIUM BROMIDE AND ALBUTEROL SULFATE 3 ML: 2.5; .5 SOLUTION RESPIRATORY (INHALATION) at 12:03

## 2023-12-29 RX ADMIN — HYDROCODONE BITARTRATE AND ACETAMINOPHEN 1 TABLET: 10; 325 TABLET ORAL at 19:30

## 2023-12-29 NOTE — THERAPY EVALUATION
Respiratory Therapist Broncho-Pulmonary Hygiene Progress Note      Patient Name:  Yenni Camacho  YOB: 1965    Yenni Camacho meets the qualification for Level 1 of the Bronco-Pulmonary Hygiene Protocol. This was based on my daily patient assessment and includes review of chest x-ray results, cough ability and quality, oxygenation, secretions or risk for secretion development and patient mobility.     Broncho-Pulmonary Hygiene Assessment:    Level of Movement: Actively changing positions without assistance  Alert/ oriented/ cooperative    Breath Sounds: Diminished and/or coarse rhonchi    Cough: Strong, effective    Chest X-Ray: Possible signs of consolidation and/or atelectasis or clear.     Sputum Productions: None or small amount of thin or watery secretions with effective cough    History and Physical: Chronic condition    SpO2 to Oxygen Need: greater than 92% on room air or  less than 3L nasal canula    Current SpO2 is: 91% on room air    Based on this information, I have completed the following interventions: Teach/Instruct patient on cough and deep breathe      Electronically signed by Ana Cates RRT, 12/29/23, 7:46 AM EST.

## 2023-12-29 NOTE — H&P
[facility]    Patient Identification:  Name:  Yenni Camacho  Age:  58 y.o.  Sex:  female  :  1965  MRN:  [id number]   Visit Number:  [id number]  Admit Date: 2023   Room number:    Primary Care Physician:  Allyssa Pritchett, APRKRISTOFER    Date of Admission: 2023     Subjective     Chief complaint:    Chief Complaint   Patient presents with    Shortness of Breath       History of presenting illness:    This is a 58-year-old female with a past medical history of hypertension, PTSD, anxiety, hyperlipidemia and 30 pack year smoking history presenting with shortness of breath. Pt states that there is renovation going on at her mother's house and they are fixing the floors. As a result, she has been exposed to dust and mold has also been found in the house. Pt states that she has been having SOB for the past two days. She is also complaining of cough and wheezing. She has an inhaler at home that she has used about three times tbut with minimal relief. Denies any fever, congestion, chest pain.     In ED, blood pressure 214/96, heart rate 104, respiratory rate is 16, temperature 97.6 O2 saturation 89% on 2 L nasal cannula.  Labs on arrival showed a sodium 141, Tessman 3.7, BUN 12, creatinine 0.7, WBC 5.5, hemoglobin 14.6 and platelet count 313.  Chest x-ray show emphysematous changes. chset xray showed Emphysematous changes  and Suspected nipple shadows projecting over the lung bases bilaterally.     ---------------------------------------------------------------------------------------------------------------------   Review of Systems   Constitutional:  Positive for fatigue.   HENT:  Negative for ear discharge.    Respiratory:  Positive for cough and wheezing.    Cardiovascular:  Negative for leg swelling.   Gastrointestinal:  Negative for anal bleeding and nausea.   Endocrine: Negative for heat intolerance.   Genitourinary:  Negative for flank pain.    Musculoskeletal:  Negative for back pain.   Neurological:  Negative for speech difficulty and headaches.   Psychiatric/Behavioral:  Negative for confusion.      ---------------------------------------------------------------------------------------------------------------------   Past Medical History:   Diagnosis Date    ADHD     Anxiety     Arthritis     Bunion     Chronic pain disorder     Foot pain, bilateral     Hypertension     PTSD (post-traumatic stress disorder)      Past Surgical History:   Procedure Laterality Date    ARM WOUND REPAIR / CLOSURE Right     COLONOSCOPY      FRACTURE SURGERY       Family History   Problem Relation Age of Onset    ADD / ADHD Mother     Anxiety disorder Mother      Social History     Socioeconomic History    Marital status:    Tobacco Use    Smoking status: Former     Packs/day: 2     Types: Cigarettes     Start date: 1978     Quit date: 2018     Years since quittin.6    Smokeless tobacco: Never   Vaping Use    Vaping Use: Former    Substances: THC    Devices: Oncothyreon tank   Substance and Sexual Activity    Alcohol use: Not Currently    Drug use: Not Currently     Frequency: 7.0 times per week     Types: Hydrocodone, Marijuana     Comment: I had a MM license in [place].    Sexual activity: Not Currently     Partners: Male     ---------------------------------------------------------------------------------------------------------------------   Allergies:  Lexapro [escitalopram], Paxlovid [nirmatrelvir-ritonavir], and Wellbutrin [bupropion]  ---------------------------------------------------------------------------------------------------------------------   Medications below are reported home medications pulling from within the system; at this time, these medications have not been reconciled unless otherwise specified and are in the verification process for further verifcation as current home medications.      Prior to Admission Medications        Prescriptions Last Dose Informant Patient Reported? Taking?    albuterol sulfate  (90 Base) MCG/ACT inhaler   No No    Inhale 2 puffs Every 4 (Four) Hours As Needed for Wheezing.    amLODIPine (NORVASC) 5 MG tablet   No No    Take 1 tablet by mouth Daily.    atorvastatin (LIPITOR) 10 MG tablet   No No    TAKE 1 TABLET BY MOUTH DAILY    cloNIDine (CATAPRES) 0.3 MG tablet   No No    TAKE 1 TABLET BY MOUTH TWICE A DAY    HYDROcodone-acetaminophen (NORCO) 7.5-325 MG per tablet   Yes No    hydrocodone 7.5 mg-acetaminophen 325 mg tablet   TAKE 1 TABLET BY MOUTH EVERY 12 HOURS AS NEEDED    methylPREDNISolone (MEDROL) 4 MG dose pack   No No    Take as directed    metoprolol succinate XL (TOPROL-XL) 25 MG 24 hr tablet   No No    TAKE 1 TABLET BY MOUTH DAILY    naloxone (NARCAN) 4 MG/0.1ML nasal spray   Yes No    naloxone 4 mg/actuation nasal spray   Take 1 spray by nasal route as directed.          Objective     Vital Signs:  Temp:  [97.6 °F (36.4 °C)] 97.6 °F (36.4 °C)  Heart Rate:  [] 108  Resp:  [16-20] 16  BP: (134-217)/(55-96) 143/55    Mean Arterial Pressure (Non-Invasive) for the past 24 hrs (Last 3 readings):   Noninvasive MAP (mmHg)   12/29/23 0000 79   12/28/23 2302 88   12/28/23 2210 104     SpO2:  [86 %-100 %] 89 %  on  Flow (L/min):  [2] 2;   Device (Oxygen Therapy): room air  Body mass index is 22.93 kg/m².    Wt Readings from Last 3 Encounters:   12/28/23 66.4 kg (146 lb 6.2 oz)   07/13/23 64.4 kg (142 lb)   06/15/23 66 kg (145 lb 6.4 oz)      ----------------------------------------------------------------------------------------------------------------------  PHYSICAL EXAMINATION:  GENERAL: The patient is well developed and nontoxic.  HEENT: Nonicteric sclerae, PERRLA, EOMI. Oropharynx clear. Moist mucous membranes. Conjunctivae appear well perfused.  CHEST: Chest wall is nontender.  HEART: Regular rate and rhythm without murmurs.  LUNGS: Clear to auscultation bilaterally.  ABDOMEN: Soft,  "positive bowel sounds, nontender, no organomegaly.  RECTAL: Deferred.  SKIN: No rash, no excessive bruising, petechiae, or purpura.  NEUROLOGIC: Cranial nerves II-XII intact without motor/sensory deficit.    ---------------------------------------------------------------------------------------------------------------------  --------------------------------------------------------------------------------------------------------------------  LABS:    CBC and coagulation:  Results from last 7 days   Lab Units 12/28/23 2053   WBC 10*3/mm3 5.58   HEMOGLOBIN g/dL 14.6   HEMATOCRIT % 45.7   MCV fL 87.2   MCHC g/dL 31.9   PLATELETS 10*3/mm3 313     Acid/base balance:  Results from last 7 days   Lab Units 12/28/23  2357   PH, ARTERIAL pH units 7.383   PO2 ART mm Hg 51.0*   PCO2, ARTERIAL mm Hg 44.1   HCO3 ART mmol/L 25.7     Renal and electrolytes:  Results from last 7 days   Lab Units 12/28/23 2053   SODIUM mmol/L 141   POTASSIUM mmol/L 3.7   CHLORIDE mmol/L 101   CO2 mmol/L 28.0   BUN mg/dL 12   CREATININE mg/dL 0.77   CALCIUM mg/dL 9.6   GLUCOSE mg/dL 101*     Estimated Creatinine Clearance: 82.5 mL/min (by C-G formula based on SCr of 0.77 mg/dL).    Liver and pancreatic function:  Results from last 7 days   Lab Units 12/28/23 2053   ALBUMIN g/dL 4.1   BILIRUBIN mg/dL 0.2   ALK PHOS U/L 116   AST (SGOT) U/L 16   ALT (SGPT) U/L 11     Endocrine function:  Lab Results   Component Value Date    HGBA1C 5.80 (H) 04/13/2022     Point of care bedside glucose levels:      Lab Results   Component Value Date    TSH 0.667 03/15/2023     Cardiac:  Results from last 7 days   Lab Units 12/28/23 2053   HSTROP T ng/L 8   PROBNP pg/mL 369.6     Results from last 7 days   Lab Units 12/28/23 2053   CHOLESTEROL mg/dL 211*   TRIGLYCERIDES mg/dL 219*   HDL CHOL mg/dL 57   LDL CHOL mg/dL 116*     Cultures:  No results found for: \"COLORU\", \"CLARITYU\", \"SPECGRAV\", \"PHUR\", \"PROTEINUR\", \"GLUCOSEU\", \"KETONESU\", \"BLOODU\", \"NITRITEU\", " "\"LEUKOCYTESUR\", \"BILIRUBINUR\", \"UROBILINOGEN\", \"RBCUA\", \"WBCUA\", \"BACTERIA\", \"UACOMMENT\"  Microbiology Results (last 10 days)       Procedure Component Value - Date/Time    COVID-19, FLU A/B, RSV PCR 1 HR TAT - Swab, Nasopharynx [[id number]]  (Normal) Collected: 12/28/23 2109    Lab Status: Final result Specimen: Swab from Nasopharynx Updated: 12/28/23 2204     COVID19 Not Detected     Influenza A PCR Not Detected     Influenza B PCR Not Detected     RSV, PCR Not Detected    Narrative:      Fact sheet for providers: https://www.fda.gov/media/298837/download    Fact sheet for patients: https://www.fda.gov/media/211802/download    Test performed by PCR.            No results found for: \"PREGTESTUR\", \"PREGSERUM\", \"HCG\", \"HCGQUANT\"  Pain Management Panel          Latest Ref Rng & Units 4/12/2022   Pain Management Panel   Barbiturates Screen, Urine Negative Negative    Benzodiazepine Screen, Urine Negative Negative    Cocaine Screen, Urine Negative Negative    Methadone Screen , Urine Negative Negative        I have personally looked at the labs and they are summarized above.  ----------------------------------------------------------------------------------------------------------------------  Detailed radiology reports for the last 24 hours:    Imaging Results (Last 24 Hours)       Procedure Component Value Units Date/Time    CT Chest Without Contrast Diagnostic [[id number]] Resulted: 12/29/23 0016     Updated: 12/29/23 0023    XR Chest 1 View [[id number]] Collected: 12/28/23 2143     Updated: 12/28/23 2146    Narrative:      PROCEDURE: XR CHEST 1 VW     COMPARISON: [facility], CT, CT CHEST W CONTRAST DIAGNOSTIC, 4/12/2022, 13:32.  [facility], CR, XR CHEST 1 VW, 4/13/2022, 23:46.     INDICATIONS: SHORTNESS OF BREATH     FINDINGS:   Emphysematous changes are noted.  There are irregular densities in the lung apices bilaterally   which are chronic, consistent with fibrosis.  There are more " nodular appearing densities projecting   over both lung bases favored to represent nipple shadows.  No convincing acute infiltrate is seen.       Impression:         1. Emphysematous changes  2. Suspected nipple shadows projecting over the lung bases bilaterally.  A repeat chest PA and   lateral with nipple markers is recommended to confirm this.  3. Biapical scarring/fibrosis.                  Freedom Roblero M.D.         Electronically Signed and Approved By: Freedom Roblero M.D. on 12/28/2023 at 21:43                           Final impressions for the last 30 days of radiology reports:    XR Chest 1 View    Result Date: 12/28/2023    1. Emphysematous changes 2. Suspected nipple shadows projecting over the lung bases bilaterally.  A repeat chest PA and lateral with nipple markers is recommended to confirm this. 3. Biapical scarring/fibrosis.       Freedom Roblero M.D.       Electronically Signed and Approved By: Freedom Roblero M.D. on 12/28/2023 at 21:43                Assessment & Plan    This is a 58-year-old female with a past medical history of hypertension, PTSD, anxiety, hyperlipidemia presented with shortness of breath.    Assessment  Hypoxia  Hypertensive urgency  Smoking history  Hyperlipidemia  PTSD  Anxiety    Plan:  -Admit to obs  -DuoNebs every 4 hours  -Solu-Medrol 40 q8h  -Will hold off on starting any abx at this time  -Monitor SaO2  -Hydralazine SARINA Gilbert MD  [facility]  12/29/23  00:25 EST

## 2023-12-29 NOTE — PLAN OF CARE
Goal Outcome Evaluation:  Plan of Care Reviewed With: patient        Progress: no change  Outcome Evaluation: Vss. Home norco ordered, given for pain control. Pt remains on 2 L nc. Will continue plan of care.

## 2023-12-29 NOTE — PROGRESS NOTES
Reviewed H&P, agree with assessment and plan.  Added insulin signs scale coverage and ordered an A1c as patient's blood sugars were in the 300 range.  Clinical course dictate further management.  Discussed with nurse at the bedside.  Electronically signed by Rio Rios MD, 12/29/23, 4:32 PM EST.  Portions of this documentation were transcribed electronically from a voice recognition software.  I confirm all data accurately represents the service(s) I performed at today's visit.

## 2023-12-29 NOTE — PLAN OF CARE
Goal Outcome Evaluation: Patient given Norco for headache with a pain level of 10. Vitals within normal limits, patient now resting.

## 2023-12-29 NOTE — ED PROVIDER NOTES
Time: 8:42 PM EST  Date of encounter:  12/28/2023  Independent Historian/Clinical History and Information was obtained by:   Patient    History is limited by: N/A    Chief Complaint   Patient presents with    Shortness of Breath         History of Present Illness:  Patient is a 58 y.o. year old female who presents to the emergency department for evaluation of shortness of breath that started two days ago. Patient says that she has been renovating at home which may have caused it. Reports cough. She does use an inhaler and has used it three times today with minimal relief. Denies any fever, congestion, chest pain. Hx of smoking but says she's quit. (Triage Provider: Milad Woody PA-C).      Patient Care Team  Primary Care Provider: Allyssa Pritchett APRN    Past Medical History:     Allergies   Allergen Reactions    Lexapro [Escitalopram] Mental Status Change     Worsened depression     Paxlovid [Nirmatrelvir-Ritonavir] Unknown - Low Severity    Wellbutrin [Bupropion] Unknown - Low Severity     Past Medical History:   Diagnosis Date    ADHD     Anxiety     Arthritis     Bunion     Chronic pain disorder     Foot pain, bilateral     Hypertension     PTSD (post-traumatic stress disorder)      Past Surgical History:   Procedure Laterality Date    ARM WOUND REPAIR / CLOSURE Right     COLONOSCOPY      FRACTURE SURGERY       Family History   Problem Relation Age of Onset    ADD / ADHD Mother     Anxiety disorder Mother        Home Medications:  Prior to Admission medications    Medication Sig Start Date End Date Taking? Authorizing Provider   albuterol sulfate  (90 Base) MCG/ACT inhaler Inhale 2 puffs Every 4 (Four) Hours As Needed for Wheezing. 6/6/23   Allyssa Pritchett APRN   amLODIPine (NORVASC) 5 MG tablet Take 1 tablet by mouth Daily. 3/28/23   Allyssa Pritchett APRN   atorvastatin (LIPITOR) 10 MG tablet TAKE 1 TABLET BY MOUTH DAILY 10/2/23   Allyssa Pritchett APRN   cloNIDine (CATAPRES) 0.3 MG tablet TAKE 1 TABLET BY  "MOUTH TWICE A DAY 23   Allyssa Pritchett APRN   HYDROcodone-acetaminophen (NORCO) 7.5-325 MG per tablet hydrocodone 7.5 mg-acetaminophen 325 mg tablet   TAKE 1 TABLET BY MOUTH EVERY 12 HOURS AS NEEDED    Provider, MD Madhavi   methylPREDNISolone (MEDROL) 4 MG dose pack Take as directed 23   Dylan Garcia DPM   metoprolol succinate XL (TOPROL-XL) 25 MG 24 hr tablet TAKE 1 TABLET BY MOUTH DAILY 23   Allyssa Pritchett APRN   naloxone (NARCAN) 4 MG/0.1ML nasal spray naloxone 4 mg/actuation nasal spray   Take 1 spray by nasal route as directed.    Provider, Madhavi, MD        Social History:   Social History     Tobacco Use    Smoking status: Former     Packs/day: 2     Types: Cigarettes     Start date: 1978     Quit date: 2018     Years since quittin.6    Smokeless tobacco: Never   Vaping Use    Vaping Use: Former    Substances: THC    Devices: [employer]   Substance Use Topics    Alcohol use: Not Currently    Drug use: Not Currently     Frequency: 7.0 times per week     Types: Hydrocodone, Marijuana     Comment: I had a MM license in [place].         Review of Systems:  Review of Systems   Constitutional:  Negative for chills and fever.   HENT:  Negative for congestion, ear pain and sore throat.    Eyes:  Negative for pain.   Respiratory:  Positive for cough, shortness of breath and wheezing. Negative for chest tightness.    Cardiovascular:  Negative for chest pain.   Gastrointestinal:  Negative for abdominal pain, diarrhea, nausea and vomiting.   Genitourinary:  Negative for flank pain and hematuria.   Musculoskeletal:  Negative for joint swelling.   Skin:  Negative for pallor.   Neurological:  Negative for seizures and headaches.   All other systems reviewed and are negative.       Physical Exam:  /89   Pulse 88   Temp 97.2 °F (36.2 °C) (Oral)   Resp 21   Ht 170.2 cm (67\")   Wt 60.4 kg (133 lb 2.5 oz)   SpO2 99%   BMI 20.86 kg/m²   Vital signs were reviewed under " triage note.  General appearance - Patient appears well-developed and well-nourished.  Patient is in moderate acute respiratory distress.  Head - Normocephalic, atraumatic.  Pupils - Equal, round, reactive to light.  Extraocular muscles are intact.  Conjunctiva is clear.  Nasal - Normal inspection.  No evidence of trauma or epistaxis.  Tympanic membranes - Gray, intact without erythema or retractions.  Oral mucosa - Pink and moist without lesions or erythema.  Uvula is midline.  Chest wall - Atraumatic.  Chest wall is nontender.  There are no vesicular rashes noted.  Neck - Supple.  Trachea was midline.  There is no palpable lymphadenopathy or thyromegaly.  There are no meningeal signs  Lungs - Auscultation reveals moderate diffuse scattered wheezes.  Patient is tachypneic.  Patient has decreased breath sounds in the bases.  Heart - Regular rate and rhythm without any murmurs, clicks, or gallops.  Abdomen - Soft.  Bowel sounds are present.  There is no palpable tenderness.  There is no rebound, guarding, or rigidity.  There are no palpable masses.  There are no pulsatile masses.  Back - Spine is straight and midline.  There is no CVA tenderness.  Extremities - Intact x4 with full range of motion.  There is no palpable edema.  Pulses are intact x4 and equal.  Neurologic - Patient is awake, alert, and oriented x3.  Cranial nerves II through XII are grossly intact.  Motor and sensory functions grossly intact.  Cerebellar function was normal.  Integument - There are no rashes.  There are no petechia or purpura lesions noted.  There are no vesicular lesions noted.            Procedures:  Procedures      Medical Decision Making:      Comorbidities that affect care:    ADHD, hypertension, PTSD, anxiety    External Notes reviewed:    Previous Clinic Note: Office visit with Dr. Garcia dated 7/13/2023 was reviewed by me.      The following orders were placed and all results were independently analyzed by me:  Orders Placed  This Encounter   Procedures    COVID-19, FLU A/B, RSV PCR 1 HR TAT - Swab, Nasopharynx    S. Pneumo Ag Urine or CSF - Urine, Urine, Clean Catch    Mycoplasma Pneumoniae Antibody, IgM - Blood, Arm, Left    Legionella Antigen, Urine - Urine, Urine, Clean Catch    Respiratory Culture - Sputum, Cough    Respiratory Culture - Sputum, Cough    XR Chest 1 View    CT Chest Without Contrast Diagnostic    [place] Draw    Comprehensive Metabolic Panel    BNP    Single High Sensitivity Troponin T    CBC Auto Differential    Blood Gas, Arterial -With Co-Ox Panel: Yes    CBC Auto Differential    Basic Metabolic Panel    Magnesium    Phosphorus    Hepatic Function Panel    Hemoglobin A1c    CBC Auto Differential    Basic Metabolic Panel    Magnesium    Phosphorus    Hepatic Function Panel    CBC Auto Differential    Scan Slide    Ambulatory Referral to Pulmonary/COPD Clinic    Undress & Gown    Continuous Pulse Oximetry    Vital Signs    Discontinue Cardiac Monitoring    Reason for COPD Admission: Ongoing Smoking, Bronchitis    Nursing to Verify Pneumonia & Flu Immunizations Up to Date    Patient Maximized on Current Regimen    Discharge Follow-up with PCP    POC Glucose Once    POC Glucose Once    POC Glucose Once    POC Glucose Once    POC Glucose Once    POC Glucose Once    POC Glucose Once    POC Glucose Once    POC Glucose Once    POC Glucose Once    POC Glucose Once    POC Glucose Once    POC Glucose Once    ECG 12 Lead ED Triage Standing Order; SOA    Initiate Observation Status    Inpatient Admission    Discharge patient    CBC & Differential    Green Top (Gel)    Lavender Top    Gold Top - SST    Light Blue Top    CBC & Differential    CBC & Differential       Medications Given in the Emergency Department:  Medications   ipratropium-albuterol (DUO-NEB) nebulizer solution 3 mL (3 mL Nebulization Given 12/28/23 2133)   methylPREDNISolone sodium succinate (SOLU-Medrol) injection 125 mg (125 mg Intravenous Given 12/28/23  2208)   ipratropium-albuterol (DUO-NEB) nebulizer solution 6 mL (6 mL Nebulization Given 12/28/23 2136)   hydrALAZINE (APRESOLINE) injection 20 mg (20 mg Intravenous Given 12/28/23 2208)   ondansetron (ZOFRAN) injection 4 mg (4 mg Intravenous Given 12/28/23 2259)   albuterol (PROVENTIL) nebulizer solution 0.083% 2.5 mg/3mL (5 mg Nebulization Given 12/28/23 2355)   potassium chloride (MICRO-K/KLOR-CON) CR capsule (20 mEq Oral Given 12/29/23 0832)        ED Course:    The patient was initially evaluated in the triage area where orders were placed. The patient was later dispositioned by Cole Antonio DO.      The patient was advised to stay for completion of workup which includes but is not limited to communication of labs and radiological results, reassessment and plan. The patient was advised that leaving prior to disposition by a provider could result in critical findings that are not communicated to the patient.     ED Course as of 01/01/24 1504   Thu Dec 28, 2023   2044 PROVIDER IN TRIAGE  Patient was evaluated by Milad carter PA-C. Orders were placed and awaiting final results and disposition.   [MV]   Fri Dec 29, 2023   0013 EKG performed at 2059 was interpreted by me to show a normal sinus rhythm with a ventricular of 92 bpm.  The [place] interval was 123 ms.  P waves is normal.  Patient is occasional premature atrial contraction.  QRS interval is normal.  Axis was 82 degrees.  There are some nonspecific ST-T wave change identified.  QT corrected is prolonged at 521 ms. [TB]      ED Course User Index  [MV] Milad Woody PA  [TB] Cole Antonio DO     The patient was seen and evaluated in the ED by me.  The above history and physical examination was performed as documented.  Diagnostic data was obtained.  Results reviewed.  Discussed with the patient.  Patient was noted to be hypoxic after multiple breathing treatments and steroids.  Patient based on history most likely has reactive airway disease.  Patient however  because of her hypoxia and requiring supplemental oxygen will be admitted to the hospital.  I will consult the hospital service for admission.    Labs:    Lab Results (last 24 hours)       Procedure Component Value Units Date/Time    POC Glucose Once [[id number]]  (Abnormal) Collected: 12/31/23 1633    Specimen: Blood Updated: 12/31/23 1635     Glucose 112 mg/dL      Comment: Serial Number: [id number]:  [id number]       POC Glucose Once [[id number]]  (Abnormal) Collected: 12/31/23 2002    Specimen: Blood Updated: 12/31/23 2004     Glucose 112 mg/dL      Comment: Serial Number: [id number]:  [id number]       Basic Metabolic Panel [[id number]]  (Abnormal) Collected: 01/01/24 0515    Specimen: Blood from Arm, Right Updated: 01/01/24 0611     Glucose 163 mg/dL      BUN 19 mg/dL      Creatinine 0.73 mg/dL      Sodium 143 mmol/L      Potassium 4.2 mmol/L      Comment: Slight hemolysis detected by analyzer. Result may be falsely elevated.        Chloride 103 mmol/L      CO2 25.1 mmol/L      Calcium 9.5 mg/dL      BUN/Creatinine Ratio 26.0     Anion Gap 14.9 mmol/L      eGFR 94.9 mL/min/1.73     Narrative:      GFR Normal >60  Chronic Kidney Disease <60  Kidney Failure <15      CBC & Differential [[id number]]  (Abnormal) Collected: 01/01/24 0515    Specimen: Blood from Arm, Right Updated: 01/01/24 0633    Narrative:      The following orders were created for panel order CBC & Differential.  Procedure                               Abnormality         Status                     ---------                               -----------         ------                     CBC Auto Differential[[id number]]        Abnormal            Final result               Scan Slide[[id number]]                   Normal              Final result                 Please view results for these tests on the individual orders.    Magnesium [[id number]]  (Normal) Collected: 01/01/24 0515    Specimen: Blood from Arm, Right Updated: 01/01/24 0611      Magnesium 1.8 mg/dL     Phosphorus [[id number]]  (Normal) Collected: 01/01/24 0515    Specimen: Blood from Arm, Right Updated: 01/01/24 0617     Phosphorus 3.4 mg/dL     Hepatic Function Panel [[id number]]  (Abnormal) Collected: 01/01/24 0515    Specimen: Blood from Arm, Right Updated: 01/01/24 0616     Total Protein 7.0 g/dL      Albumin 3.9 g/dL      ALT (SGPT) 25 U/L      AST (SGOT) 25 U/L      Comment: Specimen hemolyzed.  Result may be falsely elevated.        Alkaline Phosphatase 130 U/L      Total Bilirubin <0.2 mg/dL      Bilirubin, Direct <0.2 mg/dL      Comment: Specimen hemolyzed. Results may be affected.        Bilirubin, Indirect --     Comment: Unable to calculate       CBC Auto Differential [[id number]]  (Abnormal) Collected: 01/01/24 0515    Specimen: Blood from Arm, Right Updated: 01/01/24 0633     WBC 14.61 10*3/mm3      RBC 5.14 10*6/mm3      Hemoglobin 14.4 g/dL      Hematocrit 45.0 %      MCV 87.5 fL      MCH 28.0 pg      MCHC 32.0 g/dL      RDW 14.7 %      RDW-SD 47.2 fl      MPV 9.5 fL      Platelets 374 10*3/mm3      Neutrophil % 80.4 %      Lymphocyte % 8.4 %      Monocyte % 5.6 %      Eosinophil % 0.0 %      Basophil % 0.4 %      Immature Grans % 5.2 %      Neutrophils, Absolute 11.75 10*3/mm3      Lymphocytes, Absolute 1.22 10*3/mm3      Monocytes, Absolute 0.82 10*3/mm3      Eosinophils, Absolute 0.00 10*3/mm3      Basophils, Absolute 0.06 10*3/mm3      Immature Grans, Absolute 0.76 10*3/mm3      nRBC 0.1 /100 WBC     Scan Slide [[id number]]  (Normal) Collected: 01/01/24 0515    Specimen: Blood from Arm, Right Updated: 01/01/24 0633     RBC Morphology Normal     WBC Morphology Normal     Platelet Morphology Normal    POC Glucose Once [[id number]]  (Normal) Collected: 01/01/24 0734    Specimen: Blood Updated: 01/01/24 0736     Glucose 93 mg/dL      Comment: Serial Number: [id number]:  [id number]                Imaging:    No Radiology Exams Resulted Within Past 24 Hours      Differential  Diagnosis and Discussion:      Dyspnea: Differential diagnosis includes but is not limited to metabolic acidosis, neurological disorders, psychogenic, asthma, pneumothorax, upper airway obstruction, COPD, pneumonia, noncardiogenic pulmonary edema, interstitial lung disease, anemia, congestive heart failure, and pulmonary embolism    All labs were reviewed and interpreted by me.  All X-rays impressions were independently interpreted by me.  EKG was interpreted by me.  CT scan radiology impression was interpreted by me.    MDM     Amount and/or Complexity of Data Reviewed  Clinical lab tests: reviewed  Tests in the radiology section of CPT®: reviewed  Tests in the medicine section of CPT®: reviewed                 Patient Care Considerations:    ANTIBIOTICS: I considered prescribing antibiotics as an outpatient however no bacterial focus of infection was found.      Consultants/Shared Management Plan:    Hospitalist: I have discussed the case with Dr. Donovan who agrees to accept the patient for admission.    Social Determinants of Health:    Patient is independent, reliable, and has access to care.       Disposition and Care Coordination:    Admit:   Through independent evaluation of the patient's history, physical, and imperical data, the patient meets criteria for observation/admission to the hospital.        Final diagnoses:   Acute respiratory failure with hypoxia   Wheezing        ED Disposition       ED Disposition   Decision to Admit    Condition   --    Comment   Level of Care: Med/Surg [1]   Diagnosis: Hypoxia [147864]   Admitting Physician: JANESSA DONOVAN [[id number]]   Attending Physician: JANESSA DONOVAN [[id number]]                 This medical record created using voice recognition software.             Cole Antonio DO  01/01/24 1504

## 2023-12-29 NOTE — PLAN OF CARE
Goal Outcome Evaluation: Patient given Norco for a headache with a pain level of 10. Vitals have been within normal limits this shift, patient is now resting. Glucose ranging 134-348 this shift.

## 2023-12-30 LAB
ALBUMIN SERPL-MCNC: 4 G/DL (ref 3.5–5.2)
ALP SERPL-CCNC: 113 U/L (ref 39–117)
ALT SERPL W P-5'-P-CCNC: 8 U/L (ref 1–33)
ANION GAP SERPL CALCULATED.3IONS-SCNC: 10.2 MMOL/L (ref 5–15)
AST SERPL-CCNC: 12 U/L (ref 1–32)
BASOPHILS # BLD AUTO: 0.01 10*3/MM3 (ref 0–0.2)
BASOPHILS NFR BLD AUTO: 0.1 % (ref 0–1.5)
BILIRUB CONJ SERPL-MCNC: <0.2 MG/DL (ref 0–0.3)
BILIRUB INDIRECT SERPL-MCNC: NORMAL MG/DL
BILIRUB SERPL-MCNC: <0.2 MG/DL (ref 0–1.2)
BUN SERPL-MCNC: 16 MG/DL (ref 6–20)
BUN/CREAT SERPL: 21.1 (ref 7–25)
CALCIUM SPEC-SCNC: 10.3 MG/DL (ref 8.6–10.5)
CHLORIDE SERPL-SCNC: 104 MMOL/L (ref 98–107)
CO2 SERPL-SCNC: 26.8 MMOL/L (ref 22–29)
CREAT SERPL-MCNC: 0.76 MG/DL (ref 0.57–1)
DEPRECATED RDW RBC AUTO: 47.2 FL (ref 37–54)
EGFRCR SERPLBLD CKD-EPI 2021: 90.4 ML/MIN/1.73
EOSINOPHIL # BLD AUTO: 0 10*3/MM3 (ref 0–0.4)
EOSINOPHIL NFR BLD AUTO: 0 % (ref 0.3–6.2)
ERYTHROCYTE [DISTWIDTH] IN BLOOD BY AUTOMATED COUNT: 14.3 % (ref 12.3–15.4)
GLUCOSE BLDC GLUCOMTR-MCNC: 128 MG/DL (ref 70–99)
GLUCOSE BLDC GLUCOMTR-MCNC: 137 MG/DL (ref 70–99)
GLUCOSE BLDC GLUCOMTR-MCNC: 149 MG/DL (ref 70–99)
GLUCOSE BLDC GLUCOMTR-MCNC: 154 MG/DL (ref 70–99)
GLUCOSE SERPL-MCNC: 187 MG/DL (ref 65–99)
HCT VFR BLD AUTO: 40.4 % (ref 34–46.6)
HGB BLD-MCNC: 12.7 G/DL (ref 12–15.9)
IMM GRANULOCYTES # BLD AUTO: 0.1 10*3/MM3 (ref 0–0.05)
IMM GRANULOCYTES NFR BLD AUTO: 0.7 % (ref 0–0.5)
L PNEUMO1 AG UR QL IA: NEGATIVE
LYMPHOCYTES # BLD AUTO: 0.61 10*3/MM3 (ref 0.7–3.1)
LYMPHOCYTES NFR BLD AUTO: 4 % (ref 19.6–45.3)
M PNEUMO IGM SER QL: NEGATIVE
MAGNESIUM SERPL-MCNC: 2.1 MG/DL (ref 1.6–2.6)
MCH RBC QN AUTO: 28.2 PG (ref 26.6–33)
MCHC RBC AUTO-ENTMCNC: 31.4 G/DL (ref 31.5–35.7)
MCV RBC AUTO: 89.6 FL (ref 79–97)
MONOCYTES # BLD AUTO: 0.54 10*3/MM3 (ref 0.1–0.9)
MONOCYTES NFR BLD AUTO: 3.5 % (ref 5–12)
NEUTROPHILS NFR BLD AUTO: 14.08 10*3/MM3 (ref 1.7–7)
NEUTROPHILS NFR BLD AUTO: 91.7 % (ref 42.7–76)
NRBC BLD AUTO-RTO: 0 /100 WBC (ref 0–0.2)
PHOSPHATE SERPL-MCNC: 2.3 MG/DL (ref 2.5–4.5)
PLATELET # BLD AUTO: 311 10*3/MM3 (ref 140–450)
PMV BLD AUTO: 9.5 FL (ref 6–12)
POTASSIUM SERPL-SCNC: 4.5 MMOL/L (ref 3.5–5.2)
PROT SERPL-MCNC: 7.2 G/DL (ref 6–8.5)
RBC # BLD AUTO: 4.51 10*6/MM3 (ref 3.77–5.28)
S PNEUM AG SPEC QL LA: NEGATIVE
SODIUM SERPL-SCNC: 141 MMOL/L (ref 136–145)
WBC NRBC COR # BLD AUTO: 15.34 10*3/MM3 (ref 3.4–10.8)

## 2023-12-30 PROCEDURE — 25010000002 HEPARIN (PORCINE) PER 1000 UNITS: Performed by: STUDENT IN AN ORGANIZED HEALTH CARE EDUCATION/TRAINING PROGRAM

## 2023-12-30 PROCEDURE — 85025 COMPLETE CBC W/AUTO DIFF WBC: CPT | Performed by: FAMILY MEDICINE

## 2023-12-30 PROCEDURE — 94799 UNLISTED PULMONARY SVC/PX: CPT

## 2023-12-30 PROCEDURE — 87899 AGENT NOS ASSAY W/OPTIC: CPT | Performed by: FAMILY MEDICINE

## 2023-12-30 PROCEDURE — 94664 DEMO&/EVAL PT USE INHALER: CPT

## 2023-12-30 PROCEDURE — G0378 HOSPITAL OBSERVATION PER HR: HCPCS

## 2023-12-30 PROCEDURE — 99232 SBSQ HOSP IP/OBS MODERATE 35: CPT | Performed by: FAMILY MEDICINE

## 2023-12-30 PROCEDURE — 25010000002 METHYLPREDNISOLONE PER 40 MG: Performed by: STUDENT IN AN ORGANIZED HEALTH CARE EDUCATION/TRAINING PROGRAM

## 2023-12-30 PROCEDURE — 84100 ASSAY OF PHOSPHORUS: CPT | Performed by: FAMILY MEDICINE

## 2023-12-30 PROCEDURE — 87449 NOS EACH ORGANISM AG IA: CPT | Performed by: FAMILY MEDICINE

## 2023-12-30 PROCEDURE — 82948 REAGENT STRIP/BLOOD GLUCOSE: CPT

## 2023-12-30 PROCEDURE — 80076 HEPATIC FUNCTION PANEL: CPT | Performed by: FAMILY MEDICINE

## 2023-12-30 PROCEDURE — 83735 ASSAY OF MAGNESIUM: CPT | Performed by: FAMILY MEDICINE

## 2023-12-30 PROCEDURE — 63710000001 INSULIN LISPRO (HUMAN) PER 5 UNITS: Performed by: FAMILY MEDICINE

## 2023-12-30 PROCEDURE — 80048 BASIC METABOLIC PNL TOTAL CA: CPT | Performed by: FAMILY MEDICINE

## 2023-12-30 RX ORDER — FLUTICASONE PROPIONATE 50 MCG
2 SPRAY, SUSPENSION (ML) NASAL DAILY
Status: DISCONTINUED | OUTPATIENT
Start: 2023-12-30 | End: 2024-01-01 | Stop reason: HOSPADM

## 2023-12-30 RX ORDER — OXYMETAZOLINE HYDROCHLORIDE 0.05 G/100ML
2 SPRAY NASAL 2 TIMES DAILY
Status: DISCONTINUED | OUTPATIENT
Start: 2023-12-30 | End: 2024-01-01 | Stop reason: HOSPADM

## 2023-12-30 RX ADMIN — HEPARIN SODIUM 5000 UNITS: 5000 INJECTION INTRAVENOUS; SUBCUTANEOUS at 20:13

## 2023-12-30 RX ADMIN — IPRATROPIUM BROMIDE AND ALBUTEROL SULFATE 3 ML: 2.5; .5 SOLUTION RESPIRATORY (INHALATION) at 19:09

## 2023-12-30 RX ADMIN — BUDESONIDE 0.5 MG: 0.5 SUSPENSION RESPIRATORY (INHALATION) at 19:09

## 2023-12-30 RX ADMIN — ARFORMOTEROL TARTRATE 15 MCG: 15 SOLUTION RESPIRATORY (INHALATION) at 07:08

## 2023-12-30 RX ADMIN — Medication 10 ML: at 20:14

## 2023-12-30 RX ADMIN — IPRATROPIUM BROMIDE AND ALBUTEROL SULFATE 3 ML: 2.5; .5 SOLUTION RESPIRATORY (INHALATION) at 00:10

## 2023-12-30 RX ADMIN — HEPARIN SODIUM 5000 UNITS: 5000 INJECTION INTRAVENOUS; SUBCUTANEOUS at 09:00

## 2023-12-30 RX ADMIN — FLUTICASONE PROPIONATE 2 SPRAY: 50 SPRAY, METERED NASAL at 12:11

## 2023-12-30 RX ADMIN — METHYLPREDNISOLONE SODIUM SUCCINATE 40 MG: 40 INJECTION, POWDER, LYOPHILIZED, FOR SOLUTION INTRAMUSCULAR; INTRAVENOUS at 13:50

## 2023-12-30 RX ADMIN — IPRATROPIUM BROMIDE AND ALBUTEROL SULFATE 3 ML: 2.5; .5 SOLUTION RESPIRATORY (INHALATION) at 07:08

## 2023-12-30 RX ADMIN — CLONIDINE HYDROCHLORIDE 0.3 MG: 0.3 TABLET ORAL at 09:00

## 2023-12-30 RX ADMIN — METHYLPREDNISOLONE SODIUM SUCCINATE 40 MG: 40 INJECTION, POWDER, LYOPHILIZED, FOR SOLUTION INTRAMUSCULAR; INTRAVENOUS at 06:22

## 2023-12-30 RX ADMIN — Medication 2 SPRAY: at 12:11

## 2023-12-30 RX ADMIN — AMLODIPINE BESYLATE 5 MG: 5 TABLET ORAL at 09:00

## 2023-12-30 RX ADMIN — ARFORMOTEROL TARTRATE 15 MCG: 15 SOLUTION RESPIRATORY (INHALATION) at 19:09

## 2023-12-30 RX ADMIN — DOXYCYCLINE 100 MG: 100 CAPSULE ORAL at 20:13

## 2023-12-30 RX ADMIN — METOPROLOL SUCCINATE 25 MG: 25 TABLET, EXTENDED RELEASE ORAL at 09:00

## 2023-12-30 RX ADMIN — DOCUSATE SODIUM 50MG AND SENNOSIDES 8.6MG 2 TABLET: 8.6; 5 TABLET, FILM COATED ORAL at 09:01

## 2023-12-30 RX ADMIN — CLONIDINE HYDROCHLORIDE 0.3 MG: 0.3 TABLET ORAL at 20:13

## 2023-12-30 RX ADMIN — Medication 10 ML: at 09:03

## 2023-12-30 RX ADMIN — INSULIN LISPRO 2 UNITS: 100 INJECTION, SOLUTION INTRAVENOUS; SUBCUTANEOUS at 18:09

## 2023-12-30 RX ADMIN — HYDROCODONE BITARTRATE AND ACETAMINOPHEN 1 TABLET: 10; 325 TABLET ORAL at 20:12

## 2023-12-30 RX ADMIN — DOXYCYCLINE 100 MG: 100 CAPSULE ORAL at 09:01

## 2023-12-30 RX ADMIN — HYDROCODONE BITARTRATE AND ACETAMINOPHEN 1 TABLET: 10; 325 TABLET ORAL at 13:50

## 2023-12-30 RX ADMIN — IPRATROPIUM BROMIDE AND ALBUTEROL SULFATE 3 ML: 2.5; .5 SOLUTION RESPIRATORY (INHALATION) at 12:18

## 2023-12-30 RX ADMIN — ACETAMINOPHEN 650 MG: 325 TABLET ORAL at 19:52

## 2023-12-30 RX ADMIN — HYDROCODONE BITARTRATE AND ACETAMINOPHEN 1 TABLET: 10; 325 TABLET ORAL at 06:22

## 2023-12-30 RX ADMIN — Medication 2 SPRAY: at 20:14

## 2023-12-30 RX ADMIN — BUDESONIDE 0.5 MG: 0.5 SUSPENSION RESPIRATORY (INHALATION) at 07:08

## 2023-12-30 RX ADMIN — METHYLPREDNISOLONE SODIUM SUCCINATE 40 MG: 40 INJECTION, POWDER, LYOPHILIZED, FOR SOLUTION INTRAMUSCULAR; INTRAVENOUS at 21:31

## 2023-12-30 NOTE — PLAN OF CARE
Goal Outcome Evaluation:  Plan of Care Reviewed With: patient        Progress: improving    VSS, PATIENT BP TRENDING DOWN WNL, MEDICATED FOR PAIN X1, NO ACUTE EVENTS RESTED WELL, ON RA

## 2023-12-30 NOTE — PLAN OF CARE
Goal Outcome Evaluation:  Plan of Care Reviewed With: patient      Patient given norco for pain control. Blood sugars ranged 137-154. Vital signs WNL this shift.   Progress: improving

## 2023-12-30 NOTE — PROGRESS NOTES
[facility]   Hospitalist Progress Note  Date: 2023  Patient Name: Yenni Camacho  : 1965  MRN: [id number]  Date of admission: 2023      Subjective   Subjective     Chief complaint: Shortness of breath    Summary:  58-year-old female with history of PTSD, anxiety, hypertension, dyslipidemia, COPD, current smoker with marijuana, vapor with THC, ex tobacco smoker, hospitalized on 2022 with shortness of breath symptoms, with concern for underlying emphysema and COPD exacerbation, COPD is presumed based on CT scan findings showing emphysema.  Placed on scheduled nebs, steroids.  Prophylactic antibiotics.    Interval follow-up: Seen and examined this morning, no acute distress, no acute major night events, continues to have cough and shortness of breath symptoms with significant wheezing, inspiratory and expiratory.  Respiratory rate is stable, heart rate intermittently tachycardic up to the 100s.  O2 sats dropped to 80s on room air with exertional activity.  No chest pain or palpitations.  White blood cell count 15,000, hemoglobin 12.7, creatinine 0.76, potassium 4.5, sodium 141.    Review of systems:  All systems reviewed and negative except for weakness, fatigue, cough, shortness of breath, increased work of breathing with minimal exertional activity.,  Nasal congestion    Objective   Objective     Vitals:   Temp:  [97.5 °F (36.4 °C)-98 °F (36.7 °C)] 97.8 °F (36.6 °C)  Heart Rate:  [85-92] 85  Resp:  [16-20] 18  BP: (120-147)/(61-79) 147/76  Physical Exam    Constitutional: Awake, alert, no acute distress   Eyes: Pupils equal, sclerae anicteric, no conjunctival injection   HENT: NCAT, mucous membranes moist   Neck: Supple, no thyromegaly, no lymphadenopathy, trachea midline   Respiratory: Coarse breath sounds throughout, scattered rhonchi, wheezing inspiratory and expiratory   Cardiovascular: RRR, no murmurs, rubs, or gallops, palpable pedal pulses bilaterally   Gastrointestinal:  "Positive bowel sounds, soft, nontender, nondistended   Musculoskeletal: No bilateral ankle edema, no clubbing or cyanosis to extremities   Psychiatric: Appropriate affect, cooperative   Neurologic: Oriented x 3, strength symmetric in all extremities, Cranial Nerves grossly intact to confrontation, speech clear   Skin: No rashes visible on exposed skin    Result Review    Result Review:  I have personally reviewed the pertinent results from the past 24 hours to 12/30/2023 14:18 EST and agree with these findings:  [x]  Laboratory   CBC          12/28/2023    20:53 12/29/2023    05:45 12/30/2023    05:06   CBC   WBC 5.58  7.06  15.34    RBC 5.24  4.70  4.51    Hemoglobin 14.6  13.3  12.7    Hematocrit 45.7  41.2  40.4    MCV 87.2  87.7  89.6    MCH 27.9  28.3  28.2    MCHC 31.9  32.3  31.4    RDW 13.5  13.8  14.3    Platelets 313  311  311      BMP          12/28/2023    20:53 12/29/2023    05:45 12/30/2023    05:06   BMP   BUN 12  17  16    Creatinine 0.77  0.93  0.76    Sodium 141  139  141    Potassium 3.7  3.4  4.5    Chloride 101  101  104    CO2 28.0  23.3  26.8    Calcium 9.6  9.6  10.3      LIVER FUNCTION TESTS:      Lab 12/30/23  0506 12/28/23 2053   TOTAL PROTEIN 7.2 7.6   ALBUMIN 4.0 4.1   GLOBULIN  --  3.5   ALT (SGPT) 8 11   AST (SGOT) 12 16   BILIRUBIN <0.2 0.2   BILIRUBIN DIRECT <0.2  --    ALK PHOS 113 116       [x]  Microbiology No results found for: \"ACANTHNAEG\", \"AFBCX\", \"BPERTUSSISCX\", \"BLOODCX\"  No results found for: \"BCIDPCR\", \"CXREFLEX\", \"CSFCX\", \"CULTURETIS\"  No results found for: \"CULTURES\", \"HSVCX\", \"URCX\"  No results found for: \"EYECULTURE\", \"GCCX\", \"HSVCULTURE\", \"LABHSV\"  No results found for: \"LEGIONELLA\", \"MRSACX\", \"MUMPSCX\", \"MYCOPLASCX\"  No results found for: \"NOCARDIACX\", \"STOOLCX\"  No results found for: \"THROATCX\", \"UNSTIMCULT\", \"URINECX\", \"CULTURE\", \"VZVCULTUR\"  No results found for: \"VIRALCULTU\", \"WOUNDCX\"    [x]  Radiology CT Chest Without Contrast Diagnostic    Result Date: " 12/29/2023  PROCEDURE: CT CHEST WO CONTRAST DIAGNOSTIC  COMPARISON: [facility], CR, XR CHEST 1 VW, 12/28/2023, 21:03.  [facility], CT, CT CHEST W CONTRAST DIAGNOSTIC, 4/12/2022, 13:32.  INDICATIONS: Respiratory illness, nondiagnostic xray, soa x 3 days  TECHNIQUE: CT images were created without the administration of contrast material.   PROTOCOL:   Standard imaging protocol performed    RADIATION:   DLP: 219.4 mGy*cm   Automated exposure control was utilized to minimize radiation dose.  FINDINGS:  The central tracheobronchial tree is clear.  There is mild emphysema.  There is stable scarring within the left lung apex.  No concerning pulmonary nodule is identified.  There is no focal consolidation.  There is no pleural effusion.  The heart size appears normal, with evidence of mild calcified coronary artery disease.  The great vessels are normal in caliber.  No abnormally enlarged lymph nodes are identified.  Partial evaluation of the upper abdomen is unremarkable.  No aggressive osseous lesions are identified.        1. No acute cardiopulmonary process. 2. No concerning pulmonary nodule identified.  The abnormality on recent prior CXR likely represented prominent nipple shadows. 3. Mild emphysema.     JEREMY GUTIÉRREZ MD       Electronically Signed and Approved By: JEREMY GUTIÉRREZ MD on 12/29/2023 at 0:31             XR Chest 1 View    Result Date: 12/28/2023  PROCEDURE: XR CHEST 1 VW  COMPARISON: [facility], CT, CT CHEST W CONTRAST DIAGNOSTIC, 4/12/2022, 13:32.  [facility], CR, XR CHEST 1 VW, 4/13/2022, 23:46.  INDICATIONS: SHORTNESS OF BREATH  FINDINGS:  Emphysematous changes are noted.  There are irregular densities in the lung apices bilaterally which are chronic, consistent with fibrosis.  There are more nodular appearing densities projecting over both lung bases favored to represent nipple shadows.  No convincing acute infiltrate is seen.        1.  Emphysematous changes 2. Suspected nipple shadows projecting over the lung bases bilaterally.  A repeat chest PA and lateral with nipple markers is recommended to confirm this. 3. Biapical scarring/fibrosis.       Freedom Roblero M.D.       Electronically Signed and Approved By: Freedom Roblero M.D. on 12/28/2023 at 21:43               [x]  EKG/Telemetry   []  Cardiology/Vascular   []  Pathology  [x]  Old records  []  Other:    Assessment & Plan   Assessment / Plan     Assessment/Plan:  Assessment:  COPD with acute exacerbation  Essential pretension  PTSD  Anxiety  Current vapor  Ex-smoker  THC/marijuana user  Scarring of the left lung  Elevated blood sugars without the diagnosis of diabetes; prediabetes    Plan:  Labs and imaging reviewed  Continue supplemental oxygen as required to maintain sats greater 90%  Continue Brovana Pulmicort nebs twice daily  Continue DuoNebs every 6 hours  Follow-up culture results  Continue empiric antibiotics doxycycline 100 mg twice a day  Continue methylprednisolone 40 mg every 8 hours  Continue insulin sliding scale coverage while on methylprednisolone  Start Afrin twice daily as well as Flonase twice daily  A.m. labs  Full code  DVT prophylaxis with heparin  Clinical course dictate further management  Discussed with nurse at the bedside.    DVT prophylaxis:  Medical DVT prophylaxis orders are present.    CODE STATUS:   Level Of Support Discussed With: Patient  Code Status (Patient has no pulse and is not breathing): CPR (Attempt to Resuscitate)  Medical Interventions (Patient has pulse or is breathing): Full Support        Electronically signed by Rio Rios MD, 12/30/23, 2:18 PM EST.    Portions of this documentation were transcribed electronically from a voice recognition software.  I confirm all data accurately represents the service(s) I performed at today's visit.

## 2023-12-31 PROBLEM — J44.1 COPD EXACERBATION: Status: ACTIVE | Noted: 2023-12-31

## 2023-12-31 LAB
GLUCOSE BLDC GLUCOMTR-MCNC: 112 MG/DL (ref 70–99)
GLUCOSE BLDC GLUCOMTR-MCNC: 112 MG/DL (ref 70–99)
GLUCOSE BLDC GLUCOMTR-MCNC: 141 MG/DL (ref 70–99)
GLUCOSE BLDC GLUCOMTR-MCNC: 160 MG/DL (ref 70–99)

## 2023-12-31 PROCEDURE — 25010000002 METHYLPREDNISOLONE PER 40 MG: Performed by: FAMILY MEDICINE

## 2023-12-31 PROCEDURE — 99232 SBSQ HOSP IP/OBS MODERATE 35: CPT | Performed by: FAMILY MEDICINE

## 2023-12-31 PROCEDURE — 94799 UNLISTED PULMONARY SVC/PX: CPT

## 2023-12-31 PROCEDURE — 25010000002 HEPARIN (PORCINE) PER 1000 UNITS: Performed by: STUDENT IN AN ORGANIZED HEALTH CARE EDUCATION/TRAINING PROGRAM

## 2023-12-31 PROCEDURE — 63710000001 INSULIN LISPRO (HUMAN) PER 5 UNITS: Performed by: FAMILY MEDICINE

## 2023-12-31 PROCEDURE — 94664 DEMO&/EVAL PT USE INHALER: CPT

## 2023-12-31 PROCEDURE — 82948 REAGENT STRIP/BLOOD GLUCOSE: CPT

## 2023-12-31 PROCEDURE — 25010000002 HYDRALAZINE PER 20 MG: Performed by: STUDENT IN AN ORGANIZED HEALTH CARE EDUCATION/TRAINING PROGRAM

## 2023-12-31 PROCEDURE — 25010000002 METHYLPREDNISOLONE PER 40 MG: Performed by: STUDENT IN AN ORGANIZED HEALTH CARE EDUCATION/TRAINING PROGRAM

## 2023-12-31 RX ORDER — METHYLPREDNISOLONE SODIUM SUCCINATE 40 MG/ML
40 INJECTION, POWDER, LYOPHILIZED, FOR SOLUTION INTRAMUSCULAR; INTRAVENOUS EVERY 12 HOURS
Status: DISCONTINUED | OUTPATIENT
Start: 2023-12-31 | End: 2024-01-01 | Stop reason: HOSPADM

## 2023-12-31 RX ADMIN — IPRATROPIUM BROMIDE AND ALBUTEROL SULFATE 3 ML: 2.5; .5 SOLUTION RESPIRATORY (INHALATION) at 07:07

## 2023-12-31 RX ADMIN — BUDESONIDE 0.5 MG: 0.5 SUSPENSION RESPIRATORY (INHALATION) at 19:38

## 2023-12-31 RX ADMIN — CLONIDINE HYDROCHLORIDE 0.3 MG: 0.3 TABLET ORAL at 20:59

## 2023-12-31 RX ADMIN — DOXYCYCLINE 100 MG: 100 CAPSULE ORAL at 08:20

## 2023-12-31 RX ADMIN — DOXYCYCLINE 100 MG: 100 CAPSULE ORAL at 20:59

## 2023-12-31 RX ADMIN — HYDROCODONE BITARTRATE AND ACETAMINOPHEN 1 TABLET: 10; 325 TABLET ORAL at 10:13

## 2023-12-31 RX ADMIN — HYDROCODONE BITARTRATE AND ACETAMINOPHEN 1 TABLET: 10; 325 TABLET ORAL at 23:55

## 2023-12-31 RX ADMIN — CLONIDINE HYDROCHLORIDE 0.3 MG: 0.3 TABLET ORAL at 08:20

## 2023-12-31 RX ADMIN — IPRATROPIUM BROMIDE AND ALBUTEROL SULFATE 3 ML: 2.5; .5 SOLUTION RESPIRATORY (INHALATION) at 19:38

## 2023-12-31 RX ADMIN — Medication 2 SPRAY: at 21:01

## 2023-12-31 RX ADMIN — METHYLPREDNISOLONE SODIUM SUCCINATE 40 MG: 40 INJECTION INTRAMUSCULAR; INTRAVENOUS at 17:59

## 2023-12-31 RX ADMIN — METHYLPREDNISOLONE SODIUM SUCCINATE 40 MG: 40 INJECTION, POWDER, LYOPHILIZED, FOR SOLUTION INTRAMUSCULAR; INTRAVENOUS at 06:34

## 2023-12-31 RX ADMIN — ARFORMOTEROL TARTRATE 15 MCG: 15 SOLUTION RESPIRATORY (INHALATION) at 19:38

## 2023-12-31 RX ADMIN — HEPARIN SODIUM 5000 UNITS: 5000 INJECTION INTRAVENOUS; SUBCUTANEOUS at 08:20

## 2023-12-31 RX ADMIN — FLUTICASONE PROPIONATE 2 SPRAY: 50 SPRAY, METERED NASAL at 08:19

## 2023-12-31 RX ADMIN — IPRATROPIUM BROMIDE AND ALBUTEROL SULFATE 3 ML: 2.5; .5 SOLUTION RESPIRATORY (INHALATION) at 00:14

## 2023-12-31 RX ADMIN — DOCUSATE SODIUM 50MG AND SENNOSIDES 8.6MG 2 TABLET: 8.6; 5 TABLET, FILM COATED ORAL at 08:20

## 2023-12-31 RX ADMIN — HYDROCODONE BITARTRATE AND ACETAMINOPHEN 1 TABLET: 10; 325 TABLET ORAL at 16:10

## 2023-12-31 RX ADMIN — ARFORMOTEROL TARTRATE 15 MCG: 15 SOLUTION RESPIRATORY (INHALATION) at 07:07

## 2023-12-31 RX ADMIN — Medication 10 ML: at 08:20

## 2023-12-31 RX ADMIN — HEPARIN SODIUM 5000 UNITS: 5000 INJECTION INTRAVENOUS; SUBCUTANEOUS at 20:59

## 2023-12-31 RX ADMIN — METOPROLOL SUCCINATE 25 MG: 25 TABLET, EXTENDED RELEASE ORAL at 08:20

## 2023-12-31 RX ADMIN — HYDRALAZINE HYDROCHLORIDE 10 MG: 20 INJECTION, SOLUTION INTRAMUSCULAR; INTRAVENOUS at 16:13

## 2023-12-31 RX ADMIN — INSULIN LISPRO 2 UNITS: 100 INJECTION, SOLUTION INTRAVENOUS; SUBCUTANEOUS at 12:14

## 2023-12-31 RX ADMIN — BUDESONIDE 0.5 MG: 0.5 SUSPENSION RESPIRATORY (INHALATION) at 07:07

## 2023-12-31 RX ADMIN — Medication 2 SPRAY: at 08:19

## 2023-12-31 RX ADMIN — DOCUSATE SODIUM 50MG AND SENNOSIDES 8.6MG 2 TABLET: 8.6; 5 TABLET, FILM COATED ORAL at 20:58

## 2023-12-31 RX ADMIN — HYDROCODONE BITARTRATE AND ACETAMINOPHEN 1 TABLET: 10; 325 TABLET ORAL at 03:33

## 2023-12-31 RX ADMIN — Medication 10 ML: at 20:59

## 2023-12-31 RX ADMIN — IPRATROPIUM BROMIDE AND ALBUTEROL SULFATE 3 ML: 2.5; .5 SOLUTION RESPIRATORY (INHALATION) at 12:39

## 2023-12-31 RX ADMIN — AMLODIPINE BESYLATE 5 MG: 5 TABLET ORAL at 08:20

## 2023-12-31 NOTE — PROGRESS NOTES
[facility]   Hospitalist Progress Note  Date: 2023  Patient Name: Yenni Camacho  : 1965  MRN: [id number]  Date of admission: 2023      Subjective   Subjective     Chief complaint: Shortness of breath    Summary:  58-year-old female with history of PTSD, anxiety, hypertension, dyslipidemia, COPD, current smoker with marijuana, vapor with THC, ex tobacco smoker, hospitalized on 2022 with shortness of breath symptoms, with concern for underlying emphysema and COPD exacerbation, COPD is presumed based on CT scan findings showing emphysema.  Placed on scheduled nebs, steroids.  Prophylactic antibiotics.    Interval follow-up: Seen and examined this morning, no acute distress, no acute major night events, very wheezy and difficulty clearing sputum having to add MetaNebs to help alleviate mucous plugging.  No chest pain or palpitations.  Nasal congestion improved some.  Blood pressure is markedly elevated on 80s over 90s.  Remains on her home medication which have all been resumed for blood pressure control.    Review of systems:  All systems reviewed and negative except for weakness, fatigue, cough, shortness of breath at rest, shortness of breath with exertion, wheezing    Objective   Objective     Vitals:   Temp:  [97.4 °F (36.3 °C)-98.5 °F (36.9 °C)] 97.4 °F (36.3 °C)  Heart Rate:  [73-95] 84  Resp:  [18-20] 18  BP: (150-215)/() 186/99  Physical Exam    Constitutional: Awake, alert, no acute distress   Eyes: Pupils equal, sclerae anicteric, no conjunctival injection   HENT: NCAT, mucous membranes moist   Neck: Supple, full range of motion   Respiratory: Coarse breath sounds throughout, scattered rhonchi, wheezing inspiratory and expiratory   Cardiovascular: RRR, no murmurs, rubs, or gallops, palpable pedal pulses bilaterally   Gastrointestinal: Positive bowel sounds, soft, nontender, nondistended   Musculoskeletal: No bilateral ankle edema, no clubbing or cyanosis to  "extremities   Psychiatric: Appropriate affect, cooperative   Neurologic: Oriented x 3, strength symmetric in all extremities, Cranial Nerves grossly intact to confrontation, speech clear   Skin: No rashes visible on exposed skin    Result Review    Result Review:  I have personally reviewed the pertinent results from the past 24 hours to 12/31/2023 12:11 EST and agree with these findings:  [x]  Laboratory   CBC          12/28/2023    20:53 12/29/2023    05:45 12/30/2023    05:06   CBC   WBC 5.58  7.06  15.34    RBC 5.24  4.70  4.51    Hemoglobin 14.6  13.3  12.7    Hematocrit 45.7  41.2  40.4    MCV 87.2  87.7  89.6    MCH 27.9  28.3  28.2    MCHC 31.9  32.3  31.4    RDW 13.5  13.8  14.3    Platelets 313  311  311      BMP          12/28/2023    20:53 12/29/2023    05:45 12/30/2023    05:06   BMP   BUN 12  17  16    Creatinine 0.77  0.93  0.76    Sodium 141  139  141    Potassium 3.7  3.4  4.5    Chloride 101  101  104    CO2 28.0  23.3  26.8    Calcium 9.6  9.6  10.3      LIVER FUNCTION TESTS:      Lab 12/30/23  0506 12/28/23 2053   TOTAL PROTEIN 7.2 7.6   ALBUMIN 4.0 4.1   GLOBULIN  --  3.5   ALT (SGPT) 8 11   AST (SGOT) 12 16   BILIRUBIN <0.2 0.2   BILIRUBIN DIRECT <0.2  --    ALK PHOS 113 116       [x]  Microbiology No results found for: \"ACANTHNAEG\", \"AFBCX\", \"BPERTUSSISCX\", \"BLOODCX\"  No results found for: \"BCIDPCR\", \"CXREFLEX\", \"CSFCX\", \"CULTURETIS\"  No results found for: \"CULTURES\", \"HSVCX\", \"URCX\"  No results found for: \"EYECULTURE\", \"GCCX\", \"HSVCULTURE\", \"LABHSV\"  No results found for: \"LEGIONELLA\", \"MRSACX\", \"MUMPSCX\", \"MYCOPLASCX\"  No results found for: \"NOCARDIACX\", \"STOOLCX\"  No results found for: \"THROATCX\", \"UNSTIMCULT\", \"URINECX\", \"CULTURE\", \"VZVCULTUR\"  No results found for: \"VIRALCULTU\", \"WOUNDCX\"    [x]  Radiology CT Chest Without Contrast Diagnostic    Result Date: 12/29/2023  PROCEDURE: CT CHEST WO CONTRAST DIAGNOSTIC  COMPARISON: [facility], CR, XR CHEST 1 VW, 12/28/2023, 21:03. "  [facility], CT, CT CHEST W CONTRAST DIAGNOSTIC, 4/12/2022, 13:32.  INDICATIONS: Respiratory illness, nondiagnostic xray, soa x 3 days  TECHNIQUE: CT images were created without the administration of contrast material.   PROTOCOL:   Standard imaging protocol performed    RADIATION:   DLP: 219.4 mGy*cm   Automated exposure control was utilized to minimize radiation dose.  FINDINGS:  The central tracheobronchial tree is clear.  There is mild emphysema.  There is stable scarring within the left lung apex.  No concerning pulmonary nodule is identified.  There is no focal consolidation.  There is no pleural effusion.  The heart size appears normal, with evidence of mild calcified coronary artery disease.  The great vessels are normal in caliber.  No abnormally enlarged lymph nodes are identified.  Partial evaluation of the upper abdomen is unremarkable.  No aggressive osseous lesions are identified.        1. No acute cardiopulmonary process. 2. No concerning pulmonary nodule identified.  The abnormality on recent prior CXR likely represented prominent nipple shadows. 3. Mild emphysema.     JEREMY GUTIÉRREZ MD       Electronically Signed and Approved By: JEREMY GUTIÉRREZ MD on 12/29/2023 at 0:31             XR Chest 1 View    Result Date: 12/28/2023  PROCEDURE: XR CHEST 1 VW  COMPARISON: [facility], CT, CT CHEST W CONTRAST DIAGNOSTIC, 4/12/2022, 13:32.  [facility], CR, XR CHEST 1 VW, 4/13/2022, 23:46.  INDICATIONS: SHORTNESS OF BREATH  FINDINGS:  Emphysematous changes are noted.  There are irregular densities in the lung apices bilaterally which are chronic, consistent with fibrosis.  There are more nodular appearing densities projecting over both lung bases favored to represent nipple shadows.  No convincing acute infiltrate is seen.        1. Emphysematous changes 2. Suspected nipple shadows projecting over the lung bases bilaterally.  A repeat chest PA and lateral with nipple  markers is recommended to confirm this. 3. Biapical scarring/fibrosis.       Freedom Roblero M.D.       Electronically Signed and Approved By: Freedom Roblero M.D. on 12/28/2023 at 21:43               [x]  EKG/Telemetry   []  Cardiology/Vascular   []  Pathology  [x]  Old records  []  Other:    Assessment & Plan   Assessment / Plan     Assessment/Plan:  Assessment:  COPD with acute exacerbation  Essential pretension  PTSD  Anxiety  Current vapor  Ex-smoker  THC/marijuana user  Scarring of the left lung  Elevated blood sugars without the diagnosis of diabetes; prediabetes    Plan:  Labs and imaging reviewed  Start MetaNebs  Continue Brovana Pulmicort nebs twice daily  Continue DuoNebs every 6 hours  Follow-up culture results  Continue empiric antibiotics doxycycline 100 mg twice a day  Continue methylprednisolone 40 mg reduced to twice daily  Continue insulin sliding scale coverage while on methylprednisolone  Continue Afrin twice daily as well as Flonase twice daily  A.m. labs  Full code  Counseled to quit smoking  DVT prophylaxis with heparin  Clinical course dictate further management  Discussed with nurse at the bedside.    DVT prophylaxis:  Medical DVT prophylaxis orders are present.    CODE STATUS:   Level Of Support Discussed With: Patient  Code Status (Patient has no pulse and is not breathing): CPR (Attempt to Resuscitate)  Medical Interventions (Patient has pulse or is breathing): Full Support    Electronically signed by Rio Rios MD, 12/31/23, 12:19 PM EST.  Portions of this documentation were transcribed electronically from a voice recognition software.  I confirm all data accurately represents the service(s) I performed at today's visit.

## 2023-12-31 NOTE — PLAN OF CARE
Goal Outcome Evaluation:   Patient is alert and orientated x4. PRN pain medication administered once this shift and was effective per patient. No acute changes noted at this time. Will continue to follow current plan of care.        Progress: improving

## 2024-01-01 ENCOUNTER — READMISSION MANAGEMENT (OUTPATIENT)
Dept: CALL CENTER | Facility: HOSPITAL | Age: 59
End: 2024-01-01
Payer: MEDICAID

## 2024-01-01 VITALS
BODY MASS INDEX: 20.9 KG/M2 | DIASTOLIC BLOOD PRESSURE: 89 MMHG | HEART RATE: 88 BPM | WEIGHT: 133.16 LBS | RESPIRATION RATE: 21 BRPM | HEIGHT: 67 IN | OXYGEN SATURATION: 99 % | TEMPERATURE: 97.2 F | SYSTOLIC BLOOD PRESSURE: 141 MMHG

## 2024-01-01 LAB
ALBUMIN SERPL-MCNC: 3.9 G/DL (ref 3.5–5.2)
ALP SERPL-CCNC: 130 U/L (ref 39–117)
ALT SERPL W P-5'-P-CCNC: 25 U/L (ref 1–33)
ANION GAP SERPL CALCULATED.3IONS-SCNC: 14.9 MMOL/L (ref 5–15)
AST SERPL-CCNC: 25 U/L (ref 1–32)
BASOPHILS # BLD AUTO: 0.06 10*3/MM3 (ref 0–0.2)
BASOPHILS NFR BLD AUTO: 0.4 % (ref 0–1.5)
BILIRUB CONJ SERPL-MCNC: <0.2 MG/DL (ref 0–0.3)
BILIRUB INDIRECT SERPL-MCNC: ABNORMAL MG/DL
BILIRUB SERPL-MCNC: <0.2 MG/DL (ref 0–1.2)
BUN SERPL-MCNC: 19 MG/DL (ref 6–20)
BUN/CREAT SERPL: 26 (ref 7–25)
CALCIUM SPEC-SCNC: 9.5 MG/DL (ref 8.6–10.5)
CHLORIDE SERPL-SCNC: 103 MMOL/L (ref 98–107)
CO2 SERPL-SCNC: 25.1 MMOL/L (ref 22–29)
CREAT SERPL-MCNC: 0.73 MG/DL (ref 0.57–1)
DEPRECATED RDW RBC AUTO: 47.2 FL (ref 37–54)
EGFRCR SERPLBLD CKD-EPI 2021: 94.9 ML/MIN/1.73
EOSINOPHIL # BLD AUTO: 0 10*3/MM3 (ref 0–0.4)
EOSINOPHIL NFR BLD AUTO: 0 % (ref 0.3–6.2)
ERYTHROCYTE [DISTWIDTH] IN BLOOD BY AUTOMATED COUNT: 14.7 % (ref 12.3–15.4)
GLUCOSE BLDC GLUCOMTR-MCNC: 93 MG/DL (ref 70–99)
GLUCOSE SERPL-MCNC: 163 MG/DL (ref 65–99)
HCT VFR BLD AUTO: 45 % (ref 34–46.6)
HGB BLD-MCNC: 14.4 G/DL (ref 12–15.9)
IMM GRANULOCYTES # BLD AUTO: 0.76 10*3/MM3 (ref 0–0.05)
IMM GRANULOCYTES NFR BLD AUTO: 5.2 % (ref 0–0.5)
LYMPHOCYTES # BLD AUTO: 1.22 10*3/MM3 (ref 0.7–3.1)
LYMPHOCYTES NFR BLD AUTO: 8.4 % (ref 19.6–45.3)
MAGNESIUM SERPL-MCNC: 1.8 MG/DL (ref 1.6–2.6)
MCH RBC QN AUTO: 28 PG (ref 26.6–33)
MCHC RBC AUTO-ENTMCNC: 32 G/DL (ref 31.5–35.7)
MCV RBC AUTO: 87.5 FL (ref 79–97)
MONOCYTES # BLD AUTO: 0.82 10*3/MM3 (ref 0.1–0.9)
MONOCYTES NFR BLD AUTO: 5.6 % (ref 5–12)
NEUTROPHILS NFR BLD AUTO: 11.75 10*3/MM3 (ref 1.7–7)
NEUTROPHILS NFR BLD AUTO: 80.4 % (ref 42.7–76)
NRBC BLD AUTO-RTO: 0.1 /100 WBC (ref 0–0.2)
PHOSPHATE SERPL-MCNC: 3.4 MG/DL (ref 2.5–4.5)
PLAT MORPH BLD: NORMAL
PLATELET # BLD AUTO: 374 10*3/MM3 (ref 140–450)
PMV BLD AUTO: 9.5 FL (ref 6–12)
POTASSIUM SERPL-SCNC: 4.2 MMOL/L (ref 3.5–5.2)
PROT SERPL-MCNC: 7 G/DL (ref 6–8.5)
RBC # BLD AUTO: 5.14 10*6/MM3 (ref 3.77–5.28)
RBC MORPH BLD: NORMAL
SODIUM SERPL-SCNC: 143 MMOL/L (ref 136–145)
WBC MORPH BLD: NORMAL
WBC NRBC COR # BLD AUTO: 14.61 10*3/MM3 (ref 3.4–10.8)

## 2024-01-01 PROCEDURE — 80048 BASIC METABOLIC PNL TOTAL CA: CPT | Performed by: FAMILY MEDICINE

## 2024-01-01 PROCEDURE — 94799 UNLISTED PULMONARY SVC/PX: CPT

## 2024-01-01 PROCEDURE — 94664 DEMO&/EVAL PT USE INHALER: CPT

## 2024-01-01 PROCEDURE — 99239 HOSP IP/OBS DSCHRG MGMT >30: CPT | Performed by: FAMILY MEDICINE

## 2024-01-01 PROCEDURE — 85007 BL SMEAR W/DIFF WBC COUNT: CPT | Performed by: FAMILY MEDICINE

## 2024-01-01 PROCEDURE — 25010000002 METHYLPREDNISOLONE PER 40 MG: Performed by: FAMILY MEDICINE

## 2024-01-01 PROCEDURE — 84100 ASSAY OF PHOSPHORUS: CPT | Performed by: FAMILY MEDICINE

## 2024-01-01 PROCEDURE — 25010000002 HEPARIN (PORCINE) PER 1000 UNITS: Performed by: STUDENT IN AN ORGANIZED HEALTH CARE EDUCATION/TRAINING PROGRAM

## 2024-01-01 PROCEDURE — 83735 ASSAY OF MAGNESIUM: CPT | Performed by: FAMILY MEDICINE

## 2024-01-01 PROCEDURE — 80076 HEPATIC FUNCTION PANEL: CPT | Performed by: FAMILY MEDICINE

## 2024-01-01 PROCEDURE — 85025 COMPLETE CBC W/AUTO DIFF WBC: CPT | Performed by: FAMILY MEDICINE

## 2024-01-01 PROCEDURE — 82948 REAGENT STRIP/BLOOD GLUCOSE: CPT

## 2024-01-01 RX ORDER — TROLAMINE SALICYLATE 10 G/100G
1 CREAM TOPICAL 3 TIMES DAILY PRN
Status: DISCONTINUED | OUTPATIENT
Start: 2024-01-01 | End: 2024-01-01 | Stop reason: HOSPADM

## 2024-01-01 RX ORDER — BUDESONIDE AND FORMOTEROL FUMARATE DIHYDRATE 160; 4.5 UG/1; UG/1
2 AEROSOL RESPIRATORY (INHALATION)
Qty: 10.2 G | Refills: 0 | Status: SHIPPED | OUTPATIENT
Start: 2024-01-01

## 2024-01-01 RX ORDER — DOXYCYCLINE 100 MG/1
100 CAPSULE ORAL EVERY 12 HOURS SCHEDULED
Qty: 7 CAPSULE | Refills: 0 | Status: SHIPPED | OUTPATIENT
Start: 2024-01-01 | End: 2024-01-05

## 2024-01-01 RX ORDER — ALBUTEROL SULFATE 90 UG/1
2 AEROSOL, METERED RESPIRATORY (INHALATION) EVERY 4 HOURS PRN
Qty: 18 G | Refills: 0 | Status: SHIPPED | OUTPATIENT
Start: 2024-01-01

## 2024-01-01 RX ORDER — PREDNISONE 20 MG/1
40 TABLET ORAL DAILY
Qty: 10 TABLET | Refills: 0 | Status: SHIPPED | OUTPATIENT
Start: 2024-01-01 | End: 2024-01-06

## 2024-01-01 RX ORDER — FLUTICASONE PROPIONATE 50 MCG
2 SPRAY, SUSPENSION (ML) NASAL DAILY
Qty: 15.8 ML | Refills: 0 | Status: SHIPPED | OUTPATIENT
Start: 2024-01-02

## 2024-01-01 RX ADMIN — METOPROLOL SUCCINATE 25 MG: 25 TABLET, EXTENDED RELEASE ORAL at 08:06

## 2024-01-01 RX ADMIN — ARFORMOTEROL TARTRATE 15 MCG: 15 SOLUTION RESPIRATORY (INHALATION) at 08:10

## 2024-01-01 RX ADMIN — Medication 2 SPRAY: at 08:06

## 2024-01-01 RX ADMIN — IPRATROPIUM BROMIDE AND ALBUTEROL SULFATE 3 ML: 2.5; .5 SOLUTION RESPIRATORY (INHALATION) at 00:50

## 2024-01-01 RX ADMIN — HEPARIN SODIUM 5000 UNITS: 5000 INJECTION INTRAVENOUS; SUBCUTANEOUS at 08:06

## 2024-01-01 RX ADMIN — Medication 10 ML: at 08:06

## 2024-01-01 RX ADMIN — Medication: at 08:07

## 2024-01-01 RX ADMIN — METHYLPREDNISOLONE SODIUM SUCCINATE 40 MG: 40 INJECTION INTRAMUSCULAR; INTRAVENOUS at 06:32

## 2024-01-01 RX ADMIN — HYDROCODONE BITARTRATE AND ACETAMINOPHEN 1 TABLET: 10; 325 TABLET ORAL at 07:43

## 2024-01-01 RX ADMIN — AMLODIPINE BESYLATE 5 MG: 5 TABLET ORAL at 08:06

## 2024-01-01 RX ADMIN — Medication 1 APPLICATION: at 09:29

## 2024-01-01 RX ADMIN — IPRATROPIUM BROMIDE AND ALBUTEROL SULFATE 3 ML: 2.5; .5 SOLUTION RESPIRATORY (INHALATION) at 08:10

## 2024-01-01 RX ADMIN — DOXYCYCLINE 100 MG: 100 CAPSULE ORAL at 08:06

## 2024-01-01 RX ADMIN — FLUTICASONE PROPIONATE 2 SPRAY: 50 SPRAY, METERED NASAL at 08:06

## 2024-01-01 RX ADMIN — BUDESONIDE 0.5 MG: 0.5 SUSPENSION RESPIRATORY (INHALATION) at 08:10

## 2024-01-01 RX ADMIN — CLONIDINE HYDROCHLORIDE 0.3 MG: 0.3 TABLET ORAL at 08:06

## 2024-01-01 NOTE — OUTREACH NOTE
Prep Survey      Flowsheet Row Responses   [facility] patient discharged from? [facility]   Is LACE score < 7 ? No   Eligibility [facility]   Hospital [facility]   Date of Admission 12/28/23   Date of Discharge 01/01/24   Discharge Disposition Home or Self Care   Discharge diagnosis Hypoxia   Does the patient have one of the following disease processes/diagnoses(primary or secondary)? Other   Does the patient have Home health ordered? No   Is there a DME ordered? No   Medication alerts for this patient see AVS   Prep survey completed? Yes            Zakiya HARVEY - Registered Nurse

## 2024-01-01 NOTE — PLAN OF CARE
Goal Outcome Evaluation:  Patient alert and oriented x4 able to make needs known.  Patient with c/o pain this shift, see emar.  Patient with no acute events thus far this shift.  Continue plan of care.

## 2024-01-01 NOTE — DISCHARGE SUMMARY
[facility]         HOSPITALIST  DISCHARGE SUMMARY    Patient Name: Yenni Camacho  : 1965  MRN: [id number]    Date of Admission: 2023  Date of Discharge:  2024    Primary Care Physician: Allyssa Pritchett, LETITIA    Consults       No orders found from 2023 to 2023.            Active and Resolved Hospital Problems:    COPD with acute exacerbation  Essential pretension  PTSD  Anxiety  Current vapor  Ex-smoker  THC/marijuana user  Scarring of the left lung  Elevated blood sugars without the diagnosis of diabetes; prediabetes    Active Hospital Problems    Diagnosis POA    **Hypoxia [R09.02] Yes    COPD exacerbation [J44.1] Yes      Resolved Hospital Problems   No resolved problems to display.       Hospital Course     Hospital Course:  58-year-old female with history of PTSD, anxiety, hypertension, dyslipidemia, COPD, current smoker with marijuana, vapor with THC, ex tobacco smoker, hospitalized on 2022 with shortness of breath symptoms, with concern for underlying emphysema and COPD exacerbation, COPD is presumed based on CT scan findings showing emphysema.  Placed on scheduled nebs, steroids.  Prophylactic antibiotics.  Took several days for her breathing to improve.  Counseled her to quit smoking.  Wheezing subsided.  She had exertional tolerance.  As of 2024 she was hemodynamically stable time of discharge, she was referred to COPD clinic for further evaluation management.  She is to continue use of inhalers, steroids and empiric antibiotics.      Day of Discharge     Vital Signs:  Temp:  [97.2 °F (36.2 °C)-98.4 °F (36.9 °C)] 97.2 °F (36.2 °C)  Heart Rate:  [] 88  Resp:  [17-24] 21  BP: (141-202)/() 141/89  Review of systems:  All systems reviewed and negative except for weakness, fatigue, cough    Physical Exam              Constitutional: Awake, alert, no acute distress   Eyes: Pupils equal, sclerae anicteric, no conjunctival injection    HENT: NCAT, mucous membranes moist   Neck: Supple, full range of motion   Respiratory: Coarse breath sounds throughout   Cardiovascular: RRR, no murmurs, rubs, or gallops, palpable pedal pulses bilaterally   Gastrointestinal: Positive bowel sounds, soft, nontender, nondistended   Musculoskeletal: No bilateral ankle edema, no clubbing or cyanosis to extremities   Psychiatric: Appropriate affect, cooperative   Neurologic: Oriented x 3, strength symmetric in all extremities, Cranial Nerves grossly intact to confrontation, speech clear   Skin: No rashes visible on exposed skin      Discharge Details        Discharge Medications        New Medications        Instructions Start Date   budesonide-formoterol 160-4.5 MCG/ACT inhaler  Commonly known as: Symbicort   2 puffs, Inhalation, 2 Times Daily - RT      doxycycline 100 MG capsule  Commonly known as: MONODOX   100 mg, Oral, Every 12 Hours Scheduled      fluticasone 50 MCG/ACT nasal spray  Commonly known as: FLONASE   2 sprays, Each Nare, Daily   Start Date: January 2, 2024     predniSONE 20 MG tablet  Commonly known as: DELTASONE   40 mg, Oral, Daily             Continue These Medications        Instructions Start Date   acetaminophen 325 MG tablet  Commonly known as: TYLENOL   650 mg, Oral, Every 6 Hours PRN      albuterol sulfate  (90 Base) MCG/ACT inhaler  Commonly known as: PROVENTIL HFA;VENTOLIN HFA;PROAIR HFA   2 puffs, Inhalation, Every 4 Hours PRN      amLODIPine 5 MG tablet  Commonly known as: NORVASC   5 mg, Oral, Daily      atorvastatin 10 MG tablet  Commonly known as: LIPITOR   10 mg, Oral, Daily      Baclofen 5 MG tablet  Commonly known as: LIORESAL   5 mg, Oral, Every 8 Hours PRN      cloNIDine 0.3 MG tablet  Commonly known as: CATAPRES   TAKE 1 TABLET BY MOUTH TWICE A DAY      GOODY HEADACHE PO   1 tablet, Oral, Every 8 Hours PRN      HYDROcodone-acetaminophen 7.5-325 MG per tablet  Commonly known as: NORCO   Take 1 tablet by mouth Every 12 (Twelve)  Hours As Needed for Moderate Pain.      metoprolol succinate XL 25 MG 24 hr tablet  Commonly known as: TOPROL-XL   TAKE 1 TABLET BY MOUTH DAILY      naloxone 4 MG/0.1ML nasal spray  Commonly known as: NARCAN   1 spray into the nostril(s) as directed by provider As Needed for Opioid Reversal or Respiratory Depression.               Allergies   Allergen Reactions    Lexapro [Escitalopram] Mental Status Change     Worsened depression     Paxlovid [Nirmatrelvir-Ritonavir] Unknown - Low Severity    Wellbutrin [Bupropion] Unknown - Low Severity       Discharge Disposition:  Home or Self Care    Diet:  Hospital:No active diet order      Discharge Activity:       CODE STATUS:  Code Status and Medical Interventions:   Ordered at: 12/29/23 0782     Level Of Support Discussed With:    Patient     Code Status (Patient has no pulse and is not breathing):    CPR (Attempt to Resuscitate)     Medical Interventions (Patient has pulse or is breathing):    Full Support         No future appointments.    Additional Instructions for the Follow-ups that You Need to Schedule       Discharge Follow-up with PCP   As directed       Currently Documented PCP:    Allyssa Pritchett APRN    PCP Phone Number:    [phone]     Follow Up Details: 3 to 7 days                Pertinent  and/or Most Recent Results     PROCEDURES:   CT Chest Without Contrast Diagnostic    Result Date: 12/29/2023  PROCEDURE: CT CHEST WO CONTRAST DIAGNOSTIC  COMPARISON: [facility], CR, XR CHEST 1 VW, 12/28/2023, 21:03.  [facility], CT, CT CHEST W CONTRAST DIAGNOSTIC, 4/12/2022, 13:32.  INDICATIONS: Respiratory illness, nondiagnostic xray, soa x 3 days  TECHNIQUE: CT images were created without the administration of contrast material.   PROTOCOL:   Standard imaging protocol performed    RADIATION:   DLP: 219.4 mGy*cm   Automated exposure control was utilized to minimize radiation dose.  FINDINGS:  The central tracheobronchial tree is clear.   There is mild emphysema.  There is stable scarring within the left lung apex.  No concerning pulmonary nodule is identified.  There is no focal consolidation.  There is no pleural effusion.  The heart size appears normal, with evidence of mild calcified coronary artery disease.  The great vessels are normal in caliber.  No abnormally enlarged lymph nodes are identified.  Partial evaluation of the upper abdomen is unremarkable.  No aggressive osseous lesions are identified.        1. No acute cardiopulmonary process. 2. No concerning pulmonary nodule identified.  The abnormality on recent prior CXR likely represented prominent nipple shadows. 3. Mild emphysema.     JEREMY GUTIÉRREZ MD       Electronically Signed and Approved By: JEREMY GUTIÉRREZ MD on 12/29/2023 at 0:31             XR Chest 1 View    Result Date: 12/28/2023  PROCEDURE: XR CHEST 1 VW  COMPARISON: [facility], CT, CT CHEST W CONTRAST DIAGNOSTIC, 4/12/2022, 13:32.  [facility], CR, XR CHEST 1 VW, 4/13/2022, 23:46.  INDICATIONS: SHORTNESS OF BREATH  FINDINGS:  Emphysematous changes are noted.  There are irregular densities in the lung apices bilaterally which are chronic, consistent with fibrosis.  There are more nodular appearing densities projecting over both lung bases favored to represent nipple shadows.  No convincing acute infiltrate is seen.        1. Emphysematous changes 2. Suspected nipple shadows projecting over the lung bases bilaterally.  A repeat chest PA and lateral with nipple markers is recommended to confirm this. 3. Biapical scarring/fibrosis.       Freedom Roblero M.D.       Electronically Signed and Approved By: Freedom Roblero M.D. on 12/28/2023 at 21:43                LAB RESULTS:      Lab 01/01/24  0515 12/30/23  0506 12/29/23  0545 12/28/23  2053   WBC 14.61* 15.34* 7.06 5.58   HEMOGLOBIN 14.4 12.7 13.3 14.6   HEMATOCRIT 45.0 40.4 41.2 45.7   PLATELETS 374 311 311 313   NEUTROS ABS 11.75* 14.08* 6.71 3.80    IMMATURE GRANS (ABS) 0.76* 0.10* 0.02 0.02   LYMPHS ABS 1.22 0.61* 0.28* 1.00   MONOS ABS 0.82 0.54 0.05* 0.62   EOS ABS 0.00 0.00 0.00 0.13   MCV 87.5 89.6 87.7 87.2         Lab 01/01/24  0515 12/30/23  0506 12/29/23  0545 12/28/23 2053   SODIUM 143 141 139 141   POTASSIUM 4.2 4.5 3.4* 3.7   CHLORIDE 103 104 101 101   CO2 25.1 26.8 23.3 28.0   ANION GAP 14.9 10.2 14.7 12.0   BUN 19 16 17 12   CREATININE 0.73 0.76 0.93 0.77   EGFR 94.9 90.4 70.9 89.0   GLUCOSE 163* 187* 348* 101*   CALCIUM 9.5 10.3 9.6 9.6   MAGNESIUM 1.8 2.1  --   --    PHOSPHORUS 3.4 2.3*  --   --    HEMOGLOBIN A1C  --   --  6.50*  --          Lab 01/01/24  0515 12/30/23  0506 12/28/23 2053   TOTAL PROTEIN 7.0 7.2 7.6   ALBUMIN 3.9 4.0 4.1   GLOBULIN  --   --  3.5   ALT (SGPT) 25 8 11   AST (SGOT) 25 12 16   BILIRUBIN <0.2 <0.2 0.2   BILIRUBIN DIRECT <0.2 <0.2  --    ALK PHOS 130* 113 116         Lab 12/28/23 2053   PROBNP 369.6   HSTROP T 8         Lab 12/28/23 2053   CHOLESTEROL 211*   LDL CHOL 116*   HDL CHOL 57   TRIGLYCERIDES 219*             Lab 12/28/23 2357   PH, ARTERIAL 7.383   PCO2, ARTERIAL 44.1   PO2 ART 51.0*   O2 SATURATION ART 84.1*   FIO2 21   HCO3 ART 25.7   BASE EXCESS ART 0.3   CARBOXYHEMOGLOBIN 0.5     Brief Urine Lab Results       None          Microbiology Results (last 10 days)       Procedure Component Value - Date/Time    S. Pneumo Ag Urine or CSF - Urine, Urine, Clean Catch [[id number]]  (Normal) Collected: 12/30/23 0933    Lab Status: Final result Specimen: Urine, Clean Catch Updated: 12/30/23 0959     Strep Pneumo Ag Negative    Legionella Antigen, Urine - Urine, Urine, Clean Catch [[id number]]  (Normal) Collected: 12/30/23 0933    Lab Status: Final result Specimen: Urine, Clean Catch Updated: 12/30/23 0959     LEGIONELLA ANTIGEN, URINE Negative    COVID-19, FLU A/B, RSV PCR 1 HR TAT - Swab, Nasopharynx [[id number]]  (Normal) Collected: 12/28/23 2109    Lab Status: Final result Specimen: Swab from Nasopharynx  Updated: 12/28/23 2204     COVID19 Not Detected     Influenza A PCR Not Detected     Influenza B PCR Not Detected     RSV, PCR Not Detected    Narrative:      Fact sheet for providers: https://www.fda.gov/media/429982/download    Fact sheet for patients: https://www.fda.gov/media/386531/download    Test performed by PCR.    Mycoplasma Pneumoniae Antibody, IgM - Blood, Arm, Right [[id number]]  (Normal) Collected: 12/28/23 2053    Lab Status: Final result Specimen: Blood from Arm, Right Updated: 12/30/23 1042     Mycoplasma pneumo IgM Negative            CT Chest Without Contrast Diagnostic    Result Date: 12/29/2023  Impression:   1. No acute cardiopulmonary process. 2. No concerning pulmonary nodule identified.  The abnormality on recent prior CXR likely represented prominent nipple shadows. 3. Mild emphysema.     JEREMY GUTIÉRREZ MD       Electronically Signed and Approved By: JEREMY GUTIÉRREZ MD on 12/29/2023 at 0:31             XR Chest 1 View    Result Date: 12/28/2023  Impression:   1. Emphysematous changes 2. Suspected nipple shadows projecting over the lung bases bilaterally.  A repeat chest PA and lateral with nipple markers is recommended to confirm this. 3. Biapical scarring/fibrosis.       Freedom Roblero M.D.       Electronically Signed and Approved By: Freedom Roblero M.D. on 12/28/2023 at 21:43                      Results for orders placed during the hospital encounter of 04/12/22    Adult Transthoracic Echo Complete w/ Color, Spectral and Contrast if necessary per protocol    Interpretation Summary  Mild diffuse hypokinesis of the left ankle with mildly reduced left ventricular systolic function ejection fraction around 45%.  Trace tricuspid regurgitation.  Trace mitral regurgitation.      Labs Pending at Discharge: None        Time spent on Discharge including face to face service:  35 minutes    Electronically signed by Rio Rios MD, 01/01/24, 2:48 PM EST.    Portions of this documentation were  transcribed electronically from a voice recognition software.  I confirm all data accurately represents the service(s) I performed at today's visit.

## 2024-01-02 ENCOUNTER — TRANSITIONAL CARE MANAGEMENT TELEPHONE ENCOUNTER (OUTPATIENT)
Dept: CALL CENTER | Facility: HOSPITAL | Age: 59
End: 2024-01-02
Payer: MEDICAID

## 2024-01-02 NOTE — OUTREACH NOTE
Call Center TCM Note      Flowsheet Row Responses   [facility] patient discharged from? [facility]   Does the patient have one of the following disease processes/diagnoses(primary or secondary)? Other   TCM attempt successful? No   Unsuccessful attempts Attempt 2            Sarah Kraft RN    1/2/2024, 16:25 EST

## 2024-01-02 NOTE — OUTREACH NOTE
Call Center TCM Note      Flowsheet Row Responses   [facility] patient discharged from? [facility]   Does the patient have one of the following disease processes/diagnoses(primary or secondary)? Other   TCM attempt successful? No  [verbal release for Stepan Mcgee, son]   Unsuccessful attempts Attempt 1   Call Status Voice mail issues            Sarah Kraft RN    1/2/2024, 08:49 EST

## 2024-01-03 ENCOUNTER — TRANSITIONAL CARE MANAGEMENT TELEPHONE ENCOUNTER (OUTPATIENT)
Dept: CALL CENTER | Facility: HOSPITAL | Age: 59
End: 2024-01-03
Payer: MEDICAID

## 2024-01-03 NOTE — OUTREACH NOTE
Call Center TCM Note      Flowsheet Row Responses   [facility] patient discharged from? [facility]   Does the patient have one of the following disease processes/diagnoses(primary or secondary)? Other   TCM attempt successful? No   Unsuccessful attempts Attempt 3   Call Status Left message            Sarah Kraft RN    1/3/2024, 09:47 EST

## 2024-01-09 RX ORDER — CLONIDINE HYDROCHLORIDE 0.3 MG/1
TABLET ORAL
Qty: 60 TABLET | Refills: 1 | Status: SHIPPED | OUTPATIENT
Start: 2024-01-09

## 2024-02-27 ENCOUNTER — OFFICE VISIT (OUTPATIENT)
Dept: INTERNAL MEDICINE | Facility: CLINIC | Age: 59
End: 2024-02-27
Payer: MEDICAID

## 2024-02-27 VITALS
DIASTOLIC BLOOD PRESSURE: 100 MMHG | OXYGEN SATURATION: 92 % | SYSTOLIC BLOOD PRESSURE: 182 MMHG | TEMPERATURE: 97.2 F | WEIGHT: 144 LBS | BODY MASS INDEX: 22.55 KG/M2 | HEART RATE: 77 BPM

## 2024-02-27 DIAGNOSIS — J10.1 INFLUENZA A: Primary | ICD-10-CM

## 2024-02-27 DIAGNOSIS — R09.81 NASAL CONGESTION: ICD-10-CM

## 2024-02-27 DIAGNOSIS — R09.02 HYPOXIA: ICD-10-CM

## 2024-02-27 LAB
EXPIRATION DATE: ABNORMAL
EXPIRATION DATE: NORMAL
FLUAV AG UPPER RESP QL IA.RAPID: DETECTED
FLUBV AG UPPER RESP QL IA.RAPID: NOT DETECTED
INTERNAL CONTROL: ABNORMAL
INTERNAL CONTROL: NORMAL
Lab: 8725
Lab: 9151
S PYO AG THROAT QL: NEGATIVE
SARS-COV-2 AG UPPER RESP QL IA.RAPID: NOT DETECTED

## 2024-02-27 PROCEDURE — 3080F DIAST BP >= 90 MM HG: CPT | Performed by: PHYSICIAN ASSISTANT

## 2024-02-27 PROCEDURE — 99213 OFFICE O/P EST LOW 20 MIN: CPT | Performed by: PHYSICIAN ASSISTANT

## 2024-02-27 PROCEDURE — 3077F SYST BP >= 140 MM HG: CPT | Performed by: PHYSICIAN ASSISTANT

## 2024-02-27 PROCEDURE — 87880 STREP A ASSAY W/OPTIC: CPT | Performed by: PHYSICIAN ASSISTANT

## 2024-02-27 PROCEDURE — 1160F RVW MEDS BY RX/DR IN RCRD: CPT | Performed by: PHYSICIAN ASSISTANT

## 2024-02-27 PROCEDURE — 87428 SARSCOV & INF VIR A&B AG IA: CPT | Performed by: PHYSICIAN ASSISTANT

## 2024-02-27 PROCEDURE — 1159F MED LIST DOCD IN RCRD: CPT | Performed by: PHYSICIAN ASSISTANT

## 2024-02-27 RX ORDER — AZITHROMYCIN 250 MG/1
TABLET, FILM COATED ORAL
Qty: 6 TABLET | Refills: 0 | Status: SHIPPED | OUTPATIENT
Start: 2024-02-27

## 2024-02-27 RX ORDER — PREDNISONE 20 MG/1
20 TABLET ORAL DAILY
Qty: 5 TABLET | Refills: 0 | Status: SHIPPED | OUTPATIENT
Start: 2024-02-27 | End: 2024-03-03

## 2024-02-27 NOTE — ASSESSMENT & PLAN NOTE
+ flu A in office.  Patient with some improvement in symptoms over the past 24 hours.  However, due to hypoxia and history of respiratory failure will go ahead and order CXR to evaluate for pneumonia.  If normal will treat as COPDE with steroids and azithromycin.  Discussed risk vs. Benefit of medications with patient that due to hypertension in office today, there is a risk that steroids will increase this as well.  Discussed importance of monitoring blood pressure while on medication and going to the ER for headache, chest pain or worrisome symptoms.  Also advised patient to have low threshold to go to the ER for any breathing difficulties.  Patient agreeable.

## 2024-02-27 NOTE — PROGRESS NOTES
Chief Complaint  Nasal Congestion (If she gets medications today, would like [facility] in [place].), Shortness of Breath (If walking. When sitting, she is okay. ), Diarrhea, Earache, and Sore Throat    Subjective          Yenni Camacho presents to [facility]    Congestion, sore throat and shortness of breath- symptoms started about 5 days ago initially with abdominal pain and diarrhea.  Her symptoms worsened at that time with fever and difficulty breathing, worse when she is walking.  She has been around co-workers with flu and COVID.  She feels better today than yesterday.      Objective   Vital Signs:   BP (!) 182/100 (BP Location: Right arm, Patient Position: Sitting, Cuff Size: Large Adult)   Pulse 77   Temp 97.2 °F (36.2 °C) (Temporal)   Wt 65.3 kg (144 lb)   SpO2 92%   BMI 22.55 kg/m²     Physical Exam  Vitals reviewed.   Constitutional:       Appearance: Normal appearance. She is well-developed.   HENT:      Head: Normocephalic and atraumatic.      Right Ear: Tympanic membrane, ear canal and external ear normal.      Left Ear: Tympanic membrane, ear canal and external ear normal.      Nose: Nose normal.      Mouth/Throat:      Mouth: Mucous membranes are moist.      Pharynx: Posterior oropharyngeal erythema present.   Eyes:      Conjunctiva/sclera: Conjunctivae normal.      Pupils: Pupils are equal, round, and reactive to light.   Cardiovascular:      Rate and Rhythm: Normal rate and regular rhythm.      Heart sounds: No murmur heard.     No friction rub. No gallop.   Pulmonary:      Effort: Pulmonary effort is normal.      Breath sounds: Wheezing (diffuse, bilateral) present. No rhonchi.   Skin:     General: Skin is warm and dry.   Neurological:      Mental Status: She is alert and oriented to person, place, and time.      Cranial Nerves: No cranial nerve deficit.   Psychiatric:         Mood and Affect: Mood and affect normal.         Behavior:  Behavior normal.         Thought Content: Thought content normal.         Judgment: Judgment normal.        Result Review :          Procedures      Assessment and Plan    Diagnoses and all orders for this visit:    1. Influenza A (Primary)  Assessment & Plan:  + flu A in office.  Patient with some improvement in symptoms over the past 24 hours.  However, due to hypoxia and history of respiratory failure will go ahead and order CXR to evaluate for pneumonia.  If normal will treat as COPDE with steroids and azithromycin.  Discussed risk vs. Benefit of medications with patient that due to hypertension in office today, there is a risk that steroids will increase this as well.  Discussed importance of monitoring blood pressure while on medication and going to the ER for headache, chest pain or worrisome symptoms.  Also advised patient to have low threshold to go to the ER for any breathing difficulties.  Patient agreeable.     Orders:  -     XR Chest PA & Lateral (In Office)    2. Nasal congestion  -     POC Rapid Strep A  -     POCT SARS-CoV-2 + Flu Antigen CELESTE    3. Hypoxia    Other orders  -     predniSONE (DELTASONE) 20 MG tablet; Take 1 tablet by mouth Daily for 5 days.  Dispense: 5 tablet; Refill: 0  -     azithromycin (Zithromax Z-Lee) 250 MG tablet; Take 2 tablets by mouth on day 1, then 1 tablet daily on days 2-5  Dispense: 6 tablet; Refill: 0              Follow Up   No follow-ups on file.  Patient was given instructions and counseling regarding her condition or for health maintenance advice. Please see specific information pulled into the AVS if appropriate.

## 2024-02-29 ENCOUNTER — TELEPHONE (OUTPATIENT)
Dept: INTERNAL MEDICINE | Facility: CLINIC | Age: 59
End: 2024-02-29
Payer: MEDICAID

## 2024-02-29 DIAGNOSIS — F41.1 GAD (GENERALIZED ANXIETY DISORDER): Primary | ICD-10-CM

## 2024-02-29 DIAGNOSIS — F90.9 ATTENTION DEFICIT HYPERACTIVITY DISORDER (ADHD), UNSPECIFIED ADHD TYPE: ICD-10-CM

## 2024-02-29 NOTE — TELEPHONE ENCOUNTER
Contacted patient regarding test results, while on the phone she voiced she would like a referral to psychiatry. Noted she does not want to see [facility].

## 2024-03-11 ENCOUNTER — OFFICE VISIT (OUTPATIENT)
Dept: INTERNAL MEDICINE | Facility: CLINIC | Age: 59
End: 2024-03-11
Payer: MEDICAID

## 2024-03-11 VITALS
HEART RATE: 87 BPM | RESPIRATION RATE: 18 BRPM | DIASTOLIC BLOOD PRESSURE: 88 MMHG | SYSTOLIC BLOOD PRESSURE: 168 MMHG | BODY MASS INDEX: 22.41 KG/M2 | OXYGEN SATURATION: 98 % | TEMPERATURE: 98.6 F | HEIGHT: 67 IN | WEIGHT: 142.8 LBS

## 2024-03-11 DIAGNOSIS — I10 UNCONTROLLED HYPERTENSION: ICD-10-CM

## 2024-03-11 DIAGNOSIS — R45.4 ANGER: ICD-10-CM

## 2024-03-11 DIAGNOSIS — F41.1 GAD (GENERALIZED ANXIETY DISORDER): ICD-10-CM

## 2024-03-11 DIAGNOSIS — R06.02 SOB (SHORTNESS OF BREATH): ICD-10-CM

## 2024-03-11 DIAGNOSIS — R05.1 ACUTE COUGH: Primary | ICD-10-CM

## 2024-03-11 PROCEDURE — 99214 OFFICE O/P EST MOD 30 MIN: CPT | Performed by: NURSE PRACTITIONER

## 2024-03-11 PROCEDURE — 3077F SYST BP >= 140 MM HG: CPT | Performed by: NURSE PRACTITIONER

## 2024-03-11 PROCEDURE — 3079F DIAST BP 80-89 MM HG: CPT | Performed by: NURSE PRACTITIONER

## 2024-03-11 RX ORDER — FLUTICASONE PROPIONATE 50 MCG
2 SPRAY, SUSPENSION (ML) NASAL DAILY
Qty: 15.8 ML | Refills: 0 | Status: SHIPPED | OUTPATIENT
Start: 2024-03-11

## 2024-03-11 RX ORDER — CLONIDINE HYDROCHLORIDE 0.3 MG/1
TABLET ORAL
Qty: 60 TABLET | Refills: 1 | Status: SHIPPED | OUTPATIENT
Start: 2024-03-11

## 2024-03-11 RX ORDER — BUDESONIDE AND FORMOTEROL FUMARATE DIHYDRATE 160; 4.5 UG/1; UG/1
2 AEROSOL RESPIRATORY (INHALATION)
Qty: 10.2 G | Refills: 0 | Status: SHIPPED | OUTPATIENT
Start: 2024-03-11

## 2024-03-11 RX ORDER — METOPROLOL SUCCINATE 25 MG/1
25 TABLET, EXTENDED RELEASE ORAL DAILY
Qty: 90 TABLET | Refills: 0 | Status: SHIPPED | OUTPATIENT
Start: 2024-03-11

## 2024-03-11 RX ORDER — AMLODIPINE BESYLATE 10 MG/1
10 TABLET ORAL DAILY
Qty: 90 TABLET | Refills: 0 | Status: SHIPPED | OUTPATIENT
Start: 2024-03-11

## 2024-03-11 RX ORDER — ALBUTEROL SULFATE 90 UG/1
2 AEROSOL, METERED RESPIRATORY (INHALATION) EVERY 4 HOURS PRN
Qty: 18 G | Refills: 0 | Status: SHIPPED | OUTPATIENT
Start: 2024-03-11

## 2024-03-11 NOTE — PROGRESS NOTES
"Chief Complaint  Cough (Seen Jeni Suazo on 2/27/24 for positive Flu and Hypoxia- cough is not any better), Nasal Congestion, Wheezing, and Shortness of Breath    Subjective          Yenni Camacho presents to [facility]  She was seen for flu on 2/27/24. She reports ongoing symptoms since that time  Cough and congestion  Wheezing and soa  She reports noticing improvement intitially but then had worsening after finishing zmax  She reports using symbicort most days  She is using albuterol regularly  Denies fever chills    HTN-unctontrolled  She reports taking all meds at night  Denies chest pain, headache dizziness    Requesting psych referral for BEST and anger issues. She reports irritability  She denies issues with depression/SI  Objective   Vital Signs:   /88 (BP Location: Left arm, Patient Position: Sitting, Cuff Size: Adult)   Pulse 87   Temp 98.6 °F (37 °C)   Resp 18   Ht 170.2 cm (67\")   Wt 64.8 kg (142 lb 12.8 oz)   SpO2 98%   BMI 22.37 kg/m²     Physical Exam  Vitals and nursing note reviewed.   Constitutional:       General: She is not in acute distress.     Appearance: Normal appearance.   HENT:      Head: Normocephalic and atraumatic.      Right Ear: External ear normal.      Left Ear: External ear normal.      Nose: Nose normal.      Mouth/Throat:      Mouth: Mucous membranes are moist.   Eyes:      Conjunctiva/sclera: Conjunctivae normal.   Cardiovascular:      Rate and Rhythm: Normal rate and regular rhythm.      Pulses: Normal pulses.      Heart sounds: Normal heart sounds. No murmur heard.     No friction rub. No gallop.   Pulmonary:      Effort: Pulmonary effort is normal. No respiratory distress.      Breath sounds: No wheezing, rhonchi or rales.   Musculoskeletal:      Cervical back: Neck supple.      Right lower leg: No edema.      Left lower leg: No edema.   Skin:     General: Skin is warm and dry.   Neurological:      General: " No focal deficit present.      Mental Status: She is alert and oriented to person, place, and time.   Psychiatric:         Mood and Affect: Mood normal.         Behavior: Behavior normal.        Result Review :          Procedures      Assessment and Plan    Diagnoses and all orders for this visit:    1. Acute cough (Primary)  Comments:  CXR in office today  Orders:  -     XR Chest PA & Lateral (In Office)    2. SOB (shortness of breath)  -     XR Chest PA & Lateral (In Office)    3. BEST (generalized anxiety disorder)  Comments:  psych referral    4. Anger    5. Uncontrolled hypertension  Comments:  increasing amlodipine to 10mg daily. cont other meds. discussed r/b and importance of med compliance    Other orders  -     amLODIPine (NORVASC) 10 MG tablet; Take 1 tablet by mouth Daily.  Dispense: 90 tablet; Refill: 0  -     metoprolol succinate XL (TOPROL-XL) 25 MG 24 hr tablet; Take 1 tablet by mouth Daily.  Dispense: 90 tablet; Refill: 0  -     albuterol sulfate  (90 Base) MCG/ACT inhaler; Inhale 2 puffs Every 4 (Four) Hours As Needed for Wheezing.  Dispense: 18 g; Refill: 0  -     budesonide-formoterol (Symbicort) 160-4.5 MCG/ACT inhaler; Inhale 2 puffs 2 (Two) Times a Day.  Dispense: 10.2 g; Refill: 0  -     fluticasone (FLONASE) 50 MCG/ACT nasal spray; 2 sprays by Each Nare route Daily.  Dispense: 15.8 mL; Refill: 0              Follow Up   No follow-ups on file.  Patient was given instructions and counseling regarding her condition or for health maintenance advice. Please see specific information pulled into the AVS if appropriate.

## 2024-04-10 RX ORDER — BUDESONIDE AND FORMOTEROL FUMARATE DIHYDRATE 160; 4.5 UG/1; UG/1
2 AEROSOL RESPIRATORY (INHALATION) 2 TIMES DAILY
Qty: 10.2 G | Refills: 0 | Status: SHIPPED | OUTPATIENT
Start: 2024-04-10

## 2024-05-01 ENCOUNTER — APPOINTMENT (OUTPATIENT)
Dept: GENERAL RADIOLOGY | Facility: HOSPITAL | Age: 59
DRG: 193 | End: 2024-05-01
Payer: MEDICAID

## 2024-05-01 ENCOUNTER — HOSPITAL ENCOUNTER (INPATIENT)
Facility: HOSPITAL | Age: 59
LOS: 8 days | Discharge: HOME | DRG: 193 | End: 2024-05-09
Attending: EMERGENCY MEDICINE | Admitting: FAMILY MEDICINE
Payer: MEDICAID

## 2024-05-01 DIAGNOSIS — J96.01 ACUTE RESPIRATORY FAILURE WITH HYPOXIA: Primary | ICD-10-CM

## 2024-05-01 DIAGNOSIS — J44.1 COPD EXACERBATION: ICD-10-CM

## 2024-05-01 DIAGNOSIS — I10 UNCONTROLLED HYPERTENSION: ICD-10-CM

## 2024-05-01 LAB
ALBUMIN SERPL-MCNC: 3.6 G/DL (ref 3.5–5.2)
ALBUMIN/GLOB SERPL: 1.2 G/DL
ALP SERPL-CCNC: 79 U/L (ref 39–117)
ALT SERPL W P-5'-P-CCNC: 19 U/L (ref 1–33)
ANION GAP SERPL CALCULATED.3IONS-SCNC: 12.9 MMOL/L (ref 5–15)
AST SERPL-CCNC: 16 U/L (ref 1–32)
BASOPHILS # BLD AUTO: 0.01 10*3/MM3 (ref 0–0.2)
BASOPHILS NFR BLD AUTO: 0.1 % (ref 0–1.5)
BILIRUB SERPL-MCNC: 0.2 MG/DL (ref 0–1.2)
BUN SERPL-MCNC: 12 MG/DL (ref 6–20)
BUN/CREAT SERPL: 18.8 (ref 7–25)
CALCIUM SPEC-SCNC: 9 MG/DL (ref 8.6–10.5)
CHLORIDE SERPL-SCNC: 101 MMOL/L (ref 98–107)
CO2 SERPL-SCNC: 29.1 MMOL/L (ref 22–29)
CREAT SERPL-MCNC: 0.64 MG/DL (ref 0.57–1)
D-LACTATE SERPL-SCNC: 1.9 MMOL/L (ref 0.5–2)
DEPRECATED RDW RBC AUTO: 48 FL (ref 37–54)
EGFRCR SERPLBLD CKD-EPI 2021: 101.9 ML/MIN/1.73
EOSINOPHIL # BLD AUTO: 0.01 10*3/MM3 (ref 0–0.4)
EOSINOPHIL NFR BLD AUTO: 0.1 % (ref 0.3–6.2)
ERYTHROCYTE [DISTWIDTH] IN BLOOD BY AUTOMATED COUNT: 14.3 % (ref 12.3–15.4)
FLUAV SUBTYP SPEC NAA+PROBE: NOT DETECTED
FLUBV RNA ISLT QL NAA+PROBE: NOT DETECTED
GLOBULIN UR ELPH-MCNC: 3 GM/DL
GLUCOSE SERPL-MCNC: 156 MG/DL (ref 65–99)
HCT VFR BLD AUTO: 46.7 % (ref 34–46.6)
HGB BLD-MCNC: 15.3 G/DL (ref 12–15.9)
HOLD SPECIMEN: NORMAL
HOLD SPECIMEN: NORMAL
IMM GRANULOCYTES # BLD AUTO: 0.03 10*3/MM3 (ref 0–0.05)
IMM GRANULOCYTES NFR BLD AUTO: 0.3 % (ref 0–0.5)
LYMPHOCYTES # BLD AUTO: 1.36 10*3/MM3 (ref 0.7–3.1)
LYMPHOCYTES NFR BLD AUTO: 14.6 % (ref 19.6–45.3)
MCH RBC QN AUTO: 29.7 PG (ref 26.6–33)
MCHC RBC AUTO-ENTMCNC: 32.8 G/DL (ref 31.5–35.7)
MCV RBC AUTO: 90.5 FL (ref 79–97)
MONOCYTES # BLD AUTO: 0.8 10*3/MM3 (ref 0.1–0.9)
MONOCYTES NFR BLD AUTO: 8.6 % (ref 5–12)
NEUTROPHILS NFR BLD AUTO: 7.1 10*3/MM3 (ref 1.7–7)
NEUTROPHILS NFR BLD AUTO: 76.3 % (ref 42.7–76)
NRBC BLD AUTO-RTO: 0 /100 WBC (ref 0–0.2)
NT-PROBNP SERPL-MCNC: 373.6 PG/ML (ref 0–900)
PLATELET # BLD AUTO: 272 10*3/MM3 (ref 140–450)
PMV BLD AUTO: 9.4 FL (ref 6–12)
POTASSIUM SERPL-SCNC: 3.3 MMOL/L (ref 3.5–5.2)
PROT SERPL-MCNC: 6.6 G/DL (ref 6–8.5)
RBC # BLD AUTO: 5.16 10*6/MM3 (ref 3.77–5.28)
RSV RNA NPH QL NAA+NON-PROBE: NOT DETECTED
SARS-COV-2 RNA RESP QL NAA+PROBE: NOT DETECTED
SODIUM SERPL-SCNC: 143 MMOL/L (ref 136–145)
TROPONIN T SERPL HS-MCNC: 18 NG/L
WBC NRBC COR # BLD AUTO: 9.31 10*3/MM3 (ref 3.4–10.8)
WHOLE BLOOD HOLD COAG: NORMAL
WHOLE BLOOD HOLD SPECIMEN: NORMAL

## 2024-05-01 PROCEDURE — 93010 ELECTROCARDIOGRAM REPORT: CPT | Performed by: INTERNAL MEDICINE

## 2024-05-01 PROCEDURE — 86738 MYCOPLASMA ANTIBODY: CPT | Performed by: INTERNAL MEDICINE

## 2024-05-01 PROCEDURE — 99223 1ST HOSP IP/OBS HIGH 75: CPT | Performed by: FAMILY MEDICINE

## 2024-05-01 PROCEDURE — 87040 BLOOD CULTURE FOR BACTERIA: CPT

## 2024-05-01 PROCEDURE — 94640 AIRWAY INHALATION TREATMENT: CPT

## 2024-05-01 PROCEDURE — 99285 EMERGENCY DEPT VISIT HI MDM: CPT

## 2024-05-01 PROCEDURE — 94799 UNLISTED PULMONARY SVC/PX: CPT

## 2024-05-01 PROCEDURE — 94761 N-INVAS EAR/PLS OXIMETRY MLT: CPT

## 2024-05-01 PROCEDURE — 71045 X-RAY EXAM CHEST 1 VIEW: CPT

## 2024-05-01 PROCEDURE — 87637 SARSCOV2&INF A&B&RSV AMP PRB: CPT | Performed by: EMERGENCY MEDICINE

## 2024-05-01 PROCEDURE — 93005 ELECTROCARDIOGRAM TRACING: CPT | Performed by: EMERGENCY MEDICINE

## 2024-05-01 PROCEDURE — 80053 COMPREHEN METABOLIC PANEL: CPT | Performed by: EMERGENCY MEDICINE

## 2024-05-01 PROCEDURE — 84484 ASSAY OF TROPONIN QUANT: CPT | Performed by: EMERGENCY MEDICINE

## 2024-05-01 PROCEDURE — 83880 ASSAY OF NATRIURETIC PEPTIDE: CPT | Performed by: EMERGENCY MEDICINE

## 2024-05-01 PROCEDURE — 85025 COMPLETE CBC W/AUTO DIFF WBC: CPT

## 2024-05-01 PROCEDURE — 93005 ELECTROCARDIOGRAM TRACING: CPT

## 2024-05-01 PROCEDURE — 83036 HEMOGLOBIN GLYCOSYLATED A1C: CPT | Performed by: FAMILY MEDICINE

## 2024-05-01 PROCEDURE — 36415 COLL VENOUS BLD VENIPUNCTURE: CPT

## 2024-05-01 PROCEDURE — 83605 ASSAY OF LACTIC ACID: CPT | Performed by: EMERGENCY MEDICINE

## 2024-05-01 RX ORDER — METOPROLOL SUCCINATE 50 MG/1
50 TABLET, EXTENDED RELEASE ORAL ONCE
Status: COMPLETED | OUTPATIENT
Start: 2024-05-01 | End: 2024-05-01

## 2024-05-01 RX ORDER — LEVALBUTEROL INHALATION SOLUTION 1.25 MG/3ML
1.25 SOLUTION RESPIRATORY (INHALATION)
Status: DISPENSED | OUTPATIENT
Start: 2024-05-01 | End: 2024-05-01

## 2024-05-01 RX ORDER — SODIUM CHLORIDE 0.9 % (FLUSH) 0.9 %
10 SYRINGE (ML) INJECTION AS NEEDED
Status: DISCONTINUED | OUTPATIENT
Start: 2024-05-01 | End: 2024-05-09 | Stop reason: HOSPADM

## 2024-05-01 RX ADMIN — LEVALBUTEROL HYDROCHLORIDE 1.25 MG: 1.25 SOLUTION RESPIRATORY (INHALATION) at 23:08

## 2024-05-01 RX ADMIN — METOPROLOL SUCCINATE 50 MG: 50 TABLET, EXTENDED RELEASE ORAL at 23:13

## 2024-05-01 NOTE — LETTER
May 9, 2024     Patient: Yenni Camacho   YOB: 1965   Date of Visit: 5/1/2024       To Whom It May Concern:    It is my medical opinion that Yenni Camacho may return to work Monday (5/13/24). If any concerns please contact [phone].           Sincerely,        Viri Duarte RN

## 2024-05-02 ENCOUNTER — APPOINTMENT (OUTPATIENT)
Dept: CT IMAGING | Facility: HOSPITAL | Age: 59
DRG: 193 | End: 2024-05-02
Payer: MEDICAID

## 2024-05-02 PROBLEM — J96.21 ACUTE ON CHRONIC RESPIRATORY FAILURE WITH HYPOXIA: Status: ACTIVE | Noted: 2024-05-02

## 2024-05-02 PROBLEM — J15.7 MYCOPLASMA PNEUMONIA: Status: ACTIVE | Noted: 2024-05-02

## 2024-05-02 LAB
ANION GAP SERPL CALCULATED.3IONS-SCNC: 16.6 MMOL/L (ref 5–15)
BUN SERPL-MCNC: 12 MG/DL (ref 6–20)
BUN/CREAT SERPL: 17.9 (ref 7–25)
CALCIUM SPEC-SCNC: 8.9 MG/DL (ref 8.6–10.5)
CHLORIDE SERPL-SCNC: 101 MMOL/L (ref 98–107)
CO2 SERPL-SCNC: 19.4 MMOL/L (ref 22–29)
CREAT SERPL-MCNC: 0.67 MG/DL (ref 0.57–1)
EGFRCR SERPLBLD CKD-EPI 2021: 100.8 ML/MIN/1.73
GLUCOSE SERPL-MCNC: 193 MG/DL (ref 65–99)
HBA1C MFR BLD: 7.2 % (ref 4.8–5.6)
M PNEUMO IGM SER QL: POSITIVE
POTASSIUM SERPL-SCNC: 4.1 MMOL/L (ref 3.5–5.2)
QT INTERVAL: 291 MS
QT INTERVAL: 389 MS
QTC INTERVAL: 418 MS
QTC INTERVAL: 476 MS
SODIUM SERPL-SCNC: 137 MMOL/L (ref 136–145)

## 2024-05-02 PROCEDURE — 25010000002 HYDRALAZINE PER 20 MG: Performed by: FAMILY MEDICINE

## 2024-05-02 PROCEDURE — 25010000002 AZITHROMYCIN PER 500 MG: Performed by: INTERNAL MEDICINE

## 2024-05-02 PROCEDURE — 80048 BASIC METABOLIC PNL TOTAL CA: CPT | Performed by: FAMILY MEDICINE

## 2024-05-02 PROCEDURE — 25810000003 SODIUM CHLORIDE 0.9 % SOLUTION: Performed by: INTERNAL MEDICINE

## 2024-05-02 PROCEDURE — 94799 UNLISTED PULMONARY SVC/PX: CPT

## 2024-05-02 PROCEDURE — 25010000002 ENOXAPARIN PER 10 MG: Performed by: FAMILY MEDICINE

## 2024-05-02 PROCEDURE — 93005 ELECTROCARDIOGRAM TRACING: CPT | Performed by: FAMILY MEDICINE

## 2024-05-02 PROCEDURE — 71250 CT THORAX DX C-: CPT

## 2024-05-02 PROCEDURE — 99232 SBSQ HOSP IP/OBS MODERATE 35: CPT | Performed by: INTERNAL MEDICINE

## 2024-05-02 PROCEDURE — 25010000002 CEFTRIAXONE PER 250 MG: Performed by: INTERNAL MEDICINE

## 2024-05-02 PROCEDURE — 25010000002 METHYLPREDNISOLONE PER 40 MG: Performed by: FAMILY MEDICINE

## 2024-05-02 PROCEDURE — 93010 ELECTROCARDIOGRAM REPORT: CPT | Performed by: INTERNAL MEDICINE

## 2024-05-02 PROCEDURE — 25810000003 SODIUM CHLORIDE 0.9 % SOLUTION: Performed by: FAMILY MEDICINE

## 2024-05-02 RX ORDER — IPRATROPIUM BROMIDE AND ALBUTEROL SULFATE 2.5; .5 MG/3ML; MG/3ML
3 SOLUTION RESPIRATORY (INHALATION)
Status: DISCONTINUED | OUTPATIENT
Start: 2024-05-02 | End: 2024-05-09 | Stop reason: HOSPADM

## 2024-05-02 RX ORDER — BISACODYL 10 MG
10 SUPPOSITORY, RECTAL RECTAL DAILY PRN
Status: DISCONTINUED | OUTPATIENT
Start: 2024-05-02 | End: 2024-05-09 | Stop reason: HOSPADM

## 2024-05-02 RX ORDER — POLYETHYLENE GLYCOL 3350 17 G/17G
17 POWDER, FOR SOLUTION ORAL DAILY PRN
Status: DISCONTINUED | OUTPATIENT
Start: 2024-05-02 | End: 2024-05-09 | Stop reason: HOSPADM

## 2024-05-02 RX ORDER — HYDROCODONE BITARTRATE AND ACETAMINOPHEN 7.5; 325 MG/1; MG/1
1 TABLET ORAL EVERY 12 HOURS PRN
Status: DISCONTINUED | OUTPATIENT
Start: 2024-05-02 | End: 2024-05-09 | Stop reason: HOSPADM

## 2024-05-02 RX ORDER — AMLODIPINE BESYLATE 10 MG/1
10 TABLET ORAL DAILY
Status: DISCONTINUED | OUTPATIENT
Start: 2024-05-02 | End: 2024-05-09 | Stop reason: HOSPADM

## 2024-05-02 RX ORDER — SODIUM CHLORIDE 9 MG/ML
100 INJECTION, SOLUTION INTRAVENOUS CONTINUOUS
Status: ACTIVE | OUTPATIENT
Start: 2024-05-02 | End: 2024-05-02

## 2024-05-02 RX ORDER — SODIUM CHLORIDE 0.9 % (FLUSH) 0.9 %
10 SYRINGE (ML) INJECTION EVERY 12 HOURS SCHEDULED
Status: DISCONTINUED | OUTPATIENT
Start: 2024-05-02 | End: 2024-05-09 | Stop reason: HOSPADM

## 2024-05-02 RX ORDER — SODIUM CHLORIDE 9 MG/ML
40 INJECTION, SOLUTION INTRAVENOUS AS NEEDED
Status: DISCONTINUED | OUTPATIENT
Start: 2024-05-02 | End: 2024-05-09 | Stop reason: HOSPADM

## 2024-05-02 RX ORDER — CLONIDINE HYDROCHLORIDE 0.1 MG/1
0.3 TABLET ORAL 2 TIMES DAILY
Status: DISCONTINUED | OUTPATIENT
Start: 2024-05-02 | End: 2024-05-09 | Stop reason: HOSPADM

## 2024-05-02 RX ORDER — BISACODYL 5 MG/1
5 TABLET, DELAYED RELEASE ORAL DAILY PRN
Status: DISCONTINUED | OUTPATIENT
Start: 2024-05-02 | End: 2024-05-09 | Stop reason: HOSPADM

## 2024-05-02 RX ORDER — AMOXICILLIN 250 MG
2 CAPSULE ORAL 2 TIMES DAILY PRN
Status: DISCONTINUED | OUTPATIENT
Start: 2024-05-02 | End: 2024-05-09 | Stop reason: HOSPADM

## 2024-05-02 RX ORDER — METHYLPREDNISOLONE SODIUM SUCCINATE 40 MG/ML
40 INJECTION, POWDER, LYOPHILIZED, FOR SOLUTION INTRAMUSCULAR; INTRAVENOUS EVERY 12 HOURS
Status: DISCONTINUED | OUTPATIENT
Start: 2024-05-02 | End: 2024-05-02

## 2024-05-02 RX ORDER — SODIUM CHLORIDE 0.9 % (FLUSH) 0.9 %
10 SYRINGE (ML) INJECTION AS NEEDED
Status: DISCONTINUED | OUTPATIENT
Start: 2024-05-02 | End: 2024-05-09 | Stop reason: HOSPADM

## 2024-05-02 RX ORDER — IPRATROPIUM BROMIDE AND ALBUTEROL SULFATE 2.5; .5 MG/3ML; MG/3ML
3 SOLUTION RESPIRATORY (INHALATION) EVERY 4 HOURS PRN
Status: DISCONTINUED | OUTPATIENT
Start: 2024-05-02 | End: 2024-05-09 | Stop reason: HOSPADM

## 2024-05-02 RX ORDER — ENOXAPARIN SODIUM 100 MG/ML
40 INJECTION SUBCUTANEOUS DAILY
Status: DISCONTINUED | OUTPATIENT
Start: 2024-05-02 | End: 2024-05-09 | Stop reason: HOSPADM

## 2024-05-02 RX ORDER — BACLOFEN 10 MG/1
5 TABLET ORAL EVERY 8 HOURS PRN
Status: DISCONTINUED | OUTPATIENT
Start: 2024-05-02 | End: 2024-05-09 | Stop reason: HOSPADM

## 2024-05-02 RX ORDER — METOPROLOL SUCCINATE 25 MG/1
25 TABLET, EXTENDED RELEASE ORAL DAILY
Status: DISCONTINUED | OUTPATIENT
Start: 2024-05-02 | End: 2024-05-09 | Stop reason: HOSPADM

## 2024-05-02 RX ORDER — ACETAMINOPHEN 325 MG/1
650 TABLET ORAL EVERY 6 HOURS PRN
Status: DISCONTINUED | OUTPATIENT
Start: 2024-05-02 | End: 2024-05-09 | Stop reason: HOSPADM

## 2024-05-02 RX ORDER — HYDRALAZINE HYDROCHLORIDE 20 MG/ML
10 INJECTION INTRAMUSCULAR; INTRAVENOUS EVERY 6 HOURS PRN
Status: DISCONTINUED | OUTPATIENT
Start: 2024-05-02 | End: 2024-05-09 | Stop reason: HOSPADM

## 2024-05-02 RX ORDER — GUAIFENESIN 600 MG/1
600 TABLET, EXTENDED RELEASE ORAL EVERY 12 HOURS SCHEDULED
Status: DISCONTINUED | OUTPATIENT
Start: 2024-05-02 | End: 2024-05-07

## 2024-05-02 RX ORDER — ONDANSETRON 2 MG/ML
4 INJECTION INTRAMUSCULAR; INTRAVENOUS EVERY 6 HOURS PRN
Status: DISCONTINUED | OUTPATIENT
Start: 2024-05-02 | End: 2024-05-09 | Stop reason: HOSPADM

## 2024-05-02 RX ORDER — HYDRALAZINE HYDROCHLORIDE 20 MG/ML
10 INJECTION INTRAMUSCULAR; INTRAVENOUS ONCE
Status: COMPLETED | OUTPATIENT
Start: 2024-05-02 | End: 2024-05-02

## 2024-05-02 RX ORDER — GUAIFENESIN/DEXTROMETHORPHAN 100-10MG/5
10 SYRUP ORAL EVERY 6 HOURS PRN
Status: DISCONTINUED | OUTPATIENT
Start: 2024-05-02 | End: 2024-05-09 | Stop reason: HOSPADM

## 2024-05-02 RX ORDER — PREDNISONE 20 MG/1
40 TABLET ORAL
Status: DISCONTINUED | OUTPATIENT
Start: 2024-05-03 | End: 2024-05-09

## 2024-05-02 RX ADMIN — AMLODIPINE BESYLATE 10 MG: 10 TABLET ORAL at 09:06

## 2024-05-02 RX ADMIN — HYDROCODONE BITARTRATE AND ACETAMINOPHEN 1 TABLET: 7.5; 325 TABLET ORAL at 21:25

## 2024-05-02 RX ADMIN — ENOXAPARIN SODIUM 40 MG: 100 INJECTION SUBCUTANEOUS at 09:06

## 2024-05-02 RX ADMIN — METHYLPREDNISOLONE SODIUM SUCCINATE 40 MG: 40 INJECTION INTRAMUSCULAR; INTRAVENOUS at 04:40

## 2024-05-02 RX ADMIN — HYDROCODONE BITARTRATE AND ACETAMINOPHEN 1 TABLET: 7.5; 325 TABLET ORAL at 09:06

## 2024-05-02 RX ADMIN — CEFTRIAXONE SODIUM 1000 MG: 1 INJECTION, POWDER, FOR SOLUTION INTRAMUSCULAR; INTRAVENOUS at 19:20

## 2024-05-02 RX ADMIN — GUAIFENESIN 600 MG: 600 TABLET ORAL at 09:06

## 2024-05-02 RX ADMIN — AZITHROMYCIN MONOHYDRATE 500 MG: 500 INJECTION, POWDER, LYOPHILIZED, FOR SOLUTION INTRAVENOUS at 21:25

## 2024-05-02 RX ADMIN — SODIUM CHLORIDE 100 ML/HR: 9 INJECTION, SOLUTION INTRAVENOUS at 04:51

## 2024-05-02 RX ADMIN — IPRATROPIUM BROMIDE AND ALBUTEROL SULFATE 3 ML: .5; 3 SOLUTION RESPIRATORY (INHALATION) at 06:29

## 2024-05-02 RX ADMIN — Medication 10 ML: at 21:25

## 2024-05-02 RX ADMIN — CLONIDINE HYDROCHLORIDE 0.3 MG: 0.1 TABLET ORAL at 21:10

## 2024-05-02 RX ADMIN — Medication 10 ML: at 09:07

## 2024-05-02 RX ADMIN — CLONIDINE HYDROCHLORIDE 0.3 MG: 0.1 TABLET ORAL at 09:06

## 2024-05-02 RX ADMIN — METOPROLOL SUCCINATE 25 MG: 25 TABLET, EXTENDED RELEASE ORAL at 09:06

## 2024-05-02 RX ADMIN — GUAIFENESIN 600 MG: 600 TABLET ORAL at 21:11

## 2024-05-02 RX ADMIN — IPRATROPIUM BROMIDE AND ALBUTEROL SULFATE 3 ML: .5; 3 SOLUTION RESPIRATORY (INHALATION) at 12:13

## 2024-05-02 RX ADMIN — GUAIFENESIN AND DEXTROMETHORPHAN 10 ML: 100; 10 SYRUP ORAL at 04:39

## 2024-05-02 RX ADMIN — HYDRALAZINE HYDROCHLORIDE 10 MG: 20 INJECTION, SOLUTION INTRAMUSCULAR; INTRAVENOUS at 04:39

## 2024-05-02 NOTE — PROGRESS NOTES
[facility]   Hospitalist Progress Note  Date: 2024  Patient Name: Yenni Camacho  : 1965  MRN: [id number]  Date of admission: 2024  Room/Bed: [facility]/      Subjective   Subjective     Chief Complaint: short of air     Summary:Yenni Camacho is a 58 y.o. female with past medical history of COPD not on home oxygen, hypertension, PTSD, chronic pain, anxiety, ADHD, and GERD presented to the ED with complaint of shortness of breath.  Patient states that for the last 2 to 3 days she has been having worsening shortness of breath where she was symptomatic even at rest along with a nonproductive cough, headache, lightheadedness, and generalized weakness.  Patient states he is compliant with her maintenance and rescue inhalers quit smoking 6 years ago.  He denies any fevers,chills, or sick contacts.  Due to her worsening symptoms patient came to the ED for further evaluation.  In the ED patient was tachycardic, hypertensive and hypoxic on arrival requiring oxygen supplementation via nasal cannula.  Labs were to be labs were relatively unremarkable including a negative COVID flu RSV.  Chest x-ray negative for any acute findings.  When asked she denied any recent fevers, chills, headaches, focal weakness, chest pain, palpitation, abdominal pain, nausea, vomiting, diarrhea, constipation, dysuria, hematuria, hematochezia, melena, or anxiety.  Patient admitted for further evaluation and treatment     Interval Followup:   Patient states that she is doing much better today.  No cough or wheezing currently.  Patient currently is on 2 L of oxygen however is not on oxygen at home.  It appears that patient's blood pressure has been elevated at 160/90.  Patient overall states that she is feeling better    Review of Systems    All systems reviewed and negative except for what is outlined above.      Objective   Objective     Vitals:   Temp:  [98 °F (36.7 °C)-99.1 °F (37.3 °C)] 98.8 °F (37.1 °C)  Heart Rate:   [101-135] 103  Resp:  [18-20] 18  BP: (171-186)/() 174/112  Flow (L/min):  [2-3] 2    Physical Exam   General: Awake, alert, NAD resting in bed   HENT: NCAT, MMM  Eyes: pupils equal, no scleral icterus  Cardiovascular: RRR, no murmurs   Pulmonary: decreased. Faint wheezing. No rhonchi noted   Gastrointestinal: S/ND/NT, +BS  Musculoskeletal: No gross deformities  Skin: No jaundice, no rash on exposed skin appreciated  Neuro: CN II through XII grossly intact; speech clear; no tremor  Psych: Mood and affect appropriate  : No Delgado catheter; no suprapubic tenderness    Result Review    Result Review:  I have personally reviewed these results:  [x]  Laboratory      Lab 05/01/24 2141   WBC 9.31   HEMOGLOBIN 15.3   HEMATOCRIT 46.7*   PLATELETS 272   NEUTROS ABS 7.10*   IMMATURE GRANS (ABS) 0.03   LYMPHS ABS 1.36   MONOS ABS 0.80   EOS ABS 0.01   MCV 90.5   LACTATE 1.9         Lab 05/02/24  0434 05/01/24 2141   SODIUM 137 143   POTASSIUM 4.1 3.3*   CHLORIDE 101 101   CO2 19.4* 29.1*   ANION GAP 16.6* 12.9   BUN 12 12   CREATININE 0.67 0.64   EGFR 100.8 101.9   GLUCOSE 193* 156*   CALCIUM 8.9 9.0         Lab 05/01/24 2141   TOTAL PROTEIN 6.6   ALBUMIN 3.6   GLOBULIN 3.0   ALT (SGPT) 19   AST (SGOT) 16   BILIRUBIN 0.2   ALK PHOS 79         Lab 05/01/24 2141   PROBNP 373.6   HSTROP T 18*                 Brief Urine Lab Results       None          [x]  Microbiology   Microbiology Results (last 10 days)       Procedure Component Value - Date/Time    COVID-19, FLU A/B, RSV PCR 1 HR TAT - Swab, Nasopharynx [[id number]]  (Normal) Collected: 05/01/24 2141    Lab Status: Final result Specimen: Swab from Nasopharynx Updated: 05/01/24 2234     COVID19 Not Detected     Influenza A PCR Not Detected     Influenza B PCR Not Detected     RSV, PCR Not Detected    Narrative:      Fact sheet for providers: https://www.fda.gov/media/443648/download    Fact sheet for patients: https://www.fda.gov/media/603678/download    Test  performed by PCR.          [x]  Radiology  XR Chest 1 View    Result Date: 5/1/2024  No acute cardiopulmonary findings.  Electronically Signed By-Dr. Philipp Vallejo MD On:5/1/2024 9:52 PM     []  EKG/Telemetry   []  Cardiology/Vascular   []  Pathology  []  Old records  []  Other:    Assessment & Plan   Assessment / Plan     Assessment:  Acute exacerbation of COPD  Acute hypoxemic respiratory failure  Hypertension  Anxiety  PTSD    Plan:  Patient is currently on 2 L of oxygen.  Continue to wean oxygen  We will transition patient over to oral prednisone  Continue patient on bronchodilators  Will add antibiotics  Will obtain CT of the chest  Continue patient on Norvasc, metoprolol and clonidine for her hypertension  Consider pulmonary consult if patient does not improve  Obtain urinary antigens and resp panel        Discussed with RN.    DVT prophylaxis:  Medical DVT prophylaxis orders are present.        CODE STATUS:   Level Of Support Discussed With: Patient  Code Status (Patient has no pulse and is not breathing): CPR (Attempt to Resuscitate)  Medical Interventions (Patient has pulse or is breathing): Full Support      Electronically signed by Shelton Ricketts DO, 5/2/2024, 08:40 EDT.

## 2024-05-02 NOTE — H&P
[facility]   HISTORY AND PHYSICAL    Patient Name: Yenni Camacho  : 1965  MRN: [id number]  Primary Care Physician:  Allyssa Pritchett APRN  Date of admission: 2024    Subjective   Subjective     Chief Complaint: Shortness of breath    HPI:    Yenni Camacho is a 58 y.o. female with past medical history of COPD not on home oxygen, hypertension, PTSD, chronic pain, anxiety, ADHD, and GERD presented to the ED with complaint of shortness of breath.  Patient states that for the last 2 to 3 days she has been having worsening shortness of breath where she was symptomatic even at rest along with a nonproductive cough, headache, lightheadedness, and generalized weakness.  Patient states he is compliant with her maintenance and rescue inhalers quit smoking 6 years ago.  He denies any fevers,chills, or sick contacts.  Due to her worsening symptoms patient came to the ED for further evaluation.  In the ED patient was tachycardic, hypertensive and hypoxic on arrival requiring oxygen supplementation via nasal cannula.  Labs were to be labs were relatively unremarkable including a negative COVID flu RSV.  Chest x-ray negative for any acute findings.  When asked she denied any recent fevers, chills, headaches, focal weakness, chest pain, palpitation, abdominal pain, nausea, vomiting, diarrhea, constipation, dysuria, hematuria, hematochezia, melena, or anxiety.  Patient admitted for further evaluation and treatment    Review of Systems   All systems were reviewed and negative except for: As per HPI    Personal History     Past Medical History:   Diagnosis Date    ADHD     Anxiety     Arthritis     Bunion     Chronic pain disorder     Foot pain, bilateral     Hypertension     PTSD (post-traumatic stress disorder)        Past Surgical History:   Procedure Laterality Date    ARM WOUND REPAIR / CLOSURE Right     COLONOSCOPY      FRACTURE SURGERY         Family History: family history includes ADD / ADHD in  her mother; Anxiety disorder in her mother. Otherwise pertinent FHx was reviewed and not pertinent to current issue.    Social History:  reports that she quit smoking about 6 years ago. Her smoking use included cigarettes. She started smoking about 45 years ago. She has a 79.4 pack-year smoking history. She has never used smokeless tobacco. She reports that she does not currently use alcohol. She reports that she does not currently use drugs after having used the following drugs: Hydrocodone and Marijuana. Frequency: 7.00 times per week.    Home Medications:  Aspirin-Acetaminophen-Caffeine, Baclofen, HYDROcodone-acetaminophen, acetaminophen, albuterol sulfate HFA, amLODIPine, budesonide-formoterol, cloNIDine, fluticasone, metoprolol succinate XL, and naloxone      Allergies:  Allergies   Allergen Reactions    Lexapro [Escitalopram] Mental Status Change     Worsened depression     Paxlovid [Nirmatrelvir-Ritonavir] Unknown - Low Severity    Wellbutrin [Bupropion] Unknown - Low Severity       Objective   Objective     Vitals:   Temp:  [99.1 °F (37.3 °C)] 99.1 °F (37.3 °C)  Heart Rate:  [135] 135  Resp:  [20] 20  BP: (178)/(95) 178/95  Flow (L/min):  [3] 3  Physical Exam    Constitutional: Awake, alert   Eyes: PERRLA, sclerae anicteric, no conjunctival injection   HENT: NCAT, mucous membranes moist   Neck: Supple, no thyromegaly, no lymphadenopathy, trachea midline   Respiratory: Bilateral wheezing, nonlabored respirations    Cardiovascular: Tachycardia, no murmurs, rubs, or gallops, palpable pedal pulses bilaterally   Gastrointestinal: Positive bowel sounds, soft, nontender, nondistended   Musculoskeletal: No bilateral ankle edema, no clubbing or cyanosis to extremities   Psychiatric: Appropriate affect, cooperative   Neurologic: Oriented x 3, strength symmetric in all extremities, Cranial Nerves grossly intact to confrontation, speech clear   Skin: No rashes     Result Review    Result Review:  I have personally  reviewed the results from the time of this admission to 5/1/2024 22:34 EDT and agree with these findings:  [x]  Laboratory list / accordion  []  Microbiology  [x]  Radiology  [x]  EKG/Telemetry   []  Cardiology/Vascular   []  Pathology  []  Old records  []  Other:  Most notable findings include: Sinus tachycardia, chest x-ray negative, labs unremarkable, negative COVID flu RSV      Assessment & Plan   Assessment / Plan     Brief Patient Summary:  Yenni Camacho is a 58 y.o. female with past medical history of COPD not on home oxygen, hypertension, PTSD, chronic pain, anxiety, ADHD, and GERD presented to the ED with complaint of shortness of breath.     Active Hospital Problems:  Active Hospital Problems    Diagnosis     **COPD exacerbation     Acute on chronic respiratory failure with hypoxia     Uncontrolled hypertension     Ventricular tachycardia (paroxysmal)     Chronic systolic heart failure      Plan:     COPD Exacerbation  -Admit to telemetry  -Imaging reviewed  -Patient with history of HFrEF  -Supplemental oxygen as needed, wean down as tolerated  -ABG  -IV Solu-Medrol  -DuoNeb 3 times daily and as needed  -Mucinex, Tessalon Perles  -Incentive spirometry  -Adjust home meds if warranted  -Consult pulmonology if warranted  -Supportive care    HTN  -Currently well controlled  -PRN BP meds  -Resume home meds when available  -Titrate if needed    Hx Anxiety  Hx PTSD  Hx Chronic Pain  Hx CHF (systolic)    GI ppx  DVT ppx        CODE STATUS: Full code     Admission Status:  I believe this patient meets inpatient status.      Electronically signed by Tor Liao MD, 05/01/24, 10:34 PM EDT.

## 2024-05-02 NOTE — PLAN OF CARE
Goal Outcome Evaluation:  Pt was new admit from ED this shift. Pt aox4, B/P high, MD aware, gave medication per mar. Other vital signs are stable. No complaints of pain.

## 2024-05-02 NOTE — CONSULTS
Pulmonary Rehab Phase I - Occurrence #1    Education Topics:  Pulmonary Rehab yes     Topic Components:  Exercise yes     Teaching Aids:  Phase 2 Brochure yes     Teaching Method:  Written Instruction yes     Teaching Recipient:  Patient yes       Recovery/Discharge Instructions:  Pulmonary Rehab Phase 2 yes       Patient Qualification:  Pulmonary Rehab Phase 2 yes   COPD education done with patient.  Educated patient on pulmonary rehab program.

## 2024-05-02 NOTE — ED PROVIDER NOTES
Time: 10:21 PM EDT  Date of encounter:  5/1/2024  Independent Historian/Clinical History and Information was obtained by:   Patient  Chief Complaint: Shortness of breath    History is limited by: N/A    History of Present Illness:  Patient is a 58 y.o. year old female who presents to the emergency department for evaluation of shortness of breath.  Patient notes that she did smoke but quit 5 years ago.  She does have nebulizers at home.  The patient notes that she has had increasing shortness of breath over the last 24 hours.  She initially was short of breath with moderate exertion and now she is short of breath with minimal exertion and even at rest.  She has been coughing but its nonproductive.  She has no fever, rigors or myalgias.  She notes lung tightness but no chest pain.  No abdominal pain she has no hematochezia or melena.  She has no swelling in the legs.  She does note that she has high blood pressure.  She states that her blood pressure is typically out-of-control.  She was transported by EMS.  They did give a breathing treatment and Solu-Medrol on the way.    HPI    Patient Care Team  Primary Care Provider: Allyssa Pritchett APRN    Past Medical History:     Allergies   Allergen Reactions    Lexapro [Escitalopram] Mental Status Change     Worsened depression     Paxlovid [Nirmatrelvir-Ritonavir] Unknown - Low Severity    Wellbutrin [Bupropion] Unknown - Low Severity     Past Medical History:   Diagnosis Date    ADHD     Anxiety     Arthritis     Bunion     Chronic pain disorder     Foot pain, bilateral     Hypertension     PTSD (post-traumatic stress disorder)      Past Surgical History:   Procedure Laterality Date    ARM WOUND REPAIR / CLOSURE Right     COLONOSCOPY      FRACTURE SURGERY       Family History   Problem Relation Age of Onset    ADD / ADHD Mother     Anxiety disorder Mother        Home Medications:  Prior to Admission medications    Medication Sig Start Date End Date Taking? Authorizing  Provider   acetaminophen (TYLENOL) 325 MG tablet Take 2 tablets by mouth Every 6 (Six) Hours As Needed for Mild Pain.    ProviderMadhavi MD   albuterol sulfate  (90 Base) MCG/ACT inhaler Inhale 2 puffs Every 4 (Four) Hours As Needed for Wheezing. 3/11/24   Allyssa Pritchett APRN   amLODIPine (NORVASC) 10 MG tablet Take 1 tablet by mouth Daily. 3/11/24   Allyssa Pritchett APRN   Aspirin-Acetaminophen-Caffeine (GOODY HEADACHE PO) Take 1 tablet by mouth Every 8 (Eight) Hours As Needed (pain).    ProviderMadhavi MD   Baclofen (LIORESAL) 5 MG tablet Take 1 tablet by mouth Every 8 (Eight) Hours As Needed.    ProviderMadhavi MD   cloNIDine (CATAPRES) 0.3 MG tablet TAKE 1 TABLET BY MOUTH TWICE A DAY 3/11/24   Allyssa Pritchett APRN   fluticasone (FLONASE) 50 MCG/ACT nasal spray 2 sprays by Each Nare route Daily. 3/11/24   Allyssa Pritchett APRN   HYDROcodone-acetaminophen (NORCO) 7.5-325 MG per tablet Take 1 tablet by mouth Every 12 (Twelve) Hours As Needed for Moderate Pain.    ProviderMadhavi MD   metoprolol succinate XL (TOPROL-XL) 25 MG 24 hr tablet Take 1 tablet by mouth Daily. 3/11/24   Allyssa Pritchett APRN   naloxone (NARCAN) 4 MG/0.1ML nasal spray 1 spray into the nostril(s) as directed by provider As Needed for Opioid Reversal or Respiratory Depression.    ProviderMadhavi MD   Symbicort 160-4.5 MCG/ACT inhaler INHALE 2 PUFFS BY MOUTH TWICE A DAY 4/10/24   Allyssa Pritchett APRN        Social History:   Social History     Tobacco Use    Smoking status: Former     Current packs/day: 0.00     Average packs/day: 2.0 packs/day for 39.7 years (79.4 ttl pk-yrs)     Types: Cigarettes     Start date: 1978     Quit date: 2018     Years since quittin.0    Smokeless tobacco: Never   Vaping Use    Vaping status: Former    Substances: THC    Devices: [employer]   Substance Use Topics    Alcohol use: Not Currently    Drug use: Not Currently     Frequency: 7.0 times per week     Types:  "Hydrocodone, Marijuana     Comment: I had a MM license in [place].         Review of Systems:  Review of Systems   Constitutional:  Negative for chills, diaphoresis and fever.   HENT:  Negative for congestion, postnasal drip, rhinorrhea and sore throat.    Eyes:  Negative for photophobia.   Respiratory:  Positive for cough, shortness of breath and wheezing. Negative for chest tightness.    Cardiovascular:  Negative for chest pain, palpitations and leg swelling.   Gastrointestinal:  Negative for abdominal pain, diarrhea, nausea and vomiting.   Genitourinary:  Negative for difficulty urinating, dysuria, flank pain, frequency, hematuria and urgency.   Musculoskeletal:  Negative for neck pain and neck stiffness.   Skin:  Negative for pallor and rash.   Neurological:  Negative for dizziness, syncope, weakness, numbness and headaches.   Hematological:  Negative for adenopathy. Does not bruise/bleed easily.   Psychiatric/Behavioral: Negative.          Physical Exam:  /95 (BP Location: Right arm, Patient Position: Sitting)   Pulse (!) 135   Temp 99.1 °F (37.3 °C) (Oral)   Resp 20   Ht 170.2 cm (67\")   Wt 65 kg (143 lb 4.8 oz)   SpO2 (!) 88%   BMI 22.44 kg/m²     Physical Exam  Vitals and nursing note reviewed.   Constitutional:       General: She is not in acute distress.     Appearance: Normal appearance. She is not ill-appearing, toxic-appearing or diaphoretic.   HENT:      Head: Normocephalic and atraumatic.      Mouth/Throat:      Mouth: Mucous membranes are moist.   Eyes:      Pupils: Pupils are equal, round, and reactive to light.   Cardiovascular:      Rate and Rhythm: Normal rate and regular rhythm.      Pulses: Normal pulses.           Carotid pulses are 2+ on the right side and 2+ on the left side.       Radial pulses are 2+ on the right side and 2+ on the left side.        Femoral pulses are 2+ on the right side and 2+ on the left side.       Popliteal pulses are 2+ on the right side and 2+ on the left " side.        Dorsalis pedis pulses are 2+ on the right side and 2+ on the left side.        Posterior tibial pulses are 2+ on the right side and 2+ on the left side.      Heart sounds: Normal heart sounds. No murmur heard.  Pulmonary:      Effort: Pulmonary effort is normal. Tachypnea and accessory muscle usage present. No respiratory distress or retractions.      Breath sounds: Examination of the right-upper field reveals decreased breath sounds and wheezing. Examination of the left-upper field reveals decreased breath sounds and wheezing. Examination of the right-middle field reveals decreased breath sounds and wheezing. Examination of the left-middle field reveals decreased breath sounds and wheezing. Examination of the right-lower field reveals decreased breath sounds and wheezing. Examination of the left-lower field reveals decreased breath sounds and wheezing. Decreased breath sounds and wheezing present. No rhonchi or rales.   Chest:      Chest wall: No mass or tenderness.   Abdominal:      General: Abdomen is flat. There is no distension.      Palpations: Abdomen is soft. There is no mass or pulsatile mass.      Tenderness: There is no abdominal tenderness. There is no right CVA tenderness, left CVA tenderness, guarding or rebound.      Comments: No rigidity   Musculoskeletal:         General: No swelling, tenderness or deformity.      Cervical back: Neck supple. No tenderness.      Right lower leg: No tenderness. No edema.      Left lower leg: No tenderness. No edema.   Skin:     General: Skin is warm and dry.      Capillary Refill: Capillary refill takes less than 2 seconds.      Coloration: Skin is not jaundiced or pale.      Findings: No erythema.   Neurological:      General: No focal deficit present.      Mental Status: She is alert and oriented to person, place, and time. Mental status is at baseline.      Cranial Nerves: Cranial nerves 2-12 are intact. No cranial nerve deficit.      Sensory:  Sensation is intact. No sensory deficit.      Motor: Motor function is intact. No weakness or pronator drift.      Coordination: Coordination is intact. Coordination normal.   Psychiatric:         Mood and Affect: Mood normal.         Behavior: Behavior normal.                  Procedures:  Procedures      Medical Decision Making:      Comorbidities that affect care:    ADHD, hypertension, anxiety, COPD    External Notes reviewed:    None      The following orders were placed and all results were independently analyzed by me:  Orders Placed This Encounter   Procedures    COVID-19, FLU A/B, RSV PCR 1 HR TAT - Swab, Nasopharynx    Blood Culture - Blood,    Blood Culture - Blood,    XR Chest 1 View    [place] Draw    Comprehensive Metabolic Panel    BNP    Single High Sensitivity Troponin T    CBC Auto Differential    Lactic Acid, Plasma    Diet: Regular/House; Fluid Consistency: Thin (IDDSI 0)    Undress & Gown    Continuous Pulse Oximetry    Vital Signs    Inpatient Hospitalist Consult    Oxygen Therapy- Nasal Cannula; Titrate 1-6 LPM Per SpO2; 90 - 95%    ECG 12 Lead ED Triage Standing Order; SOA    Insert Peripheral IV    CBC & Differential    Green Top (Gel)    Lavender Top    Gold Top - SST    Light Blue Top       Medications Given in the Emergency Department:  Medications   sodium chloride 0.9 % flush 10 mL (has no administration in time range)   metoprolol succinate XL (TOPROL-XL) 24 hr tablet 50 mg (has no administration in time range)   levalbuterol (XOPENEX) nebulizer solution 1.25 mg (has no administration in time range)        ED Course:    ED Course as of 05/01/24 2238   Wed May 01, 2024   2228 EKG:    Rhythm: Sinus tachycardia  Rate: 124  Intervals: Normal MO and QT interval  T-wave: Possible T wave inversion II, III and aVF  ST Segment:, Nonspecific ST segment V1, V2, V3, ST depression, II, III, aVF    EKG Comparison: There is a change from EKG performed December 28, 2023.  However, I do feel like this  is related to the rate.  The patient has a much faster sinus tachycardia present today    Interpreted by me   [SD]      ED Course User Index  [SD] Dylan Cedeño DO       Labs:    Lab Results (last 24 hours)       Procedure Component Value Units Date/Time    CBC & Differential [[id number]]  (Abnormal) Collected: 05/01/24 2141    Specimen: Blood Updated: 05/01/24 2150    Narrative:      The following orders were created for panel order CBC & Differential.  Procedure                               Abnormality         Status                     ---------                               -----------         ------                     CBC Auto Differential[[id number]]        Abnormal            Final result                 Please view results for these tests on the individual orders.    Comprehensive Metabolic Panel [[id number]]  (Abnormal) Collected: 05/01/24 2141    Specimen: Blood Updated: 05/01/24 2218     Glucose 156 mg/dL      BUN 12 mg/dL      Creatinine 0.64 mg/dL      Sodium 143 mmol/L      Potassium 3.3 mmol/L      Comment: Slight hemolysis detected by analyzer. Result may be falsely elevated.        Chloride 101 mmol/L      CO2 29.1 mmol/L      Calcium 9.0 mg/dL      Total Protein 6.6 g/dL      Albumin 3.6 g/dL      ALT (SGPT) 19 U/L      AST (SGOT) 16 U/L      Alkaline Phosphatase 79 U/L      Total Bilirubin 0.2 mg/dL      Globulin 3.0 gm/dL      A/G Ratio 1.2 g/dL      BUN/Creatinine Ratio 18.8     Anion Gap 12.9 mmol/L      eGFR 101.9 mL/min/1.73     Narrative:      GFR Normal >60  Chronic Kidney Disease <60  Kidney Failure <15      BNP [[id number]]  (Normal) Collected: 05/01/24 2141    Specimen: Blood Updated: 05/01/24 2215     proBNP 373.6 pg/mL     Narrative:      This assay is used as an aid in the diagnosis of individuals suspected of having heart failure. It can be used as an aid in the diagnosis of acute decompensated heart failure (ADHF) in patients presenting with signs and symptoms of ADHF to the  emergency department (ED). In addition, NT-proBNP of <300 pg/mL indicates ADHF is not likely.    Age Range Result Interpretation  NT-proBNP Concentration (pg/mL:      <50             Positive            >450                   Gray                 300-450                    Negative             <300    50-75           Positive            >900                  Gray                300-900                  Negative            <300      >75             Positive            >1800                  Gray                300-1800                  Negative            <300    Single High Sensitivity Troponin T [[id number]]  (Abnormal) Collected: 05/01/24 2141    Specimen: Blood Updated: 05/01/24 2218     HS Troponin T 18 ng/L     Narrative:      High Sensitive Troponin T Reference Range:  <14.0 ng/L- Negative Female for AMI  <22.0 ng/L- Negative Male for AMI  >=14 - Abnormal Female indicating possible myocardial injury.  >=22 - Abnormal Male indicating possible myocardial injury.   Clinicians would have to utilize clinical acumen, EKG, Troponin, and serial changes to determine if it is an Acute Myocardial Infarction or myocardial injury due to an underlying chronic condition.         CBC Auto Differential [[id number]]  (Abnormal) Collected: 05/01/24 2141    Specimen: Blood Updated: 05/01/24 2150     WBC 9.31 10*3/mm3      RBC 5.16 10*6/mm3      Hemoglobin 15.3 g/dL      Hematocrit 46.7 %      MCV 90.5 fL      MCH 29.7 pg      MCHC 32.8 g/dL      RDW 14.3 %      RDW-SD 48.0 fl      MPV 9.4 fL      Platelets 272 10*3/mm3      Neutrophil % 76.3 %      Lymphocyte % 14.6 %      Monocyte % 8.6 %      Eosinophil % 0.1 %      Basophil % 0.1 %      Immature Grans % 0.3 %      Neutrophils, Absolute 7.10 10*3/mm3      Lymphocytes, Absolute 1.36 10*3/mm3      Monocytes, Absolute 0.80 10*3/mm3      Eosinophils, Absolute 0.01 10*3/mm3      Basophils, Absolute 0.01 10*3/mm3      Immature Grans, Absolute 0.03 10*3/mm3      nRBC 0.0 /100 WBC      COVID-19, FLU A/B, RSV PCR 1 HR TAT - Swab, Nasopharynx [[id number]]  (Normal) Collected: 05/01/24 2141    Specimen: Swab from Nasopharynx Updated: 05/01/24 2234     COVID19 Not Detected     Influenza A PCR Not Detected     Influenza B PCR Not Detected     RSV, PCR Not Detected    Narrative:      Fact sheet for providers: https://www.fda.gov/media/345081/download    Fact sheet for patients: https://www.fda.gov/media/617282/download    Test performed by PCR.    Blood Culture - Blood, Arm, Left [[id number]] Collected: 05/01/24 2141    Specimen: Blood from Arm, Left Updated: 05/01/24 2148    Blood Culture - Blood, Arm, Right [[id number]] Collected: 05/01/24 2141    Specimen: Blood from Arm, Right Updated: 05/01/24 2148    Lactic Acid, Plasma [[id number]]  (Normal) Collected: 05/01/24 2141    Specimen: Blood Updated: 05/01/24 2215     Lactate 1.9 mmol/L              Imaging:    XR Chest 1 View    Result Date: 5/1/2024  AP PORTABLE CHEST  HISTORY: Shortness of air.  COMPARISON: 3/11/2024  TECHNIQUE: AP portable chest x-ray.  FINDINGS: Cardiac and mediastinal contours are normal. There is atherosclerotic disease in the aorta. COPD is present with some scarring in the apices. No acute infiltrates or pneumothorax. Pulmonary vascularity is normal.      No acute cardiopulmonary findings.  Electronically Signed By-Dr. Philipp Vallejo MD On:5/1/2024 9:52 PM         Differential Diagnosis and Discussion:    Dyspnea: Differential diagnosis includes but is not limited to metabolic acidosis, neurological disorders, psychogenic, asthma, pneumothorax, upper airway obstruction, COPD, pneumonia, noncardiogenic pulmonary edema, interstitial lung disease, anemia, congestive heart failure, and pulmonary embolism    All labs were reviewed and interpreted by me.  All X-rays impressions were independently interpreted by me.  EKG was interpreted by me.    MDM  Number of Diagnoses or Management Options  Acute respiratory failure with hypoxia  COPD  exacerbation  Uncontrolled hypertension  Diagnosis management comments: The patient was given DuoNebs and Solu-Medrol by EMS on the way.    The patient had a normal lactate which indicates most likely normal tissue perfusion.    The patient's CMP was reviewed and shows no abnormalities of critical concern.  Of note, the patient's sodium and potassium are acceptable.  The patient's liver enzymes are unremarkable.  The patient's renal function including creatinine is preserved.  The patient has a normal anion gap.    The patient's CBC was reviewed and shows no abnormalities of critical concern.  Of note, there is no anemia requiring a blood transfusion and the platelet count is acceptable    Chest x-ray was performed while in the emergency room.  The chest x-ray demonstrated no acute cardiopulmonary process    The patient had a normal proBNP.  This makes acute decompensated heart failure unlikely    The patient's Covid swab was negative   The patient's Influenza swab was negative    The patient was given Xopenex while in the emergency room.  The patient's room air pulse ox did drop down to 88%.  The patient was subsequently admitted to the hospital for hypoxia.  At the time of admission, the patient is resting comfortably in no acute respiratory distress.  Overall the patient does look better than on arrival.  The patient's blood pressure was treated with her normal oral blood pressure medicine.  Her blood pressure will continue to be reassessed while in the hospital.       Amount and/or Complexity of Data Reviewed  Clinical lab tests: reviewed  Tests in the radiology section of CPT®: reviewed  Tests in the medicine section of CPT®: reviewed  Discuss the patient with other providers: yes (22:37 EDT  I discussed the case with the hospitalist, Dr. Liao.  We have discussed the patient's presenting symptoms, laboratory values, imaging and condition at the time of admission.  They will evaluate the patient in the  emergency room and admit the patient to the hospital)           Social Determinants of Health:    Patient is independent, reliable, and has access to care.       Disposition and Care Coordination:    Admit:   Through independent evaluation of the patient's history, physical, and imperical data, the patient meets criteria for inpatient admission to the hospital.        Final diagnoses:   Acute respiratory failure with hypoxia   COPD exacerbation   Uncontrolled hypertension        ED Disposition       ED Disposition   Decision to Admit    Condition   --    Comment   --               This medical record created using voice recognition software.             Dylan Cedeño DO  05/01/24 [id number]

## 2024-05-03 LAB
ANION GAP SERPL CALCULATED.3IONS-SCNC: 8.1 MMOL/L (ref 5–15)
BUN SERPL-MCNC: 17 MG/DL (ref 6–20)
BUN/CREAT SERPL: 27.9 (ref 7–25)
CALCIUM SPEC-SCNC: 8.6 MG/DL (ref 8.6–10.5)
CHLORIDE SERPL-SCNC: 107 MMOL/L (ref 98–107)
CO2 SERPL-SCNC: 26.9 MMOL/L (ref 22–29)
CREAT SERPL-MCNC: 0.61 MG/DL (ref 0.57–1)
DEPRECATED RDW RBC AUTO: 48.8 FL (ref 37–54)
EGFRCR SERPLBLD CKD-EPI 2021: 103.1 ML/MIN/1.73
ERYTHROCYTE [DISTWIDTH] IN BLOOD BY AUTOMATED COUNT: 14.6 % (ref 12.3–15.4)
GLUCOSE SERPL-MCNC: 101 MG/DL (ref 65–99)
HCT VFR BLD AUTO: 38.3 % (ref 34–46.6)
HGB BLD-MCNC: 12.3 G/DL (ref 12–15.9)
L PNEUMO1 AG UR QL IA: NEGATIVE
MCH RBC QN AUTO: 29.4 PG (ref 26.6–33)
MCHC RBC AUTO-ENTMCNC: 32.1 G/DL (ref 31.5–35.7)
MCV RBC AUTO: 91.6 FL (ref 79–97)
PLATELET # BLD AUTO: 249 10*3/MM3 (ref 140–450)
PMV BLD AUTO: 9.3 FL (ref 6–12)
POTASSIUM SERPL-SCNC: 3.8 MMOL/L (ref 3.5–5.2)
RBC # BLD AUTO: 4.18 10*6/MM3 (ref 3.77–5.28)
S PNEUM AG SPEC QL LA: NEGATIVE
SODIUM SERPL-SCNC: 142 MMOL/L (ref 136–145)
WBC NRBC COR # BLD AUTO: 7.64 10*3/MM3 (ref 3.4–10.8)

## 2024-05-03 PROCEDURE — 99232 SBSQ HOSP IP/OBS MODERATE 35: CPT | Performed by: INTERNAL MEDICINE

## 2024-05-03 PROCEDURE — 85027 COMPLETE CBC AUTOMATED: CPT | Performed by: FAMILY MEDICINE

## 2024-05-03 PROCEDURE — 80048 BASIC METABOLIC PNL TOTAL CA: CPT | Performed by: FAMILY MEDICINE

## 2024-05-03 PROCEDURE — 25010000002 ENOXAPARIN PER 10 MG: Performed by: FAMILY MEDICINE

## 2024-05-03 PROCEDURE — 87070 CULTURE OTHR SPECIMN AEROBIC: CPT | Performed by: INTERNAL MEDICINE

## 2024-05-03 PROCEDURE — 87899 AGENT NOS ASSAY W/OPTIC: CPT | Performed by: INTERNAL MEDICINE

## 2024-05-03 PROCEDURE — 94799 UNLISTED PULMONARY SVC/PX: CPT

## 2024-05-03 PROCEDURE — 94664 DEMO&/EVAL PT USE INHALER: CPT

## 2024-05-03 PROCEDURE — 87205 SMEAR GRAM STAIN: CPT | Performed by: INTERNAL MEDICINE

## 2024-05-03 PROCEDURE — 25010000002 CEFTRIAXONE PER 250 MG: Performed by: INTERNAL MEDICINE

## 2024-05-03 PROCEDURE — 63710000001 PREDNISONE PER 1 MG: Performed by: INTERNAL MEDICINE

## 2024-05-03 PROCEDURE — 87449 NOS EACH ORGANISM AG IA: CPT | Performed by: INTERNAL MEDICINE

## 2024-05-03 RX ORDER — AZITHROMYCIN 250 MG/1
500 TABLET, FILM COATED ORAL EVERY 24 HOURS
Status: COMPLETED | OUTPATIENT
Start: 2024-05-03 | End: 2024-05-04

## 2024-05-03 RX ADMIN — AZITHROMYCIN DIHYDRATE 500 MG: 250 TABLET ORAL at 20:51

## 2024-05-03 RX ADMIN — HYDROCODONE BITARTRATE AND ACETAMINOPHEN 1 TABLET: 7.5; 325 TABLET ORAL at 20:51

## 2024-05-03 RX ADMIN — ENOXAPARIN SODIUM 40 MG: 100 INJECTION SUBCUTANEOUS at 09:23

## 2024-05-03 RX ADMIN — GUAIFENESIN 600 MG: 600 TABLET ORAL at 09:22

## 2024-05-03 RX ADMIN — GUAIFENESIN 600 MG: 600 TABLET ORAL at 20:51

## 2024-05-03 RX ADMIN — CLONIDINE HYDROCHLORIDE 0.3 MG: 0.1 TABLET ORAL at 20:51

## 2024-05-03 RX ADMIN — Medication 10 ML: at 20:53

## 2024-05-03 RX ADMIN — AMLODIPINE BESYLATE 10 MG: 10 TABLET ORAL at 09:22

## 2024-05-03 RX ADMIN — CEFTRIAXONE SODIUM 1000 MG: 1 INJECTION, POWDER, FOR SOLUTION INTRAMUSCULAR; INTRAVENOUS at 18:11

## 2024-05-03 RX ADMIN — IPRATROPIUM BROMIDE AND ALBUTEROL SULFATE 3 ML: .5; 3 SOLUTION RESPIRATORY (INHALATION) at 00:05

## 2024-05-03 RX ADMIN — IPRATROPIUM BROMIDE AND ALBUTEROL SULFATE 3 ML: .5; 3 SOLUTION RESPIRATORY (INHALATION) at 06:27

## 2024-05-03 RX ADMIN — IPRATROPIUM BROMIDE AND ALBUTEROL SULFATE 3 ML: .5; 3 SOLUTION RESPIRATORY (INHALATION) at 12:42

## 2024-05-03 RX ADMIN — Medication 10 ML: at 09:23

## 2024-05-03 RX ADMIN — METOPROLOL SUCCINATE 25 MG: 25 TABLET, EXTENDED RELEASE ORAL at 09:22

## 2024-05-03 RX ADMIN — HYDROCODONE BITARTRATE AND ACETAMINOPHEN 1 TABLET: 7.5; 325 TABLET ORAL at 09:23

## 2024-05-03 RX ADMIN — PREDNISONE 40 MG: 20 TABLET ORAL at 09:22

## 2024-05-03 RX ADMIN — IPRATROPIUM BROMIDE AND ALBUTEROL SULFATE 3 ML: .5; 3 SOLUTION RESPIRATORY (INHALATION) at 19:48

## 2024-05-03 RX ADMIN — CLONIDINE HYDROCHLORIDE 0.3 MG: 0.1 TABLET ORAL at 09:22

## 2024-05-03 NOTE — PROGRESS NOTES
"Enter Query Response Below      Query Response:   Acute hypoxic respiratory failure was supported with additional clinical indicators:____________  Patient is not on oxygen at home and was hypoxic on admission?     Electronically signed by Shelton Ricketts DO, 24, 1:00 PM EDT.               If applicable, please update the problem list.   Patient: Yenni Camacho        : 1965  Account: [id number]           Admit Date:         How to Respond to this query:       a. Click New Note     b. Answer query within the yellow box.                c. Update the Problem List, if applicable.      If you have any questions about this query contact me at: [email or web address]       Dr. Ricketts or Dr. Liao,    The 24 Emergency Room Provider noted \"Pulmonary effort is normal. Tachypnea and accessory muscle usage present. No respiratory distress or retractions,\" \"room air pulse ox did drop down to 88%.  The patient was subsequently admitted to the hospital for hypoxia.  At the time of admission, the patient is resting comfortably in no acute respiratory distress,\" and \"acute hypoxic respiratory failure.\"  The 24 H&P and -5/3 Progress Notes include Acute hypoxemic respiratory failure and \"nonlabored respirations.\"  No ABG noted.  There is no documentation of any signs or symptoms of respiratory distress since admission. Related treatment has included Solumedrol, Xopenex, Azithromycin, Duo-neb, and Rocephin.     24 SpO2 88% on room air, heart rate 135, respirations 20.  Patient placed on 3 Liters SpO2 91-92%, then 90-96% on 2 Liters.     After study, was acute hypoxic respiratory failure clinically supported during this admission?    -Acute hypoxic respiratory failure was supported with additional clinical indicators:____________  -Acute hypoxic respiratory failure was not supported, resolved before admission  -Other- specify_____________  -Unable to determine      By submitting this query, we are " merely seeking further clarification of documentation to accurately reflect all conditions that you are monitoring, evaluating, treating or that extend the hospitalization or utilize additional resources of care. Please utilize your independent clinical judgment when addressing the question(s) above.     This query and your response, once completed, will be entered into the legal medical record.    Sincerely,  Flower KHAN RN, CCDS  Clinical Documentation Improvement Program  [email or web address]

## 2024-05-03 NOTE — PROGRESS NOTES
[facility]   Hospitalist Progress Note  Date: 5/3/2024  Patient Name: Yenni Camacho  : 1965  MRN: [id number]  Date of admission: 2024  Room/Bed: [facility]/      Subjective   Subjective     Chief Complaint: short of air     Summary:Yenni Camacho is a 58 y.o. female with past medical history of COPD not on home oxygen, hypertension, PTSD, chronic pain, anxiety, ADHD, and GERD presented to the ED with complaint of shortness of breath.  Patient states that for the last 2 to 3 days she has been having worsening shortness of breath where she was symptomatic even at rest along with a nonproductive cough, headache, lightheadedness, and generalized weakness.  Patient states he is compliant with her maintenance and rescue inhalers quit smoking 6 years ago.  He denies any fevers,chills, or sick contacts.  Due to her worsening symptoms patient came to the ED for further evaluation.  In the ED patient was tachycardic, hypertensive and hypoxic on arrival requiring oxygen supplementation via nasal cannula.  Labs were to be labs were relatively unremarkable including a negative COVID flu RSV.  Chest x-ray negative for any acute findings.  When asked she denied any recent fevers, chills, headaches, focal weakness, chest pain, palpitation, abdominal pain, nausea, vomiting, diarrhea, constipation, dysuria, hematuria, hematochezia, melena, or anxiety.  Patient admitted for further evaluation and treatment     Interval Followup:   Patient states that she is doing much better today.  No cough or wheezing currently.  Patient currently is on 2 L of oxygen however is not on oxygen at home.    Patient's blood pressure is much improved    Patient's mycoplasma IgM is positive  CT of the chest shows emphysema with chronic scarring and bronchitis      Review of Systems    All systems reviewed and negative except for what is outlined above.      Objective   Objective     Vitals:   Temp:  [98.2 °F (36.8 °C)-98.8 °F (37.1  °C)] 98.2 °F (36.8 °C)  Heart Rate:  [] 95  Resp:  [18-20] 18  BP: (129-161)/(68-99) 129/68  Flow (L/min):  [2] 2    Physical Exam   General: Awake, alert, NAD resting in bed   HENT: NCAT, MMM  Eyes: pupils equal, no scleral icterus  Cardiovascular: RRR, no murmurs   Pulmonary: decreased.  No wheezing or rhonchi currently  Gastrointestinal: S/ND/NT, +BS  Musculoskeletal: No gross deformities  Skin: No jaundice, no rash on exposed skin appreciated  Neuro: CN II through XII grossly intact; speech clear; no tremor  Psych: Mood and affect appropriate  : No Delgado catheter; no suprapubic tenderness    Result Review    Result Review:  I have personally reviewed these results:  [x]  Laboratory      Lab 05/03/24 0444 05/01/24 2141   WBC 7.64 9.31   HEMOGLOBIN 12.3 15.3   HEMATOCRIT 38.3 46.7*   PLATELETS 249 272   NEUTROS ABS  --  7.10*   IMMATURE GRANS (ABS)  --  0.03   LYMPHS ABS  --  1.36   MONOS ABS  --  0.80   EOS ABS  --  0.01   MCV 91.6 90.5   LACTATE  --  1.9         Lab 05/03/24  0444 05/02/24  0434 05/01/24 2141   SODIUM 142 137 143   POTASSIUM 3.8 4.1 3.3*   CHLORIDE 107 101 101   CO2 26.9 19.4* 29.1*   ANION GAP 8.1 16.6* 12.9   BUN 17 12 12   CREATININE 0.61 0.67 0.64   EGFR 103.1 100.8 101.9   GLUCOSE 101* 193* 156*   CALCIUM 8.6 8.9 9.0   HEMOGLOBIN A1C  --   --  7.20*         Lab 05/01/24 2141   TOTAL PROTEIN 6.6   ALBUMIN 3.6   GLOBULIN 3.0   ALT (SGPT) 19   AST (SGOT) 16   BILIRUBIN 0.2   ALK PHOS 79         Lab 05/01/24 2141   PROBNP 373.6   HSTROP T 18*                 Brief Urine Lab Results       None          [x]  Microbiology   Microbiology Results (last 10 days)       Procedure Component Value - Date/Time    S. Pneumo Ag Urine or CSF - Urine, Urine, Clean Catch [[id number]]  (Normal) Collected: 05/03/24 0351    Lab Status: Final result Specimen: Urine, Clean Catch Updated: 05/03/24 0632     Strep Pneumo Ag Negative    Legionella Antigen, Urine - Urine, Urine, Clean Catch [[id number]]   (Normal) Collected: 05/03/24 0351    Lab Status: Final result Specimen: Urine, Clean Catch Updated: 05/03/24 0632     LEGIONELLA ANTIGEN, URINE Negative    Respiratory Culture - Sputum, Cough [[id number]] Collected: 05/03/24 0351    Lab Status: Preliminary result Specimen: Sputum from Cough Updated: 05/03/24 0721     Gram Stain Many (4+) WBCs seen      Few (2+) Mucous strands      Few (2+) Gram positive bacilli      Rare (1+) Epithelial cells seen      Rare (1+) Budding yeast    COVID-19, FLU A/B, RSV PCR 1 HR TAT - Swab, Nasopharynx [[id number]]  (Normal) Collected: 05/01/24 2141    Lab Status: Final result Specimen: Swab from Nasopharynx Updated: 05/01/24 2234     COVID19 Not Detected     Influenza A PCR Not Detected     Influenza B PCR Not Detected     RSV, PCR Not Detected    Narrative:      Fact sheet for providers: https://www.fda.gov/media/926619/download    Fact sheet for patients: https://www.fda.gov/media/305584/download    Test performed by PCR.    Blood Culture - Blood, Arm, Left [[id number]]  (Normal) Collected: 05/01/24 2141    Lab Status: Preliminary result Specimen: Blood from Arm, Left Updated: 05/02/24 2200     Blood Culture No growth at 24 hours    Blood Culture - Blood, Arm, Right [[id number]]  (Normal) Collected: 05/01/24 2141    Lab Status: Preliminary result Specimen: Blood from Arm, Right Updated: 05/02/24 2200     Blood Culture No growth at 24 hours    Mycoplasma Pneumoniae Antibody, IgM - Blood, [[id number]]  (Abnormal) Collected: 05/01/24 2141    Lab Status: Final result Specimen: Blood Updated: 05/02/24 1543     Mycoplasma pneumo IgM Positive          [x]  Radiology  CT Chest Without Contrast Diagnostic    Result Date: 5/2/2024   1. Emphysema with some subtle tree-in-bud infectious or inflammatory infiltrates in the right upper and right lower lobe. 2. Chronic scarring in the apices, left greater than right, is similar to prior. 3. Atherosclerotic disease and coronary artery disease. 4.  Bronchitis.  Electronically Signed By-Dr. Philipp Vallejo MD On:5/2/2024 10:55 PM      XR Chest 1 View    Result Date: 5/1/2024  No acute cardiopulmonary findings.  Electronically Signed By-Dr. Philipp Vallejo MD On:5/1/2024 9:52 PM     []  EKG/Telemetry   []  Cardiology/Vascular   []  Pathology  []  Old records  []  Other:    Assessment & Plan   Assessment / Plan     Assessment:  Acute exacerbation of COPD  Acute hypoxemic respiratory failure  Hypertension  Anxiety  PTSD  Mycoplasma pneumonia    Plan:  Patient is currently on 2 L of oxygen.  Continue to wean oxygen  Continue oral prednisone  Continue patient on bronchodilators  Continue IV antibiotics  CT of the chest reviewed  Continue patient on Norvasc, metoprolol and clonidine for her hypertension  Consider pulmonary consult if patient does not improve  Urinary antigens are negative        Discussed with RN.    DVT prophylaxis:  Medical DVT prophylaxis orders are present.        CODE STATUS:   Level Of Support Discussed With: Patient  Code Status (Patient has no pulse and is not breathing): CPR (Attempt to Resuscitate)  Medical Interventions (Patient has pulse or is breathing): Full Support      Electronically signed by Shelton Ricketts DO, 5/3/2024, 10:39 EDT.

## 2024-05-04 LAB
ANION GAP SERPL CALCULATED.3IONS-SCNC: 11 MMOL/L (ref 5–15)
BUN SERPL-MCNC: 15 MG/DL (ref 6–20)
BUN/CREAT SERPL: 26.8 (ref 7–25)
CALCIUM SPEC-SCNC: 8.9 MG/DL (ref 8.6–10.5)
CHLORIDE SERPL-SCNC: 104 MMOL/L (ref 98–107)
CO2 SERPL-SCNC: 25 MMOL/L (ref 22–29)
CREAT SERPL-MCNC: 0.56 MG/DL (ref 0.57–1)
EGFRCR SERPLBLD CKD-EPI 2021: 105.3 ML/MIN/1.73
GLUCOSE SERPL-MCNC: 132 MG/DL (ref 65–99)
POTASSIUM SERPL-SCNC: 3.9 MMOL/L (ref 3.5–5.2)
SODIUM SERPL-SCNC: 140 MMOL/L (ref 136–145)

## 2024-05-04 PROCEDURE — 80048 BASIC METABOLIC PNL TOTAL CA: CPT | Performed by: FAMILY MEDICINE

## 2024-05-04 PROCEDURE — 63710000001 PREDNISONE PER 1 MG: Performed by: INTERNAL MEDICINE

## 2024-05-04 PROCEDURE — 25010000002 CEFTRIAXONE PER 250 MG: Performed by: INTERNAL MEDICINE

## 2024-05-04 PROCEDURE — 99232 SBSQ HOSP IP/OBS MODERATE 35: CPT | Performed by: INTERNAL MEDICINE

## 2024-05-04 PROCEDURE — 94799 UNLISTED PULMONARY SVC/PX: CPT

## 2024-05-04 PROCEDURE — 94664 DEMO&/EVAL PT USE INHALER: CPT

## 2024-05-04 PROCEDURE — 25010000002 ENOXAPARIN PER 10 MG: Performed by: FAMILY MEDICINE

## 2024-05-04 RX ADMIN — HYDROCODONE BITARTRATE AND ACETAMINOPHEN 1 TABLET: 7.5; 325 TABLET ORAL at 08:45

## 2024-05-04 RX ADMIN — METOPROLOL SUCCINATE 25 MG: 25 TABLET, EXTENDED RELEASE ORAL at 08:39

## 2024-05-04 RX ADMIN — IPRATROPIUM BROMIDE AND ALBUTEROL SULFATE 3 ML: .5; 3 SOLUTION RESPIRATORY (INHALATION) at 06:35

## 2024-05-04 RX ADMIN — PREDNISONE 40 MG: 20 TABLET ORAL at 08:39

## 2024-05-04 RX ADMIN — AMLODIPINE BESYLATE 10 MG: 10 TABLET ORAL at 08:39

## 2024-05-04 RX ADMIN — HYDROCODONE BITARTRATE AND ACETAMINOPHEN 1 TABLET: 7.5; 325 TABLET ORAL at 21:21

## 2024-05-04 RX ADMIN — GUAIFENESIN 600 MG: 600 TABLET ORAL at 08:40

## 2024-05-04 RX ADMIN — CLONIDINE HYDROCHLORIDE 0.3 MG: 0.1 TABLET ORAL at 08:39

## 2024-05-04 RX ADMIN — Medication 10 ML: at 21:22

## 2024-05-04 RX ADMIN — CLONIDINE HYDROCHLORIDE 0.3 MG: 0.1 TABLET ORAL at 21:21

## 2024-05-04 RX ADMIN — GUAIFENESIN 600 MG: 600 TABLET ORAL at 21:21

## 2024-05-04 RX ADMIN — IPRATROPIUM BROMIDE AND ALBUTEROL SULFATE 3 ML: .5; 3 SOLUTION RESPIRATORY (INHALATION) at 11:33

## 2024-05-04 RX ADMIN — AZITHROMYCIN DIHYDRATE 500 MG: 250 TABLET ORAL at 21:21

## 2024-05-04 RX ADMIN — CEFTRIAXONE SODIUM 1000 MG: 1 INJECTION, POWDER, FOR SOLUTION INTRAMUSCULAR; INTRAVENOUS at 16:31

## 2024-05-04 RX ADMIN — Medication 10 ML: at 08:40

## 2024-05-04 RX ADMIN — IPRATROPIUM BROMIDE AND ALBUTEROL SULFATE 3 ML: .5; 3 SOLUTION RESPIRATORY (INHALATION) at 19:00

## 2024-05-04 RX ADMIN — IPRATROPIUM BROMIDE AND ALBUTEROL SULFATE 3 ML: .5; 3 SOLUTION RESPIRATORY (INHALATION) at 00:25

## 2024-05-04 RX ADMIN — ENOXAPARIN SODIUM 40 MG: 100 INJECTION SUBCUTANEOUS at 08:39

## 2024-05-04 NOTE — PROCEDURES
Walking Oximetry Progress Note      Patient Name:  Yenni Camacho  YOB: 1965  Date of Procedure: 05/04/24              ROOM AIR BASELINE   SpO2% 87%   Heart Rate 84     EXERCISE ON ROOM AIR SpO2% EXERCISE ON O2 LPM SpO2%   1 MINUTE  1 MINUTE 4 89   2 MINUTES  2 MINUTES 4 90   3 MINUTES  3 MINUTES 4 89   4 MINUTES  4 MINUTES 4 89   5 MINUTES  5 MINUTES 4 90   6 MINUTES  6 MINUTES 4 89              Time to Recovery  4 minutes   SpO2% Post Exercise  92% on 2L.    HR Post Exercise  94     Comments: Patient placed on rrom air in bed, sats decreased to 87% when patient stood up. Nasal cannula started on 1L, then to 2L after not recovering above 88% on  1L. Walk started on 2L, after 2 minutes desat to 87%. Increased to 3L with no recovery above 88% after 1 minute increased to 4L. Walk restarted on 4L after sat recovery to 90%. Whole 6 minutes done on 6L, with saturation staying 89% or above. Patient returned to room and was left on 2L in no distress.          Electronically signed by Apurva Fernandez RRT, 05/04/24, 11:08 AM EDT.

## 2024-05-04 NOTE — PROGRESS NOTES
[facility]   Hospitalist Progress Note  Date: 2024  Patient Name: Yenni Camacho  : 1965  MRN: [id number]  Date of admission: 2024  Room/Bed: [facility]1      Subjective   Subjective     Chief Complaint: short of air     Summary:Yenni Camacho is a 58 y.o. female with past medical history of COPD not on home oxygen, hypertension, PTSD, chronic pain, anxiety, ADHD, and GERD presented to the ED with complaint of shortness of breath.  Patient states that for the last 2 to 3 days she has been having worsening shortness of breath where she was symptomatic even at rest along with a nonproductive cough, headache, lightheadedness, and generalized weakness.  Patient states he is compliant with her maintenance and rescue inhalers quit smoking 6 years ago.  He denies any fevers,chills, or sick contacts.  Due to her worsening symptoms patient came to the ED for further evaluation.  In the ED patient was tachycardic, hypertensive and hypoxic on arrival requiring oxygen supplementation via nasal cannula.  Labs were to be labs were relatively unremarkable including a negative COVID flu RSV.  Chest x-ray negative for any acute findings.  When asked she denied any recent fevers, chills, headaches, focal weakness, chest pain, palpitation, abdominal pain, nausea, vomiting, diarrhea, constipation, dysuria, hematuria, hematochezia, melena, or anxiety.  Patient admitted for further evaluation and treatment     Interval Followup:   Patient does state that she is starting to improve however she is not on oxygen at home and still remains on oxygen.  She did have a walking oximetry test this morning that she failed therapy.  She required 4 L of oxygen to complete the 6-minute walk test  Patient continues to have wheezing however it is improved  Patient's mycoplasma IgM is positive  CT of the chest shows emphysema with chronic scarring and bronchitis      Review of Systems    All systems reviewed and negative except  for what is outlined above.      Objective   Objective     Vitals:   Temp:  [97.9 °F (36.6 °C)-99.2 °F (37.3 °C)] 98.4 °F (36.9 °C)  Heart Rate:  [] 81  Resp:  [16-22] 18  BP: (127-182)/(76-98) 169/90  Flow (L/min):  [2] 2    Physical Exam   General: Awake, alert, NAD resting in bed   HENT: NCAT, MMM  Eyes: pupils equal, no scleral icterus  Cardiovascular: RRR, no murmurs   Pulmonary: decreased.  Bilateral wheezing   Gastrointestinal: S/ND/NT, +BS  Musculoskeletal: No gross deformities  Skin: No jaundice, no rash on exposed skin appreciated  Neuro: CN II through XII grossly intact; speech clear; no tremor  Psych: Mood and affect appropriate  : No Delgado catheter; no suprapubic tenderness    Result Review    Result Review:  I have personally reviewed these results:  [x]  Laboratory      Lab 05/03/24 0444 05/01/24 2141   WBC 7.64 9.31   HEMOGLOBIN 12.3 15.3   HEMATOCRIT 38.3 46.7*   PLATELETS 249 272   NEUTROS ABS  --  7.10*   IMMATURE GRANS (ABS)  --  0.03   LYMPHS ABS  --  1.36   MONOS ABS  --  0.80   EOS ABS  --  0.01   MCV 91.6 90.5   LACTATE  --  1.9         Lab 05/04/24  0511 05/03/24  0444 05/02/24  0434 05/01/24 2141   SODIUM 140 142 137 143   POTASSIUM 3.9 3.8 4.1 3.3*   CHLORIDE 104 107 101 101   CO2 25.0 26.9 19.4* 29.1*   ANION GAP 11.0 8.1 16.6* 12.9   BUN 15 17 12 12   CREATININE 0.56* 0.61 0.67 0.64   EGFR 105.3 103.1 100.8 101.9   GLUCOSE 132* 101* 193* 156*   CALCIUM 8.9 8.6 8.9 9.0   HEMOGLOBIN A1C  --   --   --  7.20*         Lab 05/01/24 2141   TOTAL PROTEIN 6.6   ALBUMIN 3.6   GLOBULIN 3.0   ALT (SGPT) 19   AST (SGOT) 16   BILIRUBIN 0.2   ALK PHOS 79         Lab 05/01/24  2141   PROBNP 373.6   HSTROP T 18*                 Brief Urine Lab Results       None          [x]  Microbiology   Microbiology Results (last 10 days)       Procedure Component Value - Date/Time    S. Pneumo Ag Urine or CSF - Urine, Urine, Clean Catch [[id number]]  (Normal) Collected: 05/03/24 0351    Lab Status: Final  result Specimen: Urine, Clean Catch Updated: 05/03/24 0632     Strep Pneumo Ag Negative    Legionella Antigen, Urine - Urine, Urine, Clean Catch [[id number]]  (Normal) Collected: 05/03/24 0351    Lab Status: Final result Specimen: Urine, Clean Catch Updated: 05/03/24 0632     LEGIONELLA ANTIGEN, URINE Negative    Respiratory Culture - Sputum, Cough [[id number]] Collected: 05/03/24 0351    Lab Status: Preliminary result Specimen: Sputum from Cough Updated: 05/04/24 1052     Respiratory Culture Light growth (2+) The culture consists of normal respiratory bennie. This is a preliminary report; final report to follow.     Gram Stain Many (4+) WBCs seen      Few (2+) Mucous strands      Few (2+) Gram positive bacilli      Rare (1+) Epithelial cells seen      Rare (1+) Budding yeast    COVID-19, FLU A/B, RSV PCR 1 HR TAT - Swab, Nasopharynx [[id number]]  (Normal) Collected: 05/01/24 2141    Lab Status: Final result Specimen: Swab from Nasopharynx Updated: 05/01/24 2234     COVID19 Not Detected     Influenza A PCR Not Detected     Influenza B PCR Not Detected     RSV, PCR Not Detected    Narrative:      Fact sheet for providers: https://www.fda.gov/media/093300/download    Fact sheet for patients: https://www.fda.gov/media/897933/download    Test performed by PCR.    Blood Culture - Blood, Arm, Left [[id number]]  (Normal) Collected: 05/01/24 2141    Lab Status: Preliminary result Specimen: Blood from Arm, Left Updated: 05/03/24 2200     Blood Culture No growth at 2 days    Blood Culture - Blood, Arm, Right [[id number]]  (Normal) Collected: 05/01/24 2141    Lab Status: Preliminary result Specimen: Blood from Arm, Right Updated: 05/03/24 2200     Blood Culture No growth at 2 days    Mycoplasma Pneumoniae Antibody, IgM - Blood, [[id number]]  (Abnormal) Collected: 05/01/24 2141    Lab Status: Final result Specimen: Blood Updated: 05/02/24 1543     Mycoplasma pneumo IgM Positive          [x]  Radiology  CT Chest Without Contrast  Diagnostic    Result Date: 5/2/2024   1. Emphysema with some subtle tree-in-bud infectious or inflammatory infiltrates in the right upper and right lower lobe. 2. Chronic scarring in the apices, left greater than right, is similar to prior. 3. Atherosclerotic disease and coronary artery disease. 4. Bronchitis.  Electronically Signed By-Dr. Philipp Vallejo MD On:5/2/2024 10:55 PM      XR Chest 1 View    Result Date: 5/1/2024  No acute cardiopulmonary findings.  Electronically Signed By-Dr. Philipp Vallejo MD On:5/1/2024 9:52 PM     []  EKG/Telemetry   []  Cardiology/Vascular   []  Pathology  []  Old records  []  Other:    Assessment & Plan   Assessment / Plan     Assessment:  Acute exacerbation of COPD  Acute hypoxemic respiratory failure  Hypertension  Anxiety  PTSD  Mycoplasma pneumonia    Plan:  Patient is currently on 2 L of oxygen.  Continue to wean oxygen  Failed walk test  Will order metneb and chest physiotherapy   Continue oral prednisone  Continue patient on bronchodilators  Continue IV antibiotics  CT of the chest reviewed  Continue patient on Norvasc, metoprolol and clonidine for her hypertension  Consider pulmonary consult if patient does not improve  Urinary antigens are negative        Discussed with RN.    DVT prophylaxis:  Medical DVT prophylaxis orders are present.        CODE STATUS:   Level Of Support Discussed With: Patient  Code Status (Patient has no pulse and is not breathing): CPR (Attempt to Resuscitate)  Medical Interventions (Patient has pulse or is breathing): Full Support      Electronically signed by Shelton Ricketts DO, 5/4/2024, 13:54 EDT.

## 2024-05-04 NOTE — PLAN OF CARE
Goal Outcome Evaluation:              Outcome Evaluation: Patient VSS, A&Ox4 this shift. Administered scheduled meds per MAR. Patient was up to shower, and rested comfortably afterward. Patient states no needs at this time. Plan of care to continue.

## 2024-05-05 LAB
ANION GAP SERPL CALCULATED.3IONS-SCNC: 9.8 MMOL/L (ref 5–15)
BACTERIA SPEC RESP CULT: NORMAL
BUN SERPL-MCNC: 17 MG/DL (ref 6–20)
BUN/CREAT SERPL: 29.8 (ref 7–25)
CALCIUM SPEC-SCNC: 9.4 MG/DL (ref 8.6–10.5)
CHLORIDE SERPL-SCNC: 104 MMOL/L (ref 98–107)
CO2 SERPL-SCNC: 28.2 MMOL/L (ref 22–29)
CREAT SERPL-MCNC: 0.57 MG/DL (ref 0.57–1)
DEPRECATED RDW RBC AUTO: 48.5 FL (ref 37–54)
EGFRCR SERPLBLD CKD-EPI 2021: 104.8 ML/MIN/1.73
ERYTHROCYTE [DISTWIDTH] IN BLOOD BY AUTOMATED COUNT: 14.6 % (ref 12.3–15.4)
GLUCOSE SERPL-MCNC: 133 MG/DL (ref 65–99)
GRAM STN SPEC: NORMAL
HCT VFR BLD AUTO: 38.8 % (ref 34–46.6)
HGB BLD-MCNC: 12.6 G/DL (ref 12–15.9)
MAGNESIUM SERPL-MCNC: 2 MG/DL (ref 1.6–2.6)
MCH RBC QN AUTO: 29.4 PG (ref 26.6–33)
MCHC RBC AUTO-ENTMCNC: 32.5 G/DL (ref 31.5–35.7)
MCV RBC AUTO: 90.7 FL (ref 79–97)
PLATELET # BLD AUTO: 306 10*3/MM3 (ref 140–450)
PMV BLD AUTO: 9.1 FL (ref 6–12)
POTASSIUM SERPL-SCNC: 4.4 MMOL/L (ref 3.5–5.2)
RBC # BLD AUTO: 4.28 10*6/MM3 (ref 3.77–5.28)
SODIUM SERPL-SCNC: 142 MMOL/L (ref 136–145)
WBC NRBC COR # BLD AUTO: 7.62 10*3/MM3 (ref 3.4–10.8)

## 2024-05-05 PROCEDURE — 80048 BASIC METABOLIC PNL TOTAL CA: CPT | Performed by: FAMILY MEDICINE

## 2024-05-05 PROCEDURE — 94799 UNLISTED PULMONARY SVC/PX: CPT

## 2024-05-05 PROCEDURE — 85027 COMPLETE CBC AUTOMATED: CPT | Performed by: INTERNAL MEDICINE

## 2024-05-05 PROCEDURE — 25010000002 ENOXAPARIN PER 10 MG: Performed by: FAMILY MEDICINE

## 2024-05-05 PROCEDURE — 63710000001 PREDNISONE PER 1 MG: Performed by: INTERNAL MEDICINE

## 2024-05-05 PROCEDURE — 99232 SBSQ HOSP IP/OBS MODERATE 35: CPT | Performed by: INTERNAL MEDICINE

## 2024-05-05 PROCEDURE — 94664 DEMO&/EVAL PT USE INHALER: CPT

## 2024-05-05 PROCEDURE — 25010000002 CEFTRIAXONE PER 250 MG: Performed by: INTERNAL MEDICINE

## 2024-05-05 PROCEDURE — 83735 ASSAY OF MAGNESIUM: CPT | Performed by: INTERNAL MEDICINE

## 2024-05-05 RX ADMIN — CLONIDINE HYDROCHLORIDE 0.3 MG: 0.1 TABLET ORAL at 21:47

## 2024-05-05 RX ADMIN — GUAIFENESIN 600 MG: 600 TABLET ORAL at 21:47

## 2024-05-05 RX ADMIN — IPRATROPIUM BROMIDE AND ALBUTEROL SULFATE 3 ML: .5; 3 SOLUTION RESPIRATORY (INHALATION) at 00:26

## 2024-05-05 RX ADMIN — CEFTRIAXONE SODIUM 1000 MG: 1 INJECTION, POWDER, FOR SOLUTION INTRAMUSCULAR; INTRAVENOUS at 18:09

## 2024-05-05 RX ADMIN — CLONIDINE HYDROCHLORIDE 0.3 MG: 0.1 TABLET ORAL at 09:17

## 2024-05-05 RX ADMIN — AMLODIPINE BESYLATE 10 MG: 10 TABLET ORAL at 09:17

## 2024-05-05 RX ADMIN — HYDROCODONE BITARTRATE AND ACETAMINOPHEN 1 TABLET: 7.5; 325 TABLET ORAL at 09:24

## 2024-05-05 RX ADMIN — PREDNISONE 40 MG: 20 TABLET ORAL at 09:17

## 2024-05-05 RX ADMIN — HYDROCODONE BITARTRATE AND ACETAMINOPHEN 1 TABLET: 7.5; 325 TABLET ORAL at 21:47

## 2024-05-05 RX ADMIN — Medication 10 ML: at 09:17

## 2024-05-05 RX ADMIN — IPRATROPIUM BROMIDE AND ALBUTEROL SULFATE 3 ML: .5; 3 SOLUTION RESPIRATORY (INHALATION) at 08:44

## 2024-05-05 RX ADMIN — IPRATROPIUM BROMIDE AND ALBUTEROL SULFATE 3 ML: .5; 3 SOLUTION RESPIRATORY (INHALATION) at 18:58

## 2024-05-05 RX ADMIN — ENOXAPARIN SODIUM 40 MG: 100 INJECTION SUBCUTANEOUS at 09:17

## 2024-05-05 RX ADMIN — GUAIFENESIN 600 MG: 600 TABLET ORAL at 09:17

## 2024-05-05 RX ADMIN — IPRATROPIUM BROMIDE AND ALBUTEROL SULFATE 3 ML: .5; 3 SOLUTION RESPIRATORY (INHALATION) at 14:02

## 2024-05-05 RX ADMIN — METOPROLOL SUCCINATE 25 MG: 25 TABLET, EXTENDED RELEASE ORAL at 09:16

## 2024-05-05 NOTE — PLAN OF CARE
Goal Outcome Evaluation:              Outcome Evaluation: Patient VSS this shift. Patient rested and watched tv majority of the day. Patient has not expressed any needs at this time. Patient antibiotic currently infusing. Patient care ongoing.

## 2024-05-05 NOTE — PROGRESS NOTES
[facility]   Hospitalist Progress Note  Date: 2024  Patient Name: Yenni Camacho  : 1965  MRN: [id number]  Date of admission: 2024  Room/Bed: [facility]/1      Subjective   Subjective     Chief Complaint: short of air     Summary:Yenni Camacho is a 58 y.o. female with past medical history of COPD not on home oxygen, hypertension, PTSD, chronic pain, anxiety, ADHD, and GERD presented to the ED with complaint of shortness of breath.  Patient states that for the last 2 to 3 days she has been having worsening shortness of breath where she was symptomatic even at rest along with a nonproductive cough, headache, lightheadedness, and generalized weakness.  Patient states he is compliant with her maintenance and rescue inhalers quit smoking 6 years ago.  He denies any fevers,chills, or sick contacts.  Due to her worsening symptoms patient came to the ED for further evaluation.  In the ED patient was tachycardic, hypertensive and hypoxic on arrival requiring oxygen supplementation via nasal cannula.  Labs were to be labs were relatively unremarkable including a negative COVID flu RSV.  Chest x-ray negative for any acute findings.  When asked she denied any recent fevers, chills, headaches, focal weakness, chest pain, palpitation, abdominal pain, nausea, vomiting, diarrhea, constipation, dysuria, hematuria, hematochezia, melena, or anxiety.  Patient admitted for further evaluation and treatment     Interval Followup:   Patient does state that she is starting to improve however she is not on oxygen at home and still remains on oxygen.  She did have a walking oximetry test this morning that she failed therapy.    She is on 2L of oxygen   Patient continues to have wheezing however it is improved  Patient's mycoplasma IgM is positive  CT of the chest shows emphysema with chronic scarring and bronchitis      Review of Systems    All systems reviewed and negative except for what is outlined  above.      Objective   Objective     Vitals:   Temp:  [97.7 °F (36.5 °C)-98.4 °F (36.9 °C)] 98.1 °F (36.7 °C)  Heart Rate:  [] 87  Resp:  [16-18] 16  BP: (146-187)/(82-98) 157/98  Flow (L/min):  [2] 2    Physical Exam   General: Awake, alert, NAD resting in bed   HENT: NCAT, MMM  Eyes: pupils equal, no scleral icterus  Cardiovascular: RRR, no murmurs   Pulmonary: decreased.  Bilateral wheezing   Gastrointestinal: S/ND/NT, +BS  Musculoskeletal: No gross deformities  Skin: No jaundice, no rash on exposed skin appreciated  Neuro: CN II through XII grossly intact; speech clear; no tremor  Psych: Mood and affect appropriate  : No Delgado catheter; no suprapubic tenderness    Result Review    Result Review:  I have personally reviewed these results:  [x]  Laboratory      Lab 05/05/24 0431 05/03/24 0444 05/01/24  2141   WBC 7.62 7.64 9.31   HEMOGLOBIN 12.6 12.3 15.3   HEMATOCRIT 38.8 38.3 46.7*   PLATELETS 306 249 272   NEUTROS ABS  --   --  7.10*   IMMATURE GRANS (ABS)  --   --  0.03   LYMPHS ABS  --   --  1.36   MONOS ABS  --   --  0.80   EOS ABS  --   --  0.01   MCV 90.7 91.6 90.5   LACTATE  --   --  1.9         Lab 05/05/24 0431 05/04/24  0511 05/03/24 0444 05/02/24 0434 05/01/24  2141   SODIUM 142 140 142   < > 143   POTASSIUM 4.4 3.9 3.8   < > 3.3*   CHLORIDE 104 104 107   < > 101   CO2 28.2 25.0 26.9   < > 29.1*   ANION GAP 9.8 11.0 8.1   < > 12.9   BUN 17 15 17   < > 12   CREATININE 0.57 0.56* 0.61   < > 0.64   EGFR 104.8 105.3 103.1   < > 101.9   GLUCOSE 133* 132* 101*   < > 156*   CALCIUM 9.4 8.9 8.6   < > 9.0   MAGNESIUM 2.0  --   --   --   --    HEMOGLOBIN A1C  --   --   --   --  7.20*    < > = values in this interval not displayed.         Lab 05/01/24  2141   TOTAL PROTEIN 6.6   ALBUMIN 3.6   GLOBULIN 3.0   ALT (SGPT) 19   AST (SGOT) 16   BILIRUBIN 0.2   ALK PHOS 79         Lab 05/01/24  2141   PROBNP 373.6   HSTROP T 18*                 Brief Urine Lab Results       None          [x]   Microbiology   Microbiology Results (last 10 days)       Procedure Component Value - Date/Time    S. Pneumo Ag Urine or CSF - Urine, Urine, Clean Catch [[id number]]  (Normal) Collected: 05/03/24 0351    Lab Status: Final result Specimen: Urine, Clean Catch Updated: 05/03/24 0632     Strep Pneumo Ag Negative    Legionella Antigen, Urine - Urine, Urine, Clean Catch [[id number]]  (Normal) Collected: 05/03/24 0351    Lab Status: Final result Specimen: Urine, Clean Catch Updated: 05/03/24 0632     LEGIONELLA ANTIGEN, URINE Negative    Respiratory Culture - Sputum, Cough [[id number]] Collected: 05/03/24 0351    Lab Status: Final result Specimen: Sputum from Cough Updated: 05/05/24 1012     Respiratory Culture Light growth (2+) Normal respiratory bennie. No S. aureus or Pseudomonas aeruginosa detected. Final report.     Gram Stain Many (4+) WBCs seen      Few (2+) Mucous strands      Few (2+) Gram positive bacilli      Rare (1+) Epithelial cells seen      Rare (1+) Budding yeast    COVID-19, FLU A/B, RSV PCR 1 HR TAT - Swab, Nasopharynx [[id number]]  (Normal) Collected: 05/01/24 2141    Lab Status: Final result Specimen: Swab from Nasopharynx Updated: 05/01/24 2234     COVID19 Not Detected     Influenza A PCR Not Detected     Influenza B PCR Not Detected     RSV, PCR Not Detected    Narrative:      Fact sheet for providers: https://www.fda.gov/media/012246/download    Fact sheet for patients: https://www.fda.gov/media/138690/download    Test performed by PCR.    Blood Culture - Blood, Arm, Left [[id number]]  (Normal) Collected: 05/01/24 2141    Lab Status: Preliminary result Specimen: Blood from Arm, Left Updated: 05/04/24 2200     Blood Culture No growth at 3 days    Blood Culture - Blood, Arm, Right [[id number]]  (Normal) Collected: 05/01/24 2141    Lab Status: Preliminary result Specimen: Blood from Arm, Right Updated: 05/04/24 2200     Blood Culture No growth at 3 days    Mycoplasma Pneumoniae Antibody, IgM - Blood,  [[id number]]  (Abnormal) Collected: 05/01/24 2141    Lab Status: Final result Specimen: Blood Updated: 05/02/24 1543     Mycoplasma pneumo IgM Positive          [x]  Radiology  CT Chest Without Contrast Diagnostic    Result Date: 5/2/2024   1. Emphysema with some subtle tree-in-bud infectious or inflammatory infiltrates in the right upper and right lower lobe. 2. Chronic scarring in the apices, left greater than right, is similar to prior. 3. Atherosclerotic disease and coronary artery disease. 4. Bronchitis.  Electronically Signed By-Dr. Philipp Vallejo MD On:5/2/2024 10:55 PM      XR Chest 1 View    Result Date: 5/1/2024  No acute cardiopulmonary findings.  Electronically Signed By-Dr. Philipp Vallejo MD On:5/1/2024 9:52 PM     []  EKG/Telemetry   []  Cardiology/Vascular   []  Pathology  []  Old records  []  Other:    Assessment & Plan   Assessment / Plan     Assessment:  Acute exacerbation of COPD  Acute hypoxemic respiratory failure  Hypertension  Anxiety  PTSD  Mycoplasma pneumonia    Plan:  Patient is currently on 2 L of oxygen.  Continue to wean oxygen  Failed walk test. Will need to go home with oxygen   Will order metneb and chest physiotherapy   Continue oral prednisone  Continue patient on bronchodilators  Continue IV antibiotics  CT of the chest reviewed  Continue patient on Norvasc, metoprolol and clonidine for her hypertension  Consider pulmonary consult if patient does not improve  Urinary antigens are negative        Discussed with RN.    DVT prophylaxis:  Medical DVT prophylaxis orders are present.        CODE STATUS:   Level Of Support Discussed With: Patient  Code Status (Patient has no pulse and is not breathing): CPR (Attempt to Resuscitate)  Medical Interventions (Patient has pulse or is breathing): Full Support      Electronically signed by Shelton Ricketts DO, 5/5/2024, 10:23 EDT.

## 2024-05-05 NOTE — NURSING NOTE
I received report from Lorna OLIVEROS to transfer care to me for the duration of shift. I will chart from 1300 on. Patient alert and oriented  Care ongoing.

## 2024-05-05 NOTE — PLAN OF CARE
Goal Outcome Evaluation:           Progress: improving  Outcome Evaluation: Patient VSS this shift, A&Ox4. Administered scheduled meds per MAR as well as PRN pain medication once for headache. Patient rested comfortably overnight, no needs at this time. Plan of care ongoing.

## 2024-05-06 LAB
ANION GAP SERPL CALCULATED.3IONS-SCNC: 14.9 MMOL/L (ref 5–15)
BACTERIA SPEC AEROBE CULT: NORMAL
BACTERIA SPEC AEROBE CULT: NORMAL
BUN SERPL-MCNC: 20 MG/DL (ref 6–20)
BUN/CREAT SERPL: 32.8 (ref 7–25)
CALCIUM SPEC-SCNC: 8.9 MG/DL (ref 8.6–10.5)
CHLORIDE SERPL-SCNC: 107 MMOL/L (ref 98–107)
CO2 SERPL-SCNC: 21.1 MMOL/L (ref 22–29)
CREAT SERPL-MCNC: 0.61 MG/DL (ref 0.57–1)
EGFRCR SERPLBLD CKD-EPI 2021: 103.1 ML/MIN/1.73
GLUCOSE SERPL-MCNC: 170 MG/DL (ref 65–99)
POTASSIUM SERPL-SCNC: 3.9 MMOL/L (ref 3.5–5.2)
SODIUM SERPL-SCNC: 143 MMOL/L (ref 136–145)

## 2024-05-06 PROCEDURE — 94618 PULMONARY STRESS TESTING: CPT

## 2024-05-06 PROCEDURE — 94664 DEMO&/EVAL PT USE INHALER: CPT

## 2024-05-06 PROCEDURE — 63710000001 PREDNISONE PER 1 MG: Performed by: INTERNAL MEDICINE

## 2024-05-06 PROCEDURE — 99232 SBSQ HOSP IP/OBS MODERATE 35: CPT | Performed by: INTERNAL MEDICINE

## 2024-05-06 PROCEDURE — 80048 BASIC METABOLIC PNL TOTAL CA: CPT | Performed by: FAMILY MEDICINE

## 2024-05-06 PROCEDURE — 25010000002 CEFTRIAXONE PER 250 MG: Performed by: INTERNAL MEDICINE

## 2024-05-06 PROCEDURE — 94799 UNLISTED PULMONARY SVC/PX: CPT

## 2024-05-06 PROCEDURE — 25010000002 ENOXAPARIN PER 10 MG: Performed by: FAMILY MEDICINE

## 2024-05-06 PROCEDURE — 99223 1ST HOSP IP/OBS HIGH 75: CPT | Performed by: INTERNAL MEDICINE

## 2024-05-06 RX ADMIN — HYDROCODONE BITARTRATE AND ACETAMINOPHEN 1 TABLET: 7.5; 325 TABLET ORAL at 21:38

## 2024-05-06 RX ADMIN — GUAIFENESIN 600 MG: 600 TABLET ORAL at 09:59

## 2024-05-06 RX ADMIN — IPRATROPIUM BROMIDE AND ALBUTEROL SULFATE 3 ML: .5; 3 SOLUTION RESPIRATORY (INHALATION) at 11:48

## 2024-05-06 RX ADMIN — IPRATROPIUM BROMIDE AND ALBUTEROL SULFATE 3 ML: .5; 3 SOLUTION RESPIRATORY (INHALATION) at 06:31

## 2024-05-06 RX ADMIN — ENOXAPARIN SODIUM 40 MG: 100 INJECTION SUBCUTANEOUS at 09:59

## 2024-05-06 RX ADMIN — GUAIFENESIN 600 MG: 600 TABLET ORAL at 21:38

## 2024-05-06 RX ADMIN — METOPROLOL SUCCINATE 25 MG: 25 TABLET, EXTENDED RELEASE ORAL at 09:59

## 2024-05-06 RX ADMIN — CEFTRIAXONE SODIUM 1000 MG: 1 INJECTION, POWDER, FOR SOLUTION INTRAMUSCULAR; INTRAVENOUS at 17:40

## 2024-05-06 RX ADMIN — IPRATROPIUM BROMIDE AND ALBUTEROL SULFATE 3 ML: .5; 3 SOLUTION RESPIRATORY (INHALATION) at 19:18

## 2024-05-06 RX ADMIN — CLONIDINE HYDROCHLORIDE 0.3 MG: 0.1 TABLET ORAL at 09:59

## 2024-05-06 RX ADMIN — Medication 10 ML: at 21:38

## 2024-05-06 RX ADMIN — IPRATROPIUM BROMIDE AND ALBUTEROL SULFATE 3 ML: .5; 3 SOLUTION RESPIRATORY (INHALATION) at 01:36

## 2024-05-06 RX ADMIN — CLONIDINE HYDROCHLORIDE 0.3 MG: 0.1 TABLET ORAL at 21:38

## 2024-05-06 RX ADMIN — PREDNISONE 40 MG: 20 TABLET ORAL at 09:59

## 2024-05-06 RX ADMIN — HYDROCODONE BITARTRATE AND ACETAMINOPHEN 1 TABLET: 7.5; 325 TABLET ORAL at 09:58

## 2024-05-06 RX ADMIN — AMLODIPINE BESYLATE 10 MG: 10 TABLET ORAL at 09:59

## 2024-05-06 RX ADMIN — Medication 10 ML: at 09:59

## 2024-05-06 NOTE — PLAN OF CARE
Goal Outcome Evaluation:           Progress: improving  Outcome Evaluation: Patient VSS this shift, A7Ox4. Administered scheduled meds per MAR, as well as PRN pain medication once for headache. Patient was up ad shital, showered, and slept comfortably. Hopes to plan discharge in the next few days. Plan of care to continue.

## 2024-05-06 NOTE — PROGRESS NOTES
"Enter Query Response Below      Query Response:   Mycoplasma Pneumonia present on admission     Electronically signed by Shelton Ricketts DO, 24, 2:49 PM EDT.               If applicable, please update the problem list.   Patient: Yenni Camacho        : 1965  Account: [id number]           Admit Date:         How to Respond to this query:       a. Click New Note     b. Answer query within the yellow box.                c. Update the Problem List, if applicable.      If you have any questions about this query contact me at:  [email or web address]     Dr. Ricketts,     The patient was admitted 24 with COPD exacerbation and acute on chronic respiratory failure with hypoxia.  Mycoplasma Pneumonia was first documented on the 5/3 Progress Note.  Treatment included intravenous Zithromax , oral Zithromax 5/3-, and Rocephin - (scheduled through ).      24 Chest Xray \"No acute cardiopulmonary findings.\"  24 CT chest \"Emphysema with some subtle tree-in-bud infectious or inflammatory infiltrates in the right upper and right lower lobe. 2.Chronic scarring in the apices, left greater than right, is similar to prior. 3.Atherosclerotic disease and coronary artery disease. 4.Bronchitis.\"    24 Mycoplasma Pneumoniae IGM +    After study, please clarify the following:    Mycoplasma Pneumonia present on admission  Mycoplasma Pneumonia developed during admission  Other- specify______  Unable to determine    By submitting this query, we are merely seeking further clarification of documentation to accurately reflect all conditions that you are monitoring, evaluating, treating or that extend the hospitalization or utilize additional resources of care. Please utilize your independent clinical judgment when addressing the question(s) above.     This query and your response, once completed, will be entered into the legal medical record.    Sincerely,  Flower KHAN, RN, CCDS  Clinical " Documentation Improvement Program  [email or web address]

## 2024-05-06 NOTE — PLAN OF CARE
Goal Outcome Evaluation:                      A&Ox4. VSS. Complaints of pain this shift and was medicated per MAR. IV antibiotics complete per MAR. No significant changes this shift. No new orders at this time.

## 2024-05-06 NOTE — CASE MANAGEMENT/SOCIAL WORK
COPD disease process and management briefly discussed with Ms. Camacho. Patient has been seen by RT CM in the past for COPD.  Patient has not established with a pulmonologist. She has not had a PFT or PSG completed to date. She states she takes Symbicort daily (without a spacer) and has problems with Thrush despite rinsing her mouth. Ms. Camacho states she has a daily productive cough, though currently she is not able to mobilize the secretions.  She is not on home oxygen and does not have a nebulizer.  I will follow for COPD education and respiratory DME and medication needs.

## 2024-05-06 NOTE — CONSULTS
[facility]   Consult Note    Patient Name: Yenni Camacho  : 1965  MRN: [id number]  Primary Care Physician:  Allyssa Pritchett APRN  Referring Physician: No ref. provider found  Date of admission: 2024    Inpatient Pulmonology Consult  Consult performed by: Pradeep Arauz DO  Consult ordered by: Shelton Ricketts DO        Subjective   Subjective     Reason for Consult/ Chief Complaint: Reason for consultation abnormal imaging    History of Present Illness  Yenni Camacho is a 58 y.o. female pleasant to the hospital on   Treated for COPD exacerbation and pneumonia  Underwent a CT scan  CT scan on 2024  Shows tree-in-bud opacities worse in the right middle lobe  Has persistent symptoms and have persistent cough  Persistent shortness of breath and difficulty expectorating mucus    Review of Systems   Positive for cough  Positive for shortness of breath  Positive for wheezes  Personal History     Past Medical History:   Diagnosis Date    ADHD     Anxiety     Arthritis     Bunion     Chronic pain disorder     Foot pain, bilateral     Hypertension     PTSD (post-traumatic stress disorder)        Past Surgical History:   Procedure Laterality Date    ARM WOUND REPAIR / CLOSURE Right     COLONOSCOPY      FRACTURE SURGERY         Family History: family history includes ADD / ADHD in her mother; Anxiety disorder in her mother. Otherwise pertinent FHx was reviewed and not pertinent to current issue.    Social History:  reports that she quit smoking about 6 years ago. Her smoking use included cigarettes. She started smoking about 45 years ago. She has a 79.4 pack-year smoking history. She has never used smokeless tobacco. She reports that she does not currently use alcohol. She reports that she does not currently use drugs after having used the following drugs: Hydrocodone and Marijuana. Frequency: 7.00 times per week.    Home Medications:   Baclofen, HYDROcodone-acetaminophen,  acetaminophen, albuterol sulfate HFA, amLODIPine, budesonide-formoterol, cloNIDine, fluticasone, metoprolol succinate XL, and naloxone    Allergies:  Allergies   Allergen Reactions    Lexapro [Escitalopram] Mental Status Change     Worsened depression     Paxlovid [Nirmatrelvir-Ritonavir] Unknown - Low Severity    Wellbutrin [Bupropion] Unknown - Low Severity       Objective    Objective     Vitals:  Temp:  [97.2 °F (36.2 °C)-98.4 °F (36.9 °C)] 97.7 °F (36.5 °C)  Heart Rate:  [] 90  Resp:  [16-20] 20  BP: (147-160)/(74-94) 149/94  Flow (L/min):  [2] 2    Physical Exam  Vital Signs Reviewed  General WDWN, Alert, NAD.    HEENT:  PERRL, EOMI.  OP, nares clear, no sinus tenderness  Neck:  Supple, no JVD, no thyromegaly  Lymph: no axillary, cervical, supraclavicular lymphadenopathy noted bilaterally  Chest:  good aeration, clear to auscultation bilaterally, tympanic to percussion bilaterally, no work of breathing noted  CV: RRR, no MGR, pulses 2+, equal.  Abd:  Soft, NT, ND, + BS, no HSM  EXT:  no clubbing, no cyanosis, no edema, no joint tenderness  Neuro:  A&Ox3, CN grossly intact, no focal deficits.  Skin: No rashes or lesions noted  Result Review    Result Review:  I have personally reviewed the results from the time of this admission to 5/6/2024 16:44 EDT and agree with these findings:  []  Laboratory  []  Microbiology  []  Radiology  []  EKG/Telemetry   []  Cardiology/Vascular   []  Pathology  []  Old records  []  Other:    Most notable findings include: CT scan showing diffuse tree-in-bud opacities and mucous plugging    Assessment & Plan   Assessment / Plan     Brief Patient Summary:  Yenni Camacho is a 58 y.o. female who mid to the hospital COPD exacerbation with mucous plugging and tree-in-bud opacities    Active Hospital Problems:  Active Hospital Problems    Diagnosis     **COPD exacerbation     Acute on chronic respiratory failure with hypoxia     Mycoplasma pneumonia     Uncontrolled  hypertension     Ventricular tachycardia (paroxysmal)     Chronic systolic heart failure        Plan:   CT scan reviewed  Patient with tree-in-bud opacities  I suspect she has bronchiectasis as well though not mentioned by the radiologist the patient does have appear to have some dilated airways in the lower lobes bilaterally  Will continue with current bronchodilator therapies and antibiotics  Patient has persistent symptoms and sensation that she has mucus stuck in her chest  Given the fact that she has failed noninvasive treatment we will proceed with bronchoscopy on 5/8/2024    Risks versus benefits of bronchoscopy explained to the patient including death, respiratory failure requiring intubation mechanical ventilation, pneumothorax requiring chest tube placement, bleeding.  Patient voices understanding of the risks of the procedure and wishes to proceed.    I personally reviewed all imaging, laboratory data, and I spoke with respiratory therapy, and nursing regarding the patient's care, have also spoken with the patient's primary admitting physician regarding her plan of care.      Electronically signed by Pradeep Arauz DO, 05/06/24, 4:44 PM EDT.

## 2024-05-06 NOTE — PROGRESS NOTES
[facility]   Hospitalist Progress Note  Date: 2024  Patient Name: Yenni Camacho  : 1965  MRN: [id number]  Date of admission: 2024  Room/Bed: [facility]/      Subjective   Subjective     Chief Complaint: short of air     Summary:Yenni Camacho is a 58 y.o. female with past medical history of COPD not on home oxygen, hypertension, PTSD, chronic pain, anxiety, ADHD, and GERD presented to the ED with complaint of shortness of breath.  Patient states that for the last 2 to 3 days she has been having worsening shortness of breath where she was symptomatic even at rest along with a nonproductive cough, headache, lightheadedness, and generalized weakness.  Patient states he is compliant with her maintenance and rescue inhalers quit smoking 6 years ago.  He denies any fevers,chills, or sick contacts.  Due to her worsening symptoms patient came to the ED for further evaluation.  In the ED patient was tachycardic, hypertensive and hypoxic on arrival requiring oxygen supplementation via nasal cannula.  Labs were to be labs were relatively unremarkable including a negative COVID flu RSV.  Chest x-ray negative for any acute findings.  When asked she denied any recent fevers, chills, headaches, focal weakness, chest pain, palpitation, abdominal pain, nausea, vomiting, diarrhea, constipation, dysuria, hematuria, hematochezia, melena, or anxiety.  Patient admitted for further evaluation and treatment     Interval Followup:   Patient states she continues to have coughing and secretions however she is unable to cough up her secretions  Patient remains on oxygen 2 L.  Patient has continued to fail her walking oximetry test and will need to be discharged home with oxygen  Patient does work at [employer] at the [employer] store and she is anxious to return to work and also anxious about how she will return with oxygen  Patient is agreeable to see pulmonary to determine if bronchoscopy might help her oxygenation and  breathing improve  Patient continues to have wheezing however it is improved  Patient's mycoplasma IgM is positive  CT of the chest shows emphysema with chronic scarring and bronchitis      Review of Systems    All systems reviewed and negative except for what is outlined above.      Objective   Objective     Vitals:   Temp:  [97.2 °F (36.2 °C)-98.4 °F (36.9 °C)] 97.2 °F (36.2 °C)  Heart Rate:  [] 96  Resp:  [16-20] 18  BP: (126-160)/(70-91) 150/83  Flow (L/min):  [2] 2    Physical Exam   General: Awake, alert, NAD resting in bed with oxygen in place   HENT: NCAT, MMM  Eyes: pupils equal, no scleral icterus  Cardiovascular: RRR, no murmurs   Pulmonary: decreased.  Bilateral wheezing   Gastrointestinal: S/ND/NT, +BS  Musculoskeletal: No gross deformities  Skin: No jaundice, no rash on exposed skin appreciated  Neuro: CN II through XII grossly intact; speech clear; no tremor  Psych: Mood and affect appropriate  : No Delgado catheter; no suprapubic tenderness    Result Review    Result Review:  I have personally reviewed these results:  [x]  Laboratory      Lab 05/05/24  0431 05/03/24  0444 05/01/24  2141   WBC 7.62 7.64 9.31   HEMOGLOBIN 12.6 12.3 15.3   HEMATOCRIT 38.8 38.3 46.7*   PLATELETS 306 249 272   NEUTROS ABS  --   --  7.10*   IMMATURE GRANS (ABS)  --   --  0.03   LYMPHS ABS  --   --  1.36   MONOS ABS  --   --  0.80   EOS ABS  --   --  0.01   MCV 90.7 91.6 90.5   LACTATE  --   --  1.9         Lab 05/06/24  0512 05/05/24  0431 05/04/24  0511 05/02/24  0434 05/01/24  2141   SODIUM 143 142 140   < > 143   POTASSIUM 3.9 4.4 3.9   < > 3.3*   CHLORIDE 107 104 104   < > 101   CO2 21.1* 28.2 25.0   < > 29.1*   ANION GAP 14.9 9.8 11.0   < > 12.9   BUN 20 17 15   < > 12   CREATININE 0.61 0.57 0.56*   < > 0.64   EGFR 103.1 104.8 105.3   < > 101.9   GLUCOSE 170* 133* 132*   < > 156*   CALCIUM 8.9 9.4 8.9   < > 9.0   MAGNESIUM  --  2.0  --   --   --    HEMOGLOBIN A1C  --   --   --   --  7.20*    < > = values in this  interval not displayed.         Lab 05/01/24 2141   TOTAL PROTEIN 6.6   ALBUMIN 3.6   GLOBULIN 3.0   ALT (SGPT) 19   AST (SGOT) 16   BILIRUBIN 0.2   ALK PHOS 79         Lab 05/01/24 2141   PROBNP 373.6   HSTROP T 18*                 Brief Urine Lab Results       None          [x]  Microbiology   Microbiology Results (last 10 days)       Procedure Component Value - Date/Time    S. Pneumo Ag Urine or CSF - Urine, Urine, Clean Catch [[id number]]  (Normal) Collected: 05/03/24 0351    Lab Status: Final result Specimen: Urine, Clean Catch Updated: 05/03/24 0632     Strep Pneumo Ag Negative    Legionella Antigen, Urine - Urine, Urine, Clean Catch [[id number]]  (Normal) Collected: 05/03/24 0351    Lab Status: Final result Specimen: Urine, Clean Catch Updated: 05/03/24 0632     LEGIONELLA ANTIGEN, URINE Negative    Respiratory Culture - Sputum, Cough [[id number]] Collected: 05/03/24 0351    Lab Status: Final result Specimen: Sputum from Cough Updated: 05/05/24 1012     Respiratory Culture Light growth (2+) Normal respiratory bennie. No S. aureus or Pseudomonas aeruginosa detected. Final report.     Gram Stain Many (4+) WBCs seen      Few (2+) Mucous strands      Few (2+) Gram positive bacilli      Rare (1+) Epithelial cells seen      Rare (1+) Budding yeast    COVID-19, FLU A/B, RSV PCR 1 HR TAT - Swab, Nasopharynx [[id number]]  (Normal) Collected: 05/01/24 2141    Lab Status: Final result Specimen: Swab from Nasopharynx Updated: 05/01/24 2234     COVID19 Not Detected     Influenza A PCR Not Detected     Influenza B PCR Not Detected     RSV, PCR Not Detected    Narrative:      Fact sheet for providers: https://www.fda.gov/media/933920/download    Fact sheet for patients: https://www.fda.gov/media/744654/download    Test performed by PCR.    Blood Culture - Blood, Arm, Left [[id number]]  (Normal) Collected: 05/01/24 2141    Lab Status: Preliminary result Specimen: Blood from Arm, Left Updated: 05/05/24 2200     Blood Culture No  growth at 4 days    Blood Culture - Blood, Arm, Right [[id number]]  (Normal) Collected: 05/01/24 2141    Lab Status: Preliminary result Specimen: Blood from Arm, Right Updated: 05/05/24 2200     Blood Culture No growth at 4 days    Mycoplasma Pneumoniae Antibody, IgM - Blood, [[id number]]  (Abnormal) Collected: 05/01/24 2141    Lab Status: Final result Specimen: Blood Updated: 05/02/24 1543     Mycoplasma pneumo IgM Positive          [x]  Radiology  CT Chest Without Contrast Diagnostic    Result Date: 5/2/2024   1. Emphysema with some subtle tree-in-bud infectious or inflammatory infiltrates in the right upper and right lower lobe. 2. Chronic scarring in the apices, left greater than right, is similar to prior. 3. Atherosclerotic disease and coronary artery disease. 4. Bronchitis.  Electronically Signed By-Dr. Philipp Vallejo MD On:5/2/2024 10:55 PM      XR Chest 1 View    Result Date: 5/1/2024  No acute cardiopulmonary findings.  Electronically Signed By-Dr. Philipp Vallejo MD On:5/1/2024 9:52 PM     []  EKG/Telemetry   []  Cardiology/Vascular   []  Pathology  []  Old records  []  Other:    Assessment & Plan   Assessment / Plan     Assessment:  Acute exacerbation of COPD  Acute hypoxemic respiratory failure  Hypertension  Anxiety  PTSD  Mycoplasma pneumonia    Plan:  Patient is currently on 2 L of oxygen.  Continue to wean oxygen  Failed walk test. Will need to go home with oxygen   Continue metneb and chest physiotherapy   Continue oral prednisone  Continue patient on bronchodilators  Continue IV antibiotics  CT of the chest reviewed  Continue patient on Norvasc, metoprolol and clonidine for her hypertension  Pulmonary has been consulted today for possible bronch   Urinary antigens are negative        Discussed with RN.    DVT prophylaxis:  Medical DVT prophylaxis orders are present.        CODE STATUS:   Level Of Support Discussed With: Patient  Code Status (Patient has no pulse and is not breathing): CPR (Attempt  to Resuscitate)  Medical Interventions (Patient has pulse or is breathing): Full Support      Electronically signed by Shelton Ricketts DO, 5/6/2024, 13:44 EDT.                      no loss of consciousness, no gait abnormality, no headache, no sensory deficits, and no weakness.

## 2024-05-07 PROBLEM — I47.20 VENTRICULAR TACHYCARDIA (PAROXYSMAL): Status: RESOLVED | Noted: 2022-04-18 | Resolved: 2024-05-07

## 2024-05-07 PROBLEM — J15.7 MYCOPLASMA PNEUMONIA: Status: RESOLVED | Noted: 2024-05-02 | Resolved: 2024-05-07

## 2024-05-07 PROBLEM — I47.29 VENTRICULAR TACHYCARDIA (PAROXYSMAL): Status: RESOLVED | Noted: 2022-04-18 | Resolved: 2024-05-07

## 2024-05-07 PROBLEM — I10 UNCONTROLLED HYPERTENSION: Status: RESOLVED | Noted: 2023-03-15 | Resolved: 2024-05-07

## 2024-05-07 LAB
ANION GAP SERPL CALCULATED.3IONS-SCNC: 10.6 MMOL/L (ref 5–15)
BUN SERPL-MCNC: 17 MG/DL (ref 6–20)
BUN/CREAT SERPL: 29.8 (ref 7–25)
CALCIUM SPEC-SCNC: 9 MG/DL (ref 8.6–10.5)
CHLORIDE SERPL-SCNC: 105 MMOL/L (ref 98–107)
CO2 SERPL-SCNC: 23.4 MMOL/L (ref 22–29)
CREAT SERPL-MCNC: 0.57 MG/DL (ref 0.57–1)
EGFRCR SERPLBLD CKD-EPI 2021: 104.8 ML/MIN/1.73
GLUCOSE BLDC GLUCOMTR-MCNC: 152 MG/DL (ref 70–99)
GLUCOSE BLDC GLUCOMTR-MCNC: 176 MG/DL (ref 70–99)
GLUCOSE BLDC GLUCOMTR-MCNC: 233 MG/DL (ref 70–99)
GLUCOSE SERPL-MCNC: 156 MG/DL (ref 65–99)
POTASSIUM SERPL-SCNC: 4 MMOL/L (ref 3.5–5.2)
SODIUM SERPL-SCNC: 139 MMOL/L (ref 136–145)

## 2024-05-07 PROCEDURE — 80048 BASIC METABOLIC PNL TOTAL CA: CPT | Performed by: FAMILY MEDICINE

## 2024-05-07 PROCEDURE — 94664 DEMO&/EVAL PT USE INHALER: CPT

## 2024-05-07 PROCEDURE — 63710000001 PREDNISONE PER 1 MG: Performed by: INTERNAL MEDICINE

## 2024-05-07 PROCEDURE — 94799 UNLISTED PULMONARY SVC/PX: CPT

## 2024-05-07 PROCEDURE — 63710000001 INSULIN LISPRO (HUMAN) PER 5 UNITS: Performed by: INTERNAL MEDICINE

## 2024-05-07 PROCEDURE — 99233 SBSQ HOSP IP/OBS HIGH 50: CPT | Performed by: INTERNAL MEDICINE

## 2024-05-07 PROCEDURE — 25010000002 HYDRALAZINE PER 20 MG: Performed by: FAMILY MEDICINE

## 2024-05-07 PROCEDURE — 94760 N-INVAS EAR/PLS OXIMETRY 1: CPT

## 2024-05-07 PROCEDURE — 82948 REAGENT STRIP/BLOOD GLUCOSE: CPT | Performed by: INTERNAL MEDICINE

## 2024-05-07 PROCEDURE — 82948 REAGENT STRIP/BLOOD GLUCOSE: CPT

## 2024-05-07 PROCEDURE — 25010000002 ENOXAPARIN PER 10 MG: Performed by: FAMILY MEDICINE

## 2024-05-07 RX ORDER — IBUPROFEN 600 MG/1
1 TABLET ORAL
Status: DISCONTINUED | OUTPATIENT
Start: 2024-05-07 | End: 2024-05-09 | Stop reason: HOSPADM

## 2024-05-07 RX ORDER — BUDESONIDE 0.5 MG/2ML
0.5 INHALANT ORAL
Status: DISCONTINUED | OUTPATIENT
Start: 2024-05-07 | End: 2024-05-09 | Stop reason: HOSPADM

## 2024-05-07 RX ORDER — DEXTROSE MONOHYDRATE 25 G/50ML
25 INJECTION, SOLUTION INTRAVENOUS
Status: DISCONTINUED | OUTPATIENT
Start: 2024-05-07 | End: 2024-05-09 | Stop reason: HOSPADM

## 2024-05-07 RX ORDER — INSULIN LISPRO 100 [IU]/ML
2-7 INJECTION, SOLUTION INTRAVENOUS; SUBCUTANEOUS
Status: DISCONTINUED | OUTPATIENT
Start: 2024-05-07 | End: 2024-05-09 | Stop reason: HOSPADM

## 2024-05-07 RX ORDER — GUAIFENESIN 600 MG/1
1200 TABLET, EXTENDED RELEASE ORAL EVERY 12 HOURS SCHEDULED
Status: DISCONTINUED | OUTPATIENT
Start: 2024-05-07 | End: 2024-05-09 | Stop reason: HOSPADM

## 2024-05-07 RX ORDER — ARFORMOTEROL TARTRATE 15 UG/2ML
15 SOLUTION RESPIRATORY (INHALATION)
Status: DISCONTINUED | OUTPATIENT
Start: 2024-05-07 | End: 2024-05-09 | Stop reason: HOSPADM

## 2024-05-07 RX ORDER — FAMOTIDINE 20 MG/1
20 TABLET, FILM COATED ORAL NIGHTLY
Status: DISCONTINUED | OUTPATIENT
Start: 2024-05-07 | End: 2024-05-09 | Stop reason: HOSPADM

## 2024-05-07 RX ORDER — NICOTINE POLACRILEX 4 MG
15 LOZENGE BUCCAL
Status: DISCONTINUED | OUTPATIENT
Start: 2024-05-07 | End: 2024-05-09 | Stop reason: HOSPADM

## 2024-05-07 RX ADMIN — INSULIN LISPRO 2 UNITS: 100 INJECTION, SOLUTION INTRAVENOUS; SUBCUTANEOUS at 21:23

## 2024-05-07 RX ADMIN — BUDESONIDE 0.5 MG: 0.5 SUSPENSION RESPIRATORY (INHALATION) at 19:14

## 2024-05-07 RX ADMIN — Medication 10 ML: at 08:28

## 2024-05-07 RX ADMIN — ENOXAPARIN SODIUM 40 MG: 100 INJECTION SUBCUTANEOUS at 08:28

## 2024-05-07 RX ADMIN — INSULIN LISPRO 2 UNITS: 100 INJECTION, SOLUTION INTRAVENOUS; SUBCUTANEOUS at 11:53

## 2024-05-07 RX ADMIN — Medication 10 ML: at 21:16

## 2024-05-07 RX ADMIN — ARFORMOTEROL TARTRATE 15 MCG: 15 SOLUTION RESPIRATORY (INHALATION) at 19:13

## 2024-05-07 RX ADMIN — METOPROLOL SUCCINATE 25 MG: 25 TABLET, EXTENDED RELEASE ORAL at 08:28

## 2024-05-07 RX ADMIN — CLONIDINE HYDROCHLORIDE 0.3 MG: 0.1 TABLET ORAL at 21:11

## 2024-05-07 RX ADMIN — GUAIFENESIN 1200 MG: 600 TABLET ORAL at 21:12

## 2024-05-07 RX ADMIN — CLONIDINE HYDROCHLORIDE 0.3 MG: 0.1 TABLET ORAL at 08:28

## 2024-05-07 RX ADMIN — AMLODIPINE BESYLATE 10 MG: 10 TABLET ORAL at 08:28

## 2024-05-07 RX ADMIN — FAMOTIDINE 20 MG: 20 TABLET ORAL at 21:11

## 2024-05-07 RX ADMIN — IPRATROPIUM BROMIDE AND ALBUTEROL SULFATE 3 ML: .5; 3 SOLUTION RESPIRATORY (INHALATION) at 11:29

## 2024-05-07 RX ADMIN — PREDNISONE 40 MG: 20 TABLET ORAL at 08:28

## 2024-05-07 RX ADMIN — INSULIN LISPRO 3 UNITS: 100 INJECTION, SOLUTION INTRAVENOUS; SUBCUTANEOUS at 17:14

## 2024-05-07 RX ADMIN — HYDROCODONE BITARTRATE AND ACETAMINOPHEN 1 TABLET: 7.5; 325 TABLET ORAL at 21:11

## 2024-05-07 RX ADMIN — HYDRALAZINE HYDROCHLORIDE 10 MG: 20 INJECTION INTRAMUSCULAR; INTRAVENOUS at 13:01

## 2024-05-07 RX ADMIN — HYDROCODONE BITARTRATE AND ACETAMINOPHEN 1 TABLET: 7.5; 325 TABLET ORAL at 08:32

## 2024-05-07 RX ADMIN — ARFORMOTEROL TARTRATE 15 MCG: 15 SOLUTION RESPIRATORY (INHALATION) at 11:29

## 2024-05-07 RX ADMIN — BUDESONIDE 0.5 MG: 0.5 SUSPENSION RESPIRATORY (INHALATION) at 11:29

## 2024-05-07 RX ADMIN — IPRATROPIUM BROMIDE AND ALBUTEROL SULFATE 3 ML: .5; 3 SOLUTION RESPIRATORY (INHALATION) at 19:13

## 2024-05-07 RX ADMIN — IPRATROPIUM BROMIDE AND ALBUTEROL SULFATE 3 ML: .5; 3 SOLUTION RESPIRATORY (INHALATION) at 00:08

## 2024-05-07 RX ADMIN — IPRATROPIUM BROMIDE AND ALBUTEROL SULFATE 3 ML: .5; 3 SOLUTION RESPIRATORY (INHALATION) at 07:31

## 2024-05-07 NOTE — PROGRESS NOTES
Respiratory Therapist Broncho-Pulmonary Hygiene Progress Note      Patient Name:  Yenni Camacho  YOB: 1965    Yenni Camacho meets the qualification for Level 1 of the Bronco-Pulmonary Hygiene Protocol. This was based on my daily patient assessment and includes review of chest x-ray results, cough ability and quality, oxygenation, secretions or risk for secretion development and patient mobility.     Broncho-Pulmonary Hygiene Assessment:    Level of Movement: Actively changing positions without assistance  Alert/ oriented/ cooperative    Breath Sounds: Clear to slightly diminished    Cough: Strong, effective and/or frequent    Chest X-Ray: Possible signs of consolidation and/or atelectasis or clear.     Sputum Productions: None or small amount of thin or watery secretions with effective cough    History and Physical: New onset of bronchitis or mucus plugging    SpO2 to Oxygen Need: greater than 92% on room air or  less than 3L nasal canula    Current SpO2 is: 90% on room air    Based on this information, I have completed the following interventions: Aerobika with bronchodialtor medication or TID      Electronically signed by Zoila Diamond RRT, 05/07/24, 9:57 AM EDT.

## 2024-05-07 NOTE — PLAN OF CARE
Goal Outcome Evaluation:  Plan of Care Reviewed With: patient        Progress: improving  Outcome Evaluation: Patient c/o joint pain once this am; given prn Norco. Patient started on SSI and blood glucose checks. Patient is to be NPO at midnight for a Bronch tomorrow, consent is signed. IV hydralazine given x1 for elevated BP.

## 2024-05-07 NOTE — PROGRESS NOTES
[facility]   Hospitalist Progress Note  Date: 2024  Patient Name: Yenni Camacho  : 1965  MRN: [id number]  Date of admission: 2024  Room/Bed: [facility]/      Subjective   Subjective     Chief Complaint: short of air     Summary:Yenni Camacho is a 58 y.o. female with past medical history of COPD not on home oxygen, hypertension, PTSD, chronic pain, anxiety, ADHD, and GERD presented to the ED with complaint of shortness of breath.  Patient states that for the last 2 to 3 days she has been having worsening shortness of breath where she was symptomatic even at rest along with a nonproductive cough, headache, lightheadedness, and generalized weakness.  Patient states he is compliant with her maintenance and rescue inhalers quit smoking 6 years ago.  He denies any fevers,chills, or sick contacts.  Due to her worsening symptoms patient came to the ED for further evaluation.  In the ED patient was tachycardic, hypertensive and hypoxic on arrival requiring oxygen supplementation via nasal cannula.  Labs were to be labs were relatively unremarkable including a negative COVID flu RSV.  Chest x-ray negative for any acute findings.  When asked she denied any recent fevers, chills, headaches, focal weakness, chest pain, palpitation, abdominal pain, nausea, vomiting, diarrhea, constipation, dysuria, hematuria, hematochezia, melena, or anxiety.  Patient admitted for further evaluation and treatment .  Valuated by pulmonary for bronchoscopy on May 8    Interval Followup:   Patient states she continues to have coughing and secretions however she is unable to cough up her secretions  Patient remains on oxygen 2 L.  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      Objective   Objective     Vitals:   Temp:  [98.4 °F (36.9 °C)-99.1 °F (37.3 °C)] 98.6 °F (37 °C)  Heart Rate:  [] 104  Resp:  [16-20] 16  BP: (153-170)/() 153/75  Flow (L/min):  [2] 2    Physical Exam   General: Awake, alert, NAD resting in bed with oxygen in place   HENT: NCAT, MMM  Eyes: pupils equal, no scleral icterus  Cardiovascular: RRR, no murmurs   Pulmonary: decreased.  Bilateral wheezing   Gastrointestinal: S/ND/NT, +BS  Musculoskeletal: No gross deformities  Skin: No jaundice, no rash on exposed skin appreciated  Neuro: CN II through XII grossly intact; speech clear; no tremor  Psych: Mood and affect appropriate  : No Delgado catheter; no suprapubic tenderness    Result Review    Result Review:  I have personally reviewed these results:  [x]  Laboratory      Lab 05/05/24  0431 05/03/24  0444 05/01/24  2141   WBC 7.62 7.64 9.31   HEMOGLOBIN 12.6 12.3 15.3   HEMATOCRIT 38.8 38.3 46.7*   PLATELETS 306 249 272   NEUTROS ABS  --   --  7.10*   IMMATURE GRANS (ABS)  --   --  0.03   LYMPHS ABS  --   --  1.36   MONOS ABS  --   --  0.80   EOS ABS  --   --  0.01   MCV 90.7 91.6 90.5   LACTATE  --   --  1.9         Lab 05/07/24  0454 05/06/24  0512 05/05/24  0431 05/02/24  0434 05/01/24  2141   SODIUM 139 143 142   < > 143   POTASSIUM 4.0 3.9 4.4   < > 3.3*   CHLORIDE 105 107 104   < > 101   CO2 23.4 21.1* 28.2   < > 29.1*   ANION GAP 10.6 14.9 9.8   < > 12.9   BUN 17 20 17   < > 12   CREATININE 0.57 0.61 0.57   < > 0.64   EGFR 104.8 103.1 104.8   < > 101.9   GLUCOSE 156* 170* 133*   < > 156*   CALCIUM 9.0 8.9 9.4   < > 9.0   MAGNESIUM  --   --  2.0  --   --    HEMOGLOBIN A1C  --    --   --   --  7.20*    < > = values in this interval not displayed.         Lab 05/01/24 2141   TOTAL PROTEIN 6.6   ALBUMIN 3.6   GLOBULIN 3.0   ALT (SGPT) 19   AST (SGOT) 16   BILIRUBIN 0.2   ALK PHOS 79         Lab 05/01/24 2141   PROBNP 373.6   HSTROP T 18*                 Brief Urine Lab Results       None          [x]  Microbiology   Microbiology Results (last 10 days)       Procedure Component Value - Date/Time    S. Pneumo Ag Urine or CSF - Urine, Urine, Clean Catch [[id number]]  (Normal) Collected: 05/03/24 0351    Lab Status: Final result Specimen: Urine, Clean Catch Updated: 05/03/24 0632     Strep Pneumo Ag Negative    Legionella Antigen, Urine - Urine, Urine, Clean Catch [[id number]]  (Normal) Collected: 05/03/24 0351    Lab Status: Final result Specimen: Urine, Clean Catch Updated: 05/03/24 0632     LEGIONELLA ANTIGEN, URINE Negative    Respiratory Culture - Sputum, Cough [[id number]] Collected: 05/03/24 0351    Lab Status: Final result Specimen: Sputum from Cough Updated: 05/05/24 1012     Respiratory Culture Light growth (2+) Normal respiratory bennie. No S. aureus or Pseudomonas aeruginosa detected. Final report.     Gram Stain Many (4+) WBCs seen      Few (2+) Mucous strands      Few (2+) Gram positive bacilli      Rare (1+) Epithelial cells seen      Rare (1+) Budding yeast    COVID-19, FLU A/B, RSV PCR 1 HR TAT - Swab, Nasopharynx [[id number]]  (Normal) Collected: 05/01/24 2141    Lab Status: Final result Specimen: Swab from Nasopharynx Updated: 05/01/24 2234     COVID19 Not Detected     Influenza A PCR Not Detected     Influenza B PCR Not Detected     RSV, PCR Not Detected    Narrative:      Fact sheet for providers: https://www.fda.gov/media/343920/download    Fact sheet for patients: https://www.fda.gov/media/419579/download    Test performed by PCR.    Blood Culture - Blood, Arm, Left [[id number]]  (Normal) Collected: 05/01/24 2141    Lab Status: Final result Specimen: Blood from Arm, Left  Updated: 05/06/24 2200     Blood Culture No growth at 5 days    Blood Culture - Blood, Arm, Right [[id number]]  (Normal) Collected: 05/01/24 2141    Lab Status: Final result Specimen: Blood from Arm, Right Updated: 05/06/24 2200     Blood Culture No growth at 5 days    Mycoplasma Pneumoniae Antibody, IgM - Blood, [[id number]]  (Abnormal) Collected: 05/01/24 2141    Lab Status: Final result Specimen: Blood Updated: 05/02/24 1543     Mycoplasma pneumo IgM Positive          [x]  Radiology  CT Chest Without Contrast Diagnostic    Result Date: 5/2/2024   1. Emphysema with some subtle tree-in-bud infectious or inflammatory infiltrates in the right upper and right lower lobe. 2. Chronic scarring in the apices, left greater than right, is similar to prior. 3. Atherosclerotic disease and coronary artery disease. 4. Bronchitis.  Electronically Signed By-Dr. Philipp Vallejo MD On:5/2/2024 10:55 PM      XR Chest 1 View    Result Date: 5/1/2024  No acute cardiopulmonary findings.  Electronically Signed By-Dr. Philipp Vallejo MD On:5/1/2024 9:52 PM     []  EKG/Telemetry   []  Cardiology/Vascular   []  Pathology  []  Old records  []  Other:    Assessment & Plan   Assessment / Plan     Assessment:  Acute exacerbation of COPD  Acute hypoxemic respiratory failure  Hypertension  Anxiety  PTSD  Mycoplasma pneumonia  New diagnosis of diabetes hemoglobin A1c of 7.2%.  Previous hemoglobin A1c of 6.5% December 2023    Plan:  Patient is currently on 2 L of oxygen.  Continue to wean oxygen  Failed walk test. Will need to go home with oxygen   Continue metneb and chest physiotherapy   Continue oral prednisone  Continue patient on bronchodilators  Finished antibiotics  CT of the chest reviewed  Continue patient on Norvasc, metoprolol and clonidine for her hypertension  Pulmonary  consulted, for possible bronch on May 8  Urinary antigens are negative   Sliding-scale insulin  Diabetic nurse educator.       Discussed with RN and .   Discharge home after procedure.  May need oxygen    DVT prophylaxis:  Medical DVT prophylaxis orders are present.        CODE STATUS:   Level Of Support Discussed With: Patient  Code Status (Patient has no pulse and is not breathing): CPR (Attempt to Resuscitate)  Medical Interventions (Patient has pulse or is breathing): Full Support      Electronically signed by Jonathan Hope MD, 5/7/2024, 16:23 EDT.

## 2024-05-07 NOTE — PROGRESS NOTES
[facility]     Progress Note    Patient Name: Yenni Camacho  : 1965  MRN: [id number]  Primary Care Physician:  Allyssa Pritchett, LETITIA  Date of admission: 2024    Subjective   Subjective     Chief Complaint: Reason for consultation shortness of breath mucous plugging    History of Present Illness  Patient Reports complaints of shortness of breath  Still wheezing  Currently on room air and 2 L with exertion  Has difficulties expectorating mucus    Review of Systems  Positive for cough  Positive shortness of breath  Positive for wheezing  Objective   Objective     Vitals:   Temp:  [98.4 °F (36.9 °C)-99.1 °F (37.3 °C)] 98.6 °F (37 °C)  Heart Rate:  [] 104  Resp:  [16-20] 16  BP: (153-170)/() 153/75  Flow (L/min):  [2] 2    Physical Exam   Vital Signs Reviewed  General WDWN, Alert, NAD.    Chest significant rhonchi and wheezing  CV: RRR, no MGR, .  EXT:  no clubbing, no cyanosis, no edema, no joint tenderness  Neuro:  A&Ox3, CN grossly intact, no focal deficits.  Skin: No rashes or lesions noted  Result Review    Result Review:  I have personally reviewed the results from the time of this admission to 2024 16:45 EDT and agree with these findings:  []  Laboratory list / accordion  []  Microbiology  []  Radiology  []  EKG/Telemetry   []  Cardiology/Vascular   []  Pathology  []  Old records  []  Other:  Most notable findings include: CT scan showing mucous plugging      Assessment & Plan   Assessment / Plan     Brief Patient Summary:  Yenni Camacho is a 58 y.o. female who mated to the hospital found to have COPD exacerbation not improving CT scan showing significant mucus plugging    Active Hospital Problems:  Active Hospital Problems    Diagnosis     **COPD exacerbation     Acute on chronic respiratory failure with hypoxia     Chronic systolic heart failure      Plan:   Plan on taking patient for bronchoscopy tomorrow  Discussed risk versus benefits with the patient  Risks  versus benefits of bronchoscopy explained to the patient including death, respiratory failure requiring intubation mechanical ventilation, pneumothorax requiring chest tube placement, bleeding.  Patient voices understanding of the risks of the procedure and wishes to proceed.  Continue LABA LAMA annual corticosteroids  N.p.o. after midnight  DVT prophylaxis:  Medical DVT prophylaxis orders are present.        CODE STATUS:    Level Of Support Discussed With: Patient  Code Status (Patient has no pulse and is not breathing): CPR (Attempt to Resuscitate)  Medical Interventions (Patient has pulse or is breathing): Full Support      Pradeep Arauz DO    Electronically signed by Pradeep Arauz DO, 05/07/24, 4:48 PM EDT.

## 2024-05-08 ENCOUNTER — ANESTHESIA (OUTPATIENT)
Dept: GASTROENTEROLOGY | Facility: HOSPITAL | Age: 59
End: 2024-05-08
Payer: MEDICAID

## 2024-05-08 ENCOUNTER — ANESTHESIA EVENT (OUTPATIENT)
Dept: GASTROENTEROLOGY | Facility: HOSPITAL | Age: 59
End: 2024-05-08
Payer: MEDICAID

## 2024-05-08 LAB
ACB CMPLX DNA BAL NAA+NON-PRB-NCNCRNG: NOT DETECTED
ANION GAP SERPL CALCULATED.3IONS-SCNC: 9.7 MMOL/L (ref 5–15)
BLACTX-M ISLT/SPM QL: NORMAL
BLAIMP ISLT/SPM QL: NORMAL
BLAKPC ISLT/SPM QL: NORMAL
BLAOXA-48-LIKE ISLT/SPM QL: NORMAL
BLAVIM ISLT/SPM QL: NORMAL
BUN SERPL-MCNC: 25 MG/DL (ref 6–20)
BUN/CREAT SERPL: 34.7 (ref 7–25)
C PNEUM DNA NPH QL NAA+NON-PROBE: NOT DETECTED
CALCIUM SPEC-SCNC: 9.8 MG/DL (ref 8.6–10.5)
CHLORIDE SERPL-SCNC: 104 MMOL/L (ref 98–107)
CO2 SERPL-SCNC: 27.3 MMOL/L (ref 22–29)
CREAT SERPL-MCNC: 0.72 MG/DL (ref 0.57–1)
E CLOAC COMP DNA BAL NAA+NON-PRB-NCNCRNG: NOT DETECTED
E COLI DNA BAL NAA+NON-PRB-NCNCRNG: NOT DETECTED
EGFRCR SERPLBLD CKD-EPI 2021: 96.5 ML/MIN/1.73
FLUAV SUBTYP SPEC NAA+PROBE: NOT DETECTED
FLUBV RNA ISLT QL NAA+PROBE: NOT DETECTED
GLUCOSE BLDC GLUCOMTR-MCNC: 105 MG/DL (ref 70–99)
GLUCOSE BLDC GLUCOMTR-MCNC: 143 MG/DL (ref 70–99)
GLUCOSE BLDC GLUCOMTR-MCNC: 160 MG/DL (ref 70–99)
GLUCOSE BLDC GLUCOMTR-MCNC: 265 MG/DL (ref 70–99)
GLUCOSE SERPL-MCNC: 97 MG/DL (ref 65–99)
GP B STREP DNA BAL NAA+NON-PRB-NCNCRNG: NOT DETECTED
HADV DNA SPEC NAA+PROBE: NOT DETECTED
HAEM INFLU DNA BAL NAA+NON-PRB-NCNCRNG: NOT DETECTED
HCOV RNA LOWER RESP QL NAA+NON-PROBE: NOT DETECTED
HMPV RNA NPH QL NAA+NON-PROBE: NOT DETECTED
HPIV RNA LOWER RESP QL NAA+NON-PROBE: NOT DETECTED
K AEROGENES DNA BAL NAA+NON-PRB-NCNCRNG: NOT DETECTED
K OXYTOCA DNA BAL NAA+NON-PRB-NCNCRNG: NOT DETECTED
K PNEU GRP DNA BAL NAA+NON-PRB-NCNCRNG: NOT DETECTED
L PNEUMO DNA LOWER RESP QL NAA+NON-PROBE: NOT DETECTED
M CATARRHALIS DNA BAL NAA+NON-PRB-NCNCRNG: NOT DETECTED
M PNEUMO IGG SER IA-ACNC: NOT DETECTED
MECA+MECC ISLT/SPM QL: NORMAL
NDM GENE: NORMAL
P AERUGINOSA DNA BAL NAA+NON-PRB-NCNCRNG: NOT DETECTED
POTASSIUM SERPL-SCNC: 4.5 MMOL/L (ref 3.5–5.2)
PROTEUS SP DNA BAL NAA+NON-PRB-NCNCRNG: NOT DETECTED
RHINOVIRUS RNA SPEC NAA+PROBE: NOT DETECTED
RSV RNA NPH QL NAA+NON-PROBE: NOT DETECTED
S AUREUS DNA BAL NAA+NON-PRB-NCNCRNG: NOT DETECTED
S MARCESCENS DNA BAL NAA+NON-PRB-NCNCRNG: NOT DETECTED
S PNEUM DNA BAL NAA+NON-PRB-NCNCRNG: NOT DETECTED
S PYO DNA BAL NAA+NON-PRB-NCNCRNG: NOT DETECTED
SODIUM SERPL-SCNC: 141 MMOL/L (ref 136–145)

## 2024-05-08 PROCEDURE — 80048 BASIC METABOLIC PNL TOTAL CA: CPT | Performed by: FAMILY MEDICINE

## 2024-05-08 PROCEDURE — 88108 CYTOPATH CONCENTRATE TECH: CPT | Performed by: INTERNAL MEDICINE

## 2024-05-08 PROCEDURE — 87205 SMEAR GRAM STAIN: CPT | Performed by: INTERNAL MEDICINE

## 2024-05-08 PROCEDURE — 87206 SMEAR FLUORESCENT/ACID STAI: CPT | Performed by: INTERNAL MEDICINE

## 2024-05-08 PROCEDURE — 25010000002 PROPOFOL 10 MG/ML EMULSION: Performed by: NURSE ANESTHETIST, CERTIFIED REGISTERED

## 2024-05-08 PROCEDURE — 63710000001 PREDNISONE PER 1 MG: Performed by: INTERNAL MEDICINE

## 2024-05-08 PROCEDURE — 31624 DX BRONCHOSCOPE/LAVAGE: CPT | Performed by: INTERNAL MEDICINE

## 2024-05-08 PROCEDURE — 94799 UNLISTED PULMONARY SVC/PX: CPT

## 2024-05-08 PROCEDURE — 87102 FUNGUS ISOLATION CULTURE: CPT | Performed by: INTERNAL MEDICINE

## 2024-05-08 PROCEDURE — 25810000003 LACTATED RINGERS PER 1000 ML: Performed by: NURSE ANESTHETIST, CERTIFIED REGISTERED

## 2024-05-08 PROCEDURE — 87071 CULTURE AEROBIC QUANT OTHER: CPT | Performed by: INTERNAL MEDICINE

## 2024-05-08 PROCEDURE — 0B9F8ZX DRAINAGE OF RIGHT LOWER LUNG LOBE, VIA NATURAL OR ARTIFICIAL OPENING ENDOSCOPIC, DIAGNOSTIC: ICD-10-PCS | Performed by: INTERNAL MEDICINE

## 2024-05-08 PROCEDURE — 25010000002 HYDRALAZINE PER 20 MG: Performed by: INTERNAL MEDICINE

## 2024-05-08 PROCEDURE — 88312 SPECIAL STAINS GROUP 1: CPT | Performed by: INTERNAL MEDICINE

## 2024-05-08 PROCEDURE — 63710000001 INSULIN LISPRO (HUMAN) PER 5 UNITS: Performed by: INTERNAL MEDICINE

## 2024-05-08 PROCEDURE — 99232 SBSQ HOSP IP/OBS MODERATE 35: CPT | Performed by: INTERNAL MEDICINE

## 2024-05-08 PROCEDURE — 87116 MYCOBACTERIA CULTURE: CPT | Performed by: INTERNAL MEDICINE

## 2024-05-08 PROCEDURE — 82948 REAGENT STRIP/BLOOD GLUCOSE: CPT | Performed by: INTERNAL MEDICINE

## 2024-05-08 PROCEDURE — 99233 SBSQ HOSP IP/OBS HIGH 50: CPT | Performed by: INTERNAL MEDICINE

## 2024-05-08 PROCEDURE — 87633 RESP VIRUS 12-25 TARGETS: CPT | Performed by: INTERNAL MEDICINE

## 2024-05-08 PROCEDURE — 94664 DEMO&/EVAL PT USE INHALER: CPT

## 2024-05-08 PROCEDURE — 82948 REAGENT STRIP/BLOOD GLUCOSE: CPT

## 2024-05-08 RX ORDER — PROPOFOL 10 MG/ML
VIAL (ML) INTRAVENOUS AS NEEDED
Status: DISCONTINUED | OUTPATIENT
Start: 2024-05-08 | End: 2024-05-08 | Stop reason: SURG

## 2024-05-08 RX ORDER — LIDOCAINE HYDROCHLORIDE 40 MG/ML
INJECTION, SOLUTION RETROBULBAR AS NEEDED
Status: DISCONTINUED | OUTPATIENT
Start: 2024-05-08 | End: 2024-05-08 | Stop reason: HOSPADM

## 2024-05-08 RX ORDER — LIDOCAINE HYDROCHLORIDE 20 MG/ML
INJECTION, SOLUTION EPIDURAL; INFILTRATION; INTRACAUDAL; PERINEURAL AS NEEDED
Status: DISCONTINUED | OUTPATIENT
Start: 2024-05-08 | End: 2024-05-08 | Stop reason: SURG

## 2024-05-08 RX ORDER — SODIUM CHLORIDE, SODIUM LACTATE, POTASSIUM CHLORIDE, CALCIUM CHLORIDE 600; 310; 30; 20 MG/100ML; MG/100ML; MG/100ML; MG/100ML
INJECTION, SOLUTION INTRAVENOUS CONTINUOUS PRN
Status: DISCONTINUED | OUTPATIENT
Start: 2024-05-08 | End: 2024-05-08 | Stop reason: SURG

## 2024-05-08 RX ORDER — LOSARTAN POTASSIUM 25 MG/1
25 TABLET ORAL
Status: DISCONTINUED | OUTPATIENT
Start: 2024-05-08 | End: 2024-05-09 | Stop reason: HOSPADM

## 2024-05-08 RX ORDER — IPRATROPIUM BROMIDE AND ALBUTEROL SULFATE 2.5; .5 MG/3ML; MG/3ML
3 SOLUTION RESPIRATORY (INHALATION) ONCE
Status: COMPLETED | OUTPATIENT
Start: 2024-05-08 | End: 2024-05-08

## 2024-05-08 RX ORDER — MAGNESIUM HYDROXIDE 1200 MG/15ML
LIQUID ORAL AS NEEDED
Status: DISCONTINUED | OUTPATIENT
Start: 2024-05-08 | End: 2024-05-08 | Stop reason: HOSPADM

## 2024-05-08 RX ADMIN — SODIUM CHLORIDE, POTASSIUM CHLORIDE, SODIUM LACTATE AND CALCIUM CHLORIDE: 600; 310; 30; 20 INJECTION, SOLUTION INTRAVENOUS at 12:37

## 2024-05-08 RX ADMIN — IPRATROPIUM BROMIDE AND ALBUTEROL SULFATE 3 ML: .5; 3 SOLUTION RESPIRATORY (INHALATION) at 01:03

## 2024-05-08 RX ADMIN — ARFORMOTEROL TARTRATE 15 MCG: 15 SOLUTION RESPIRATORY (INHALATION) at 07:10

## 2024-05-08 RX ADMIN — HYDROCODONE BITARTRATE AND ACETAMINOPHEN 1 TABLET: 7.5; 325 TABLET ORAL at 08:47

## 2024-05-08 RX ADMIN — CLONIDINE HYDROCHLORIDE 0.3 MG: 0.1 TABLET ORAL at 08:47

## 2024-05-08 RX ADMIN — Medication 10 ML: at 20:40

## 2024-05-08 RX ADMIN — PROPOFOL 100 MG: 10 INJECTION, EMULSION INTRAVENOUS at 12:40

## 2024-05-08 RX ADMIN — FAMOTIDINE 20 MG: 20 TABLET ORAL at 20:39

## 2024-05-08 RX ADMIN — BUDESONIDE 0.5 MG: 0.5 SUSPENSION RESPIRATORY (INHALATION) at 07:10

## 2024-05-08 RX ADMIN — ARFORMOTEROL TARTRATE 15 MCG: 15 SOLUTION RESPIRATORY (INHALATION) at 19:13

## 2024-05-08 RX ADMIN — BUDESONIDE 0.5 MG: 0.5 SUSPENSION RESPIRATORY (INHALATION) at 19:13

## 2024-05-08 RX ADMIN — GUAIFENESIN 1200 MG: 600 TABLET ORAL at 08:47

## 2024-05-08 RX ADMIN — INSULIN LISPRO 2 UNITS: 100 INJECTION, SOLUTION INTRAVENOUS; SUBCUTANEOUS at 11:48

## 2024-05-08 RX ADMIN — GUAIFENESIN 1200 MG: 600 TABLET ORAL at 20:39

## 2024-05-08 RX ADMIN — LOSARTAN POTASSIUM 25 MG: 25 TABLET, FILM COATED ORAL at 11:48

## 2024-05-08 RX ADMIN — PROPOFOL 175 MCG/KG/MIN: 10 INJECTION, EMULSION INTRAVENOUS at 12:40

## 2024-05-08 RX ADMIN — LIDOCAINE HYDROCHLORIDE 50 MG: 20 INJECTION, SOLUTION EPIDURAL; INFILTRATION; INTRACAUDAL; PERINEURAL at 12:40

## 2024-05-08 RX ADMIN — IPRATROPIUM BROMIDE AND ALBUTEROL SULFATE 3 ML: .5; 3 SOLUTION RESPIRATORY (INHALATION) at 12:22

## 2024-05-08 RX ADMIN — INSULIN LISPRO 4 UNITS: 100 INJECTION, SOLUTION INTRAVENOUS; SUBCUTANEOUS at 17:04

## 2024-05-08 RX ADMIN — Medication 10 ML: at 08:47

## 2024-05-08 RX ADMIN — CLONIDINE HYDROCHLORIDE 0.3 MG: 0.1 TABLET ORAL at 20:39

## 2024-05-08 RX ADMIN — PREDNISONE 40 MG: 20 TABLET ORAL at 08:47

## 2024-05-08 RX ADMIN — HYDRALAZINE HYDROCHLORIDE 10 MG: 20 INJECTION INTRAMUSCULAR; INTRAVENOUS at 17:04

## 2024-05-08 RX ADMIN — AMLODIPINE BESYLATE 10 MG: 10 TABLET ORAL at 08:47

## 2024-05-08 RX ADMIN — IPRATROPIUM BROMIDE AND ALBUTEROL SULFATE 3 ML: .5; 3 SOLUTION RESPIRATORY (INHALATION) at 19:14

## 2024-05-08 RX ADMIN — METOPROLOL SUCCINATE 25 MG: 25 TABLET, EXTENDED RELEASE ORAL at 08:47

## 2024-05-08 RX ADMIN — HYDROCODONE BITARTRATE AND ACETAMINOPHEN 1 TABLET: 7.5; 325 TABLET ORAL at 20:42

## 2024-05-08 RX ADMIN — IPRATROPIUM BROMIDE AND ALBUTEROL SULFATE 3 ML: .5; 3 SOLUTION RESPIRATORY (INHALATION) at 07:10

## 2024-05-08 NOTE — CONSULTS
"Nutrition Services    Patient Name: Yenni Camacho  YOB: 1965  MRN: [id number]  Admission date: 5/1/2024      CLINICAL NUTRITION ASSESSMENT      Reason for Assessment   Diet education      H&P:  Past Medical History:   Diagnosis Date    ADHD     Anxiety     Arthritis     Bunion     Chronic pain disorder     Foot pain, bilateral     Hypertension     PTSD (post-traumatic stress disorder)         Current Problems:   Active Hospital Problems    Diagnosis     **COPD exacerbation     Acute on chronic respiratory failure with hypoxia     Chronic systolic heart failure         Nutrition/Diet History         Narrative   Pt is NPO for a bronchoscopy today. Otherwise, she has been eating well (100% of meals). Pt denies recent weight loss. She reports that the weight of 175# last year was inaccurate. NFPE: no signs of fat or muscle wasting.     RD consulted for diet education for newly diagnosed diabetes (A1c 7.2%). Pt reports that she does not have diabetes. She reports that blood glucoses are elevated due to steroid use. Pt reports that she will likely d/c on 5 days of steroids. Discussed diet while on steroids. Encouraged avoidance of concentrated sweets. Encouraged consistent intake. Discussed which foods contain carbohydrates and portion sizes. Encouraged healthy, well-balanced meals. Pt eats a lot of salads, no bread. Pt is agreeable to follow while on steroids. Pt was given written materials for home.     Recommend clarification on diagnosis for pt.      Anthropometrics        Current Height, Weight Height: 170.2 cm (67.01\")  Weight: 67 kg (147 lb 11.3 oz)   Current BMI Body mass index is 23.13 kg/m².   BMI Classification Normal range   % %    Adjusted Body Weight (ABW)    Weight Hx  Wt Readings from Last 30 Encounters:   05/05/24 2036 67 kg (147 lb 11.3 oz)   05/02/24 0438 63.4 kg (139 lb 12.4 oz)   05/02/24 0401 63.4 kg (139 lb 12.4 oz)   05/01/24 2135 65 kg (143 lb 4.8 oz)   03/11/24 1505 " 64.8 kg (142 lb 12.8 oz)   02/27/24 1310 65.3 kg (144 lb)   12/29/23 0244 60.4 kg (133 lb 2.5 oz)   12/28/23 2040 66.4 kg (146 lb 6.2 oz)   07/13/23 1341 64.4 kg (142 lb)   06/15/23 0949 66 kg (145 lb 6.4 oz)   06/06/23 1429 66 kg (145 lb 9.6 oz)   05/15/23 1035 79.4 kg (175 lb)   04/24/23 0836 68.5 kg (151 lb)   03/15/23 1354 68 kg (150 lb)   03/07/23 1513 67.7 kg (149 lb 4 oz)   04/23/22 1945 57 kg (125 lb 10.6 oz)   04/18/22 0605 58.9 kg (129 lb 13.6 oz)   04/17/22 0500 57.8 kg (127 lb 6.8 oz)   04/16/22 0454 57.8 kg (127 lb 6.8 oz)   04/14/22 0600 56.6 kg (124 lb 12.5 oz)   04/12/22 0309 51.5 kg (113 lb 8.6 oz)   04/12/22 0309 51.5 kg (113 lb 8.6 oz)          Wt Change Observation 16% loss over 1 year- inaccurate per pt      Estimated/Assessed Needs  Estimated Needs based on: Current Body Weight       Energy Requirements 25-30 kcal/kg   EST Needs (kcal/day) 9447-3549 kcal/day        Protein Requirements 1.1 g/kg    EST Daily Needs (g/day) 74 g/day        Fluid Requirements 1 ml/kcal    Estimated Needs (mL/day) 1107-4683 ml/day      Labs/Medications         Pertinent Labs Reviewed.   Results from last 7 days   Lab Units 05/08/24  0440 05/07/24  0454 05/06/24  0512 05/02/24  0434 05/01/24  2141   SODIUM mmol/L 141 139 143   < > 143   POTASSIUM mmol/L 4.5 4.0 3.9   < > 3.3*   CHLORIDE mmol/L 104 105 107   < > 101   CO2 mmol/L 27.3 23.4 21.1*   < > 29.1*   BUN mg/dL 25* 17 20   < > 12   CREATININE mg/dL 0.72 0.57 0.61   < > 0.64   CALCIUM mg/dL 9.8 9.0 8.9   < > 9.0   BILIRUBIN mg/dL  --   --   --   --  0.2   ALK PHOS U/L  --   --   --   --  79   ALT (SGPT) U/L  --   --   --   --  19   AST (SGOT) U/L  --   --   --   --  16   GLUCOSE mg/dL 97 156* 170*   < > 156*    < > = values in this interval not displayed.     Results from last 7 days   Lab Units 05/05/24  0431   MAGNESIUM mg/dL 2.0   HEMOGLOBIN g/dL 12.6   HEMATOCRIT % 38.8     COVID19   Date Value Ref Range Status   05/01/2024 Not Detected Not Detected - Ref.  Range Final     Lab Results   Component Value Date    HGBA1C 7.20 (H) 05/01/2024         Pertinent Medications Reviewed.     Malnutrition Severity Assessment              Nutrition Diagnosis         Nutrition Dx Problem 1 Altered nutrition related lab values related to  steroid use   as evidenced by  hemoglobin A1C of 7.2%      Nutrition Intervention           Current Nutrition Orders & Evaluation of Intake       Current PO Diet NPO Diet NPO Type: Strict NPO   Supplement No active supplement orders           Nutrition Intervention/Prescription        Provide diet education     Recommend clarification from MD on diagnosis         Medical Nutrition Therapy/Nutrition Education          Learner     Readiness Patient  Acceptance     Method     Response Explanation  Verbalizes understanding     Monitor/Evaluation        Monitor PO intake, Pertinent labs, Diet advancement     Nutrition Discharge Plan         Pt given written materials for d/c      Electronically signed by:  Randi Cintron RD  05/08/24 08:01 EDT

## 2024-05-08 NOTE — PLAN OF CARE
Goal Outcome Evaluation:           Progress: improving  Outcome Evaluation: Patient A&Ox4 this shift. Vital signs stable overnight. Administered scheduled medications per MAR as well as PRN pain medicaion once at bedtime. Patient NPO since midnight for bronchoscopy. Patient states no needs at this time. Plan of care ongoing.

## 2024-05-08 NOTE — PROGRESS NOTES
[facility]     Progress Note    Patient Name: Yenni Camacho  : 1965  MRN: [id number]  Primary Care Physician:  Allysas Pritchett, LETITIA  Date of admission: 2024    Subjective   Subjective     Chief Complaint: Reason for consultation shortness of breath mucous plugging    History of Present Illness  Patient Reports complaints of shortness of breath  Still complaining of difficult expectorating mucus  Wheezing has improved  Still requesting bronchoscopy  Has been n.p.o.    Review of Systems  Positive for cough  Positive shortness of breath  Positive for wheezing  Objective   Objective     Vitals:   Temp:  [98.1 °F (36.7 °C)-98.6 °F (37 °C)] 98.2 °F (36.8 °C)  Heart Rate:  [] 101  Resp:  [16-20] 20  BP: (120-179)/() 179/84    Physical Exam   Vital Signs Reviewed  General WDWN, Alert, NAD.    Chest significant improvement in previous wheezing  CV: RRR, no MGR, .  EXT:  no clubbing, no cyanosis, no edema, no joint tenderness  Neuro:  A&Ox3, CN grossly intact, no focal deficits.  Skin: No rashes or lesions noted  Result Review    Result Review:  I have personally reviewed the results from the time of this admission to 2024 12:03 EDT and agree with these findings:  []  Laboratory list / accordion  []  Microbiology  []  Radiology  []  EKG/Telemetry   []  Cardiology/Vascular   []  Pathology  []  Old records  []  Other:  Most notable findings include: CT scan showing mucous plugging      Assessment & Plan   Assessment / Plan     Brief Patient Summary:  Yenni Camacho is a 58 y.o. female who mated to the hospital found to have COPD exacerbation not improving CT scan showing significant mucus plugging    Active Hospital Problems:  Active Hospital Problems    Diagnosis    • **COPD exacerbation    • Acute on chronic respiratory failure with hypoxia    • Chronic systolic heart failure      Plan:     Received bronchoscopy today    Risks versus benefits of bronchoscopy explained to the  patient including death, respiratory failure requiring intubation mechanical ventilation, pneumothorax requiring chest tube placement, bleeding.  Patient voices understanding of the risks of the procedure and wishes to proceed.      Will send for pneumonia panel and chemistry    Continue LABA LAMA inhaled corticosteroid      DVT prophylaxis:  Medical DVT prophylaxis orders are present.        CODE STATUS:    Level Of Support Discussed With: Patient  Code Status (Patient has no pulse and is not breathing): CPR (Attempt to Resuscitate)  Medical Interventions (Patient has pulse or is breathing): Full Support      Pradeep Arauz DO    Electronically signed by Pradeep Arauz DO, 05/08/24, 12:03 PM EDT.

## 2024-05-08 NOTE — PLAN OF CARE
Goal Outcome Evaluation:  Plan of Care Reviewed With: patient        Progress: improving  Outcome Evaluation: Patient went for a bronchoscopy today. Patient has had no issues recovering since being back to the floor. BP in the 170s, given prn Hydralazine. Patient ordered to have a walk test tomorrow and to D/C home afterwards.

## 2024-05-08 NOTE — OP NOTE
Procedure name: bronchoscopy with bronchoalveolar lavage     Indication: Mucous plugging     Sedation-IV MAC per anesthesia service     Procedure details:  Patient was brought back to the bronchoscopy suite, a bite block was placed in the oral cavity, and a therapeutic bronchoscope was then used to intubate the trachea. The vocal cords inspected and appeared to have normal motion with inhalation and ventilation.  Inspection was performed of the left tracheobronchial tree and there was no endobronchial lesion seen to the segmental level.  Inspection of the right tracheobronchial tree showed no endobronchial lesions to the segmental level.  A bronchoalveolar lavage was obtained from right lower lobe bronchus        Estimated blood loss:  none     Postoperative diagnosis: Mild bronchitis    Patient tolerated procedure well, no complications        Plan  Patient is to follow up in my office as scheduled to go over the results of the pathology/cytology and microbiology

## 2024-05-08 NOTE — PROGRESS NOTES
[facility]   Hospitalist Progress Note  Date: 2024  Patient Name: Yenni Camacho  : 1965  MRN: [id number]  Date of admission: 2024  Room/Bed: [facility]/      Subjective   Subjective     Chief Complaint: short of air     Summary:Yenni Camacho is a 58 y.o. female with past medical history of COPD not on home oxygen, hypertension, PTSD, chronic pain, anxiety, ADHD, and GERD presented to the ED with complaint of shortness of breath.  Patient states that for the last 2 to 3 days she has been having worsening shortness of breath where she was symptomatic even at rest along with a nonproductive cough, headache, lightheadedness, and generalized weakness.  Patient states he is compliant with her maintenance and rescue inhalers quit smoking 6 years ago.  He denies any fevers,chills, or sick contacts.  Due to her worsening symptoms patient came to the ED for further evaluation.  In the ED patient was tachycardic, hypertensive and hypoxic on arrival requiring oxygen supplementation via nasal cannula.  Labs were to be labs were relatively unremarkable including a negative COVID flu RSV.  Chest x-ray negative for any acute findings.  When asked she denied any recent fevers, chills, headaches, focal weakness, chest pain, palpitation, abdominal pain, nausea, vomiting, diarrhea, constipation, dysuria, hematuria, hematochezia, melena, or anxiety.  Patient admitted for further evaluation and treatment .  S/p bronchoscopy on May 8.  No significant finding.  Hemoglobin A1c of 7.1% on this admission new diagnosis for her.    Interval Followup:   Patient feels better after bronchoscopy can breathe better.  Has been on room air while not walking.  Patient has continued to fail her walking oximetry test and will need to be discharged home with oxygen  Patient does work at [employer] at the [employer] store and she is anxious to return to work and also anxious about how she will return with oxygen  Patient denies any  history of diabetes mellitus.  There is family history of diabetes mellitus but in morbidly obese patients.  Patient previous hemoglobin A1c has been within normal range  Patient's mycoplasma IgM is positive  CT of the chest shows emphysema with chronic scarring and bronchitis      Review of Systems    All systems reviewed and negative except for what is outlined above.      Objective   Objective     Vitals:   Temp:  [98 °F (36.7 °C)-98.2 °F (36.8 °C)] 98.1 °F (36.7 °C)  Heart Rate:  [] 97  Resp:  [16-22] 22  BP: (120-179)/(40-97) 171/97    Physical Exam   General: Awake, alert, NAD resting in bed with out oxygen in place   HENT: NCAT, MMM  Eyes: pupils equal, no scleral icterus  Cardiovascular: RRR, no murmurs   Pulmonary: decreased.  Bilateral occasional rhonchi  Gastrointestinal: S/ND/NT, +BS  Musculoskeletal: No gross deformities  Skin: No jaundice, no rash on exposed skin appreciated  Neuro: CN II through XII grossly intact; speech clear; no tremor  Psych: Mood and affect appropriate  : No Delgado catheter; no suprapubic tenderness    Result Review    Result Review:  I have personally reviewed these results:  [x]  Laboratory      Lab 05/05/24 0431 05/03/24  0444 05/01/24  2141   WBC 7.62 7.64 9.31   HEMOGLOBIN 12.6 12.3 15.3   HEMATOCRIT 38.8 38.3 46.7*   PLATELETS 306 249 272   NEUTROS ABS  --   --  7.10*   IMMATURE GRANS (ABS)  --   --  0.03   LYMPHS ABS  --   --  1.36   MONOS ABS  --   --  0.80   EOS ABS  --   --  0.01   MCV 90.7 91.6 90.5   LACTATE  --   --  1.9         Lab 05/08/24  0440 05/07/24  0454 05/06/24  0512 05/05/24  0431 05/02/24  0434 05/01/24  2141   SODIUM 141 139 143 142   < > 143   POTASSIUM 4.5 4.0 3.9 4.4   < > 3.3*   CHLORIDE 104 105 107 104   < > 101   CO2 27.3 23.4 21.1* 28.2   < > 29.1*   ANION GAP 9.7 10.6 14.9 9.8   < > 12.9   BUN 25* 17 20 17   < > 12   CREATININE 0.72 0.57 0.61 0.57   < > 0.64   EGFR 96.5 104.8 103.1 104.8   < > 101.9   GLUCOSE 97 156* 170* 133*   < > 156*    CALCIUM 9.8 9.0 8.9 9.4   < > 9.0   MAGNESIUM  --   --   --  2.0  --   --    HEMOGLOBIN A1C  --   --   --   --   --  7.20*    < > = values in this interval not displayed.         Lab 05/01/24  2141   TOTAL PROTEIN 6.6   ALBUMIN 3.6   GLOBULIN 3.0   ALT (SGPT) 19   AST (SGOT) 16   BILIRUBIN 0.2   ALK PHOS 79         Lab 05/01/24  2141   PROBNP 373.6   HSTROP T 18*                 Brief Urine Lab Results       None          [x]  Microbiology   Microbiology Results (last 10 days)       Procedure Component Value - Date/Time    BAL Culture, Quantitative - Lavage, Lung, Right Lower Lobe [[id number]] Collected: 05/08/24 1245    Lab Status: Preliminary result Specimen: Lavage from Lung, Right Lower Lobe Updated: 05/08/24 1524     Gram Stain Few (2+) Yeast      Few (2+) WBCs seen    Pneumonia Panel - Lavage, Lung, Right Lower Lobe [[id number]]  (Normal) Collected: 05/08/24 1245    Lab Status: Final result Specimen: Lavage from Lung, Right Lower Lobe Updated: 05/08/24 1537     Escherichia coli PCR Not Detected     Acinetobacter calcoaceticus-baumannii complex PCR Not Detected     Enterobacter cloacae PCR Not Detected     Klebsiella oxytoca PCR Not Detected     Klebsiella pneumoniae group PCR Not Detected     Klebsiella aerogenes PCR Not Detected     Moraxella catarrhalis PCR Not Detected     Proteus species PCR Not Detected     Pseudomonas aeroginosa PCR Not Detected     Serratia marcescens PCR Not Detected     Staphylococcus aureus PCR Not Detected     Streptococcus pyogenes PCR Not Detected     Haemophilus influenzae PCR Not Detected     Streptococcus agalactiae PCR Not Detected     Streptococcus pneumoniae PCR Not Detected     Chlamydophila pneumoniae PCR Not Detected     Legionella pneumophilia PCR Not Detected     Mycoplasma pneumo by PCR Not Detected     ADENOVIRUS, PCR Not Detected     CTX-M Gene N/A     IMP Gene N/A     KPC Gene N/A     mecA/C and MREJ Gene N/A     NDM Gene N/A     OXA-48-like Gene N/A     VIM Gene  N/A     Coronavirus Not Detected     Human Metapneumovirus Not Detected     Human Rhinovirus/Enterovirus Not Detected     Influenza A PCR Not Detected     Influenza B PCR Not Detected     RSV, PCR Not Detected     Parainfluenza virus PCR Not Detected    S. Pneumo Ag Urine or CSF - Urine, Urine, Clean Catch [[id number]]  (Normal) Collected: 05/03/24 0351    Lab Status: Final result Specimen: Urine, Clean Catch Updated: 05/03/24 0632     Strep Pneumo Ag Negative    Legionella Antigen, Urine - Urine, Urine, Clean Catch [[id number]]  (Normal) Collected: 05/03/24 0351    Lab Status: Final result Specimen: Urine, Clean Catch Updated: 05/03/24 0632     LEGIONELLA ANTIGEN, URINE Negative    Respiratory Culture - Sputum, Cough [[id number]] Collected: 05/03/24 0351    Lab Status: Final result Specimen: Sputum from Cough Updated: 05/05/24 1012     Respiratory Culture Light growth (2+) Normal respiratory bennie. No S. aureus or Pseudomonas aeruginosa detected. Final report.     Gram Stain Many (4+) WBCs seen      Few (2+) Mucous strands      Few (2+) Gram positive bacilli      Rare (1+) Epithelial cells seen      Rare (1+) Budding yeast    COVID-19, FLU A/B, RSV PCR 1 HR TAT - Swab, Nasopharynx [[id number]]  (Normal) Collected: 05/01/24 2141    Lab Status: Final result Specimen: Swab from Nasopharynx Updated: 05/01/24 2234     COVID19 Not Detected     Influenza A PCR Not Detected     Influenza B PCR Not Detected     RSV, PCR Not Detected    Narrative:      Fact sheet for providers: https://www.fda.gov/media/289724/download    Fact sheet for patients: https://www.fda.gov/media/499232/download    Test performed by PCR.    Blood Culture - Blood, Arm, Left [[id number]]  (Normal) Collected: 05/01/24 2141    Lab Status: Final result Specimen: Blood from Arm, Left Updated: 05/06/24 2200     Blood Culture No growth at 5 days    Blood Culture - Blood, Arm, Right [[id number]]  (Normal) Collected: 05/01/24 2141    Lab Status: Final result  Specimen: Blood from Arm, Right Updated: 05/06/24 2200     Blood Culture No growth at 5 days    Mycoplasma Pneumoniae Antibody, IgM - Blood, [[id number]]  (Abnormal) Collected: 05/01/24 2141    Lab Status: Final result Specimen: Blood Updated: 05/02/24 1543     Mycoplasma pneumo IgM Positive          [x]  Radiology  CT Chest Without Contrast Diagnostic    Result Date: 5/2/2024   1. Emphysema with some subtle tree-in-bud infectious or inflammatory infiltrates in the right upper and right lower lobe. 2. Chronic scarring in the apices, left greater than right, is similar to prior. 3. Atherosclerotic disease and coronary artery disease. 4. Bronchitis.  Electronically Signed By-Dr. Philipp Vallejo MD On:5/2/2024 10:55 PM      XR Chest 1 View    Result Date: 5/1/2024  No acute cardiopulmonary findings.  Electronically Signed By-Dr. Philipp Vallejo MD On:5/1/2024 9:52 PM     []  EKG/Telemetry   []  Cardiology/Vascular   []  Pathology  []  Old records  []  Other:    Assessment & Plan   Assessment / Plan     Assessment:  Acute exacerbation of COPD.  Improving.  S/p bronchoscopy May 8  Acute hypoxemic respiratory failure.  Resolved  Hypertension  Anxiety  PTSD  Mycoplasma pneumonia  New diagnosis of diabetes hemoglobin A1c of 7.2%.  Previous hemoglobin A1c of 6.5% December 2023    Plan:  Patient is currently on 2 L of oxygen.  Continue to wean off oxygen  Repeat walking oximetry in the morning prior to discharge  Continue metneb and chest physiotherapy   Continue oral prednisone  Continue patient on bronchodilators  Finished antibiotics  CT of the chest reviewed  Continue patient on Norvasc, metoprolol and clonidine for her hypertension  Pulmonary  consulted, for possible bronch on May 8  Urinary antigens are negative   Sliding-scale insulin  Diabetic nurse educator and dietitian consulted.  Will discharge patient on metformin       Discussed with RN and .  Discharge home in a.m. after repeat walking oximetry.   May need oxygen    DVT prophylaxis:  Medical DVT prophylaxis orders are present.        CODE STATUS:   Level Of Support Discussed With: Patient  Code Status (Patient has no pulse and is not breathing): CPR (Attempt to Resuscitate)  Medical Interventions (Patient has pulse or is breathing): Full Support      Electronically signed by Jonathan Hope MD, 5/8/2024, 16:12 EDT.

## 2024-05-09 ENCOUNTER — READMISSION MANAGEMENT (OUTPATIENT)
Dept: CALL CENTER | Facility: HOSPITAL | Age: 59
End: 2024-05-09
Payer: MEDICAID

## 2024-05-09 VITALS
HEART RATE: 86 BPM | HEIGHT: 67 IN | SYSTOLIC BLOOD PRESSURE: 132 MMHG | TEMPERATURE: 97.6 F | BODY MASS INDEX: 23.18 KG/M2 | DIASTOLIC BLOOD PRESSURE: 86 MMHG | OXYGEN SATURATION: 92 % | WEIGHT: 147.71 LBS | RESPIRATION RATE: 18 BRPM

## 2024-05-09 PROBLEM — J44.1 COPD EXACERBATION: Status: RESOLVED | Noted: 2023-12-31 | Resolved: 2024-05-09

## 2024-05-09 LAB
ANION GAP SERPL CALCULATED.3IONS-SCNC: 10.8 MMOL/L (ref 5–15)
BUN SERPL-MCNC: 30 MG/DL (ref 6–20)
BUN/CREAT SERPL: 40.5 (ref 7–25)
CALCIUM SPEC-SCNC: 9 MG/DL (ref 8.6–10.5)
CHLORIDE SERPL-SCNC: 105 MMOL/L (ref 98–107)
CO2 SERPL-SCNC: 26.2 MMOL/L (ref 22–29)
CREAT SERPL-MCNC: 0.74 MG/DL (ref 0.57–1)
EGFRCR SERPLBLD CKD-EPI 2021: 93.3 ML/MIN/1.73
GLUCOSE BLDC GLUCOMTR-MCNC: 128 MG/DL (ref 70–99)
GLUCOSE BLDC GLUCOMTR-MCNC: 286 MG/DL (ref 70–99)
GLUCOSE SERPL-MCNC: 140 MG/DL (ref 65–99)
POTASSIUM SERPL-SCNC: 4 MMOL/L (ref 3.5–5.2)
SODIUM SERPL-SCNC: 142 MMOL/L (ref 136–145)

## 2024-05-09 PROCEDURE — 94618 PULMONARY STRESS TESTING: CPT

## 2024-05-09 PROCEDURE — 94799 UNLISTED PULMONARY SVC/PX: CPT

## 2024-05-09 PROCEDURE — 63710000001 PREDNISONE PER 1 MG: Performed by: INTERNAL MEDICINE

## 2024-05-09 PROCEDURE — 80048 BASIC METABOLIC PNL TOTAL CA: CPT | Performed by: INTERNAL MEDICINE

## 2024-05-09 PROCEDURE — 99239 HOSP IP/OBS DSCHRG MGMT >30: CPT | Performed by: INTERNAL MEDICINE

## 2024-05-09 PROCEDURE — 25010000002 ENOXAPARIN PER 10 MG: Performed by: INTERNAL MEDICINE

## 2024-05-09 PROCEDURE — 94664 DEMO&/EVAL PT USE INHALER: CPT

## 2024-05-09 PROCEDURE — 63710000001 INSULIN LISPRO (HUMAN) PER 5 UNITS: Performed by: INTERNAL MEDICINE

## 2024-05-09 PROCEDURE — 99232 SBSQ HOSP IP/OBS MODERATE 35: CPT | Performed by: INTERNAL MEDICINE

## 2024-05-09 PROCEDURE — 82948 REAGENT STRIP/BLOOD GLUCOSE: CPT | Performed by: INTERNAL MEDICINE

## 2024-05-09 RX ORDER — BLOOD SUGAR DIAGNOSTIC
30 STRIP MISCELLANEOUS DAILY
Qty: 100 EACH | Refills: 0 | Status: SHIPPED | OUTPATIENT
Start: 2024-05-09 | End: 2024-06-08

## 2024-05-09 RX ORDER — BLOOD-GLUCOSE METER
1 KIT MISCELLANEOUS 2 TIMES DAILY
Qty: 60 EACH | Refills: 0 | Status: SHIPPED | OUTPATIENT
Start: 2024-05-09 | End: 2024-06-08

## 2024-05-09 RX ORDER — LOSARTAN POTASSIUM 50 MG/1
50 TABLET ORAL DAILY
Qty: 30 TABLET | Refills: 0 | Status: SHIPPED | OUTPATIENT
Start: 2024-05-09 | End: 2024-06-08

## 2024-05-09 RX ORDER — PREDNISONE 20 MG/1
20 TABLET ORAL DAILY
Qty: 3 TABLET | Refills: 0 | Status: SHIPPED | OUTPATIENT
Start: 2024-05-10 | End: 2024-05-13

## 2024-05-09 RX ORDER — BLOOD-GLUCOSE METER
1 KIT MISCELLANEOUS 2 TIMES DAILY
Qty: 1 EACH | Refills: 0 | Status: SHIPPED | OUTPATIENT
Start: 2024-05-09 | End: 2024-06-08

## 2024-05-09 RX ORDER — PREDNISONE 20 MG/1
20 TABLET ORAL
Status: DISCONTINUED | OUTPATIENT
Start: 2024-05-10 | End: 2024-05-09 | Stop reason: HOSPADM

## 2024-05-09 RX ORDER — LANCETS 28 GAUGE
1 EACH MISCELLANEOUS 2 TIMES DAILY
Qty: 100 EACH | Refills: 12 | Status: SHIPPED | OUTPATIENT
Start: 2024-05-09 | End: 2024-06-08

## 2024-05-09 RX ORDER — GUAIFENESIN 600 MG/1
1200 TABLET, EXTENDED RELEASE ORAL EVERY 12 HOURS SCHEDULED
Qty: 120 TABLET | Refills: 0 | Status: SHIPPED | OUTPATIENT
Start: 2024-05-09 | End: 2024-06-08

## 2024-05-09 RX ORDER — BUDESONIDE, GLYCOPYRROLATE, AND FORMOTEROL FUMARATE 160; 9; 4.8 UG/1; UG/1; UG/1
2 AEROSOL, METERED RESPIRATORY (INHALATION) 2 TIMES DAILY
Qty: 1 EACH | Refills: 11 | Status: SHIPPED | OUTPATIENT
Start: 2024-05-09

## 2024-05-09 RX ORDER — METFORMIN HYDROCHLORIDE EXTENDED-RELEASE TABLETS 1000 MG/1
1000 TABLET, FILM COATED, EXTENDED RELEASE ORAL
Qty: 30 TABLET | Refills: 0 | Status: SHIPPED | OUTPATIENT
Start: 2024-05-09 | End: 2024-06-08

## 2024-05-09 RX ADMIN — PREDNISONE 40 MG: 20 TABLET ORAL at 08:12

## 2024-05-09 RX ADMIN — IPRATROPIUM BROMIDE AND ALBUTEROL SULFATE 3 ML: .5; 3 SOLUTION RESPIRATORY (INHALATION) at 07:26

## 2024-05-09 RX ADMIN — HYDROCODONE BITARTRATE AND ACETAMINOPHEN 1 TABLET: 7.5; 325 TABLET ORAL at 08:16

## 2024-05-09 RX ADMIN — Medication 10 ML: at 08:12

## 2024-05-09 RX ADMIN — METOPROLOL SUCCINATE 25 MG: 25 TABLET, EXTENDED RELEASE ORAL at 08:12

## 2024-05-09 RX ADMIN — IPRATROPIUM BROMIDE AND ALBUTEROL SULFATE 3 ML: .5; 3 SOLUTION RESPIRATORY (INHALATION) at 00:30

## 2024-05-09 RX ADMIN — CLONIDINE HYDROCHLORIDE 0.3 MG: 0.1 TABLET ORAL at 08:12

## 2024-05-09 RX ADMIN — IPRATROPIUM BROMIDE AND ALBUTEROL SULFATE 3 ML: .5; 3 SOLUTION RESPIRATORY (INHALATION) at 12:59

## 2024-05-09 RX ADMIN — GUAIFENESIN 1200 MG: 600 TABLET ORAL at 08:12

## 2024-05-09 RX ADMIN — LOSARTAN POTASSIUM 25 MG: 25 TABLET, FILM COATED ORAL at 08:12

## 2024-05-09 RX ADMIN — BUDESONIDE 0.5 MG: 0.5 SUSPENSION RESPIRATORY (INHALATION) at 07:26

## 2024-05-09 RX ADMIN — INSULIN LISPRO 4 UNITS: 100 INJECTION, SOLUTION INTRAVENOUS; SUBCUTANEOUS at 12:17

## 2024-05-09 RX ADMIN — ENOXAPARIN SODIUM 40 MG: 100 INJECTION SUBCUTANEOUS at 08:11

## 2024-05-09 RX ADMIN — ARFORMOTEROL TARTRATE 15 MCG: 15 SOLUTION RESPIRATORY (INHALATION) at 07:26

## 2024-05-09 RX ADMIN — AMLODIPINE BESYLATE 10 MG: 10 TABLET ORAL at 08:12

## 2024-05-09 NOTE — PROGRESS NOTES
[facility]     Progress Note    Patient Name: Yenni Camacho  : 1965  MRN: [id number]  Primary Care Physician:  Allyssa Pritchett, LETITIA  Date of admission: 2024    Subjective   Subjective     Chief Complaint: Reason for consultation shortness of breath mucous plugging    History of Present Illness  Patient Reports complaints of shortness of breath  Feels much better after bronchoscopy  Wanting to go home    Review of Systems  Positive for cough  Positive shortness of breath  Positive for wheezing  Objective   Objective     Vitals:   Temp:  [97.6 °F (36.4 °C)-98.1 °F (36.7 °C)] 97.6 °F (36.4 °C)  Heart Rate:  [] 86  Resp:  [18-22] 18  BP: (124-171)/(62-97) 132/86  Flow (L/min):  [3] 3    Physical Exam   Vital Signs Reviewed  General WDWN, Alert, NAD.    Chest significant improvement in previous wheezing  CV: RRR, no MGR, .  EXT:  no clubbing, no cyanosis, no edema, no joint tenderness  Neuro:  A&Ox3, CN grossly intact, no focal deficits.  Skin: No rashes or lesions noted  Result Review    Result Review:  I have personally reviewed the results from the time of this admission to 2024 14:34 EDT and agree with these findings:  []  Laboratory list / accordion  []  Microbiology  []  Radiology  []  EKG/Telemetry   []  Cardiology/Vascular   []  Pathology  []  Old records  []  Other:  Most notable findings include: CT scan showing mucous plugging      Assessment & Plan   Assessment / Plan     Brief Patient Summary:  Yenni Camacho is a 58 y.o. female who mated to the hospital found to have COPD exacerbation not improving CT scan showing significant mucus plugging    Active Hospital Problems:  Active Hospital Problems    Diagnosis     Acute on chronic respiratory failure with hypoxia     Chronic systolic heart failure      Plan:     Okay from a standpoint to discharge  Would recommend 5 days steroid burst  Would recommend discharging on Breztri and as needed albuterol  Follow-up with us in  the office      DVT prophylaxis:  Medical DVT prophylaxis orders are present.        CODE STATUS:    Level Of Support Discussed With: Patient  Code Status (Patient has no pulse and is not breathing): CPR (Attempt to Resuscitate)  Medical Interventions (Patient has pulse or is breathing): Full Support      Pradeep Arauz DO    Electronically signed by Pradeep Arauz DO, 05/09/24, 2:34 PM EDT.

## 2024-05-09 NOTE — CASE MANAGEMENT/SOCIAL WORK
"COPD education was given to Ms. Camacho via the booklet, \"Understanding and Living with COPD\". Discussion of the COPD zones (Green/Yellow/Red) was completed with emphasizes on what to do in the yellow and red zones. COPD action plan was reviewed with instruction on rescue and maintenance medications, the function of each and the importance of using them as prescribed. MDI administration with a spacer was instructed; patient was provided with spacer for discharge. Home oxygen prescription was also reviewed with patient.       Yenni Camacho (Key: [id number]) - [id number]  [facility] [id number]/ACT aerosol  Status: PA Response - ApprovedCreated: May 9th, 2024Sent: May 9th, 2024  "

## 2024-05-09 NOTE — CONSULTS
"Met with patient at bedside. Discussed current A1c of 7.2% with an estimated average glucose of 160 mg/dl. Educated that an A1c above 7% is considered diabetes and that she will most likely be discharged on a PO medication to lower her A1c. Explained that the steroids that the patient is on may be causing the elevated glucose levels. Provided patient with the \"Diabetes Survival Skills\" booklet to review and take home.     Patient also admits to frequently drinking soda, juice, and sweet tea. Educated how these raise our blood sugar quickly, the side effects of long term glucose elevation, and lower carbohydrate options to try upon discharge. Patient seemed hesitant but willing to change beverages. Patient had no further questions at this time. Will continue to assist and support as needed.   "

## 2024-05-09 NOTE — DISCHARGE SUMMARY
[facility]        HOSPITALIST  DISCHARGE SUMMARY    Patient Name: Yenni Camacho  : 1965  MRN: [id number]    Date of Admission: 2024  Date of Discharge:  2024  Primary Care Physician: Allyssa Pritchett APRN    Consults       Date and Time Order Name Status Description    2024  1:42 PM Inpatient Pulmonology Consult Completed     2024 10:27 PM Inpatient Hospitalist Consult              Active and Resolved Hospital Problems:  Active Hospital Problems    Diagnosis POA    Acute on chronic respiratory failure with hypoxia [J96.21] Yes    Chronic systolic heart failure [I50.22] Yes      Resolved Hospital Problems    Diagnosis POA    **COPD exacerbation [J44.1] Yes    Mycoplasma pneumonia [J15.7] Yes    Uncontrolled hypertension [I10] Yes    Ventricular tachycardia (paroxysmal) [I47.29] Yes     Acute exacerbation of COPD.  Improving.  S/p bronchoscopy May 8  Acute hypoxemic respiratory failure.  Need oxygen with exertion  Hypertension  Anxiety  PTSD  Mycoplasma pneumonia.  Finished treatment  New diagnosis of diabetes hemoglobin A1c of 7.2%.  Previous hemoglobin A1c of 6.5% 2023     Hospital Course     Hospital Course:  Yenni Camacho is a 58 y.o. female with past medical history of COPD not on home oxygen, hypertension, PTSD, chronic pain, anxiety, ADHD, and GERD presented to the ED with complaint of shortness of breath.  Patient states that for the last 2 to 3 days she has been having worsening shortness of breath where she was symptomatic even at rest along with a nonproductive cough, headache, lightheadedness, and generalized weakness.  Patient states he is compliant with her maintenance and rescue inhalers quit smoking 6 years ago.  He denies any fevers,chills, or sick contacts.  Due to her worsening symptoms patient came to the ED for further evaluation.  In the ED patient was tachycardic, hypertensive and hypoxic on arrival requiring oxygen supplementation  via nasal cannula.  Labs were to be labs were relatively unremarkable including a negative COVID flu RSV.  Chest x-ray negative for any acute findings.  When asked she denied any recent fevers, chills, headaches, focal weakness, chest pain, palpitation, abdominal pain, nausea, vomiting, diarrhea, constipation, dysuria, hematuria, hematochezia, melena, or anxiety.  Patient admitted for further evaluation and treatment .  S/p bronchoscopy on May 8.  No significant finding.  Hemoglobin A1c of 7.1% on this admission new diagnosis for her.     Interval Followup:   Patient feels better after bronchoscopy, can breathe better.  Has been on room air while not walking.  Patient has continued to fail her walking oximetry test (3 tests )and will need to be discharged home with oxygen  Patient does work at [employer] at the [employer] and she is anxious to return to work and also anxious about how she will return with oxygen  Patient denies any history of diabetes mellitus.  There is family history of diabetes mellitus but in morbidly obese patients.  Patient previous hemoglobin A1c has been within normal range  Patient's mycoplasma IgM is positive  CT of the chest shows emphysema with chronic scarring and bronchitis  Patient cleared by pulmonary to be released.  Patient wants to go home today.    DISCHARGE Follow Up Recommendations for labs and diagnostics: PCP and pulmonary.  Use oxygen with exertion until cleared by pulmonary.  Monitor blood sugar at home and report to PCP.  Home Norvasc replaced with losartan.      Day of Discharge     Vital Signs:  Temp:  [97.6 °F (36.4 °C)-98.1 °F (36.7 °C)] 97.6 °F (36.4 °C)  Heart Rate:  [] 86  Resp:  [18-22] 18  BP: (124-171)/(62-97) 132/86  Flow (L/min):  [3] 3    Physical Exam:     General: Awake, alert, NAD resting in bed with out oxygen in place   HENT: NCAT, MMM  Eyes: pupils equal, no scleral icterus  Cardiovascular: RRR, no murmurs   Pulmonary: decreased.  Bilateral  occasional rhonchi  Gastrointestinal: S/ND/NT, +BS  Musculoskeletal: No gross deformities  Skin: No jaundice, no rash on exposed skin appreciated  Neuro: CN II through XII grossly intact; speech clear; no tremor  Psych: Mood and affect appropriate  : No Delgado catheter; no suprapubic tenderness  Discharge Details        Discharge Medications        New Medications        Instructions Start Date   Alcohol Pads 70 % pads   30 each, Does not apply, Daily      freestyle lancets   1 each, Other, 2 Times Daily, Use as instructed      FreeStyle Lite device   1 Device, Does not apply, 2 Times Daily      FREESTYLE LITE test strip  Generic drug: glucose blood   1 each, Other, 2 Times Daily, Use as instructed      guaiFENesin 600 MG 12 hr tablet  Commonly known as: MUCINEX   1,200 mg, Oral, Every 12 Hours Scheduled      losartan 50 MG tablet  Commonly known as: Cozaar   50 mg, Oral, Daily      metFORMIN 1000 MG (OSM) 24 hr tablet  Commonly known as: FORTAMET   1,000 mg, Oral, Daily With Breakfast      predniSONE 20 MG tablet  Commonly known as: DELTASONE   20 mg, Oral, Daily   Start Date: May 10, 2024            Continue These Medications        Instructions Start Date   acetaminophen 325 MG tablet  Commonly known as: TYLENOL   650 mg, Oral, Every 6 Hours PRN      albuterol sulfate  (90 Base) MCG/ACT inhaler  Commonly known as: PROVENTIL HFA;VENTOLIN HFA;PROAIR HFA   2 puffs, Inhalation, Every 4 Hours PRN      Baclofen 5 MG tablet  Commonly known as: LIORESAL   5 mg, Oral, Every 8 Hours PRN      cloNIDine 0.3 MG tablet  Commonly known as: CATAPRES   TAKE 1 TABLET BY MOUTH TWICE A DAY      fluticasone 50 MCG/ACT nasal spray  Commonly known as: FLONASE   2 sprays, Each Nare, Daily      HYDROcodone-acetaminophen 7.5-325 MG per tablet  Commonly known as: NORCO   Take 1 tablet by mouth Every 12 (Twelve) Hours As Needed for Moderate Pain.      metoprolol succinate XL 25 MG 24 hr tablet  Commonly known as: TOPROL-XL   25 mg,  Oral, Daily      naloxone 4 MG/0.1ML nasal spray  Commonly known as: NARCAN   1 spray into the nostril(s) as directed by provider As Needed for Opioid Reversal or Respiratory Depression.      Symbicort 160-4.5 MCG/ACT inhaler  Generic drug: budesonide-formoterol   2 puffs, Inhalation, 2 Times Daily             Stop These Medications      amLODIPine 10 MG tablet  Commonly known as: NORVASC              Allergies   Allergen Reactions    Lexapro [Escitalopram] Mental Status Change     Worsened depression     Paxlovid [Nirmatrelvir-Ritonavir] Unknown - Low Severity    Wellbutrin [Bupropion] Unknown - Low Severity       Discharge Disposition:  Home or Self Care.  In private vehicle    Diet:  Diet Instructions       Diet: Diabetic Diets; Consistent Carbohydrate; Thin (IDDSI 0)      Discharge Diet: Diabetic Diets    Diabetic Diet: Consistent Carbohydrate    Fluid Consistency: Thin (IDDSI 0)            Discharge Activity:   Activity Instructions       Activity as Tolerated      Other Activity Restrictions      Type of Restriction: Work    May Return to Work: Specific Date    Return To Work Date: 5/13/2024    With / Without Restrictions: Without Restrictions            CODE STATUS:  Code Status and Medical Interventions:   Ordered at: 05/01/24 2230     Level Of Support Discussed With:    Patient     Code Status (Patient has no pulse and is not breathing):    CPR (Attempt to Resuscitate)     Medical Interventions (Patient has pulse or is breathing):    Full Support         Future Appointments   Date Time Provider Department Center   5/13/2024  8:00 AM Sindy Castillo APRN [facility] COPD [facility]       Additional Instructions for the Follow-ups that You Need to Schedule       Discharge Follow-up with PCP   As directed       Currently Documented PCP:    Allyssa Pritchett APRN    PCP Phone Number:    [phone]     Follow Up Details: 1 week        Discharge Follow-up with Specified Provider: Dr. Arauz; 2 Weeks   As directed      To:  Dr. Arauz   Follow Up: 2 Weeks                Pertinent  and/or Most Recent Results     PROCEDURES:   Procedures:    * BRONCHOSCOPY WITH BAL     LAB RESULTS:      Lab 05/05/24  0431 05/03/24  0444   WBC 7.62 7.64   HEMOGLOBIN 12.6 12.3   HEMATOCRIT 38.8 38.3   PLATELETS 306 249   MCV 90.7 91.6         Lab 05/09/24  0456 05/08/24  0440 05/07/24  0454 05/06/24  0512 05/05/24  0431   SODIUM 142 141 139 143 142   POTASSIUM 4.0 4.5 4.0 3.9 4.4   CHLORIDE 105 104 105 107 104   CO2 26.2 27.3 23.4 21.1* 28.2   ANION GAP 10.8 9.7 10.6 14.9 9.8   BUN 30* 25* 17 20 17   CREATININE 0.74 0.72 0.57 0.61 0.57   EGFR 93.3 96.5 104.8 103.1 104.8   GLUCOSE 140* 97 156* 170* 133*   CALCIUM 9.0 9.8 9.0 8.9 9.4   MAGNESIUM  --   --   --   --  2.0                         Brief Urine Lab Results       None          Microbiology Results (last 10 days)       Procedure Component Value - Date/Time    AFB Culture - Lavage, Lung, Right Lower Lobe [[id number]] Collected: 05/08/24 1245    Lab Status: Preliminary result Specimen: Lavage from Lung, Right Lower Lobe Updated: 05/09/24 1202     AFB Stain No acid fast bacilli seen on direct smear      No acid fast bacilli seen on concentrated smear    BAL Culture, Quantitative - Lavage, Lung, Right Lower Lobe [[id number]] Collected: 05/08/24 1245    Lab Status: Preliminary result Specimen: Lavage from Lung, Right Lower Lobe Updated: 05/09/24 0951     BAL Culture 25,000 CFU/mL The culture consists of normal respiratory bennie. This is a preliminary report; final report to follow.     Gram Stain Few (2+) Yeast      Few (2+) WBCs seen    Pneumonia Panel - Lavage, Lung, Right Lower Lobe [[id number]]  (Normal) Collected: 05/08/24 1245    Lab Status: Final result Specimen: Lavage from Lung, Right Lower Lobe Updated: 05/08/24 1537     Escherichia coli PCR Not Detected     Acinetobacter calcoaceticus-baumannii complex PCR Not Detected     Enterobacter cloacae PCR Not Detected     Klebsiella oxytoca PCR Not  Detected     Klebsiella pneumoniae group PCR Not Detected     Klebsiella aerogenes PCR Not Detected     Moraxella catarrhalis PCR Not Detected     Proteus species PCR Not Detected     Pseudomonas aeroginosa PCR Not Detected     Serratia marcescens PCR Not Detected     Staphylococcus aureus PCR Not Detected     Streptococcus pyogenes PCR Not Detected     Haemophilus influenzae PCR Not Detected     Streptococcus agalactiae PCR Not Detected     Streptococcus pneumoniae PCR Not Detected     Chlamydophila pneumoniae PCR Not Detected     Legionella pneumophilia PCR Not Detected     Mycoplasma pneumo by PCR Not Detected     ADENOVIRUS, PCR Not Detected     CTX-M Gene N/A     IMP Gene N/A     KPC Gene N/A     mecA/C and MREJ Gene N/A     NDM Gene N/A     OXA-48-like Gene N/A     VIM Gene N/A     Coronavirus Not Detected     Human Metapneumovirus Not Detected     Human Rhinovirus/Enterovirus Not Detected     Influenza A PCR Not Detected     Influenza B PCR Not Detected     RSV, PCR Not Detected     Parainfluenza virus PCR Not Detected    S. Pneumo Ag Urine or CSF - Urine, Urine, Clean Catch [[id number]]  (Normal) Collected: 05/03/24 0351    Lab Status: Final result Specimen: Urine, Clean Catch Updated: 05/03/24 0632     Strep Pneumo Ag Negative    Legionella Antigen, Urine - Urine, Urine, Clean Catch [[id number]]  (Normal) Collected: 05/03/24 0351    Lab Status: Final result Specimen: Urine, Clean Catch Updated: 05/03/24 0632     LEGIONELLA ANTIGEN, URINE Negative    Respiratory Culture - Sputum, Cough [[id number]] Collected: 05/03/24 0351    Lab Status: Final result Specimen: Sputum from Cough Updated: 05/05/24 1012     Respiratory Culture Light growth (2+) Normal respiratory bennie. No S. aureus or Pseudomonas aeruginosa detected. Final report.     Gram Stain Many (4+) WBCs seen      Few (2+) Mucous strands      Few (2+) Gram positive bacilli      Rare (1+) Epithelial cells seen      Rare (1+) Budding yeast    COVID-19, FLU  A/B, RSV PCR 1 HR TAT - Swab, Nasopharynx [[id number]]  (Normal) Collected: 05/01/24 2141    Lab Status: Final result Specimen: Swab from Nasopharynx Updated: 05/01/24 2234     COVID19 Not Detected     Influenza A PCR Not Detected     Influenza B PCR Not Detected     RSV, PCR Not Detected    Narrative:      Fact sheet for providers: https://www.fda.gov/media/955553/download    Fact sheet for patients: https://www.fda.gov/media/898842/download    Test performed by PCR.    Blood Culture - Blood, Arm, Left [[id number]]  (Normal) Collected: 05/01/24 2141    Lab Status: Final result Specimen: Blood from Arm, Left Updated: 05/06/24 2200     Blood Culture No growth at 5 days    Blood Culture - Blood, Arm, Right [[id number]]  (Normal) Collected: 05/01/24 2141    Lab Status: Final result Specimen: Blood from Arm, Right Updated: 05/06/24 2200     Blood Culture No growth at 5 days    Mycoplasma Pneumoniae Antibody, IgM - Blood, [[id number]]  (Abnormal) Collected: 05/01/24 2141    Lab Status: Final result Specimen: Blood Updated: 05/02/24 1543     Mycoplasma pneumo IgM Positive            CT Chest Without Contrast Diagnostic    Result Date: 5/2/2024  Impression:  1. Emphysema with some subtle tree-in-bud infectious or inflammatory infiltrates in the right upper and right lower lobe. 2. Chronic scarring in the apices, left greater than right, is similar to prior. 3. Atherosclerotic disease and coronary artery disease. 4. Bronchitis.  Electronically Signed By-Dr. Philipp Vallejo MD On:5/2/2024 10:55 PM      XR Chest 1 View    Result Date: 5/1/2024  Impression: No acute cardiopulmonary findings.  Electronically Signed By-Dr. Philipp Vallejo MD On:5/1/2024 9:52 PM               Results for orders placed during the hospital encounter of 04/12/22    Adult Transthoracic Echo Complete w/ Color, Spectral and Contrast if necessary per protocol    Interpretation Summary  Mild diffuse hypokinesis of the left ankle with mildly reduced left  ventricular systolic function ejection fraction around 45%.  Trace tricuspid regurgitation.  Trace mitral regurgitation.      Imaging Results (All)       Procedure Component Value Units Date/Time    CT Chest Without Contrast Diagnostic [[id number]] Collected: 05/02/24 2249     Updated: 05/02/24 2257    Narrative:      CT Chest without Contrast     INDICATION:  Hypoxia. Pneumonia and COPD.     TECHNIQUE:  CT of the thorax without contrast. Coronal and sagittal reconstructions  were obtained.  Radiation dose reduction techniques included automated  exposure control or exposure modulation based on body size.      COMPARISON:  12/29/2023     FINDINGS:  There is atherosclerotic disease and coronary artery disease. No pleural  or pericardial effusion. Scattered small mediastinal lymph nodes are  stable to slightly improved. No adenopathy is identified allowing for  the lack of contrast. No acute findings in the visualized upper abdomen.     Lung windows reveal emphysema with scarring in the apices, left greater  than right. There are some subtle tree-in-bud infiltrates in the right  lower lobe and anterior right upper lobe, likely infectious or  inflammatory. There is bronchial wall thickening compatible with  bronchitis. No dense alveolar infiltrates. No pneumothorax.       Impression:         1. Emphysema with some subtle tree-in-bud infectious or inflammatory  infiltrates in the right upper and right lower lobe.  2. Chronic scarring in the apices, left greater than right, is similar  to prior.  3. Atherosclerotic disease and coronary artery disease.  4. Bronchitis.     Electronically Signed By-Dr. Philipp Vallejo MD On:5/2/2024 10:55 PM       XR Chest 1 View [[id number]] Collected: 05/01/24 2151     Updated: 05/01/24 2154    Narrative:      AP PORTABLE CHEST     HISTORY:  Shortness of air.     COMPARISON:  3/11/2024     TECHNIQUE:  AP portable chest x-ray.     FINDINGS:  Cardiac and mediastinal contours are normal. There  is atherosclerotic  disease in the aorta. COPD is present with some scarring in the apices.  No acute infiltrates or pneumothorax. Pulmonary vascularity is normal.       Impression:      No acute cardiopulmonary findings.     Electronically Signed By-Dr. Philipp Vallejo MD On:5/1/2024 9:52 PM                Labs Pending at Discharge:  Pending Labs       Order Current Status    Fungus Culture - Lavage, Lung, Right Lower Lobe In process    Non-gynecologic Cytology In process    AFB Culture - Lavage, Lung, Right Lower Lobe Preliminary result    BAL Culture, Quantitative - Lavage, Lung, Right Lower Lobe Preliminary result                Time spent on Discharge including face to face service: 35 minutes  Part of this note may be an electronic transcription/translation of spoken language to printed text using the Dragon Dictation System.     TElectronically signed by Jonathan Hope MD, 05/09/24, 2:25 PM EDT.

## 2024-05-09 NOTE — PROCEDURES
Walking Oximetry Progress Note      Patient Name:  Yenni Camacho  YOB: 1965  Date of Procedure: 05/09/24              ROOM AIR BASELINE   SpO2% 92 resting   Heart Rate 83     EXERCISE ON ROOM AIR SpO2% EXERCISE ON O2 LPM SpO2%   1 MINUTE 86 1 MINUTE PD 3 92   2 MINUTES  2 MINUTES PD 3 92   3 MINUTES  3 MINUTES PD 3 92   4 MINUTES  4 MINUTES PD 3 92   5 MINUTES  5 MINUTES PD 3 92   6 MINUTES  6 MINUTES PD 3 92              Time to Recovery  NA   SpO2% Post Exercise  92 on room air resting   HR Post Exercise  99     Comments: patient stated no shortness of breath before or after walk, tolerated well          Electronically signed by Milad Dennis CRT, 05/09/24, 7:22 AM EDT.

## 2024-05-09 NOTE — NURSING NOTE
Patient vital signs stable. Patient has no telemetry monitor. Patient IV access removed. Patient given discharge instructions and educated on where to  discharge medications. Patient is going home with home oxygen. Patient to be discharged home via family.

## 2024-05-09 NOTE — PLAN OF CARE
Goal Outcome Evaluation:         A&O x4. VSS. Medicated x1 for pain on shift, see MAR. All needs met at this time. Plan of care on going.

## 2024-05-10 ENCOUNTER — TRANSITIONAL CARE MANAGEMENT TELEPHONE ENCOUNTER (OUTPATIENT)
Dept: CALL CENTER | Facility: HOSPITAL | Age: 59
End: 2024-05-10
Payer: MEDICAID

## 2024-05-10 LAB
BACTERIA SPEC AEROBE CULT: NORMAL
CYTO UR: NORMAL
GRAM STN SPEC: NORMAL
GRAM STN SPEC: NORMAL
LAB AP CASE REPORT: NORMAL
LAB AP CLINICAL INFORMATION: NORMAL
LAB AP DIAGNOSIS COMMENT: NORMAL
LAB AP SPECIAL STAINS: NORMAL
PATH REPORT.FINAL DX SPEC: NORMAL
PATH REPORT.GROSS SPEC: NORMAL

## 2024-05-11 ENCOUNTER — TRANSITIONAL CARE MANAGEMENT TELEPHONE ENCOUNTER (OUTPATIENT)
Dept: CALL CENTER | Facility: HOSPITAL | Age: 59
End: 2024-05-11
Payer: MEDICAID

## 2024-05-13 ENCOUNTER — TELEPHONE (OUTPATIENT)
Dept: PULMONOLOGY | Facility: CLINIC | Age: 59
End: 2024-05-13

## 2024-05-13 NOTE — TELEPHONE ENCOUNTER
Hub staff attempted to follow warm transfer process and was unsuccessful     Caller: KWAN CLINE    Relationship to patient: SELF    Best call back number: [phone]     Patient is needing: MS. 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 DISCHARGED FROM HOSPITAL ON  5/9/24 FOR COPD. PLEASE CONTACT PATIENT ASAP TO RESCHEDULE.     LEATHA [phone] (NEIGHBOR) IF UNABLE TO REACH PATIENT    OK TO CALL BACK ANYTIME. OK TO LEAVE .

## 2024-05-15 LAB
FUNGUS WND CULT: ABNORMAL
MYCOBACTERIUM SPEC CULT: NORMAL
NIGHT BLUE STAIN TISS: NORMAL
NIGHT BLUE STAIN TISS: NORMAL

## 2024-05-16 RX ORDER — CLONIDINE HYDROCHLORIDE 0.3 MG/1
0.3 TABLET ORAL 2 TIMES DAILY
Qty: 60 TABLET | Refills: 1 | Status: SHIPPED | OUTPATIENT
Start: 2024-05-16

## 2024-05-16 RX ORDER — BUDESONIDE AND FORMOTEROL FUMARATE DIHYDRATE 160; 4.5 UG/1; UG/1
2 AEROSOL RESPIRATORY (INHALATION) 2 TIMES DAILY
Qty: 10.2 G | Refills: 0 | OUTPATIENT
Start: 2024-05-16

## 2024-05-16 NOTE — TELEPHONE ENCOUNTER
Spoke with patient who stated she is not using the Symbicort inhaler. She stated that she is using the Breztri inhaler but is going to have to stop to due to causing blisters in her mouth and lips. Stopping. She said she is using her Albuterol inhaler for now. Please send new inhaler to pharmacy.

## 2024-05-22 LAB
MYCOBACTERIUM SPEC CULT: NORMAL
NIGHT BLUE STAIN TISS: NORMAL
NIGHT BLUE STAIN TISS: NORMAL

## 2024-05-29 LAB
MYCOBACTERIUM SPEC CULT: NORMAL
NIGHT BLUE STAIN TISS: NORMAL
NIGHT BLUE STAIN TISS: NORMAL

## 2024-06-10 RX ORDER — AMLODIPINE BESYLATE 10 MG/1
10 TABLET ORAL DAILY
Qty: 90 TABLET | Refills: 0 | OUTPATIENT
Start: 2024-06-10

## 2024-06-10 NOTE — TELEPHONE ENCOUNTER
The original prescription was discontinued on 5/9/2024 by Jonathan Hope MD for the following reason: Stop Taking at Discharge. Renewing this prescription may not be appropriate.

## 2024-06-10 NOTE — TELEPHONE ENCOUNTER
Amlodipine was discontinued and replaced with losartan upon discharge from hospitalization last month.  Patient was instructed to follow-up with me for further evaluation and management of diabetes as well as blood pressure follow-up.  Patient needs an appointment for further evaluation.  Please call to schedule

## 2024-06-12 LAB
MYCOBACTERIUM SPEC CULT: NORMAL
NIGHT BLUE STAIN TISS: NORMAL
NIGHT BLUE STAIN TISS: NORMAL

## 2024-06-19 LAB
MYCOBACTERIUM SPEC CULT: NORMAL
NIGHT BLUE STAIN TISS: NORMAL
NIGHT BLUE STAIN TISS: NORMAL

## 2024-07-15 RX ORDER — METOPROLOL SUCCINATE 25 MG/1
25 TABLET, EXTENDED RELEASE ORAL DAILY
Qty: 90 TABLET | Refills: 0 | Status: SHIPPED | OUTPATIENT
Start: 2024-07-15

## 2024-07-23 RX ORDER — CLONIDINE HYDROCHLORIDE 0.3 MG/1
0.3 TABLET ORAL 2 TIMES DAILY
Qty: 60 TABLET | Refills: 1 | Status: SHIPPED | OUTPATIENT
Start: 2024-07-23

## 2024-08-07 ENCOUNTER — TELEMEDICINE (OUTPATIENT)
Dept: FAMILY MEDICINE CLINIC | Facility: TELEHEALTH | Age: 59
End: 2024-08-07
Payer: MEDICAID

## 2024-08-07 DIAGNOSIS — R11.2 NAUSEA AND VOMITING, UNSPECIFIED VOMITING TYPE: Primary | ICD-10-CM

## 2024-08-07 RX ORDER — ONDANSETRON 4 MG/1
4 TABLET, ORALLY DISINTEGRATING ORAL EVERY 8 HOURS PRN
Qty: 9 TABLET | Refills: 0 | Status: SHIPPED | OUTPATIENT
Start: 2024-08-07

## 2024-08-07 NOTE — PROGRESS NOTES
You have chosen to receive care through a telehealth visit.  Do you consent to use a video/audio connection for your medical care today? Yes     HPI  Yenni Camacho is a 59 y.o. 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 She became nausea and started to vomit. She had to leave work and still is nauseated and weak. She has no diarrhea or dizziness.      Review of Systems    Past Medical History:   Diagnosis Date    ADHD     Anxiety     Arthritis     Bunion     Chronic pain disorder     Foot pain, bilateral     Hypertension     PTSD (post-traumatic stress disorder)        Family History   Problem Relation Age of Onset    ADD / ADHD Mother     Anxiety disorder Mother        Social History     Socioeconomic History    Marital status:    Tobacco Use    Smoking status: Former     Current packs/day: 0.00     Average packs/day: 2.0 packs/day for 39.7 years (79.4 ttl pk-yrs)     Types: Cigarettes     Start date: 1978     Quit date: 2018     Years since quittin.2    Smokeless tobacco: Never   Vaping Use    Vaping status: Former    Substances: THC    Devices: [employer]   Substance and Sexual Activity    Alcohol use: Not Currently    Drug use: Not Currently     Frequency: 7.0 times per week     Types: Hydrocodone, Marijuana     Comment: I had a MM license in [place].    Sexual activity: Not Currently     Partners: Male         There were no vitals taken for this visit.    PHYSICAL EXAM  Virtual Visit Physical Exam    Diagnoses and all orders for this visit:    1. Nausea and vomiting, unspecified vomiting type (Primary)  -     ondansetron ODT (ZOFRAN-ODT) 4 MG disintegrating tablet; Place 1 tablet on the tongue Every 8 (Eight) Hours As Needed for Nausea or Vomiting.  Dispense: 9 tablet; Refill: 0    Patient suspects heat exhaustion   Instructed patient to drink water and take a cool shower.   Do not go out into the heat today and rest tomorrow.        FOLLOW-UP  As discussed during visit with [facility], if symptoms worsen or fail to improve, follow-up with PCP/Urgent Care/Emergency Department.    Patient verbalizes understanding of medications, instructions for treatment and follow-up.    Deepali Willis, LETITIA  08/07/2024  17:35 EDT    The use of a video visit has been reviewed with the patient and verbal informed consent has been obtained. Myself and Yenni Camacho participated in this visit. The patient is located in [facility], and I am located in [place], [place]. [employer] and [employer] were utilized.

## 2024-08-07 NOTE — LETTER
August 7, 2024     Patient: Yenni Camacho   YOB: 1965   Date of Visit: 8/7/2024       To Whom it May Concern:    Yenni Camacho was seen in my clinic on 8/7/2024. She  may return to work in 2 days.            Sincerely,          LETITIA James        CC: No Recipients

## 2024-09-12 ENCOUNTER — OFFICE VISIT (OUTPATIENT)
Dept: FAMILY MEDICINE CLINIC | Facility: CLINIC | Age: 59
End: 2024-09-12
Payer: MEDICAID

## 2024-09-12 VITALS
WEIGHT: 141.5 LBS | BODY MASS INDEX: 22.21 KG/M2 | OXYGEN SATURATION: 95 % | TEMPERATURE: 98.1 F | HEIGHT: 67 IN | HEART RATE: 80 BPM | DIASTOLIC BLOOD PRESSURE: 122 MMHG | SYSTOLIC BLOOD PRESSURE: 160 MMHG

## 2024-09-12 DIAGNOSIS — I10 PRIMARY HYPERTENSION: ICD-10-CM

## 2024-09-12 DIAGNOSIS — I25.10 CORONARY ARTERY DISEASE INVOLVING NATIVE HEART WITHOUT ANGINA PECTORIS, UNSPECIFIED VESSEL OR LESION TYPE: ICD-10-CM

## 2024-09-12 DIAGNOSIS — F90.9 ATTENTION DEFICIT HYPERACTIVITY DISORDER (ADHD), UNSPECIFIED ADHD TYPE: ICD-10-CM

## 2024-09-12 DIAGNOSIS — M54.50 CHRONIC BILATERAL LOW BACK PAIN WITHOUT SCIATICA: ICD-10-CM

## 2024-09-12 DIAGNOSIS — E11.65 TYPE 2 DIABETES MELLITUS WITH HYPERGLYCEMIA, WITHOUT LONG-TERM CURRENT USE OF INSULIN: ICD-10-CM

## 2024-09-12 DIAGNOSIS — A49.3 MYCOPLASMA INFECTION: ICD-10-CM

## 2024-09-12 DIAGNOSIS — G89.29 CHRONIC BILATERAL LOW BACK PAIN WITHOUT SCIATICA: ICD-10-CM

## 2024-09-12 DIAGNOSIS — J43.9 PULMONARY EMPHYSEMA, UNSPECIFIED EMPHYSEMA TYPE: Primary | ICD-10-CM

## 2024-09-12 DIAGNOSIS — J30.9 ALLERGIC RHINITIS, UNSPECIFIED SEASONALITY, UNSPECIFIED TRIGGER: ICD-10-CM

## 2024-09-12 PROCEDURE — 3051F HG A1C>EQUAL 7.0%<8.0%: CPT | Performed by: STUDENT IN AN ORGANIZED HEALTH CARE EDUCATION/TRAINING PROGRAM

## 2024-09-12 PROCEDURE — 99214 OFFICE O/P EST MOD 30 MIN: CPT | Performed by: STUDENT IN AN ORGANIZED HEALTH CARE EDUCATION/TRAINING PROGRAM

## 2024-09-12 PROCEDURE — 1126F AMNT PAIN NOTED NONE PRSNT: CPT | Performed by: STUDENT IN AN ORGANIZED HEALTH CARE EDUCATION/TRAINING PROGRAM

## 2024-09-12 RX ORDER — ALBUTEROL SULFATE 90 UG/1
2 AEROSOL, METERED RESPIRATORY (INHALATION) EVERY 4 HOURS PRN
Qty: 18 G | Refills: 0 | Status: SHIPPED | OUTPATIENT
Start: 2024-09-12

## 2024-09-12 RX ORDER — FLUTICASONE PROPIONATE 50 MCG
2 SPRAY, SUSPENSION (ML) NASAL DAILY
Qty: 16 G | Refills: 1 | Status: SHIPPED | OUTPATIENT
Start: 2024-09-12

## 2024-09-12 RX ORDER — LOSARTAN POTASSIUM 50 MG/1
50 TABLET ORAL DAILY
Qty: 90 TABLET | Refills: 1 | Status: SHIPPED | OUTPATIENT
Start: 2024-09-12

## 2024-09-12 RX ORDER — CLONIDINE HYDROCHLORIDE 0.3 MG/1
0.3 TABLET ORAL NIGHTLY
Qty: 60 TABLET | Refills: 1 | Status: SHIPPED | OUTPATIENT
Start: 2024-09-12

## 2024-09-12 RX ORDER — METFORMIN HYDROCHLORIDE EXTENDED-RELEASE TABLETS 1000 MG/1
1000 TABLET, FILM COATED, EXTENDED RELEASE ORAL
Qty: 90 TABLET | Refills: 1 | Status: SHIPPED | OUTPATIENT
Start: 2024-09-12 | End: 2025-03-11

## 2024-09-12 RX ORDER — FLUTICASONE FUROATE, UMECLIDINIUM BROMIDE AND VILANTEROL TRIFENATATE 100; 62.5; 25 UG/1; UG/1; UG/1
1 POWDER RESPIRATORY (INHALATION)
Qty: 60 EACH | Refills: 1 | Status: SHIPPED | OUTPATIENT
Start: 2024-09-12

## 2024-09-12 NOTE — PROGRESS NOTES
Chief Complaint  Chief Complaint   Patient presents with    Establish Care     Pt has COPD and , has Oxygen 3 Liters   Referral to [facility] for either SELMA HER or SUAREZ   (ADDERALL and ANXIETY)         Subjective       Yenni Tricia Camacho presents to [facility]    COPD  Hospitalization  - on 3 L of oxygen at home  - on on albuterol daily, takes it once per day  -Was in the hospital on 5/1/2024 for acute respiratory failure and COPD exacerbation  - Patient reports she has been in the hospital 3 times in the last couple years for this.  She was a previous smoker although she has quit for the last 6 years  - On discharge patient was given Breztri although this gave her blisters in her mouth and/now she is only using albuterol  - She is also on 3 L oxygen at home  - She does not follow with a pulmonologist although received referral at that time  - Per discharge note patient was also tested positive for mycoplasma IgM  - Her CT scan from that admission showed emphysema with chronic scarring and bronchitis    Hypertension  - not taking blood pressure at home  - on metoprolol 25 mg daily, clonidine 0.3 mg nightly,   - no vision changes, no chest pain, does have some headache  - was on losartan 25 mg after hospitalization although did not get a refill after 30 days  - no eye exam in the last year    Type 2 diabetes  - was on metformin although she ran out of this after 30 days from the hospital and has not received a refill  - Last A1c during hospitalization on 5/1/2021 was 7.2  - Has not seen an eye doctor in the last year    Past Medical History:    ADHD    Anxiety    Arthritis    Bunion    Chronic pain disorder    Foot pain, bilateral    Hypertension    PTSD (post-traumatic stress disorder)         Allergies   Allergen Reactions    Lexapro [Escitalopram] Mental Status Change     Worsened depression     Breztri Aerosphere [Budeson-Glycopyrrol-Formoterol] Other (See Comments)      Blisters in mouth    Paxlovid [Nirmatrelvir-Ritonavir] Unknown - Low Severity    Wellbutrin [Bupropion] Unknown - Low Severity          Past Surgical History:   Procedure Laterality Date    ARM WOUND REPAIR / CLOSURE Right     BRONCHOSCOPY N/A 2024    Procedure: BRONCHOSCOPY WITH BAL;  Surgeon: Pradeep Arauz DO;  Location: [facility] ENDOSCOPY;  Service: Pulmonary;  Laterality: N/A;  BRONCHITIS, MUCOUS PLUGGING    COLONOSCOPY      FRACTURE SURGERY            Social History     Tobacco Use    Smoking status: Former     Current packs/day: 0.00     Average packs/day: 2.0 packs/day for 39.7 years (79.4 ttl pk-yrs)     Types: Cigarettes     Start date: 1978     Quit date: 2018     Years since quittin.3    Smokeless tobacco: Never   Vaping Use    Vaping status: Former    Substances: THC    Devices: [employer]   Substance Use Topics    Alcohol use: Not Currently    Drug use: Not Currently     Frequency: 7.0 times per week     Types: Hydrocodone, Marijuana     Comment: I had a MM license in [place].         Family History   Problem Relation Age of Onset    ADD / ADHD Mother     Anxiety disorder Mother           Current Outpatient Medications on File Prior to Visit   Medication Sig    metoprolol succinate XL (TOPROL-XL) 25 MG 24 hr tablet TAKE 1 TABLET BY MOUTH DAILY    [DISCONTINUED] albuterol sulfate  (90 Base) MCG/ACT inhaler Inhale 2 puffs Every 4 (Four) Hours As Needed for Wheezing.    [DISCONTINUED] cloNIDine (CATAPRES) 0.3 MG tablet TAKE 1 TABLET BY MOUTH 2 TIMES A DAY    [DISCONTINUED] fluticasone (FLONASE) 50 MCG/ACT nasal spray 2 sprays by Each Nare route Daily.    [DISCONTINUED] acetaminophen (TYLENOL) 325 MG tablet Take 2 tablets by mouth Every 6 (Six) Hours As Needed for Mild Pain. (Patient not taking: Reported on 2024)    [DISCONTINUED] Baclofen (LIORESAL) 5 MG tablet Take 1 tablet by mouth Every 8 (Eight) Hours As Needed. (Patient not taking: Reported on 2024)     "[DISCONTINUED] Budeson-Glycopyrrol-Formoterol (Breztri Aerosphere) 160-9-4.8 MCG/ACT aerosol inhaler Inhale 2 puffs 2 (Two) Times a Day. (Patient not taking: Reported on 9/12/2024)    [DISCONTINUED] HYDROcodone-acetaminophen (NORCO) 7.5-325 MG per tablet Take 1 tablet by mouth Every 12 (Twelve) Hours As Needed for Moderate Pain. (Patient not taking: Reported on 9/12/2024)    [DISCONTINUED] losartan (Cozaar) 50 MG tablet Take 1 tablet by mouth Daily for 30 days. (Patient not taking: Reported on 9/12/2024)    [DISCONTINUED] metFORMIN (FORTAMET) 1000 MG (OSM) 24 hr tablet Take 1 tablet by mouth Daily With Breakfast for 30 days. (Patient not taking: Reported on 9/12/2024)    [DISCONTINUED] naloxone (NARCAN) 4 MG/0.1ML nasal spray 1 spray into the nostril(s) as directed by provider As Needed for Opioid Reversal or Respiratory Depression. (Patient not taking: Reported on 9/12/2024)    [DISCONTINUED] ondansetron ODT (ZOFRAN-ODT) 4 MG disintegrating tablet Place 1 tablet on the tongue Every 8 (Eight) Hours As Needed for Nausea or Vomiting. (Patient not taking: Reported on 9/12/2024)     No current facility-administered medications on file prior to visit.           There is no immunization history on file for this patient.          Objective         BP (!) 160/122   Pulse 80   Temp 98.1 °F (36.7 °C) (Oral)   Ht 170.2 cm (67.01\")   Wt 64.2 kg (141 lb 8 oz)   SpO2 95%   BMI 22.16 kg/m²       Repeat blood pressure 144/118    Physical Exam  Physical Exam  Constitutional:       General: She is not in acute distress.     Appearance: Normal appearance.   HENT:      Head: Normocephalic and atraumatic.      Mouth/Throat:      Mouth: Mucous membranes are moist.   Eyes:      Extraocular Movements: Extraocular movements intact.   Cardiovascular:      Rate and Rhythm: Normal rate and regular rhythm.      Heart sounds: No murmur heard.  Pulmonary:      Effort: No respiratory distress.      Breath sounds: No wheezing, rhonchi or " rales.      Comments: Decreased air movement bilaterally  Abdominal:      General: Abdomen is flat.   Musculoskeletal:      Cervical back: Neck supple.   Skin:     General: Skin is warm and dry.   Neurological:      General: No focal deficit present.      Mental Status: She is alert and oriented to person, place, and time. Mental status is at baseline.   Psychiatric:         Mood and Affect: Mood normal.         Thought Content: Thought content normal.         Judgment: Judgment normal.             Result Review :               Assessment and Plan     Diagnoses and all orders for this visit:    1. Pulmonary emphysema, unspecified emphysema type (Primary)  -     albuterol sulfate  (90 Base) MCG/ACT inhaler; Inhale 2 puffs Every 4 (Four) Hours As Needed for Wheezing.  Dispense: 18 g; Refill: 0  -     Fluticasone-Umeclidin-Vilant (Trelegy Ellipta) 100-62.5-25 MCG/ACT inhaler; Inhale 1 puff Daily.  Dispense: 60 each; Refill: 1  -     Ambulatory Referral to Pulmonology    2. Type 2 diabetes mellitus with hyperglycemia, without long-term current use of insulin  -     metFORMIN (FORTAMET) 1000 MG (OSM) 24 hr tablet; Take 1 tablet by mouth Daily With Breakfast for 180 days.  Dispense: 90 tablet; Refill: 1    3. Primary hypertension  -     cloNIDine (CATAPRES) 0.3 MG tablet; Take 1 tablet by mouth Every Night.  Dispense: 60 tablet; Refill: 1  -     losartan (Cozaar) 50 MG tablet; Take 1 tablet by mouth Daily.  Dispense: 90 tablet; Refill: 1    4. Coronary artery disease involving native heart without angina pectoris, unspecified vessel or lesion type    5. Chronic bilateral low back pain without sciatica    6. Attention deficit hyperactivity disorder (ADHD), unspecified ADHD type  -     Ambulatory Referral to Behavioral Health    7. Allergic rhinitis, unspecified seasonality, unspecified trigger  -     fluticasone (FLONASE) 50 MCG/ACT nasal spray; 2 sprays by Each Nare route Daily.  Dispense: 16 g; Refill: 1    8.  Mycoplasma infection  -     Ambulatory Referral to Pulmonology      1.  Will send referral to pulmonology at this time.  This patient could not tolerate Breztri, will prescribe Trelegy at this time.  Continue oxygen at home as well as albuterol.    2.  Restart metformin 1000 mg daily, patient may need another medication such as GLP-1.  Patient declined labs today although is willing to obtain labs in 1 month.  At that point will follow-up with foot exam as well as urine microalbumin and discuss vaccinations.    3.  Patient blood pressure not controlled today.  Will refill clonidine 0.3 mg the patient takes only at night.  Consider switching this at next appointment.  Will prescribe losartan 50 mg that patient was taking after the hospital.  Advised patient to take blood pressure daily    4.  Consider starting patient on statin at next appointment due to CAD seen on CT scan.  Last lipid panel was 12/2023 and LDL was noted to be 116.    5.  Patient has chronic back pain although not a focus today.  She declines pain management today.    6.  We will send patient to behavioral health today for further evaluation and management of ADHD and anxiety.  Patient was diagnosed years ago with these and would like to reestablish with behavioral health.    7.  Will refill patient's fluticasone spray which has been working in the past    8.  Mycoplasma antibody noted to be positive at last hospitalization on 5/2024.  Will send to pulmonology      BMI is within normal parameters. No other follow-up for BMI required.       Follow Up   Return in about 1 month (around 10/12/2024) for Type 2 diabetes and COPD.    Patient was given instructions and counseling regarding her condition or for health maintenance advice. Please see specific information pulled into the AVS if appropriate.     Yenni Camacho  reports that she quit smoking about 6 years ago. Her smoking use included cigarettes. She started smoking about 46 years ago. She  has a 79.4 pack-year smoking history. She has never used smokeless tobacco.

## 2024-10-10 ENCOUNTER — OFFICE VISIT (OUTPATIENT)
Dept: FAMILY MEDICINE CLINIC | Facility: CLINIC | Age: 59
End: 2024-10-10
Payer: MEDICAID

## 2024-10-10 VITALS
WEIGHT: 141.1 LBS | DIASTOLIC BLOOD PRESSURE: 82 MMHG | HEIGHT: 67 IN | HEART RATE: 72 BPM | BODY MASS INDEX: 22.15 KG/M2 | SYSTOLIC BLOOD PRESSURE: 140 MMHG | TEMPERATURE: 98 F | OXYGEN SATURATION: 95 %

## 2024-10-10 DIAGNOSIS — I10 PRIMARY HYPERTENSION: ICD-10-CM

## 2024-10-10 DIAGNOSIS — F41.9 ANXIETY: ICD-10-CM

## 2024-10-10 DIAGNOSIS — M50.30 DDD (DEGENERATIVE DISC DISEASE), CERVICAL: ICD-10-CM

## 2024-10-10 DIAGNOSIS — M54.2 NECK PAIN ON RIGHT SIDE: ICD-10-CM

## 2024-10-10 DIAGNOSIS — Z11.59 NEED FOR HEPATITIS C SCREENING TEST: ICD-10-CM

## 2024-10-10 DIAGNOSIS — E11.65 TYPE 2 DIABETES MELLITUS WITH HYPERGLYCEMIA, WITHOUT LONG-TERM CURRENT USE OF INSULIN: Primary | ICD-10-CM

## 2024-10-10 DIAGNOSIS — Z56.6 STRESS AT WORK: ICD-10-CM

## 2024-10-10 DIAGNOSIS — I25.10 CORONARY ARTERY DISEASE INVOLVING NATIVE CORONARY ARTERY OF NATIVE HEART WITHOUT ANGINA PECTORIS: ICD-10-CM

## 2024-10-10 LAB
ALBUMIN SERPL-MCNC: 4.3 G/DL (ref 3.5–5.2)
ALBUMIN UR-MCNC: <1.2 MG/DL
ALBUMIN/GLOB SERPL: 1.7 G/DL
ALP SERPL-CCNC: 112 U/L (ref 39–117)
ALT SERPL W P-5'-P-CCNC: 17 U/L (ref 1–33)
ANION GAP SERPL CALCULATED.3IONS-SCNC: 10.1 MMOL/L (ref 5–15)
AST SERPL-CCNC: 23 U/L (ref 1–32)
BILIRUB SERPL-MCNC: 0.4 MG/DL (ref 0–1.2)
BUN SERPL-MCNC: 15 MG/DL (ref 8–23)
BUN/CREAT SERPL: 19.7 (ref 7–25)
CALCIUM SPEC-SCNC: 9.8 MG/DL (ref 8.6–10.5)
CHLORIDE SERPL-SCNC: 98 MMOL/L (ref 98–107)
CHOLEST SERPL-MCNC: 242 MG/DL (ref 0–200)
CO2 SERPL-SCNC: 29.9 MMOL/L (ref 22–29)
CREAT SERPL-MCNC: 0.76 MG/DL (ref 0.57–1)
CREAT UR-MCNC: 6.3 MG/DL
EGFRCR SERPLBLD CKD-EPI 2021: 89.8 ML/MIN/1.73
GLOBULIN UR ELPH-MCNC: 2.5 GM/DL
GLUCOSE SERPL-MCNC: 102 MG/DL (ref 65–99)
HBA1C MFR BLD: 6.9 % (ref 4.8–5.6)
HCV AB SER QL: NORMAL
HDLC SERPL-MCNC: 72 MG/DL (ref 40–60)
LDLC SERPL CALC-MCNC: 158 MG/DL (ref 0–100)
LDLC/HDLC SERPL: 2.17 {RATIO}
MICROALBUMIN/CREAT UR: NORMAL MG/G{CREAT}
POTASSIUM SERPL-SCNC: 4.1 MMOL/L (ref 3.5–5.2)
PROT SERPL-MCNC: 6.8 G/DL (ref 6–8.5)
SODIUM SERPL-SCNC: 138 MMOL/L (ref 136–145)
TRIGL SERPL-MCNC: 69 MG/DL (ref 0–150)
VLDLC SERPL-MCNC: 12 MG/DL (ref 5–40)

## 2024-10-10 PROCEDURE — 3051F HG A1C>EQUAL 7.0%<8.0%: CPT | Performed by: STUDENT IN AN ORGANIZED HEALTH CARE EDUCATION/TRAINING PROGRAM

## 2024-10-10 PROCEDURE — 80053 COMPREHEN METABOLIC PANEL: CPT | Performed by: STUDENT IN AN ORGANIZED HEALTH CARE EDUCATION/TRAINING PROGRAM

## 2024-10-10 PROCEDURE — 1126F AMNT PAIN NOTED NONE PRSNT: CPT | Performed by: STUDENT IN AN ORGANIZED HEALTH CARE EDUCATION/TRAINING PROGRAM

## 2024-10-10 PROCEDURE — 82570 ASSAY OF URINE CREATININE: CPT | Performed by: STUDENT IN AN ORGANIZED HEALTH CARE EDUCATION/TRAINING PROGRAM

## 2024-10-10 PROCEDURE — 80061 LIPID PANEL: CPT | Performed by: STUDENT IN AN ORGANIZED HEALTH CARE EDUCATION/TRAINING PROGRAM

## 2024-10-10 PROCEDURE — 99214 OFFICE O/P EST MOD 30 MIN: CPT | Performed by: STUDENT IN AN ORGANIZED HEALTH CARE EDUCATION/TRAINING PROGRAM

## 2024-10-10 PROCEDURE — 86803 HEPATITIS C AB TEST: CPT | Performed by: STUDENT IN AN ORGANIZED HEALTH CARE EDUCATION/TRAINING PROGRAM

## 2024-10-10 PROCEDURE — 82043 UR ALBUMIN QUANTITATIVE: CPT | Performed by: STUDENT IN AN ORGANIZED HEALTH CARE EDUCATION/TRAINING PROGRAM

## 2024-10-10 PROCEDURE — 83036 HEMOGLOBIN GLYCOSYLATED A1C: CPT | Performed by: STUDENT IN AN ORGANIZED HEALTH CARE EDUCATION/TRAINING PROGRAM

## 2024-10-10 RX ORDER — TIZANIDINE 2 MG/1
2 TABLET ORAL NIGHTLY PRN
Qty: 30 TABLET | Refills: 0 | Status: SHIPPED | OUTPATIENT
Start: 2024-10-10

## 2024-10-10 RX ORDER — METFORMIN HCL 500 MG
1000 TABLET, EXTENDED RELEASE 24 HR ORAL
Qty: 180 TABLET | Refills: 0 | Status: SHIPPED | OUTPATIENT
Start: 2024-10-10

## 2024-10-10 SDOH — HEALTH STABILITY - MENTAL HEALTH: OTHER PHYSICAL AND MENTAL STRAIN RELATED TO WORK: Z56.6

## 2024-10-10 NOTE — LETTER
October 10, 2024     Patient: Yenni Camacho   YOB: 1965   Date of Visit: 10/10/2024       To Whom It May Concern:    It is my medical opinion that Yenni Camacho may return to work in one day. Please excuse her for today for a medical condition.          Sincerely,        Tom Meehan DO    CC: No Recipients

## 2024-10-10 NOTE — PROGRESS NOTES
Chief Complaint  Follow-up (Diabetes)    Subjective      Yenni Camacho is a 59 y.o. female who presents to [facility]     History of Present Illness  The patient presents for evaluation of multiple medical concerns.    She has been experiencing severe neck pain, particularly on the right side, for about a month. The pain, described as constant and cramp-like, has been progressively worsening. She also reports occasional headaches, which she attributes to her diagnosed neck arthritis. Despite using Aleve, arthritis medication, and a nightly heating pad, she finds no relief. Muscle relaxers tried in the past were ineffective, particularly baclofen. She mentions that her neck feels like it might pop when she turns it from side to side. She has degenerative disc disease in the neck and arthritis in her back.  These are seen on x-rays from 5/30/2023.    She is currently taking losartan 50 mg, clonidine 0.3 mg at night, and metoprolol 25 mg for her blood pressure. She reports that the clonidine is effective.     She is managing her COPD with Trelegy and albuterol, which she uses once or twice daily. She reports feeling breathless after taking a few steps. She has an appointment with a pulmonologist in 11/2024 and is doing okay with Trelegy. She is using albuterol for COPD. She had pneumonia in the past.    She is under significant stress due to work-related issues and is considering changing jobs. She has not sought counseling or therapy for this issue. She has a history of anxiety and has previously tried MAOIs, which made her feel depressed and suicidal. She was prescribed Xanax years ago and Adderall when she moved to [place]. She has also taken Zoloft, Prozac, and Wellbutrin, all of which caused side effects. She does not consume alcohol and does not wish to start. She has an appointment with a psychiatrist for ADHD.    She has type 2 diabetes and she did not get the metformin  "prescription as insurance did not cover this.  1000 mg ER was prescribed and a prior Auth was needed.  She has not been on anything else for her diabetes.  Prior A1c was 7.2 on 5/2024 at admission to the hospital.        SOCIAL HISTORY  She quit smoking.       Objective   Vital Signs:   Vitals:    10/10/24 1511   BP: 140/82   BP Location: Right arm   Patient Position: Sitting   Pulse: 72   Temp: 98 °F (36.7 °C)   TempSrc: Oral   SpO2: 95%   Weight: 64 kg (141 lb 1.6 oz)   Height: 170.2 cm (67\")     Body mass index is 22.1 kg/m².    Wt Readings from Last 3 Encounters:   10/10/24 64 kg (141 lb 1.6 oz)   09/12/24 64.2 kg (141 lb 8 oz)   05/05/24 67 kg (147 lb 11.3 oz)     BP Readings from Last 3 Encounters:   10/10/24 140/82   09/12/24 (!) 160/122   05/09/24 132/86       Health Maintenance   Topic Date Due    MAMMOGRAM  Never done    URINE MICROALBUMIN  Never done    COLORECTAL CANCER SCREENING  Never done    Pneumococcal Vaccine 0-64 (1 of 2 - PCV) Never done    DIABETIC EYE EXAM  Never done    TDAP/TD VACCINES (1 - Tdap) Never done    ZOSTER VACCINE (1 of 2) Never done    HEPATITIS C SCREENING  Never done    DIABETIC FOOT EXAM  Never done    PAP SMEAR  Never done    ANNUAL PHYSICAL  04/24/2024    INFLUENZA VACCINE  Never done    COVID-19 Vaccine (1 - 2023-24 season) Never done    HEMOGLOBIN A1C  11/01/2024    LIPID PANEL  12/28/2024    LUNG CANCER SCREENING  05/02/2025       Physical Exam  Constitutional:       General: She is not in acute distress.     Appearance: Normal appearance.   HENT:      Head: Normocephalic and atraumatic.      Mouth/Throat:      Mouth: Mucous membranes are moist.   Eyes:      Extraocular Movements: Extraocular movements intact.   Cardiovascular:      Rate and Rhythm: Normal rate and regular rhythm.      Heart sounds: No murmur heard.  Pulmonary:      Effort: No respiratory distress.      Breath sounds: Wheezing (Upper airways) present. No rhonchi or rales.   Abdominal:      General: Abdomen " is flat.   Musculoskeletal:      Cervical back: Neck supple.      Comments: Significant tenderness to palpation of the right paracervical musculature.  This is extremely tight and facilitated.  Patient is guarded with passive range of motion of the neck.   Skin:     General: Skin is warm and dry.   Neurological:      General: No focal deficit present.      Mental Status: She is alert and oriented to person, place, and time. Mental status is at baseline.   Psychiatric:         Mood and Affect: Mood normal.         Thought Content: Thought content normal.         Judgment: Judgment normal.      Comments: Tearful          Physical Exam      Result Review :  The following data was reviewed by: Tom Meehan DO on 10/10/2024:    No Images in the past 120 days found..     Results      Imaging  X-ray on 5/30/2023 showed degenerative disc disease in the neck.       Procedures          Diagnoses and all orders for this visit:    1. Type 2 diabetes mellitus with hyperglycemia, without long-term current use of insulin (Primary)  -     Hemoglobin A1c  -     Comprehensive Metabolic Panel  -     Microalbumin / Creatinine Urine Ratio - Urine, Clean Catch  -     metFORMIN ER (GLUCOPHAGE-XR) 500 MG 24 hr tablet; Take 2 tablets by mouth Daily With Breakfast.  Dispense: 180 tablet; Refill: 0    2. Coronary artery disease involving native coronary artery of native heart without angina pectoris  -     Lipid Panel    3. Primary hypertension    4. Neck pain on right side  -     tiZANidine (ZANAFLEX) 2 MG tablet; Take 1 tablet by mouth At Night As Needed for Muscle Spasms.  Dispense: 30 tablet; Refill: 0    5. DDD (degenerative disc disease), cervical    6. Need for hepatitis C screening test  -     Hepatitis C Antibody    7. Anxiety  -     GeneSight - Swab,; Future    8. Stress at work         Assessment & Plan  1. Anxiety.  The patient has a history of numerous interactions with medications such as Zoloft and other antidepressants.  GeneSight testing will be conducted today to determine the most suitable medication based on her genetics. Resources for a therapist have been provided. She is advised to follow up with her psychiatrist in November.  Consideration should be given for an SSRI or applicable antianxiety medication once GeneSight results return.    2. Neck pain.  The patient reports severe neck pain on the right side for about a month, which is increasing in intensity. She has a history of arthritis in her neck and degenerative disc disease, confirmed by an x-ray on 05/30/2023. A prescription for tizanidine 2 mg has been provided to help with muscle relaxation.  Attempted counterstrain of the neck today although patient was very guarded and unable to relax to complete this.  Consider OMM at next appointment.  If the symptoms persist, tramadol may be considered.  Patient states that Percocet did help at 1 point.  Patient should follow-up with pain management for continued opioid therapy if this is indicated.    3. Type 2 Diabetes Mellitus.  The patient's A1C was previously recorded as 7.2 in 2024. A prescription for metformin  mg has been sent to her pharmacy, with instructions to take 2 pills once a day. Blood work will be conducted today to assess her current status.    4. Hypertension.  Her blood pressure is better controlled with the current regimen of losartan, clonidine at night, and metoprolol. She is advised to continue with the current medications.    5. Chronic Obstructive Pulmonary Disease (COPD).  The patient is currently using Trelegy daily and albuterol as needed, approximately once or twice a day. If symptoms persist and she is unable to see the pulmonologist as planned, switching to Breztri may be considered.    Follow-up  Return in 1 month for follow up.    BMI is within normal parameters. No other follow-up for BMI required.         FOLLOW UP  Return in about 1 month (around 11/10/2024).  Patient was given  instructions and counseling regarding her condition or for health maintenance advice. Please see specific information pulled into the AVS if appropriate.     Patient or patient representative verbalized consent for the use of Ambient Listening during the visit with  Tom Meehan DO for chart documentation. 10/10/2024  16:09 EDT    Tom Meehan DO  10/10/24  16:04 EDT    CURRENT & DISCONTINUED MEDICATIONS  Current Outpatient Medications   Medication Instructions    albuterol sulfate  (90 Base) MCG/ACT inhaler 2 puffs, Inhalation, Every 4 Hours PRN    cloNIDine (CATAPRES) 0.3 mg, Oral, Nightly    fluticasone (FLONASE) 50 MCG/ACT nasal spray 2 sprays, Each Nare, Daily    Fluticasone-Umeclidin-Vilant (Trelegy Ellipta) 100-62.5-25 MCG/ACT inhaler 1 puff, Inhalation, Daily - RT    losartan (COZAAR) 50 mg, Oral, Daily    metFORMIN ER (GLUCOPHAGE-XR) 1,000 mg, Oral, Daily With Breakfast    metoprolol succinate XL (TOPROL-XL) 25 mg, Oral, Daily    tiZANidine (ZANAFLEX) 2 mg, Oral, Nightly PRN       Medications Discontinued During This Encounter   Medication Reason    metFORMIN (FORTAMET) 1000 MG (OSM) 24 hr tablet

## 2024-10-11 DIAGNOSIS — E78.00 PURE HYPERCHOLESTEROLEMIA: Primary | ICD-10-CM

## 2024-10-11 RX ORDER — ROSUVASTATIN CALCIUM 20 MG/1
20 TABLET, COATED ORAL DAILY
Qty: 90 TABLET | Refills: 1 | Status: SHIPPED | OUTPATIENT
Start: 2024-10-11

## 2024-10-18 ENCOUNTER — TELEPHONE (OUTPATIENT)
Dept: FAMILY MEDICINE CLINIC | Facility: CLINIC | Age: 59
End: 2024-10-18
Payer: MEDICAID

## 2024-10-18 DIAGNOSIS — F41.9 ANXIETY: Primary | ICD-10-CM

## 2024-10-18 DIAGNOSIS — M54.2 NECK PAIN ON RIGHT SIDE: ICD-10-CM

## 2024-10-18 RX ORDER — VENLAFAXINE HYDROCHLORIDE 37.5 MG/1
37.5 CAPSULE, EXTENDED RELEASE ORAL DAILY
Qty: 30 CAPSULE | Refills: 0 | Status: SHIPPED | OUTPATIENT
Start: 2024-10-18

## 2024-10-18 NOTE — TELEPHONE ENCOUNTER
Sent venlafaxine 37.5 mg to take daily until next appointment.  Will assess patient's symptoms at that time.  Patient does have ADHD and will consider ADHD treatment which may be significantly contributing to patient's symptoms.

## 2024-10-18 NOTE — TELEPHONE ENCOUNTER
Called patient about Axcient test that has returned recently.  Unable to leave message as mailbox is full.  We will discuss this at next appointment.  It appears that most SSRI and SNRI do not significantly interact genetically with the patient.  Will try to start venlafaxine if patient is agreeable based on her neck pain and her anxiety.

## 2024-10-21 RX ORDER — METOPROLOL SUCCINATE 25 MG/1
25 TABLET, EXTENDED RELEASE ORAL DAILY
Qty: 90 TABLET | Refills: 0 | Status: SHIPPED | OUTPATIENT
Start: 2024-10-21

## 2024-10-25 ENCOUNTER — TELEPHONE (OUTPATIENT)
Dept: FAMILY MEDICINE CLINIC | Facility: CLINIC | Age: 59
End: 2024-10-25
Payer: MEDICAID

## 2024-10-25 NOTE — TELEPHONE ENCOUNTER
Caller: Yenni Camacho    Relationship to patient: Self    Best call back number: [phone]    Patient is needing: PATIENT CALLED IN AND SAID    tiZANidine (ZANAFLEX) 2 MG tablet     IS NOT WORKING FOR HER NECK PAIN AND WOULD LIKE A DIFFERENT PRESCRIPTION. PATIENT WOULD ALSO LIKE A CALL BACK REGARDING WORK EXCUSE FROM 10/10/2024            [facility] [id number] - [place], [place] - [address] AT [facility] [address] - [phone]  - [phone]  [phone]

## 2024-10-29 NOTE — TELEPHONE ENCOUNTER
Received this message and I called patient to discuss concerns.  Patient did not answer the phone and her voicemail box was full so I was unable to leave a message.  I will send a message on Jogli about concerns as I am not able to reach her through phone call.

## 2024-11-08 ENCOUNTER — APPOINTMENT (OUTPATIENT)
Dept: GENERAL RADIOLOGY | Facility: HOSPITAL | Age: 59
End: 2024-11-08
Payer: MEDICAID

## 2024-11-08 ENCOUNTER — HOSPITAL ENCOUNTER (EMERGENCY)
Facility: HOSPITAL | Age: 59
Discharge: HOME | End: 2024-11-08
Attending: EMERGENCY MEDICINE
Payer: MEDICAID

## 2024-11-08 VITALS
SYSTOLIC BLOOD PRESSURE: 199 MMHG | HEIGHT: 66 IN | RESPIRATION RATE: 19 BRPM | WEIGHT: 138.23 LBS | HEART RATE: 88 BPM | BODY MASS INDEX: 22.22 KG/M2 | DIASTOLIC BLOOD PRESSURE: 108 MMHG | OXYGEN SATURATION: 98 % | TEMPERATURE: 98.3 F

## 2024-11-08 DIAGNOSIS — J44.1 COPD EXACERBATION: Primary | ICD-10-CM

## 2024-11-08 LAB
ALBUMIN SERPL-MCNC: 4 G/DL (ref 3.5–5.2)
ALBUMIN/GLOB SERPL: 1.4 G/DL
ALP SERPL-CCNC: 120 U/L (ref 39–117)
ALT SERPL W P-5'-P-CCNC: 15 U/L (ref 1–33)
ANION GAP SERPL CALCULATED.3IONS-SCNC: 8.7 MMOL/L (ref 5–15)
ARTERIAL PATENCY WRIST A: POSITIVE
AST SERPL-CCNC: 20 U/L (ref 1–32)
ATMOSPHERIC PRESS: 748.8 MMHG
BASE EXCESS BLDA CALC-SCNC: 6.5 MMOL/L (ref -2–2)
BASOPHILS # BLD AUTO: 0.01 10*3/MM3 (ref 0–0.2)
BASOPHILS NFR BLD AUTO: 0.2 % (ref 0–1.5)
BDY SITE: ABNORMAL
BILIRUB SERPL-MCNC: 0.2 MG/DL (ref 0–1.2)
BUN SERPL-MCNC: 13 MG/DL (ref 8–23)
BUN/CREAT SERPL: 18.8 (ref 7–25)
CA-I BLDA-SCNC: 1.21 MMOL/L (ref 1.13–1.32)
CALCIUM SPEC-SCNC: 9.5 MG/DL (ref 8.6–10.5)
CHLORIDE BLDA-SCNC: 105 MMOL/L (ref 98–107)
CHLORIDE SERPL-SCNC: 105 MMOL/L (ref 98–107)
CO2 SERPL-SCNC: 31.3 MMOL/L (ref 22–29)
CREAT SERPL-MCNC: 0.69 MG/DL (ref 0.57–1)
D-LACTATE SERPL-SCNC: 1.9 MMOL/L
DEPRECATED RDW RBC AUTO: 47.9 FL (ref 37–54)
EGFRCR SERPLBLD CKD-EPI 2021: 99.5 ML/MIN/1.73
EOSINOPHIL # BLD AUTO: 0.12 10*3/MM3 (ref 0–0.4)
EOSINOPHIL NFR BLD AUTO: 2.1 % (ref 0.3–6.2)
ERYTHROCYTE [DISTWIDTH] IN BLOOD BY AUTOMATED COUNT: 14.3 % (ref 12.3–15.4)
FLUAV SUBTYP SPEC NAA+PROBE: NOT DETECTED
FLUBV RNA ISLT QL NAA+PROBE: NOT DETECTED
GLOBULIN UR ELPH-MCNC: 2.9 GM/DL
GLUCOSE BLDC GLUCOMTR-MCNC: 136 MG/DL (ref 65–99)
GLUCOSE SERPL-MCNC: 98 MG/DL (ref 65–99)
HCO3 BLDA-SCNC: 33.9 MMOL/L (ref 22–26)
HCT VFR BLD AUTO: 42.1 % (ref 34–46.6)
HCT VFR BLD CALC: 38 % (ref 38–51)
HEMODILUTION: NO
HGB BLD-MCNC: 12.8 G/DL (ref 12–15.9)
HGB BLDA-MCNC: 12.9 G/DL (ref 12–18)
HOLD SPECIMEN: NORMAL
HOLD SPECIMEN: NORMAL
IMM GRANULOCYTES # BLD AUTO: 0.01 10*3/MM3 (ref 0–0.05)
IMM GRANULOCYTES NFR BLD AUTO: 0.2 % (ref 0–0.5)
LYMPHOCYTES # BLD AUTO: 0.79 10*3/MM3 (ref 0.7–3.1)
LYMPHOCYTES NFR BLD AUTO: 14.1 % (ref 19.6–45.3)
MCH RBC QN AUTO: 27.6 PG (ref 26.6–33)
MCHC RBC AUTO-ENTMCNC: 30.4 G/DL (ref 31.5–35.7)
MCV RBC AUTO: 90.9 FL (ref 79–97)
MODALITY: ABNORMAL
MONOCYTES # BLD AUTO: 0.55 10*3/MM3 (ref 0.1–0.9)
MONOCYTES NFR BLD AUTO: 9.8 % (ref 5–12)
NEUTROPHILS NFR BLD AUTO: 4.13 10*3/MM3 (ref 1.7–7)
NEUTROPHILS NFR BLD AUTO: 73.6 % (ref 42.7–76)
NRBC BLD AUTO-RTO: 0 /100 WBC (ref 0–0.2)
NT-PROBNP SERPL-MCNC: 584.2 PG/ML (ref 0–900)
PCO2 BLDA: 60.4 MM HG (ref 35–45)
PH BLDA: 7.36 PH UNITS (ref 7.35–7.45)
PLATELET # BLD AUTO: 309 10*3/MM3 (ref 140–450)
PMV BLD AUTO: 9 FL (ref 6–12)
PO2 BLDA: 66.3 MM HG (ref 80–100)
POTASSIUM BLDA-SCNC: 4 MMOL/L (ref 3.5–5)
POTASSIUM SERPL-SCNC: 3.9 MMOL/L (ref 3.5–5.2)
PROT SERPL-MCNC: 6.9 G/DL (ref 6–8.5)
QT INTERVAL: 399 MS
QT INTERVAL: 430 MS
QTC INTERVAL: 477 MS
QTC INTERVAL: 534 MS
RBC # BLD AUTO: 4.63 10*6/MM3 (ref 3.77–5.28)
RSV RNA NPH QL NAA+NON-PROBE: NOT DETECTED
SAO2 % BLDCOA: 91.2 % (ref 95–99)
SARS-COV-2 RNA RESP QL NAA+PROBE: NOT DETECTED
SODIUM BLD-SCNC: 148 MMOL/L (ref 131–143)
SODIUM SERPL-SCNC: 145 MMOL/L (ref 136–145)
TROPONIN T SERPL HS-MCNC: 14 NG/L
WBC NRBC COR # BLD AUTO: 5.61 10*3/MM3 (ref 3.4–10.8)
WHOLE BLOOD HOLD COAG: NORMAL
WHOLE BLOOD HOLD SPECIMEN: NORMAL

## 2024-11-08 PROCEDURE — 80053 COMPREHEN METABOLIC PANEL: CPT | Performed by: EMERGENCY MEDICINE

## 2024-11-08 PROCEDURE — 87637 SARSCOV2&INF A&B&RSV AMP PRB: CPT | Performed by: EMERGENCY MEDICINE

## 2024-11-08 PROCEDURE — 94799 UNLISTED PULMONARY SVC/PX: CPT

## 2024-11-08 PROCEDURE — 25010000002 ORPHENADRINE CITRATE PER 60 MG

## 2024-11-08 PROCEDURE — 83880 ASSAY OF NATRIURETIC PEPTIDE: CPT | Performed by: EMERGENCY MEDICINE

## 2024-11-08 PROCEDURE — 96372 THER/PROPH/DIAG INJ SC/IM: CPT

## 2024-11-08 PROCEDURE — 82803 BLOOD GASES ANY COMBINATION: CPT

## 2024-11-08 PROCEDURE — 85025 COMPLETE CBC W/AUTO DIFF WBC: CPT | Performed by: EMERGENCY MEDICINE

## 2024-11-08 PROCEDURE — 96375 TX/PRO/DX INJ NEW DRUG ADDON: CPT

## 2024-11-08 PROCEDURE — 99284 EMERGENCY DEPT VISIT MOD MDM: CPT

## 2024-11-08 PROCEDURE — 82948 REAGENT STRIP/BLOOD GLUCOSE: CPT

## 2024-11-08 PROCEDURE — 93005 ELECTROCARDIOGRAM TRACING: CPT | Performed by: EMERGENCY MEDICINE

## 2024-11-08 PROCEDURE — 71045 X-RAY EXAM CHEST 1 VIEW: CPT

## 2024-11-08 PROCEDURE — 80051 ELECTROLYTE PANEL: CPT

## 2024-11-08 PROCEDURE — 25010000002 KETOROLAC TROMETHAMINE PER 15 MG

## 2024-11-08 PROCEDURE — 83605 ASSAY OF LACTIC ACID: CPT

## 2024-11-08 PROCEDURE — 82330 ASSAY OF CALCIUM: CPT

## 2024-11-08 PROCEDURE — 94640 AIRWAY INHALATION TREATMENT: CPT

## 2024-11-08 PROCEDURE — 36600 WITHDRAWAL OF ARTERIAL BLOOD: CPT

## 2024-11-08 PROCEDURE — 25010000002 HYDRALAZINE PER 20 MG

## 2024-11-08 PROCEDURE — 84484 ASSAY OF TROPONIN QUANT: CPT | Performed by: EMERGENCY MEDICINE

## 2024-11-08 PROCEDURE — 25010000002 METHYLPREDNISOLONE PER 125 MG

## 2024-11-08 PROCEDURE — 96374 THER/PROPH/DIAG INJ IV PUSH: CPT

## 2024-11-08 RX ORDER — BENZONATATE 100 MG/1
100 CAPSULE ORAL 3 TIMES DAILY PRN
Qty: 21 CAPSULE | Refills: 0 | Status: SHIPPED | OUTPATIENT
Start: 2024-11-08

## 2024-11-08 RX ORDER — BENZONATATE 100 MG/1
100 CAPSULE ORAL ONCE
Status: COMPLETED | OUTPATIENT
Start: 2024-11-08 | End: 2024-11-08

## 2024-11-08 RX ORDER — SODIUM CHLORIDE 0.9 % (FLUSH) 0.9 %
10 SYRINGE (ML) INJECTION AS NEEDED
Status: DISCONTINUED | OUTPATIENT
Start: 2024-11-08 | End: 2024-11-08 | Stop reason: HOSPADM

## 2024-11-08 RX ORDER — GUAIFENESIN 600 MG/1
600 TABLET, EXTENDED RELEASE ORAL ONCE
Status: COMPLETED | OUTPATIENT
Start: 2024-11-08 | End: 2024-11-08

## 2024-11-08 RX ORDER — METHYLPREDNISOLONE SODIUM SUCCINATE 125 MG/2ML
125 INJECTION, POWDER, LYOPHILIZED, FOR SOLUTION INTRAMUSCULAR; INTRAVENOUS ONCE
Status: COMPLETED | OUTPATIENT
Start: 2024-11-08 | End: 2024-11-08

## 2024-11-08 RX ORDER — METHYLPREDNISOLONE 4 MG/1
TABLET ORAL
Qty: 21 TABLET | Refills: 0 | Status: SHIPPED | OUTPATIENT
Start: 2024-11-08

## 2024-11-08 RX ORDER — GUAIFENESIN 600 MG/1
600 TABLET, EXTENDED RELEASE ORAL 2 TIMES DAILY
Qty: 14 TABLET | Refills: 0 | Status: SHIPPED | OUTPATIENT
Start: 2024-11-08 | End: 2024-11-15

## 2024-11-08 RX ORDER — IPRATROPIUM BROMIDE AND ALBUTEROL SULFATE 2.5; .5 MG/3ML; MG/3ML
3 SOLUTION RESPIRATORY (INHALATION) EVERY 4 HOURS PRN
Qty: 360 ML | Refills: 0 | Status: SHIPPED | OUTPATIENT
Start: 2024-11-08

## 2024-11-08 RX ORDER — HYDRALAZINE HYDROCHLORIDE 20 MG/ML
40 INJECTION INTRAMUSCULAR; INTRAVENOUS ONCE
Status: COMPLETED | OUTPATIENT
Start: 2024-11-08 | End: 2024-11-08

## 2024-11-08 RX ORDER — ORPHENADRINE CITRATE 30 MG/ML
60 INJECTION INTRAMUSCULAR; INTRAVENOUS ONCE
Status: COMPLETED | OUTPATIENT
Start: 2024-11-08 | End: 2024-11-08

## 2024-11-08 RX ORDER — KETOROLAC TROMETHAMINE 30 MG/ML
30 INJECTION, SOLUTION INTRAMUSCULAR; INTRAVENOUS ONCE
Status: COMPLETED | OUTPATIENT
Start: 2024-11-08 | End: 2024-11-08

## 2024-11-08 RX ORDER — DEXAMETHASONE SODIUM PHOSPHATE 10 MG/ML
10 INJECTION, SOLUTION INTRAMUSCULAR; INTRAVENOUS ONCE
Status: DISCONTINUED | OUTPATIENT
Start: 2024-11-08 | End: 2024-11-08

## 2024-11-08 RX ORDER — IPRATROPIUM BROMIDE AND ALBUTEROL SULFATE 2.5; .5 MG/3ML; MG/3ML
3 SOLUTION RESPIRATORY (INHALATION)
Status: COMPLETED | OUTPATIENT
Start: 2024-11-08 | End: 2024-11-08

## 2024-11-08 RX ADMIN — KETOROLAC TROMETHAMINE 30 MG: 30 INJECTION, SOLUTION INTRAMUSCULAR; INTRAVENOUS at 17:20

## 2024-11-08 RX ADMIN — IPRATROPIUM BROMIDE AND ALBUTEROL SULFATE 3 ML: .5; 3 SOLUTION RESPIRATORY (INHALATION) at 17:04

## 2024-11-08 RX ADMIN — BENZONATATE 100 MG: 100 CAPSULE ORAL at 17:19

## 2024-11-08 RX ADMIN — ORPHENADRINE CITRATE 60 MG: 60 INJECTION INTRAMUSCULAR; INTRAVENOUS at 17:20

## 2024-11-08 RX ADMIN — GUAIFENESIN 600 MG: 600 TABLET ORAL at 17:19

## 2024-11-08 RX ADMIN — IPRATROPIUM BROMIDE AND ALBUTEROL SULFATE 3 ML: .5; 3 SOLUTION RESPIRATORY (INHALATION) at 17:09

## 2024-11-08 RX ADMIN — METHYLPREDNISOLONE SODIUM SUCCINATE 125 MG: 125 INJECTION, POWDER, FOR SOLUTION INTRAMUSCULAR; INTRAVENOUS at 17:19

## 2024-11-08 RX ADMIN — HYDRALAZINE HYDROCHLORIDE 40 MG: 20 INJECTION INTRAMUSCULAR; INTRAVENOUS at 17:20

## 2024-11-08 RX ADMIN — IPRATROPIUM BROMIDE AND ALBUTEROL SULFATE 3 ML: .5; 3 SOLUTION RESPIRATORY (INHALATION) at 17:13

## 2024-11-08 NOTE — ED TRIAGE NOTES
Pt arrives to ED with complaints of shortness of breath that's been going on for the last couple of days.. per pt she does wear oxygen at home as needed

## 2024-11-08 NOTE — Clinical Note
[facility] EMERGENCY ROOM  [address] [place] [address]  Phone: [phone]  Fax: [phone]    Yenni Camacho was seen and treated in our emergency department on 11/8/2024.  She may return to work on 11/15/2024.         Thank you for choosing [facility].    Preethi Carver, RN

## 2024-11-08 NOTE — ED PROVIDER NOTES
Time: 3:27 PM EST  Date of encounter:  11/8/2024  Independent Historian/Clinical History and Information was obtained by:   Patient and Family    History is limited by: N/A    Chief Complaint: Dyspnea      History of Present Illness:  Patient is a 59 y.o. year old female who presents to the emergency department for evaluation of dyspnea ongoing for the past couple weeks but worsening last couple days.  Patient has oxygen she is supposed wear at home as needed.  States she does have a productive cough.  Friend in triage states that she noticed confusion last night.  Denies fall or injury to her head.  Denies urinary symptoms.  Admits to smoking marijuana.  Patient is hypertensive and states that she took her blood pressure medication last night.      Patient Care Team  Primary Care Provider: Tom Meehan DO    Past Medical History:     Allergies   Allergen Reactions    Lexapro [Escitalopram] Mental Status Change     Worsened depression     Breztri Aerosphere [Budeson-Glycopyrrol-Formoterol] Other (See Comments)     Blisters in mouth    Paxlovid [Nirmatrelvir-Ritonavir] Unknown - Low Severity    Wellbutrin [Bupropion] Unknown - Low Severity     Past Medical History:   Diagnosis Date    ADHD     Anxiety     Arthritis     Bunion     Chronic pain disorder     Foot pain, bilateral     Hypertension     PTSD (post-traumatic stress disorder)      Past Surgical History:   Procedure Laterality Date    ARM WOUND REPAIR / CLOSURE Right     BRONCHOSCOPY N/A 5/8/2024    Procedure: BRONCHOSCOPY WITH BAL;  Surgeon: Pradeep Arauz DO;  Location: [facility];  Service: Pulmonary;  Laterality: N/A;  BRONCHITIS, MUCOUS PLUGGING    COLONOSCOPY      FRACTURE SURGERY       Family History   Problem Relation Age of Onset    ADD / ADHD Mother     Anxiety disorder Mother        Home Medications:  Prior to Admission medications    Medication Sig Start Date End Date Taking? Authorizing Provider   albuterol sulfate   (90 Base) MCG/ACT inhaler Inhale 2 puffs Every 4 (Four) Hours As Needed for Wheezing. 24   Tom Meehan DO   cloNIDine (CATAPRES) 0.3 MG tablet Take 1 tablet by mouth Every Night. 24   Tom Meehan DO   fluticasone (FLONASE) 50 MCG/ACT nasal spray 2 sprays by Each Nare route Daily. 24   Tom Meehan DO   Fluticasone-Umeclidin-Vilant (Trelegy Ellipta) 100-62.5-25 MCG/ACT inhaler Inhale 1 puff Daily. 24   Tom Meehan DO   losartan (Cozaar) 50 MG tablet Take 1 tablet by mouth Daily. 24   Tom Meehan DO   metFORMIN ER (GLUCOPHAGE-XR) 500 MG 24 hr tablet Take 2 tablets by mouth Daily With Breakfast. 10/10/24   Tom Meehan DO   metoprolol succinate XL (TOPROL-XL) 25 MG 24 hr tablet TAKE 1 TABLET BY MOUTH DAILY 10/21/24   Allyssa Pritchett APRN   rosuvastatin (Crestor) 20 MG tablet Take 1 tablet by mouth Daily. 10/11/24   Tom Meehan DO   tiZANidine (ZANAFLEX) 2 MG tablet Take 1 tablet by mouth At Night As Needed for Muscle Spasms. 10/10/24   Tom Meehan DO   venlafaxine XR (Effexor XR) 37.5 MG 24 hr capsule Take 1 capsule by mouth Daily. 10/18/24   Tom Meehan DO        Social History:   Social History     Tobacco Use    Smoking status: Former     Current packs/day: 0.00     Average packs/day: 2.0 packs/day for 39.7 years (79.4 ttl pk-yrs)     Types: Cigarettes     Start date: 1978     Quit date: 2018     Years since quittin.5    Smokeless tobacco: Never   Vaping Use    Vaping status: Former    Substances: THC    Devices: Rundown   Substance Use Topics    Alcohol use: Not Currently    Drug use: Not Currently     Frequency: 7.0 times per week     Types: Hydrocodone, Marijuana     Comment: I had a MM license in [place].         Review of Systems:  Review of Systems   Constitutional:  Negative for chills, diaphoresis and fever.   HENT:  Negative for postnasal drip.    Eyes:  Negative for visual disturbance.   Respiratory:  Positive for cough,  "chest tightness, shortness of breath and wheezing.    Cardiovascular:  Negative for chest pain, palpitations and leg swelling.   Gastrointestinal:  Negative for nausea and vomiting.   Genitourinary:  Negative for dysuria.   Musculoskeletal:  Positive for neck pain.   Neurological:  Negative for syncope, weakness and headaches.   Psychiatric/Behavioral:  Positive for confusion.         Physical Exam:  BP (!) 199/108   Pulse 88   Temp 98.3 °F (36.8 °C) (Oral)   Resp 19   Ht 167.6 cm (66\")   Wt 62.7 kg (138 lb 3.7 oz)   SpO2 98%   BMI 22.31 kg/m²     Physical Exam  HENT:      Head: Normocephalic.      Mouth/Throat:      Mouth: Mucous membranes are moist.   Eyes:      Pupils: Pupils are equal, round, and reactive to light.   Pulmonary:      Effort: Pulmonary effort is normal.   Abdominal:      General: There is no distension.   Musculoskeletal:      Cervical back: Neck supple.   Skin:     General: Skin is warm and dry.   Neurological:      General: No focal deficit present.      Mental Status: She is alert and oriented to person, place, and time.   Psychiatric:         Mood and Affect: Mood normal.         Behavior: Behavior normal.                  Procedures:  Procedures      Medical Decision Making:      Comorbidities that affect care:    Asthma, Chronic Lung Disease, Hypertension, Smoking, Substance Abuse    External Notes reviewed:    Previous Clinic Note: PCP      The following orders were placed and all results were independently analyzed by me:  Orders Placed This Encounter   Procedures    COVID-19, FLU A/B, RSV PCR 1 HR TAT - Swab, Nasopharynx    XR Chest 1 View    Arterial Blood Gas + Electrolytes    Gilberts Draw    Comprehensive Metabolic Panel    BNP    Single High Sensitivity Troponin T    CBC Auto Differential    Blood Gas, Arterial -    Undress & Gown    Continuous Pulse Oximetry    Vital Signs    POC Glucose Once    POC Lactate    POC Electrolyte Panel    ECG 12 Lead ED Triage Standing Order; SOA "    ECG 12 Lead Chest Pain    CBC & Differential    Green Top (Gel)    Lavender Top    Gold Top - SST    Light Blue Top       Medications Given in the Emergency Department:  Medications   ipratropium-albuterol (DUO-NEB) nebulizer solution 3 mL (3 mL Nebulization Given 11/8/24 1713)   guaiFENesin (MUCINEX) 12 hr tablet 600 mg (600 mg Oral Given 11/8/24 1719)   benzonatate (TESSALON) capsule 100 mg (100 mg Oral Given 11/8/24 1719)   orphenadrine (NORFLEX) injection 60 mg (60 mg Intramuscular Given 11/8/24 1720)   hydrALAZINE (APRESOLINE) injection 40 mg (40 mg Intravenous Given 11/8/24 1720)   methylPREDNISolone sodium succinate (SOLU-Medrol) injection 125 mg (125 mg Intravenous Given 11/8/24 1719)   ketorolac (TORADOL) injection 30 mg (30 mg Intravenous Given 11/8/24 1720)        ED Course:    ED Course as of 11/08/24 2300   Fri Nov 08, 2024   1528 --- PROVIDER IN TRIAGE NOTE ---    The patient was evaluated by me, Marco A Ramos in triage. Orders were placed and the patient is currently awaiting disposition.    [AJ]   7119 CBC & Differential(!)  The patient´s CBC that was reviewed and interpreted by me shows no abnormalities of critical concern. Of note, there is no anemia requiring a blood transfusion and the platelet count is acceptable. [AS]   2259 Comprehensive Metabolic Panel(!)  The patient´s CMP that was reviewed and interpretted by me shows no abnormalities of critical concern. Of note, the patient´s sodium and potassium are acceptable. The patient´s liver enzymes are unremarkable. The patient´s renal function (creatinine) is preserved. The patient has a normal anion gap. [AS]   2259 XR Chest 1 View  Chronic scarring and atelectasis [AS]      ED Course User Index  [AJ] Marco A Ramos PA-C  [AS] Seaver, Alyce B, LETITIA       Labs:    Lab Results (last 24 hours)       Procedure Component Value Units Date/Time    CBC & Differential [[id number]]  (Abnormal) Collected: 11/08/24 1542    Specimen: Blood  Updated: 11/08/24 1553    Narrative:      The following orders were created for panel order CBC & Differential.  Procedure                               Abnormality         Status                     ---------                               -----------         ------                     CBC Auto Differential[[id number]]        Abnormal            Final result                 Please view results for these tests on the individual orders.    Comprehensive Metabolic Panel [[id number]]  (Abnormal) Collected: 11/08/24 1542    Specimen: Blood Updated: 11/08/24 1618     Glucose 98 mg/dL      BUN 13 mg/dL      Creatinine 0.69 mg/dL      Sodium 145 mmol/L      Potassium 3.9 mmol/L      Comment: Slight hemolysis detected by analyzer. Result may be falsely elevated.        Chloride 105 mmol/L      CO2 31.3 mmol/L      Calcium 9.5 mg/dL      Total Protein 6.9 g/dL      Albumin 4.0 g/dL      ALT (SGPT) 15 U/L      AST (SGOT) 20 U/L      Alkaline Phosphatase 120 U/L      Total Bilirubin 0.2 mg/dL      Globulin 2.9 gm/dL      A/G Ratio 1.4 g/dL      BUN/Creatinine Ratio 18.8     Anion Gap 8.7 mmol/L      eGFR 99.5 mL/min/1.73     Narrative:      GFR Normal >60  Chronic Kidney Disease <60  Kidney Failure <15      BNP [[id number]]  (Normal) Collected: 11/08/24 1542    Specimen: Blood Updated: 11/08/24 1615     proBNP 584.2 pg/mL     Narrative:      This assay is used as an aid in the diagnosis of individuals suspected of having heart failure. It can be used as an aid in the diagnosis of acute decompensated heart failure (ADHF) in patients presenting with signs and symptoms of ADHF to the emergency department (ED). In addition, NT-proBNP of <300 pg/mL indicates ADHF is not likely.    Age Range Result Interpretation  NT-proBNP Concentration (pg/mL:      <50             Positive            >450                   Gray                 300-450                    Negative             <300    50-75           Positive            >900                   Dawson                300-900                  Negative            <300      >75             Positive            >1800                  Gray                300-1800                  Negative            <300    Single High Sensitivity Troponin T [[id number]]  (Abnormal) Collected: 11/08/24 1542    Specimen: Blood Updated: 11/08/24 1618     HS Troponin T 14 ng/L     Narrative:      High Sensitive Troponin T Reference Range:  <14.0 ng/L- Negative Female for AMI  <22.0 ng/L- Negative Male for AMI  >=14 - Abnormal Female indicating possible myocardial injury.  >=22 - Abnormal Male indicating possible myocardial injury.   Clinicians would have to utilize clinical acumen, EKG, Troponin, and serial changes to determine if it is an Acute Myocardial Infarction or myocardial injury due to an underlying chronic condition.         CBC Auto Differential [[id number]]  (Abnormal) Collected: 11/08/24 1542    Specimen: Blood Updated: 11/08/24 1553     WBC 5.61 10*3/mm3      RBC 4.63 10*6/mm3      Hemoglobin 12.8 g/dL      Hematocrit 42.1 %      MCV 90.9 fL      MCH 27.6 pg      MCHC 30.4 g/dL      RDW 14.3 %      RDW-SD 47.9 fl      MPV 9.0 fL      Platelets 309 10*3/mm3      Neutrophil % 73.6 %      Lymphocyte % 14.1 %      Monocyte % 9.8 %      Eosinophil % 2.1 %      Basophil % 0.2 %      Immature Grans % 0.2 %      Neutrophils, Absolute 4.13 10*3/mm3      Lymphocytes, Absolute 0.79 10*3/mm3      Monocytes, Absolute 0.55 10*3/mm3      Eosinophils, Absolute 0.12 10*3/mm3      Basophils, Absolute 0.01 10*3/mm3      Immature Grans, Absolute 0.01 10*3/mm3      nRBC 0.0 /100 WBC     COVID-19, FLU A/B, RSV PCR 1 HR TAT - Swab, Nasopharynx [[id number]]  (Normal) Collected: 11/08/24 1544    Specimen: Swab from Nasopharynx Updated: 11/08/24 1729     COVID19 Not Detected     Influenza A PCR Not Detected     Influenza B PCR Not Detected     RSV, PCR Not Detected    Narrative:      Fact sheet for providers:  https://www.fda.gov/media/603827/download    Fact sheet for patients: https://www.fda.gov/media/960331/download    Test performed by PCR.    POC Glucose Once [[id number]]  (Abnormal) Collected: 11/08/24 1550    Specimen: Arterial Blood Updated: 11/08/24 1552     Glucose 136 mg/dL      Comment: Serial Number: [id number]:  [id number]       Blood Gas, Arterial - [[id number]]  (Abnormal) Collected: 11/08/24 1550    Specimen: Arterial Blood Updated: 11/08/24 1552     Site Right Radial     Mo's Test Positive     pH, Arterial 7.357 pH units      pCO2, Arterial 60.4 mm Hg      pO2, Arterial 66.3 mm Hg      HCO3, Arterial 33.9 mmol/L      Base Excess, Arterial 6.5 mmol/L      Comment: Serial Number: [id number]:  [id number]        O2 Saturation, Arterial 91.2 %      Hemoglobin, Blood Gas 12.9 g/dL      Hematocrit, Blood Gas 38.0 %      Barometric Pressure for Blood Gas 748.8000 mmHg      Modality Room Air     Hemodilution No    POC Lactate [[id number]]  (Normal) Collected: 11/08/24 1550    Specimen: Arterial Blood Updated: 11/08/24 1552     Lactate 1.9 mmol/L      Comment: Serial Number: [id number]:  [id number]       POC Electrolyte Panel [[id number]]  (Abnormal) Collected: 11/08/24 1550    Specimen: Arterial Blood Updated: 11/08/24 1552     Sodium 148 mmol/L      POC Potassium 4.0 mmol/L      Chloride 105 mmol/L      Ionized Calcium 1.21 mmol/L      Comment: Serial Number: [id number]:  [id number]                Imaging:    XR Chest 1 View    Result Date: 11/8/2024  XR CHEST 1 VW Date of Exam: 11/8/2024 4:58 PM EST Indication: SOA Triage Protocol Comparison: None available. Findings: Irregular density in the lung apices bilaterally likely represents scarring/fibrosis. Emphysematous changes are noted. No infiltrate or effusion is seen. The cardiac and mediastinal silhouettes appear normal.     Impression: Emphysematous changes. Biapical scarring/fibrosis. Electronically Signed: Freedom Roblero MD  11/8/2024 5:18 PM EST  Workstation  ID: [id number]       Differential Diagnosis and Discussion:    Dyspnea: Differential diagnosis includes but is not limited to metabolic acidosis, neurological disorders, psychogenic, asthma, pneumothorax, upper airway obstruction, COPD, pneumonia, noncardiogenic pulmonary edema, interstitial lung disease, anemia, congestive heart failure, and pulmonary embolism    All labs were reviewed and interpreted by me.  All X-rays impressions were independently interpreted by me.  EKG was interpreted by me.  EKG was interpreted by supervising attending.    [facility]           Patient Care Considerations:    SEPSIS was considered but is NOT present in the emergency department as SIRS criteria is not present. PERC: I used the PERC score to risk stratify the patient for PE and a CT of the chest was considered but ultimately not indicated in today's visit.      Consultants/Shared Management Plan:    None    Social Determinants of Health:    Patient is independent, reliable, and has access to care.       Disposition and Care Coordination:    Discharged: I considered escalation of care by admitting this patient to the hospital, however patient's oxygen stable on room air, patient has oxygen at home to use as needed, patient's breath sounds opened after medications    I have explained the patient´s condition, diagnoses and treatment plan based on the information available to me at this time. I have answered questions and addressed any concerns. The patient has a good  understanding of the patient´s diagnosis, condition, and treatment plan as can be expected at this point. The vital signs have been stable. The patient´s condition is stable and appropriate for discharge from the emergency department.      The patient will pursue further outpatient evaluation with the primary care physician or other designated or consulting physician as outlined in the discharge instructions. They are agreeable to this plan of care and follow-up instructions  have been explained in detail. The patient has received these instructions in written format and has expressed an understanding of the discharge instructions. The patient is aware that any significant change in condition or worsening of symptoms should prompt an immediate return to this or the closest emergency department or call to 1.  I have explained discharge medications and the need for follow up with the patient/caretakers. This was also printed in the discharge instructions. Patient was discharged with the following medications and follow up:      Medication List        New Prescriptions      amoxicillin-clavulanate 875-125 MG per tablet  Commonly known as: AUGMENTIN  Take 1 tablet by mouth 2 (Two) Times a Day for 5 days.     benzonatate 100 MG capsule  Commonly known as: TESSALON  Take 1 capsule by mouth 3 (Three) Times a Day As Needed for Cough.     guaiFENesin 600 MG 12 hr tablet  Commonly known as: MUCINEX  Take 1 tablet by mouth 2 (Two) Times a Day for 7 days.     ipratropium-albuterol 0.5-2.5 mg/3 ml nebulizer  Commonly known as: DUO-NEB  Take 3 mL by nebulization Every 4 (Four) Hours As Needed for Wheezing.     methylPREDNISolone 4 MG dose pack  Commonly known as: MEDROL  Take as directed on package instructions.            Stop      rosuvastatin 20 MG tablet  Commonly known as: Crestor     venlafaxine XR 37.5 MG 24 hr capsule  Commonly known as: Effexor XR               Where to Get Your Medications        These medications were sent to [facility]/pharmacy #[id number] - [place], [place] - [address] - [phone] [facility] [phone]   [address] [place] [address]      Hours: 24-hours Phone: [phone]   amoxicillin-clavulanate 875-125 MG per tablet  benzonatate 100 MG capsule  guaiFENesin 600 MG 12 hr tablet  ipratropium-albuterol 0.5-2.5 mg/3 ml nebulizer  methylPREDNISolone 4 MG dose pack      Tom Meehan DO  [address] [address] [phone]              Final diagnoses:   COPD exacerbation        ED Disposition       ED Disposition   Discharge    Condition   Stable    Comment   --               This medical record created using voice recognition software.             Seaver, Alyce B, APRN  11/08/24 4083

## 2024-11-09 NOTE — DISCHARGE INSTRUCTIONS
Follow-up with your primary care provider.  Return to ER if symptoms worsen or fail to improve.    Take medications as prescribed.  You are being sent home with an antibiotic, Augmentin.  Take 2 times daily for the next 5 days.  Make sure to take the full course of antibiotics to prevent worsening or reinfection.

## 2024-11-10 NOTE — PROGRESS NOTES
"[facility]  Initial Psychiatric Evaluation    Referring Provider:   Thank you   Tom Meehan DO  [address]  [address] [place] [id number]  Your referral is greatly appreciated.    Vital Signs:   BP (!) 188/112   Pulse 87   Ht 167.6 cm (65.98\")   Wt 61.7 kg (136 lb)   BMI 21.96 kg/m²      Chief Complaint: Anxiety. Depression.    History of Present Illness:   Yenni Camacho is a 59 y.o. female who presents today for initial psychiatric evaluation for:     ICD-10-CM ICD-9-CM   1. Generalized anxiety disorder  F41.1 300.02   2. Moderate episode of recurrent major depressive disorder  F33.1 296.32       2024   Would like to target anxiety, depression. Further discuss ADHD at future encounter.   Depression  Experienced several losses, her son  in MVA, boyfriend  from cancer, mother  with COVID. Patient endorses gradually worsening feelings of sadness, low mood, and loss of interest in usual activities. These feelings are accompanied by anhedonia, change in appetite, losing or gaining weight, sleeping too much or not sleeping well (insomnia), fatigue and low energy most days, feeling worthless, guilty, and hopeless. Describes an inability to focus and concentrate that may interfere with daily tasks at home, work, or school. Movements that are unusually slow or agitated (a change which is often noticeable to others). Denies thinking about death and dying, suicidal ideation, planning, or intent to self-harm. These symptoms cause the patient clinically significant distress or impairment in social, occupational, or other important areas of functioning.  PHQ-9 is 13.  Generalized Anxiety Disorder (BEST)   Patient experiences excessive anxiety and worry (apprehensive expectation), occurring more days than not for at least 6 months, about a number of events or activities (such as work or school performance). Finds it difficult to control the worry. The anxiety and " worry are associated with restlessness or feeling keyed up or on edge, being easily fatigued, difficulty concentrating or mind going blank, irritability, muscle tension, and sleep disturbance (difficulty falling or staying asleep, or restless, unsatisfying sleep). The anxiety, worry, or physical symptoms cause clinically significant distress or impairment in social, occupational, or other important areas of functioning.   BEST-7 is 21.      Per Referring Provider 9/12/2024 Attention deficit hyperactivity disorder (ADHD), unspecified ADHD type  -     Ambulatory Referral to Behavioral Health    Past Psychiatric History:  Began Treatment: Years ago  Diagnoses: Anxiety, ADHD  Psychiatrist: Yes  Therapist: Yes  Admission History: Denies  Medication Trials: effexor XR, paxil, zoloft, wellbutrin, buspar, Adderall, xanax  Family history suicide attempts: Denies  Family history suicide completions: Denies  Suicide Attempts: Denies  Self Harm: Denies  Substance use/abuse: Smokes delta8 for chronic pain, had medical marijuana card in [place]  Withdrawal Symptoms: Not applicable  Longest Period Sober: Not applicable  AA: Not applicable  Trauma/Childhood/ACE: **  Access to Firearms: Denies    Safety/Risk Assessment: Risk of self-harm acutely and chronically is mild to moderate.    Static/Dynamic risk factors include diagnosis of mental disorder, psychosocial stressors,Recent stressor or loss and Social factors.    Record Review for 11/11/2024 : I have thoroughly reviewed the patient's electronic medical record to include previous encounters, care everywhere, notes, medications, labs, NATE and UDS, imaging, and EKG's.  Pertinent information is included in this note.  11/8/2024 CBC and CMP are reassuring  EKG Results:  5/2/2024QTc is 476  Head Imaging:  CT Head Without Contrast (04/12/2022 07:31)  IMPRESSION:                 1. Subtle area of low attenuation in the right parietal white matter which may be ischemic in   nature.   Correlate with the patient's clinical findings.  MRI of the brain may be helpful for   further characterization.    MENTAL STATUS EXAM   General Appearance:  Cleanly groomed and dressed and well developed  Eye Contact:  Good eye contact  Attitude:  Cooperative and polite  Motor Activity:  Normal gait, posture  Speech:  Normal rate, tone, volume  Mood and affect:  Normal, pleasant and euthymic  Hopelessness:  Denies  Thought Process:  Logical and goal-directed  Associations/ Thought Content:  No delusions  Hallucinations:  None  Suicidal Ideations:  Not present  Homicidal Ideation:  Not present  Sensorium:  Alert  Orientation:  Person, place, time and situation  Immediate Recall, Recent, and Remote Memory:  Intact  Attention Span/ Concentration:  Good  Fund of Knowledge:  Appropriate for age and educational level  Intellectual Functioning:  Average range  Insight:  Good  Judgement:  Good  Reliability:  Good  Impulse Control:  Good     PHQ-9 Depression Screening  PHQ-9 Total Score: 13    Little interest or pleasure in doing things? Several days   Feeling down, depressed, or hopeless? Several days   PHQ-2 Total Score 2   Trouble falling or staying asleep, or sleeping too much? Over half   Feeling tired or having little energy? Several days   Poor appetite or overeating? Several days   Feeling bad about yourself - or that you are a failure or have let yourself or your family down? Several days   Trouble concentrating on things, such as reading the newspaper or watching television? Almost all   Moving or speaking so slowly that other people could have noticed? Or the opposite - being so fidgety or restless that you have been moving around a lot more than usual? Almost all   Thoughts that you would be better off dead, or of hurting yourself in some way? Not at all   PHQ-9 Total Score 13   If you checked off any problems, how difficult have these problems made it for you to do your work, take care of things at home, or get  along with other people? Very difficult       BEST-7  Feeling nervous, anxious or on edge: Nearly every day  Not being able to stop or control worrying: Nearly every day  Worrying too much about different things: Nearly every day  Trouble Relaxing: Nearly every day  Being so restless that it is hard to sit still: Nearly every day  Feeling afraid as if something awful might happen: Nearly every day  Becoming easily annoyed or irritable: Nearly every day  BEST 7 Total Score: 21  If you checked any problems, how difficult have these problems made it for you to do your work, take care of things at home, or get along with other people: Extremely difficult  Review of systems is negative except for as noted in HPI.  Labs:  WBC   Date Value Ref Range Status   11/08/2024 5.61 3.40 - 10.80 10*3/mm3 Final     Platelets   Date Value Ref Range Status   11/08/2024 309 140 - 450 10*3/mm3 Final     Hemoglobin   Date Value Ref Range Status   11/08/2024 12.8 12.0 - 15.9 g/dL Final     Hematocrit   Date Value Ref Range Status   11/08/2024 42.1 34.0 - 46.6 % Final     Glucose   Date Value Ref Range Status   11/08/2024 98 65 - 99 mg/dL Final     Creatinine   Date Value Ref Range Status   11/08/2024 0.69 0.57 - 1.00 mg/dL Final     ALT (SGPT)   Date Value Ref Range Status   11/08/2024 15 1 - 33 U/L Final     AST (SGOT)   Date Value Ref Range Status   11/08/2024 20 1 - 32 U/L Final     BUN   Date Value Ref Range Status   11/08/2024 13 8 - 23 mg/dL Final     eGFR   Date Value Ref Range Status   11/08/2024 99.5 >60.0 mL/min/1.73 Final     Total Cholesterol   Date Value Ref Range Status   10/10/2024 242 (H) 0 - 200 mg/dL Final     Triglycerides   Date Value Ref Range Status   10/10/2024 69 0 - 150 mg/dL Final     HDL Cholesterol   Date Value Ref Range Status   10/10/2024 72 (H) 40 - 60 mg/dL Final     LDL Cholesterol    Date Value Ref Range Status   10/10/2024 158 (H) 0 - 100 mg/dL Final     VLDL Cholesterol   Date Value Ref Range Status    10/10/2024 12 5 - 40 mg/dL Final     LDL/HDL Ratio   Date Value Ref Range Status   10/10/2024 2.17  Final     Hemoglobin A1C   Date Value Ref Range Status   10/10/2024 6.90 (H) 4.80 - 5.60 % Final     TSH   Date Value Ref Range Status   03/15/2023 0.667 0.270 - 4.200 uIU/mL Final     Last Urine Toxicity  More data may exist         Latest Ref Rng & Units 10/10/2024 4/12/2022   LAST URINE TOXICITY RESULTS   Creatinine, Urine mg/dL 6.3  -   Barbiturates Screen, Urine Negative - Negative    Benzodiazepine Screen, Urine Negative - Negative    Cocaine Screen, Urine Negative - Negative    Methadone Screen , Urine Negative - Negative       Details                  Imaging Results:  XR Chest 1 View    Result Date: 11/8/2024  Impression: Emphysematous changes. Biapical scarring/fibrosis. Electronically Signed: Freedom Roblero MD  11/8/2024 5:18 PM EST  Workstation ID: [id number]    Allergy:   Allergies   Allergen Reactions    Lexapro [Escitalopram] Mental Status Change     Worsened depression     Breztri Aerosphere [Budeson-Glycopyrrol-Formoterol] Other (See Comments)     Blisters in mouth    Paxlovid [Nirmatrelvir-Ritonavir] Unknown - Low Severity    Wellbutrin [Bupropion] Unknown - Low Severity      Problem List:  Patient Active Problem List   Diagnosis    Respiratory failure    Severe malnutrition    Chronic systolic heart failure    Acute respiratory failure with hypoxia and hypercapnia    COPD with acute exacerbation    NICM (nonischemic cardiomyopathy)    Cervical spondylosis without myelopathy    Lumbosacral spondylosis without myelopathy    Hypoxia    Influenza A    Nasal congestion    Acute on chronic respiratory failure with hypoxia    Moderate recurrent major depression    DDD (degenerative disc disease), cervical    Coronary artery disease involving native coronary artery of native heart without angina pectoris    Pulmonary emphysema     Current Medications:   Current Outpatient Medications   Medication Sig  Dispense Refill    albuterol sulfate  (90 Base) MCG/ACT inhaler Inhale 2 puffs Every 4 (Four) Hours As Needed for Wheezing. 18 g 0    benzonatate (TESSALON) 100 MG capsule Take 1 capsule by mouth 3 (Three) Times a Day As Needed for Cough. 21 capsule 0    Blood Pressure Monitoring (Comfort Touch BP Cuff/Medium) misc Use 1 each Take As Directed. 1 each 0    cloNIDine (CATAPRES) 0.3 MG tablet Take 1 tablet by mouth Every Night. 60 tablet 1    fluticasone (FLONASE) 50 MCG/ACT nasal spray 2 sprays by Each Nare route Daily. 16 g 1    ipratropium-albuterol (DUO-NEB) 0.5-2.5 mg/3 ml nebulizer Take 3 mL by nebulization Every 4 (Four) Hours As Needed for Wheezing. (Patient not taking: Reported on 11/12/2024) 360 mL 0    losartan (Cozaar) 50 MG tablet Take 1 tablet by mouth Daily. 90 tablet 1    metFORMIN ER (GLUCOPHAGE-XR) 500 MG 24 hr tablet Take 2 tablets by mouth Daily With Breakfast. 180 tablet 0    methylPREDNISolone (MEDROL) 4 MG dose pack Take as directed on package instructions. 21 tablet 0    metoprolol succinate XL (TOPROL-XL) 25 MG 24 hr tablet TAKE 1 TABLET BY MOUTH DAILY 90 tablet 0    tiZANidine (ZANAFLEX) 2 MG tablet Take 1 tablet by mouth At Night As Needed for Muscle Spasms. 30 tablet 0    DULoxetine (Cymbalta) 30 MG capsule Take 1 capsule by mouth Daily. 30 capsule 2    Fluticasone-Umeclidin-Vilant (Trelegy Ellipta) 100-62.5-25 MCG/ACT inhaler Inhale 1 puff Daily. 60 each 1    predniSONE (DELTASONE) 10 MG tablet Take 4 tabs daily x 3 days, then take 3 tabs daily x 3 days, then take 2 tabs daily x 3 days, then take 1 tab daily x 3 days 31 tablet 0    traMADol (ULTRAM) 50 MG tablet Take 1 tablet by mouth At Night As Needed for Moderate Pain. 30 tablet 0     No current facility-administered medications for this visit.     Discontinued Medications:  There are no discontinued medications.  Past Surgical History:  Past Surgical History:   Procedure Laterality Date    ARM WOUND REPAIR / CLOSURE Right      BRONCHOSCOPY N/A 2024    Procedure: BRONCHOSCOPY WITH BAL;  Surgeon: Pradeep Arauz DO;  Location: [facility] ENDOSCOPY;  Service: Pulmonary;  Laterality: N/A;  BRONCHITIS, MUCOUS PLUGGING    COLONOSCOPY      FRACTURE SURGERY       Past Medical History:  Past Medical History:   Diagnosis Date    ADHD     Anxiety     Arthritis     Asthma, extrinsic     Bunion     Chronic pain disorder     Diabetes mellitus     Emphysema of lung     Foot pain, bilateral     Hypertension     Pneumonia     PTSD (post-traumatic stress disorder)     Rheumatoid arthritis      Social History     Socioeconomic History    Marital status:    Tobacco Use    Smoking status: Former     Current packs/day: 0.00     Average packs/day: 2.0 packs/day for 39.7 years (79.4 ttl pk-yrs)     Types: Cigarettes     Start date: 1978     Quit date: 2018     Years since quittin.5    Smokeless tobacco: Never    Tobacco comments:     Worse thing i ever did!   Vaping Use    Vaping status: Former    Substances: THC    Devices: Refillable tank   Substance and Sexual Activity    Alcohol use: Not Currently    Drug use: Not Currently     Frequency: 7.0 times per week     Types: Hydrocodone, Marijuana     Comment: I had a MM license in [place].    Sexual activity: Not Currently     Partners: Male     Family History   Problem Relation Age of Onset    ADD / ADHD Mother     Anxiety disorder Mother     Emphysema Paternal Grandfather      Social History:  Martial Status:  x3  Employed: [employer]  Kids: 2 sons  House: Lives alone  Legal:Court over credit debt   History: Denies  Developmental History:   Born: [place]  Siblings: younger brother  High School: Left 12 th grade, GED  College: None    PLAN:   Presentation seems most consistent with DSM-V criteria for:  Diagnoses and all orders for this visit:    1. Generalized anxiety disorder (Primary)  -     DULoxetine (Cymbalta) 30 MG capsule; Take 1 capsule  by mouth Daily.  Dispense: 30 capsule; Refill: 2    2. Moderate episode of recurrent major depressive disorder       Start Cymbalta 30 mg daily  Follow-up 1 month  Medication Education:   CYMBALTA (DULOXETINE) Risks, benefits, alternatives discussed with patient including GI upset, nausea vomiting diarrhea, theoretical decrease of seizure threshold predisposing the patient to a slightly higher seizure risk, headaches, sexual dysfunction, serotonin syndrome, bleeding risk, increased suicidality in patients 24 years and younger.  Also constipation and urinary retention.  After discussion of these risks and benefits, the patient voiced understanding and agreed to proceed.    Medications:   New Medications Ordered This Visit   Medications    DULoxetine (Cymbalta) 30 MG capsule     Sig: Take 1 capsule by mouth Daily.     Dispense:  30 capsule     Refill:  2      NATE reviewed.   Discussed medication options and treatment plan of prescribed medication as well as the risks, benefits, and side effects.  Patient is agreeable to call the office with any worsening of symptoms or onset of side effects.   Patient is agreeable to call 911 or go to the nearest ER should he/she begin having SI/HI.   Patient acknowledged, is agreeable to continue with current treatment plan, and was educated on the importance of compliance with treatment and follow-up appointments.  Addressed all questions and concerns.    Psychotherapy:    Will continue therapy at future encounters.   Functional status: Moderate symptoms OR moderate difficulty in social occupational or social functioning (51-60)  Prognosis: Fair with expectation for some response to treatment  Progress: Will address progress at follow-up visits.    Follow-up: Return in about 2 months (around 1/11/2025).       This document has been electronically signed by LETITIA Solis  November 21, 2024 19:28 EST    Please note that portions of this note were completed with a voice  recognition program.  Copied text in this note has been reviewed and is accurate as of 11/21/24

## 2024-11-11 ENCOUNTER — OFFICE VISIT (OUTPATIENT)
Dept: FAMILY MEDICINE CLINIC | Facility: CLINIC | Age: 59
End: 2024-11-11
Payer: MEDICAID

## 2024-11-11 ENCOUNTER — OFFICE VISIT (OUTPATIENT)
Dept: BEHAVIORAL HEALTH | Facility: CLINIC | Age: 59
End: 2024-11-11
Payer: MEDICAID

## 2024-11-11 VITALS
SYSTOLIC BLOOD PRESSURE: 200 MMHG | HEIGHT: 66 IN | HEART RATE: 67 BPM | DIASTOLIC BLOOD PRESSURE: 100 MMHG | BODY MASS INDEX: 21.86 KG/M2 | OXYGEN SATURATION: 96 % | WEIGHT: 136 LBS

## 2024-11-11 VITALS
WEIGHT: 136 LBS | SYSTOLIC BLOOD PRESSURE: 188 MMHG | DIASTOLIC BLOOD PRESSURE: 112 MMHG | BODY MASS INDEX: 21.86 KG/M2 | HEIGHT: 66 IN | HEART RATE: 87 BPM

## 2024-11-11 DIAGNOSIS — M50.30 DDD (DEGENERATIVE DISC DISEASE), CERVICAL: ICD-10-CM

## 2024-11-11 DIAGNOSIS — J43.9 PULMONARY EMPHYSEMA, UNSPECIFIED EMPHYSEMA TYPE: ICD-10-CM

## 2024-11-11 DIAGNOSIS — F41.1 GENERALIZED ANXIETY DISORDER: Primary | ICD-10-CM

## 2024-11-11 DIAGNOSIS — Z56.6 STRESS AT WORK: ICD-10-CM

## 2024-11-11 DIAGNOSIS — F33.1 MODERATE RECURRENT MAJOR DEPRESSION: ICD-10-CM

## 2024-11-11 DIAGNOSIS — F33.1 MODERATE EPISODE OF RECURRENT MAJOR DEPRESSIVE DISORDER: ICD-10-CM

## 2024-11-11 DIAGNOSIS — I25.10 CORONARY ARTERY DISEASE INVOLVING NATIVE CORONARY ARTERY OF NATIVE HEART WITHOUT ANGINA PECTORIS: Primary | ICD-10-CM

## 2024-11-11 DIAGNOSIS — I10 PRIMARY HYPERTENSION: ICD-10-CM

## 2024-11-11 PROCEDURE — 1126F AMNT PAIN NOTED NONE PRSNT: CPT | Performed by: STUDENT IN AN ORGANIZED HEALTH CARE EDUCATION/TRAINING PROGRAM

## 2024-11-11 PROCEDURE — 3044F HG A1C LEVEL LT 7.0%: CPT | Performed by: STUDENT IN AN ORGANIZED HEALTH CARE EDUCATION/TRAINING PROGRAM

## 2024-11-11 PROCEDURE — 99214 OFFICE O/P EST MOD 30 MIN: CPT | Performed by: STUDENT IN AN ORGANIZED HEALTH CARE EDUCATION/TRAINING PROGRAM

## 2024-11-11 RX ORDER — TRAMADOL HYDROCHLORIDE 50 MG/1
50 TABLET ORAL NIGHTLY PRN
Qty: 30 TABLET | Refills: 0 | Status: SHIPPED | OUTPATIENT
Start: 2024-11-11 | End: 2024-11-14 | Stop reason: SDUPTHER

## 2024-11-11 RX ORDER — DULOXETIN HYDROCHLORIDE 30 MG/1
30 CAPSULE, DELAYED RELEASE ORAL DAILY
Qty: 30 CAPSULE | Refills: 2 | Status: SHIPPED | OUTPATIENT
Start: 2024-11-11 | End: 2025-11-11

## 2024-11-11 SDOH — HEALTH STABILITY - MENTAL HEALTH: OTHER PHYSICAL AND MENTAL STRAIN RELATED TO WORK: Z56.6

## 2024-11-11 NOTE — PROGRESS NOTES
Chief Complaint  Follow-up    Subjective      Yenni Camacho is a 59 y.o. female who presents to [facility]     History of Present Illness  The patient presents for evaluation of multiple medical concerns. She is accompanied by an adult female who is her close friend.    She was taken to the ER last Friday due to a blood pressure reading of 220/114 as well as difficulty breathing. She received a blood pressure infusion in the ER due to concerns about her high blood pressure. She takes her blood pressure medication at night as it causes daytime drowsiness. She believes her work stress is contributing to her high blood pressure. She does not monitor her blood pressure at home.  She states her breathing is somewhat improved after going to the ER.  She was given a Medrol Dosepak as well as Augmentin for this.  X-ray was negative for any pneumonia.    Her oxygen levels at home were fluctuating between 85 and 88. She has been prescribed oxygen as needed, but the accompanying adult female believes she requires it more frequently. She missed her pulmonology appointment due to construction delays but has another scheduled for tomorrow. She is currently on Trelegy and albuterol inhalers, which do not seem to be providing relief. She also has DuoNeb and nebulizer solution, but lacks a nebulizer machine. She suffers from COPD and is on oxygen therapy. An EKG was performed during her hospital stay.    She has been experiencing severe emotional distress, characterized by constant crying and difficulty focusing. This began after starting venlafaxine, which she took for about a week. She has since stopped the medication. She has a history of adverse reactions to Paxil and Zoloft, which led to suicidal thoughts. She has an appointment with her behavioral health specialist today.    She continues to experience neck pain and leg discomfort. She is seeking [facility] paperwork due to these issues. She  "has arthritis, which recently caused significant swelling in her hands, requiring ice treatment. She has been taking Zanaflex 4 mg, which provides some relief for her neck pain, but she still needs to use a heating pad at night. She also experiences headaches. She has previously sought pain management for her symptoms.       Objective   Vital Signs:   Vitals:    11/11/24 1446   BP: (!) 200/100   BP Location: Left arm   Patient Position: Sitting   Pulse: 67   SpO2: 96%   Weight: 61.7 kg (136 lb)   Height: 167.6 cm (66\")     Body mass index is 21.95 kg/m².    Wt Readings from Last 3 Encounters:   11/11/24 61.7 kg (136 lb)   11/11/24 61.7 kg (136 lb)   11/08/24 62.7 kg (138 lb 3.7 oz)     BP Readings from Last 3 Encounters:   11/11/24 (!) 188/112   11/11/24 (!) 200/100   11/08/24 (!) 199/108       Health Maintenance   Topic Date Due    MAMMOGRAM  Never done    COLORECTAL CANCER SCREENING  Never done    DIABETIC EYE EXAM  Never done    TDAP/TD VACCINES (1 - Tdap) Never done    ZOSTER VACCINE (1 of 2) Never done    DIABETIC FOOT EXAM  Never done    PAP SMEAR  Never done    ANNUAL PHYSICAL  04/24/2024    COVID-19 Vaccine (1 - 2024-25 season) Never done    INFLUENZA VACCINE  03/31/2025 (Originally 8/1/2024)    Pneumococcal Vaccine 0-64 (1 of 2 - PCV) 11/04/2025 (Originally 7/17/1971)    HEMOGLOBIN A1C  04/10/2025    LUNG CANCER SCREENING  05/02/2025    LIPID PANEL  10/10/2025    URINE MICROALBUMIN  10/10/2025    HEPATITIS C SCREENING  Completed       Physical Exam  Constitutional:       General: She is not in acute distress.     Appearance: Normal appearance.   HENT:      Head: Normocephalic and atraumatic.      Mouth/Throat:      Mouth: Mucous membranes are moist.   Eyes:      Extraocular Movements: Extraocular movements intact.   Pulmonary:      Effort: No respiratory distress.   Abdominal:      General: Abdomen is flat.   Musculoskeletal:      Cervical back: Neck supple.   Skin:     General: Skin is warm and dry. "   Neurological:      General: No focal deficit present.      Mental Status: She is alert and oriented to person, place, and time. Mental status is at baseline.   Psychiatric:         Thought Content: Thought content normal.         Judgment: Judgment normal.      Comments: Very tearful during visit          Physical Exam  Vital Signs  Vitals show blood pressure at 200/100, heart rate at 95, and oxygen saturation at 95 percent.    Result Review :  The following data was reviewed by: Tom Meehan DO on 11/11/2024:    XR Chest 1 View    Result Date: 11/8/2024  Impression: Emphysematous changes. Biapical scarring/fibrosis. Electronically Signed: Freedom Roblero MD  11/8/2024 5:18 PM EST  Workstation ID: [id number]       Results         Procedures          Diagnoses and all orders for this visit:    1. Coronary artery disease involving native coronary artery of native heart without angina pectoris (Primary)    2. Primary hypertension  -     Blood Pressure Monitoring (Comfort Touch BP Cuff/Medium) misc; Use 1 each Take As Directed.  Dispense: 1 each; Refill: 0    3. Pulmonary emphysema, unspecified emphysema type    4. DDD (degenerative disc disease), cervical  -     traMADol (ULTRAM) 50 MG tablet; Take 1 tablet by mouth At Night As Needed for Moderate Pain.  Dispense: 30 tablet; Refill: 0    5. Stress at work    6. Moderate recurrent major depression         Assessment & Plan  1. Anxiety, stress at work.  She has been experiencing significant distress and crying spells, which may be related to her current medication, venlafaxine (Effexor). It is recommended to discontinue venlafaxine to allow her system to reset. She is advised to consult with her behavioral health specialist today (LETITIA Moss) and discuss alternative treatments with her psychiatrist. A S[employer] My [employer] NOW test result will be provided to her therapist to guide future treatment decisions.    2. Hypertension.  Her blood pressure has been elevated, likely  due to increased stress at work. She is currently on metoprolol, losartan, and clonidine at night. She is advised to increase her losartan dosage to two pills daily (100 mg). A blood pressure cuff will be prescribed for home monitoring. If her blood pressure remains uncontrolled, further adjustments will be considered.    3. Chronic Obstructive Pulmonary Disease (COPD).  She has been experiencing low oxygen levels and shortness of breath. She is currently using Trelegy and albuterol inhalers, which have not been effective. She was prescribed DuoNeb nebulizer solution but did not receive a nebulizer machine. She is advised to see her pulmonologist tomorrow for further management and to obtain a nebulizer machine if needed.    4.  Degenerative disc disease, cervical.  She has been experiencing pain in her neck and legs, likely due to arthropathy. An x-ray confirmed arthritis in her spine. A prescription for tramadol 50 mg to be taken at night has been provided to help manage her pain. She is advised to discontinue Zanaflex and use tramadol instead.  Patient does not want to go to pain management at this time.    Follow-up  Return in 2 weeks for follow up.    BMI is within normal parameters. No other follow-up for BMI required.         FOLLOW UP  Return in about 2 weeks (around 11/25/2024) for Hypertension.  Patient was given instructions and counseling regarding her condition or for health maintenance advice. Please see specific information pulled into the AVS if appropriate.     Patient or patient representative verbalized consent for the use of Ambient Listening during the visit with  Tom Meehan DO for chart documentation. 11/11/2024  17:25 EST    Tom Meehan DO  11/11/24  17:25 EST    CURRENT & DISCONTINUED MEDICATIONS  Current Outpatient Medications   Medication Instructions    albuterol sulfate  (90 Base) MCG/ACT inhaler 2 puffs, Inhalation, Every 4 Hours PRN    amoxicillin-clavulanate (AUGMENTIN)  875-125 MG per tablet 1 tablet, Oral, 2 Times Daily    benzonatate (TESSALON) 100 mg, Oral, 3 Times Daily PRN    Blood Pressure Monitoring (Comfort Touch BP Cuff/Medium) misc 1 each, Does not apply, Take As Directed    cloNIDine (CATAPRES) 0.3 mg, Oral, Nightly    DULoxetine (CYMBALTA) 30 mg, Oral, Daily    fluticasone (FLONASE) 50 MCG/ACT nasal spray 2 sprays, Each Nare, Daily    Fluticasone-Umeclidin-Vilant (Trelegy Ellipta) 100-62.5-25 MCG/ACT inhaler 1 puff, Inhalation, Daily - RT    guaiFENesin (MUCINEX) 600 mg, Oral, 2 Times Daily    ipratropium-albuterol (DUO-NEB) 0.5-2.5 mg/3 ml nebulizer 3 mL, Nebulization, Every 4 Hours PRN    losartan (COZAAR) 50 mg, Oral, Daily    metFORMIN ER (GLUCOPHAGE-XR) 1,000 mg, Oral, Daily With Breakfast    methylPREDNISolone (MEDROL) 4 MG dose pack Take as directed on package instructions.    metoprolol succinate XL (TOPROL-XL) 25 mg, Oral, Daily    tiZANidine (ZANAFLEX) 2 mg, Oral, Nightly PRN    traMADol (ULTRAM) 50 mg, Oral, Nightly PRN       There are no discontinued medications.

## 2024-11-11 NOTE — PATIENT INSTRUCTIONS
1.  Please return to clinic at your next scheduled visit.  Please contact the clinic ([phone]) at least 24 hours prior in the event you need to cancel.  2.  Do no harm to yourself or others.    3.  Avoid alcohol and drugs.    4.  Take all medications as prescribed.  Please contact the clinic with any concerns. If you are in need of medication refills, please call the clinic at [phone].    5. Should you want to get in touch with your provider, LETITIA Solis, please contact the office ([phone]), and staff will be able to page Parul directly.  6. In the event you have personal crisis, contact the following crisis numbers: Suicide Prevention Hotline 1-446.366.2948; [facility] Helpline 1-789-591-JGGA; [facility] Emergency Room [phone]; text HELLO to 616189; or 905.     SPECIFIC RECOMMENDATIONS:     1.      Medications discussed at this encounter:     New Medications Ordered This Visit   Medications    DULoxetine (Cymbalta) 30 MG capsule     Sig: Take 1 capsule by mouth Daily.     Dispense:  30 capsule     Refill:  2                       2.      Psychotherapy recommendations: We will continue therapy at future visits.     3.     Return to clinic: Return in about 2 months (around 1/11/2025).

## 2024-11-12 ENCOUNTER — OFFICE VISIT (OUTPATIENT)
Dept: PULMONOLOGY | Facility: CLINIC | Age: 59
End: 2024-11-12
Payer: MEDICAID

## 2024-11-12 VITALS
BODY MASS INDEX: 21.72 KG/M2 | DIASTOLIC BLOOD PRESSURE: 108 MMHG | SYSTOLIC BLOOD PRESSURE: 204 MMHG | HEIGHT: 67 IN | OXYGEN SATURATION: 88 % | HEART RATE: 89 BPM | TEMPERATURE: 98.2 F | WEIGHT: 138.4 LBS | RESPIRATION RATE: 18 BRPM

## 2024-11-12 DIAGNOSIS — J43.9 PULMONARY EMPHYSEMA, UNSPECIFIED EMPHYSEMA TYPE: ICD-10-CM

## 2024-11-12 DIAGNOSIS — J44.1 COPD WITH ACUTE EXACERBATION: Primary | ICD-10-CM

## 2024-11-12 DIAGNOSIS — Z72.0 TOBACCO ABUSE: ICD-10-CM

## 2024-11-12 RX ORDER — PREDNISONE 10 MG/1
TABLET ORAL
Qty: 31 TABLET | Refills: 0 | Status: SHIPPED | OUTPATIENT
Start: 2024-11-12

## 2024-11-12 RX ORDER — FLUTICASONE FUROATE, UMECLIDINIUM BROMIDE AND VILANTEROL TRIFENATATE 100; 62.5; 25 UG/1; UG/1; UG/1
1 POWDER RESPIRATORY (INHALATION)
Qty: 60 EACH | Refills: 1 | Status: SHIPPED | OUTPATIENT
Start: 2024-11-12

## 2024-11-12 RX ORDER — AMOXICILLIN 500 MG/1
500 CAPSULE ORAL 3 TIMES DAILY
Qty: 21 CAPSULE | Refills: 0 | Status: SHIPPED | OUTPATIENT
Start: 2024-11-12 | End: 2024-11-19

## 2024-11-12 NOTE — PROGRESS NOTES
Pulmonary Consultation    No ref. provider found,    Thank you for asking me to see Yenni Camacho for   Chief Complaint   Patient presents with    Establish Care    COPD    Emphysema    Shortness of Breath   .      History of Present Illness  Yenni Camacho is a 59 y.o. female with a PMH significant for CHF with cardiomyopathy and severe COPD on home oxygen with tobacco abuse presents for evaluation patient has been having cough with mucopurulent expectoration chest congestion and wheezing patient denies fever or chills but does have fatigue along with malaise  Patient denies any hemoptysis calf pain or weight loss she is presently not using her home oxygen      Tobacco use history:  Former smoker      Review of Systems: History obtained from chart review and the patient.  Review of Systems   Respiratory:  Positive for cough, shortness of breath and wheezing.    All other systems reviewed and are negative.    As described in the HPI. Otherwise, remainder of ROS (14 systems) were negative.    Patient Active Problem List   Diagnosis    Respiratory failure    Severe malnutrition    Chronic systolic heart failure    Acute respiratory failure with hypoxia and hypercapnia    COPD with acute exacerbation    NICM (nonischemic cardiomyopathy)    Cervical spondylosis without myelopathy    Lumbosacral spondylosis without myelopathy    Hypoxia    Influenza A    Nasal congestion    Acute on chronic respiratory failure with hypoxia    Moderate recurrent major depression    DDD (degenerative disc disease), cervical    Coronary artery disease involving native coronary artery of native heart without angina pectoris    Pulmonary emphysema         Current Outpatient Medications:     albuterol sulfate  (90 Base) MCG/ACT inhaler, Inhale 2 puffs Every 4 (Four) Hours As Needed for Wheezing., Disp: 18 g, Rfl: 0    amoxicillin-clavulanate (AUGMENTIN) 875-125 MG per tablet, Take 1 tablet by mouth 2 (Two) Times a Day  for 5 days., Disp: 10 tablet, Rfl: 0    benzonatate (TESSALON) 100 MG capsule, Take 1 capsule by mouth 3 (Three) Times a Day As Needed for Cough., Disp: 21 capsule, Rfl: 0    Blood Pressure Monitoring (Comfort Touch BP Cuff/Medium) misc, Use 1 each Take As Directed., Disp: 1 each, Rfl: 0    cloNIDine (CATAPRES) 0.3 MG tablet, Take 1 tablet by mouth Every Night., Disp: 60 tablet, Rfl: 1    DULoxetine (Cymbalta) 30 MG capsule, Take 1 capsule by mouth Daily., Disp: 30 capsule, Rfl: 2    fluticasone (FLONASE) 50 MCG/ACT nasal spray, 2 sprays by Each Nare route Daily., Disp: 16 g, Rfl: 1    Fluticasone-Umeclidin-Vilant (Trelegy Ellipta) 100-62.5-25 MCG/ACT inhaler, Inhale 1 puff Daily., Disp: 60 each, Rfl: 1    guaiFENesin (MUCINEX) 600 MG 12 hr tablet, Take 1 tablet by mouth 2 (Two) Times a Day for 7 days., Disp: 14 tablet, Rfl: 0    losartan (Cozaar) 50 MG tablet, Take 1 tablet by mouth Daily., Disp: 90 tablet, Rfl: 1    metFORMIN ER (GLUCOPHAGE-XR) 500 MG 24 hr tablet, Take 2 tablets by mouth Daily With Breakfast., Disp: 180 tablet, Rfl: 0    methylPREDNISolone (MEDROL) 4 MG dose pack, Take as directed on package instructions., Disp: 21 tablet, Rfl: 0    metoprolol succinate XL (TOPROL-XL) 25 MG 24 hr tablet, TAKE 1 TABLET BY MOUTH DAILY, Disp: 90 tablet, Rfl: 0    tiZANidine (ZANAFLEX) 2 MG tablet, Take 1 tablet by mouth At Night As Needed for Muscle Spasms., Disp: 30 tablet, Rfl: 0    traMADol (ULTRAM) 50 MG tablet, Take 1 tablet by mouth At Night As Needed for Moderate Pain., Disp: 30 tablet, Rfl: 0    amoxicillin (AMOXIL) 500 MG capsule, Take 1 capsule by mouth 3 (Three) Times a Day for 7 days., Disp: 21 capsule, Rfl: 0    ipratropium-albuterol (DUO-NEB) 0.5-2.5 mg/3 ml nebulizer, Take 3 mL by nebulization Every 4 (Four) Hours As Needed for Wheezing. (Patient not taking: Reported on 11/12/2024), Disp: 360 mL, Rfl: 0    predniSONE (DELTASONE) 10 MG tablet, Take 4 tabs daily x 3 days, then take 3 tabs daily x 3  days, then take 2 tabs daily x 3 days, then take 1 tab daily x 3 days, Disp: 31 tablet, Rfl: 0    Allergies   Allergen Reactions    Lexapro [Escitalopram] Mental Status Change     Worsened depression     Breztri Aerosphere [Budeson-Glycopyrrol-Formoterol] Other (See Comments)     Blisters in mouth    Paxlovid [Nirmatrelvir-Ritonavir] Unknown - Low Severity    Wellbutrin [Bupropion] Unknown - Low Severity       Past Medical History:   Diagnosis Date    ADHD     Anxiety     Arthritis     Asthma, extrinsic     Bunion     Chronic pain disorder     Diabetes mellitus     Emphysema of lung     Foot pain, bilateral     Hypertension     Pneumonia     PTSD (post-traumatic stress disorder)     Rheumatoid arthritis      Past Surgical History:   Procedure Laterality Date    ARM WOUND REPAIR / CLOSURE Right     BRONCHOSCOPY N/A 2024    Procedure: BRONCHOSCOPY WITH BAL;  Surgeon: Pradeep Arauz DO;  Location: [facility] ENDOSCOPY;  Service: Pulmonary;  Laterality: N/A;  BRONCHITIS, MUCOUS PLUGGING    COLONOSCOPY      FRACTURE SURGERY       Social History     Socioeconomic History    Marital status:    Tobacco Use    Smoking status: Former     Current packs/day: 0.00     Average packs/day: 2.0 packs/day for 39.7 years (79.4 ttl pk-yrs)     Types: Cigarettes     Start date: 1978     Quit date: 2018     Years since quittin.5    Smokeless tobacco: Never    Tobacco comments:     Worse thing i ever did!   Vaping Use    Vaping status: Former    Substances: THC    Devices: Refillable tank   Substance and Sexual Activity    Alcohol use: Not Currently    Drug use: Not Currently     Frequency: 7.0 times per week     Types: Hydrocodone, Marijuana     Comment: I had a MM license in [place].    Sexual activity: Not Currently     Partners: Male     Family History   Problem Relation Age of Onset    ADD / ADHD Mother     Anxiety disorder Mother     Emphysema Paternal Grandfather        XR Chest 1  "View    Result Date: 11/8/2024  Impression: Emphysematous changes. Biapical scarring/fibrosis. Electronically Signed: Freedom Roblero MD  11/8/2024 5:18 PM EST  Workstation ID: [id number]         Objective     Blood pressure (!) 204/108, pulse 89, temperature 98.2 °F (36.8 °C), temperature source Tympanic, resp. rate 18, height 170.2 cm (67\"), weight 62.8 kg (138 lb 6.4 oz), SpO2 (!) 88%.  Physical Exam  Vitals and nursing note reviewed.   Constitutional:       Appearance: She is ill-appearing.   HENT:      Head: Normocephalic and atraumatic.      Nose: Nose normal.      Mouth/Throat:      Mouth: Mucous membranes are moist.      Pharynx: Oropharynx is clear.   Eyes:      Extraocular Movements: Extraocular movements intact.      Conjunctiva/sclera: Conjunctivae normal.      Pupils: Pupils are equal, round, and reactive to light.   Cardiovascular:      Rate and Rhythm: Normal rate and regular rhythm.      Pulses: Normal pulses.      Heart sounds: Normal heart sounds.   Pulmonary:      Effort: Pulmonary effort is normal.      Breath sounds: Wheezing and rhonchi present.   Abdominal:      General: Abdomen is flat. Bowel sounds are normal.      Palpations: Abdomen is soft.   Musculoskeletal:         General: Normal range of motion.      Cervical back: Normal range of motion and neck supple.   Skin:     General: Skin is warm.      Capillary Refill: Capillary refill takes 2 to 3 seconds.   Neurological:      General: No focal deficit present.      Mental Status: She is alert and oriented to person, place, and time.   Psychiatric:         Mood and Affect: Mood normal.         Behavior: Behavior normal.         There is no immunization history on file for this patient.         Assessment & Plan     Diagnoses and all orders for this visit:    1. COPD with acute exacerbation (Primary)    2. Tobacco abuse    3. Pulmonary emphysema, unspecified emphysema type  -     Fluticasone-Umeclidin-Vilant (Trelegy Ellipta) 100-62.5-25 MCG/ACT " inhaler; Inhale 1 puff Daily.  Dispense: 60 each; Refill: 1    Other orders  -     amoxicillin (AMOXIL) 500 MG capsule; Take 1 capsule by mouth 3 (Three) Times a Day for 7 days.  Dispense: 21 capsule; Refill: 0  -     predniSONE (DELTASONE) 10 MG tablet; Take 4 tabs daily x 3 days, then take 3 tabs daily x 3 days, then take 2 tabs daily x 3 days, then take 1 tab daily x 3 days  Dispense: 31 tablet; Refill: 0         Result Review :       Data reviewed : Radiologic studies CT chest      Discussion/ Recommendations:   X-rays and CT reviewed showing severe COPD with emphysema  Patient has already quit smoking  Advised to continue Trelegy  DuoNebs every 6 hours patient will be prescribed nebulizer  Patient is strongly advised to use her home oxygen  Will start her on Amoxil and prednisone for 1 week for COPD exacerbation  Will order PFT  Patient is refusing pneumonia and flu shot  Discussed vaccination and recommended    BMI is within normal parameters. No other follow-up for BMI required.           Return in about 3 months (around 2/12/2025).      Thank you for allowing me to participate in the care of Yenni Camacho. Please do not hesitate to contact me with any questions.         This document has been electronically signed by Omid Ball MD on November 12, 2024 16:13 EST

## 2024-11-13 DIAGNOSIS — M50.30 DDD (DEGENERATIVE DISC DISEASE), CERVICAL: ICD-10-CM

## 2024-11-13 RX ORDER — TRAMADOL HYDROCHLORIDE 50 MG/1
50 TABLET ORAL NIGHTLY PRN
Qty: 30 TABLET | Refills: 0 | OUTPATIENT
Start: 2024-11-13

## 2024-11-13 NOTE — TELEPHONE ENCOUNTER
PT asking for prescription to be sent to different pharmacy [facility] in [place] instead of [facility].     Tramadol 50 MG tablet

## 2024-11-14 DIAGNOSIS — M54.2 NECK PAIN ON RIGHT SIDE: Primary | ICD-10-CM

## 2024-11-14 DIAGNOSIS — M50.30 DDD (DEGENERATIVE DISC DISEASE), CERVICAL: ICD-10-CM

## 2024-11-14 DIAGNOSIS — M54.50 CHRONIC BILATERAL LOW BACK PAIN WITHOUT SCIATICA: ICD-10-CM

## 2024-11-14 DIAGNOSIS — G89.29 CHRONIC BILATERAL LOW BACK PAIN WITHOUT SCIATICA: ICD-10-CM

## 2024-11-14 RX ORDER — TRAMADOL HYDROCHLORIDE 50 MG/1
50 TABLET ORAL NIGHTLY PRN
Qty: 30 TABLET | Refills: 0 | Status: SHIPPED | OUTPATIENT
Start: 2024-11-14

## 2024-11-19 ENCOUNTER — PRIOR AUTHORIZATION (OUTPATIENT)
Dept: FAMILY MEDICINE CLINIC | Facility: CLINIC | Age: 59
End: 2024-11-19
Payer: MEDICAID

## 2024-11-19 NOTE — TELEPHONE ENCOUNTER
traMADol HCl 50MG tablets SENT TO PLAN      Approved today by [place] [employer] 2017  The request has been approved. The authorization is effective from 11/19/2024 to 01/18/2025, as long as the member is enrolled in their current health plan. The request was approved as submitted. A written notification letter will follow with additional details.  Authorization Expiration Date: 1/17/2025

## 2024-11-25 DIAGNOSIS — J43.9 PULMONARY EMPHYSEMA, UNSPECIFIED EMPHYSEMA TYPE: ICD-10-CM

## 2024-11-25 RX ORDER — FLUTICASONE FUROATE, UMECLIDINIUM BROMIDE AND VILANTEROL TRIFENATATE 100; 62.5; 25 UG/1; UG/1; UG/1
1 POWDER RESPIRATORY (INHALATION)
Qty: 60 EACH | Refills: 1 | Status: SHIPPED | OUTPATIENT
Start: 2024-11-25

## 2024-11-26 DIAGNOSIS — J43.9 PULMONARY EMPHYSEMA, UNSPECIFIED EMPHYSEMA TYPE: ICD-10-CM

## 2024-11-26 RX ORDER — FLUTICASONE FUROATE, UMECLIDINIUM BROMIDE AND VILANTEROL TRIFENATATE 100; 62.5; 25 UG/1; UG/1; UG/1
1 POWDER RESPIRATORY (INHALATION)
Qty: 60 EACH | Refills: 1 | Status: CANCELLED | OUTPATIENT
Start: 2024-11-26

## 2024-12-05 ENCOUNTER — OFFICE VISIT (OUTPATIENT)
Dept: FAMILY MEDICINE CLINIC | Facility: CLINIC | Age: 59
End: 2024-12-05
Payer: MEDICAID

## 2024-12-05 VITALS
WEIGHT: 136.1 LBS | HEART RATE: 66 BPM | SYSTOLIC BLOOD PRESSURE: 190 MMHG | BODY MASS INDEX: 21.36 KG/M2 | HEIGHT: 67 IN | DIASTOLIC BLOOD PRESSURE: 100 MMHG

## 2024-12-05 DIAGNOSIS — Z56.6 STRESS AT WORK: ICD-10-CM

## 2024-12-05 DIAGNOSIS — M54.50 CHRONIC BILATERAL LOW BACK PAIN WITHOUT SCIATICA: ICD-10-CM

## 2024-12-05 DIAGNOSIS — M25.562 CHRONIC PAIN OF BOTH KNEES: ICD-10-CM

## 2024-12-05 DIAGNOSIS — M25.561 CHRONIC PAIN OF BOTH KNEES: ICD-10-CM

## 2024-12-05 DIAGNOSIS — I10 PRIMARY HYPERTENSION: ICD-10-CM

## 2024-12-05 DIAGNOSIS — G89.29 CHRONIC PAIN OF BOTH KNEES: ICD-10-CM

## 2024-12-05 DIAGNOSIS — M47.816 FACET ARTHROPATHY, LUMBAR: ICD-10-CM

## 2024-12-05 DIAGNOSIS — G89.29 CHRONIC BILATERAL LOW BACK PAIN WITHOUT SCIATICA: ICD-10-CM

## 2024-12-05 DIAGNOSIS — J43.9 PULMONARY EMPHYSEMA, UNSPECIFIED EMPHYSEMA TYPE: Primary | ICD-10-CM

## 2024-12-05 DIAGNOSIS — F33.1 MODERATE RECURRENT MAJOR DEPRESSION: ICD-10-CM

## 2024-12-05 DIAGNOSIS — M50.30 DDD (DEGENERATIVE DISC DISEASE), CERVICAL: ICD-10-CM

## 2024-12-05 DIAGNOSIS — E11.42 POLYNEUROPATHY DUE TO TYPE 2 DIABETES MELLITUS: ICD-10-CM

## 2024-12-05 PROCEDURE — 3044F HG A1C LEVEL LT 7.0%: CPT | Performed by: STUDENT IN AN ORGANIZED HEALTH CARE EDUCATION/TRAINING PROGRAM

## 2024-12-05 PROCEDURE — 99214 OFFICE O/P EST MOD 30 MIN: CPT | Performed by: STUDENT IN AN ORGANIZED HEALTH CARE EDUCATION/TRAINING PROGRAM

## 2024-12-05 PROCEDURE — 1125F AMNT PAIN NOTED PAIN PRSNT: CPT | Performed by: STUDENT IN AN ORGANIZED HEALTH CARE EDUCATION/TRAINING PROGRAM

## 2024-12-05 RX ORDER — CLONIDINE HYDROCHLORIDE 0.3 MG/1
0.3 TABLET ORAL NIGHTLY
Qty: 90 TABLET | Refills: 1 | Status: SHIPPED | OUTPATIENT
Start: 2024-12-05

## 2024-12-05 RX ORDER — VENLAFAXINE HYDROCHLORIDE 37.5 MG/1
37.5 CAPSULE, EXTENDED RELEASE ORAL DAILY
Qty: 30 CAPSULE | Refills: 0 | Status: SHIPPED | OUTPATIENT
Start: 2024-12-05

## 2024-12-05 RX ORDER — MELOXICAM 15 MG/1
15 TABLET ORAL DAILY
Qty: 30 TABLET | Refills: 0 | Status: SHIPPED | OUTPATIENT
Start: 2024-12-05

## 2024-12-05 RX ORDER — LOSARTAN POTASSIUM 100 MG/1
100 TABLET ORAL DAILY
Qty: 90 TABLET | Refills: 1 | Status: SHIPPED | OUTPATIENT
Start: 2024-12-05

## 2024-12-05 SDOH — HEALTH STABILITY - MENTAL HEALTH: OTHER PHYSICAL AND MENTAL STRAIN RELATED TO WORK: Z56.6

## 2024-12-05 NOTE — PROGRESS NOTES
Chief Complaint  Follow-up    Subjective      Yenni Camacho is a 59 y.o. female who presents to [facility]     History of Present Illness  The patient is a 59-year-old female who presents today to discuss hypertension as well as depression.    She reports an improvement in her condition compared to her last visit. She has been actively seeking employment and has an appointment with her psychiatrist scheduled for 01/2025. She discontinued her Cymbalta after 4 to 5 days due to side effects of imbalance and dizziness. She has been experiencing frequent crying spells, which she finds distressing. She cannot take Wellbutrin due to the suicidal thoughts it induces. She has tried Zoloft, Paxil, BuSpar, Lexapro and Cymbalta, but experienced side effects. She has also taken gabapentin, which caused imbalance and dizziness, but these symptoms resolved upon discontinuation.    She believes her blood pressure may be elevated due to the cold weather. She occasionally checks her blood pressure at home, which typically ranges between 160 and 170, but can be lower depending on her activity level. She is currently taking losartan 50 mg, metoprolol 25 mg, and clonidine 0.3 mg at night, but has run out of the latter. She takes clonidine at night as it causes daytime fatigue, but it aids her sleep. She recently completed a course of prednisone which was prescribed by her pulmonologist. She uses Trelegy and a nebulizer as needed states her breathing has been at baseline.    She experiences constant pain, particularly in her neck, knees, and feet, which she attributes to arthritis. She rates her pain as 7 to 8 out of 10. She has previously seen [facility], but was dissatisfied with the service. She was prescribed hydrocodone and acetaminophen, but found Percocet more effective. She has also tried tramadol, Goody's powder, Advil, Aleve, and Zanaflex, but found them ineffective.  "She has used a lidocaine patch and BenGay for her feet, and applies a heat pad at night. She experiences tingling and burning sensations in her feet, which worsen at night. She has had x-rays of her back, but not her knees. She was diagnosed with degenerative disc disease in her neck in her 20s. She also experiences pain in her hands, particularly in her knuckles, and has noticed inflammation on both sides.  Does not have significant pain in the MCPs although states PIP and DIPs have lots of pain.      FAMILY HISTORY  Her grandfather had arthritis.       Objective   Vital Signs:   Vitals:    12/05/24 1516   BP: (!) 190/100   BP Location: Right arm   Patient Position: Sitting   Pulse: 66   Weight: 61.7 kg (136 lb 1.6 oz)   Height: 170.2 cm (67\")   PainSc:   8     Body mass index is 21.32 kg/m².    Wt Readings from Last 3 Encounters:   12/05/24 61.7 kg (136 lb 1.6 oz)   11/12/24 62.8 kg (138 lb 6.4 oz)   11/11/24 61.7 kg (136 lb)     BP Readings from Last 3 Encounters:   12/05/24 (!) 190/100   11/12/24 (!) 204/108   11/11/24 (!) 188/112       Health Maintenance   Topic Date Due    COLORECTAL CANCER SCREENING  Never done    DIABETIC EYE EXAM  Never done    TDAP/TD VACCINES (1 - Tdap) Never done    MAMMOGRAM  Never done    ZOSTER VACCINE (1 of 2) Never done    PAP SMEAR  Never done    ANNUAL PHYSICAL  04/24/2024    COVID-19 Vaccine (1 - 2024-25 season) 02/01/2025 (Originally 9/1/2024)    INFLUENZA VACCINE  03/31/2025 (Originally 7/1/2024)    Pneumococcal Vaccine 0-64 (1 of 2 - PCV) 11/04/2025 (Originally 7/17/1971)    HEMOGLOBIN A1C  04/10/2025    LUNG CANCER SCREENING  05/02/2025    LIPID PANEL  10/10/2025    HEPATITIS C SCREENING  Completed    URINE MICROALBUMIN  Discontinued       Physical Exam  Constitutional:       General: She is not in acute distress.     Appearance: Normal appearance.   HENT:      Head: Normocephalic and atraumatic.      Mouth/Throat:      Mouth: Mucous membranes are moist.   Eyes:      " Extraocular Movements: Extraocular movements intact.   Cardiovascular:      Rate and Rhythm: Normal rate and regular rhythm.      Heart sounds: No murmur heard.  Pulmonary:      Effort: No respiratory distress.      Breath sounds: No wheezing, rhonchi or rales.   Abdominal:      General: Abdomen is flat.   Musculoskeletal:      Cervical back: Neck supple.      Comments: No pain to palpation of knee joint space.  No crepitus with passive range of motion bilaterally.    Heberden's nodes seen at second finger DIPs bilaterally as well as some PIP joints bilaterally.   Skin:     General: Skin is warm and dry.   Neurological:      General: No focal deficit present.      Mental Status: She is alert and oriented to person, place, and time. Mental status is at baseline.   Psychiatric:         Mood and Affect: Mood normal.         Thought Content: Thought content normal.         Judgment: Judgment normal.          Physical Exam      Result Review :  The following data was reviewed by: Tom Meehan DO on 12/05/2024:    XR Chest 1 View    Result Date: 11/8/2024  Impression: Emphysematous changes. Biapical scarring/fibrosis. Electronically Signed: Freedom Roblero MD  11/8/2024 5:18 PM EST  Workstation ID: [id number]       Results         Procedures          Diagnoses and all orders for this visit:    1. Pulmonary emphysema, unspecified emphysema type (Primary)    2. Primary hypertension  -     cloNIDine (CATAPRES) 0.3 MG tablet; Take 1 tablet by mouth Every Night.  Dispense: 90 tablet; Refill: 1  -     losartan (Cozaar) 100 MG tablet; Take 1 tablet by mouth Daily.  Dispense: 90 tablet; Refill: 1    3. Moderate recurrent major depression  -     venlafaxine XR (Effexor XR) 37.5 MG 24 hr capsule; Take 1 capsule by mouth Daily.  Dispense: 30 capsule; Refill: 0    4. Stress at work    5. DDD (degenerative disc disease), cervical  -     Ambulatory Referral to Pain Management  -     meloxicam (MOBIC) 15 MG tablet; Take 1 tablet by mouth  Daily.  Dispense: 30 tablet; Refill: 0    6. Chronic bilateral low back pain without sciatica  -     Ambulatory Referral to Pain Management  -     meloxicam (MOBIC) 15 MG tablet; Take 1 tablet by mouth Daily.  Dispense: 30 tablet; Refill: 0    7. Facet arthropathy, lumbar  -     Ambulatory Referral to Pain Management    8. Polyneuropathy due to type 2 diabetes mellitus    9. Chronic pain of both knees  -     XR Knee 3+ View With Sunrise Left; Future  -     XR Knee 3+ View With Sunrise Right; Future         Assessment & Plan  1. Hypertension.  Her blood pressure readings at home range between 160-170, which is still high. She has been advised to increase the dose of losartan to 100 mg. She will continue her current regimen of clonidine and metoprolol. If her blood pressure remains high, further adjustments will be considered.  Her blood pressure may be due to her chronic pain.  Venlafaxine and Mobic have been prescribed to help her with this pain.  Advised patient to take blood pressure at home.    2. Depression.  She reports that the previous medication caused significant side effects, including equilibrium issues. A low dose of venlafaxine 37.5 mg has been initiated. If she experiences any adverse effects from the venlafaxine, she is to discontinue its use immediately. She has a follow-up appointment with her psychiatrist on January 14.    3. Degenerative Disc Disease.  She reports ongoing neck pain and has been diagnosed with degenerative disc disease. Pain management options, including meloxicam for daily use, have been discussed. A referral to [facility] has been made.  She has been offered injections although currently is down in the past.  Percocet has worked with her pain better than Norco.    4. Arthritis.  She exhibits Heberden's nodes in her hands, indicative of arthritis. She reports pain in her knees and feet, which worsens in cold weather. Knee x-rays have been ordered. A steroid injection  for knee pain and physical therapy for the knees have been discussed as potential future treatment options.    5. Neuropathy.  She reports tingling and burning pain in the bottom of her feet, which worsens at night. Venlafaxine may help with this nerve pain. Blood work will be conducted to assess vitamin B12 and folate levels, as deficiencies in these vitamins can contribute to neuropathy.    Follow-up  Return in 2 to 3 weeks for follow up.    BMI is within normal parameters. No other follow-up for BMI required.         FOLLOW UP  Return in about 2 weeks (around 12/19/2024) for depression, neck pain.  Patient was given instructions and counseling regarding her condition or for health maintenance advice. Please see specific information pulled into the AVS if appropriate.     Patient or patient representative verbalized consent for the use of Ambient Listening during the visit with  Tom Meehan DO for chart documentation. 12/5/2024  16:21 EST    Tom Meehan DO  12/05/24  16:05 EST    CURRENT & DISCONTINUED MEDICATIONS  Current Outpatient Medications   Medication Instructions    albuterol sulfate  (90 Base) MCG/ACT inhaler 2 puffs, Inhalation, Every 4 Hours PRN    Blood Pressure Monitoring (Comfort Touch BP Cuff/Medium) misc 1 each, Not Applicable, Take As Directed    cloNIDine (CATAPRES) 0.3 mg, Oral, Nightly    fluticasone (FLONASE) 50 MCG/ACT nasal spray 2 sprays, Each Nare, Daily    Fluticasone-Umeclidin-Vilant (Trelegy Ellipta) 100-62.5-25 MCG/ACT inhaler 1 puff, Inhalation, Daily - RT    ipratropium-albuterol (DUO-NEB) 0.5-2.5 mg/3 ml nebulizer 3 mL, Nebulization, Every 4 Hours PRN    losartan (COZAAR) 100 mg, Oral, Daily    meloxicam (MOBIC) 15 mg, Oral, Daily    metFORMIN ER (GLUCOPHAGE-XR) 1,000 mg, Oral, Daily With Breakfast    metoprolol succinate XL (TOPROL-XL) 25 mg, Oral, Daily    tiZANidine (ZANAFLEX) 2 mg, Oral, Nightly PRN    venlafaxine XR (EFFEXOR XR) 37.5 mg, Oral, Daily        Medications Discontinued During This Encounter   Medication Reason    losartan (Cozaar) 50 MG tablet     benzonatate (TESSALON) 100 MG capsule     predniSONE (DELTASONE) 10 MG tablet     methylPREDNISolone (MEDROL) 4 MG dose pack     traMADol (ULTRAM) 50 MG tablet     DULoxetine (Cymbalta) 30 MG capsule     cloNIDine (CATAPRES) 0.3 MG tablet Reorder

## 2025-01-13 ENCOUNTER — TELEPHONE (OUTPATIENT)
Dept: BEHAVIORAL HEALTH | Facility: CLINIC | Age: 60
End: 2025-01-13
Payer: MEDICAID

## 2025-01-13 ENCOUNTER — TELEMEDICINE (OUTPATIENT)
Dept: FAMILY MEDICINE CLINIC | Facility: TELEHEALTH | Age: 60
End: 2025-01-13
Payer: MEDICAID

## 2025-01-13 DIAGNOSIS — B34.9 VIRAL ILLNESS: Primary | ICD-10-CM

## 2025-01-13 PROCEDURE — 1159F MED LIST DOCD IN RCRD: CPT | Performed by: NURSE PRACTITIONER

## 2025-01-13 PROCEDURE — 99213 OFFICE O/P EST LOW 20 MIN: CPT | Performed by: NURSE PRACTITIONER

## 2025-01-13 PROCEDURE — 1160F RVW MEDS BY RX/DR IN RCRD: CPT | Performed by: NURSE PRACTITIONER

## 2025-01-13 RX ORDER — PROMETHAZINE HYDROCHLORIDE 12.5 MG/1
12.5 TABLET ORAL EVERY 6 HOURS PRN
Qty: 15 TABLET | Refills: 0 | Status: SHIPPED | OUTPATIENT
Start: 2025-01-13

## 2025-01-13 NOTE — LETTER
January 13, 2025     Patient: Yenni Camacho   YOB: 1965   Date of Visit: 1/13/2025       To Whom It May Concern:    It is my medical opinion that Yenni Camacho may return to work on Thursday 11/16/2025.       Sincerely,    LETITIA Cabrales     URGENT CARE VIDEO VISIT PROVIDER    CC: No Recipients

## 2025-01-13 NOTE — PATIENT INSTRUCTIONS
Drink plenty of water  Over the counter pain relievers okay   If symptoms do not improve in 3-5 days follow up with your primary care provider or urgent care  If symptoms worsen follow up with urgent care or the emergency room    Drink plenty of fluids   Clear liquid diet until no longer vomiting. Stay away from fatty and fried foods and milk products until diarrhea resolves.   If symptoms do not improve in 3 days follow up with your primary care provider or urgent care  If you develop abdominal pain or more severe symptoms, go to the emergency department.     This is likely a viral illness. Viral illnesses do not respond to antibiotics. It is best to treat viruses with rest and drinking plenty of fluids. Over the counter medications can help ease symptoms.       Viral Gastroenteritis, Adult    Viral gastroenteritis is also known as the stomach flu. This condition may affect your stomach, your small intestine, and your large intestine. It can cause sudden watery poop (diarrhea), fever, and vomiting. This condition is caused by certain germs (viruses). These germs can be passed from person to person very easily (are contagious).  Having watery poop and vomiting can make you feel weak and cause you to not have enough water in your body (get dehydrated). This can make you tired and thirsty, make you have a dry mouth, and make it so you pee (urinate) less often. It is important to replace the fluids that you lose from having watery poop and vomiting.  What are the causes?  You can get sick by catching germs from other people.  You can also get sick by:  Eating food, drinking water, or touching a surface that has the germs on it (is contaminated).  Sharing utensils or other personal items with a person who is sick.  What increases the risk?  Having a weak body defense system (immune system).  Living with one or more children who are younger than 2 years.  Living in a nursing home.  Going on cruise ships.  What are the  signs or symptoms?  Symptoms of this condition start suddenly. Symptoms may last for a few days or for as long as a week.  Common symptoms include:  Watery poop.  Vomiting.  Other symptoms include:  Fever.  Headache.  Feeling tired (fatigue).  Pain in the belly (abdomen).  Chills.  Feeling weak.  Feeling like you may vomit (nauseous).  Muscle aches.  Not feeling hungry.  How is this treated?  This condition typically goes away on its own. The focus of treatment is to replace the fluids that you lose. This condition may be treated with:  An ORS (oral rehydration solution). This is a drink that helps you replace fluids and minerals your body lost. It is sold at pharmacies and stores.  Medicines to help with your symptoms.  Probiotic supplements to reduce symptoms of watery poop.  Fluids given through an IV tube, if needed.  Older adults and people with other diseases or a weak body defense system are at higher risk for not having enough water in the body.  Follow these instructions at home:  Eating and drinking    Take an ORS as told by your doctor.  Drink clear fluids in small amounts as you are able. Clear fluids include:  Water.  Ice chips.  Fruit juice that has water added to it (is diluted).  Low-calorie sports drinks.  Drink enough fluid to keep your pee (urine) pale yellow.  Eat small amounts of healthy foods every 3-4 hours as you are able. This may include whole grains, fruits, vegetables, lean meats, and yogurt.  Avoid fluids that have a lot of sugar or caffeine in them. This includes energy drinks, sports drinks, and soda.  Avoid spicy or fatty foods.  Avoid alcohol.  General instructions    Wash your hands often. This is very important after you have watery poop or you vomit. If you cannot use soap and water, use hand .  Make sure that all people in your home wash their hands well and often.  Take over-the-counter and prescription medicines only as told by your doctor.  Rest at home while you get  better.  Watch your condition for any changes.  Take a warm bath to help with any burning or pain from having watery poop.  Keep all follow-up visits.  Contact a doctor if:  You cannot keep fluids down.  Your symptoms get worse.  You have new symptoms.  You feel light-headed or dizzy.  You have muscle cramps.  Get help right away if:  You have chest pain.  You have trouble breathing, or you are breathing very fast.  You have a fast heartbeat.  You feel very weak or you faint.  You have a very bad headache, a stiff neck, or both.  You have a rash.  You have very bad pain, cramping, or bloating in your belly.  Your skin feels cold and clammy.  You feel mixed up (confused).  You have pain when you pee.  You have signs of not having enough water in the body, such as:  Dark pee, hardly any pee, or no pee.  Cracked lips.  Dry mouth.  Sunken eyes.  Feeling very sleepy.  Feeling weak.  You have signs of bleeding, such as:  You see blood in your vomit.  Your vomit looks like coffee grounds.  You have bloody or black poop or poop that looks like tar.  These symptoms may be an emergency. Get help right away. Call 911.  Do not wait to see if the symptoms will go away.  Do not drive yourself to the hospital.  Summary  Viral gastroenteritis is also known as the stomach flu.  This condition can cause sudden watery poop (diarrhea), fever, and vomiting.  These germs can be passed from person to person very easily.  Take an ORS (oral rehydration solution) as told by your doctor. This is a drink that is sold at pharmacies and stores.  Wash your hands often, especially after having watery poop or vomiting. If you cannot use soap and water, use hand .  This information is not intended to replace advice given to you by your health care provider. Make sure you discuss any questions you have with your health care provider.  Document Revised: 10/17/2022 Document Reviewed: 10/17/2022  [employer] Patient Education © 2024 [employer]

## 2025-01-13 NOTE — PROGRESS NOTES
CHIEF COMPLAINT  Chief Complaint   Patient presents with    GI Problem         HPI  Yenni Camacho is a 59 y.o. female  presents with complaint of nausea and cold symptoms. Nausea started last night and today nasal congestion and fatigue. She states she has been exposed to illnesses at work.     Review of Systems   Constitutional:  Positive for fatigue. Negative for chills, diaphoresis and fever.   HENT:  Positive for congestion, postnasal drip, rhinorrhea and sinus pressure. Negative for sinus pain, sneezing and sore throat.    Respiratory:  Negative for cough, chest tightness and wheezing.    Cardiovascular:  Negative for chest pain.   Gastrointestinal:  Positive for diarrhea (2 watery stools) and nausea. Negative for abdominal distention, abdominal pain, anal bleeding, blood in stool, constipation and vomiting.   Musculoskeletal:  Negative for myalgias.   Neurological:  Positive for headaches.       Past Medical History:   Diagnosis Date    ADHD     Anxiety     Arthritis     Asthma, extrinsic     Bunion     Chronic pain disorder     Diabetes mellitus     Emphysema of lung     Foot pain, bilateral     Hypertension     Pneumonia     PTSD (post-traumatic stress disorder)     Rheumatoid arthritis        Family History   Problem Relation Age of Onset    ADD / ADHD Mother     Anxiety disorder Mother     Emphysema Paternal Grandfather        Social History     Socioeconomic History    Marital status:    Tobacco Use    Smoking status: Former     Current packs/day: 0.00     Average packs/day: 2.0 packs/day for 39.7 years (79.4 ttl pk-yrs)     Types: Cigarettes     Start date: 1978     Quit date: 2018     Years since quittin.7     Passive exposure: Past    Smokeless tobacco: Never    Tobacco comments:     Worse thing i ever did!   Vaping Use    Vaping status: Former    Substances: THC    Devices: [email or web address]   Substance and Sexual Activity    Alcohol use: Not Currently     Drug use: Not Currently     Frequency: 7.0 times per week     Types: Hydrocodone, Marijuana     Comment: I had a MM license in [place].    Sexual activity: Not Currently     Partners: Male         There were no vitals taken for this visit.    PHYSICAL EXAM  Physical Exam   Constitutional: She is oriented to person, place, and time. She appears well-developed and well-nourished. She does not have a sickly appearance. She does not appear ill. No distress.   HENT:   Head: Normocephalic and atraumatic.   Nose: Rhinorrhea and congestion present.   Eyes: EOM are normal.   Neck: Neck normal appearance.  Pulmonary/Chest: Effort normal.  No respiratory distress.  Neurological: She is alert and oriented to person, place, and time.   Skin: Skin is dry.   Psychiatric: She has a normal mood and affect.           Diagnoses and all orders for this visit:    1. Viral illness (Primary)    Other orders  -     promethazine (PHENERGAN) 12.5 MG tablet; Take 1 tablet by mouth Every 6 (Six) Hours As Needed for Nausea or Vomiting.  Dispense: 15 tablet; Refill: 0        Mode of visit: Video   Myself and Yenni Camacho participated in this visit. The patient is located in [address] [place] [address]. I am located in [place], [place]. Valon Lasershart and [employer] were utilized.   You have chosen to receive care through a telehealth visit.     Does the patient consent to use a video/audio connection for your medical care today? Yes       Note Disclaimer: At [facility], we believe that sharing information builds trust and better   relationships. You are receiving this note because you recently visited [facility]. It is possible you   will see health information before a provider has talked with you about it. This kind of information can   be easy to misunderstand. To help you fully understand what it means for your health, we urge you to   discuss this note with your provider.    Kimberly Siegel, APRN  01/13/2025  15:55  EST

## 2025-01-15 ENCOUNTER — OFFICE VISIT (OUTPATIENT)
Dept: BEHAVIORAL HEALTH | Facility: CLINIC | Age: 60
End: 2025-01-15
Payer: MEDICAID

## 2025-01-15 ENCOUNTER — CLINICAL SUPPORT (OUTPATIENT)
Dept: INTERNAL MEDICINE | Facility: CLINIC | Age: 60
End: 2025-01-15
Payer: MEDICAID

## 2025-01-15 VITALS
HEART RATE: 90 BPM | WEIGHT: 136 LBS | HEIGHT: 67 IN | BODY MASS INDEX: 21.35 KG/M2 | DIASTOLIC BLOOD PRESSURE: 112 MMHG | SYSTOLIC BLOOD PRESSURE: 194 MMHG

## 2025-01-15 DIAGNOSIS — F41.1 GENERALIZED ANXIETY DISORDER: ICD-10-CM

## 2025-01-15 DIAGNOSIS — F41.0 PANIC ATTACKS: ICD-10-CM

## 2025-01-15 DIAGNOSIS — F33.1 MODERATE EPISODE OF RECURRENT MAJOR DEPRESSIVE DISORDER: Primary | ICD-10-CM

## 2025-01-15 DIAGNOSIS — F33.1 MODERATE RECURRENT MAJOR DEPRESSION: Primary | ICD-10-CM

## 2025-01-15 DIAGNOSIS — Z79.899 MEDICATION MANAGEMENT: ICD-10-CM

## 2025-01-15 LAB
AMPHET+METHAMPHET UR QL: NEGATIVE
AMPHETAMINE INTERNAL CONTROL: NORMAL
AMPHETAMINES UR QL: NEGATIVE
BARBITURATE INTERNAL CONTROL: NORMAL
BARBITURATES UR QL SCN: NEGATIVE
BENZODIAZ UR QL SCN: NEGATIVE
BENZODIAZEPINE INTERNAL CONTROL: NORMAL
BUPRENORPHINE INTERNAL CONTROL: NORMAL
BUPRENORPHINE SERPL-MCNC: NEGATIVE NG/ML
CANNABINOIDS SERPL QL: NEGATIVE
COCAINE INTERNAL CONTROL: NORMAL
COCAINE UR QL: NEGATIVE
EXPIRATION DATE: NORMAL
Lab: NORMAL
MDMA (ECSTASY) INTERNAL CONTROL: NORMAL
MDMA UR QL SCN: NEGATIVE
METHADONE INTERNAL CONTROL: NORMAL
METHADONE UR QL SCN: NEGATIVE
METHAMPHETAMINE INTERNAL CONTROL: NORMAL
MORPHINE INTERNAL CONTROL: NORMAL
MORPHINE/OPIATES SCREEN, URINE: NEGATIVE
OXYCODONE INTERNAL CONTROL: NORMAL
OXYCODONE UR QL SCN: NEGATIVE
PCP UR QL SCN: NEGATIVE
PHENCYCLIDINE INTERNAL CONTROL: NORMAL
THC INTERNAL CONTROL: NORMAL

## 2025-01-15 PROCEDURE — 80305 DRUG TEST PRSMV DIR OPT OBS: CPT | Performed by: INTERNAL MEDICINE

## 2025-01-15 RX ORDER — MIRTAZAPINE 7.5 MG/1
7.5 TABLET, FILM COATED ORAL NIGHTLY
Qty: 30 TABLET | Refills: 1 | Status: SHIPPED | OUTPATIENT
Start: 2025-01-15

## 2025-01-15 RX ORDER — ALPRAZOLAM 0.25 MG/1
0.25 TABLET ORAL DAILY PRN
Qty: 10 TABLET | Refills: 0 | Status: SHIPPED | OUTPATIENT
Start: 2025-01-15 | End: 2026-01-15

## 2025-01-15 NOTE — PROGRESS NOTES
"[facility]/Psychiatry  Medication Management Follow-up      Record Review is below for 01/15/2025 :   11/8/2024CBC and CMP are reassuring  EKG Results:  5/2/2024QTc is 476  Head Imaging:  CT Head Without Contrast (04/12/2022 07:31)  IMPRESSION:                 1. Subtle area of low attenuation in the right parietal white matter which may be ischemic in   nature.  Correlate with the patient's clinical findings.  MRI of the brain may be helpful for   further characterization.  Vital Signs:   BP (!) 194/112   Pulse 90   Ht 170.2 cm (67.01\")   Wt 61.7 kg (136 lb)   BMI 21.30 kg/m²     Chief Complaint: Anxiety. Panic Attacks.     History of Present Illness:   Yenni Camacho is a 59 y.o. female who presents today for follow-up and medication management for:    ICD-10-CM ICD-9-CM   1. Moderate episode of recurrent major depressive disorder  F33.1 296.32   2. Generalized anxiety disorder  F41.1 300.02   3. Medication management  Z79.899 V58.69   4. Panic attacks  F41.0 300.01       01/15/2025 Patient is taking medications as prescribed and is tolerating them well.   Depression and Anxiety   Progression of symptoms, frequency, and intensity is worsening. Patient continues to experience feelings of sadness, low mood, lost of interest in usual activities, anhedonia, crying spells, feeling worthless, guilty, hopelessness, inability to focus and concentrate that may interfere with daily tasks,  brain fog, psychomotor agitation, excessive anxiety and worry, anxiety, difficulty controlling the worry, restlessness, feeling keyed up or on edge, irritability, muscle tension, panic attacks, decreased motivation and these symptoms are causing significant distress or impairment. Denies thinking about death and dying, suicidal ideation, planning, or intent to self-harm.  Denies AVH.  Clinically significant distress or impairment in social, occupational, or other important areas of functioning is worsening.  Panic " Attack  The current episode started more than 1 year ago. The problem occurs especially when she has to go out into public, agoraphobia. The problem has been worsening. Associated symptoms include sense of impending doom, unbearable foreboding that something terrible is going to happen, intense fear of losing control, palpitations, racing/pounding heartbeat, chest discomfort, shortness of breath, choking sensation, detachment, depersonalization, dissociation, derealization, and numbness or tingling sensations. Treatments tried: hydroxyzine, propanolol, clonidine, buspirone, alprazolam (reports alprazolam was most effective).  CBT/Supportive  Allowed patient to process topics such as several financial stressors. Electric may get turned off soon, credit card company has sued her for past payments, collections.Severe anxiety, now her wages are being garnished, afraid to go anywhere public. Individual psychotherapy was provided utilizing solution focused techniques to restore adaptive functioning, provide symptom relief, discuss values and strengths, manage stress, identify triggers, recognize patterns of behavior, acknowledge sources of feelings and behaviors, assess symptoms, provide support, and discuss interpersonal conflicts. The therapeutic alliance was strengthened to encourage the patient to express their thoughts and feelings.       2024   Would like to target anxiety, depression. Further discuss ADHD at future encounter.   Depression  Experienced several losses, her son  in MVA, boyfriend  from cancer, mother  with COVID. Patient endorses gradually worsening feelings of sadness, low mood, and loss of interest in usual activities. These feelings are accompanied by anhedonia, change in appetite, losing or gaining weight, sleeping too much or not sleeping well (insomnia), fatigue and low energy most days, feeling worthless, guilty, and hopeless. Describes an inability to focus and concentrate  that may interfere with daily tasks at home, work, or school. Movements that are unusually slow or agitated (a change which is often noticeable to others). Denies thinking about death and dying, suicidal ideation, planning, or intent to self-harm. These symptoms cause the patient clinically significant distress or impairment in social, occupational, or other important areas of functioning.  PHQ-9 is 13.  Generalized Anxiety Disorder (BEST)   Patient experiences excessive anxiety and worry (apprehensive expectation), occurring more days than not for at least 6 months, about a number of events or activities (such as work or school performance). Finds it difficult to control the worry. The anxiety and worry are associated with restlessness or feeling keyed up or on edge, being easily fatigued, difficulty concentrating or mind going blank, irritability, muscle tension, and sleep disturbance (difficulty falling or staying asleep, or restless, unsatisfying sleep). The anxiety, worry, or physical symptoms cause clinically significant distress or impairment in social, occupational, or other important areas of functioning.   BEST-7 is 21.  Start Cymbalta 30 mg daily  Follow-up 1 month    Per Referring Provider 9/12/2024 Attention deficit hyperactivity disorder (ADHD), unspecified ADHD type  -     Ambulatory Referral to Behavioral Health    Past Psychiatric History:  Began Treatment: Years ago  Diagnoses: Anxiety, ADHD  Psychiatrist: Yes  Therapist: Yes  Admission History: Denies  Medication Trials: effexor XR, paxil, zoloft, prozac, ritalin, wellbutrin, buspar, Adderall, xanax, cymbalta, atomoxetine, escitalopram, ambien, seroquel  Family history suicide attempts: Denies  Family history suicide completions: Denies  Suicide Attempts: Denies  Self Harm: Denies  Substance use/abuse: Smokes delta8 for chronic pain, had medical marijuana card in [place]  Withdrawal Symptoms: Not applicable  Longest Period Sober: Not applicable  AA: Not  applicable  Trauma/Childhood/ACE: **  Access to Firearms: Denies    Safety/Risk Assessment: Risk of self-harm acutely and chronically is mild to moderate.    Static/Dynamic risk factors include diagnosis of mental disorder, psychosocial stressors,Recent stressor or loss and Social factors.      MENTAL STATUS EXAM   General Appearance:  Cleanly groomed and dressed and well developed  Eye Contact:  Fair  Attitude:  Negativistic and candid  Motor Activity:  Psychomotor agitated, hyperactive and restless  Speech:  Normal rate, tone, volume  Mood and affect:  Frustrated, anxious, dysthymic and irritable  Hopelessness:  Denies  Thought Process:  Circumstantial  Associations/ Thought Content:  No delusions  Hallucinations:  None  Suicidal Ideations:  Not present  Homicidal Ideation:  Not present  Sensorium:  Alert  Orientation:  Person, place, time and situation  Immediate Recall, Recent, and Remote Memory:  Intact  Attention Span/ Concentration:  Good  Fund of Knowledge:  Appropriate for age and educational level  Intellectual Functioning:  Average range  Insight:  Fair  Judgement:  Fair  Reliability:  Fair  Impulse Control:  Good       Review of systems is negative except as noted in HPI.  Labs:  WBC   Date Value Ref Range Status   11/08/2024 5.61 3.40 - 10.80 10*3/mm3 Final     Platelets   Date Value Ref Range Status   11/08/2024 309 140 - 450 10*3/mm3 Final     Hemoglobin   Date Value Ref Range Status   11/08/2024 12.8 12.0 - 15.9 g/dL Final     Hematocrit   Date Value Ref Range Status   11/08/2024 42.1 34.0 - 46.6 % Final     Glucose   Date Value Ref Range Status   11/08/2024 98 65 - 99 mg/dL Final     Creatinine   Date Value Ref Range Status   11/08/2024 0.69 0.57 - 1.00 mg/dL Final     ALT (SGPT)   Date Value Ref Range Status   11/08/2024 15 1 - 33 U/L Final     AST (SGOT)   Date Value Ref Range Status   11/08/2024 20 1 - 32 U/L Final     BUN   Date Value Ref Range Status   11/08/2024 13 8 - 23 mg/dL Final     eGFR    Date Value Ref Range Status   11/08/2024 99.5 >60.0 mL/min/1.73 Final     Total Cholesterol   Date Value Ref Range Status   10/10/2024 242 (H) 0 - 200 mg/dL Final     Triglycerides   Date Value Ref Range Status   10/10/2024 69 0 - 150 mg/dL Final     HDL Cholesterol   Date Value Ref Range Status   10/10/2024 72 (H) 40 - 60 mg/dL Final     LDL Cholesterol    Date Value Ref Range Status   10/10/2024 158 (H) 0 - 100 mg/dL Final     VLDL Cholesterol   Date Value Ref Range Status   10/10/2024 12 5 - 40 mg/dL Final     LDL/HDL Ratio   Date Value Ref Range Status   10/10/2024 2.17  Final     Hemoglobin A1C   Date Value Ref Range Status   10/10/2024 6.90 (H) 4.80 - 5.60 % Final     TSH   Date Value Ref Range Status   03/15/2023 0.667 0.270 - 4.200 uIU/mL Final      Pain Management Panel  More data exists         Latest Ref Rng & Units 1/15/2025 10/10/2024   Pain Management Panel   Creatinine, Urine mg/dL - 6.3    Amphetamine, Urine Qual Negative Negative  -   Barbiturates Screen, Urine Negative Negative  -   Benzodiazepine Screen, Urine Negative Negative  -   Buprenorphine, Screen, Urine Negative Negative  -   Cocaine Screen, Urine Negative Negative  -   Methadone Screen , Urine Negative Negative  -   Methamphetamine, Ur Negative Negative  -      Details                  Imaging Results:  XR Chest 1 View    Result Date: 11/8/2024  Impression: Emphysematous changes. Biapical scarring/fibrosis. Electronically Signed: Freedom Roblero MD  11/8/2024 5:18 PM EST  Workstation ID: [id number]    Current Medications:   Current Outpatient Medications   Medication Sig Dispense Refill    albuterol sulfate  (90 Base) MCG/ACT inhaler Inhale 2 puffs Every 4 (Four) Hours As Needed for Wheezing. 18 g 0    Blood Pressure Monitoring (Comfort Touch BP Cuff/Medium) misc Use 1 each Take As Directed. 1 each 0    cloNIDine (CATAPRES) 0.3 MG tablet Take 1 tablet by mouth Every Night. 90 tablet 1    fluticasone (FLONASE) 50 MCG/ACT nasal spray 2  sprays by Each Nare route Daily. 16 g 1    Fluticasone-Umeclidin-Vilant (Trelegy Ellipta) 100-62.5-25 MCG/ACT inhaler Inhale 1 puff Daily. 60 each 1    ipratropium-albuterol (DUO-NEB) 0.5-2.5 mg/3 ml nebulizer Take 3 mL by nebulization Every 4 (Four) Hours As Needed for Wheezing. 360 mL 0    losartan (Cozaar) 100 MG tablet Take 1 tablet by mouth Daily. 90 tablet 1    meloxicam (MOBIC) 15 MG tablet Take 1 tablet by mouth Daily. 30 tablet 0    metFORMIN ER (GLUCOPHAGE-XR) 500 MG 24 hr tablet Take 2 tablets by mouth Daily With Breakfast. 180 tablet 0    metoprolol succinate XL (TOPROL-XL) 25 MG 24 hr tablet TAKE 1 TABLET BY MOUTH DAILY 90 tablet 0    promethazine (PHENERGAN) 12.5 MG tablet Take 1 tablet by mouth Every 6 (Six) Hours As Needed for Nausea or Vomiting. 15 tablet 0    tiZANidine (ZANAFLEX) 2 MG tablet Take 1 tablet by mouth At Night As Needed for Muscle Spasms. 30 tablet 0    ALPRAZolam (Xanax) 0.25 MG tablet Take 1 tablet by mouth Daily As Needed for Anxiety (Panic Attacks). 10 tablet 0    mirtazapine (REMERON) 7.5 MG tablet Take 1 tablet by mouth Every Night. 30 tablet 1     No current facility-administered medications for this visit.     Problem List:  Patient Active Problem List   Diagnosis    Respiratory failure    Severe malnutrition    Chronic systolic heart failure    Acute respiratory failure with hypoxia and hypercapnia    COPD with acute exacerbation    NICM (nonischemic cardiomyopathy)    Cervical spondylosis without myelopathy    Lumbosacral spondylosis without myelopathy    Hypoxia    Influenza A    Nasal congestion    Acute on chronic respiratory failure with hypoxia    Moderate recurrent major depression    DDD (degenerative disc disease), cervical    Coronary artery disease involving native coronary artery of native heart without angina pectoris    Pulmonary emphysema    Facet arthropathy, lumbar     Allergy:   Allergies   Allergen Reactions    Lexapro [Escitalopram] Mental Status Change      Worsened depression     Breztri Aerosphere [Budeson-Glycopyrrol-Formoterol] Other (See Comments)     Blisters in mouth    Ondansetron Headache    Paxlovid [Nirmatrelvir-Ritonavir] Unknown - Low Severity    Wellbutrin [Bupropion] Unknown - Low Severity      Discontinued Medications:  Medications Discontinued During This Encounter   Medication Reason    venlafaxine XR (Effexor XR) 37.5 MG 24 hr capsule Patient Reported Not Taking       PLAN:   Presentation seems most consistent with DSM-V criteria for:  Diagnoses and all orders for this visit:    1. Moderate episode of recurrent major depressive disorder (Primary)  -     Ambulatory Referral to Social Care Services (Amb Case Mgmt)  -     mirtazapine (REMERON) 7.5 MG tablet; Take 1 tablet by mouth Every Night.  Dispense: 30 tablet; Refill: 1    2. Generalized anxiety disorder  -     Ambulatory Referral to Social Care Services (Amb Case Mgmt)  -     mirtazapine (REMERON) 7.5 MG tablet; Take 1 tablet by mouth Every Night.  Dispense: 30 tablet; Refill: 1    3. Medication management  -     POC Medline 12 Panel Urine Drug Screen    4. Panic attacks  -     ALPRAZolam (Xanax) 0.25 MG tablet; Take 1 tablet by mouth Daily As Needed for Anxiety (Panic Attacks).  Dispense: 10 tablet; Refill: 0       Discontinue Cymbalta   Start mirtazapine 7.5 mg at bedtime  Start alprazolam 0.25 mg daily as needed for anxiety/panic attacks  UDS  CSA  Follow-up 1 month  Medication Education:   MIRTAZAPINE (REMERON) Risks, benefits, alternatives discussed with patient including GI upset, sedation, dizziness with falls risk, increased appetite.  After discussion of these risks and benefits, the patient voiced understanding and agreed to proceed.  XANAX (ALPRAZOLAM)  Risks, benefits, and alternatives discussed with patient including sedation/falls risk, dizziness, disinhibition, headache, fatigue, dry mouth, blurred vision, constipation, nausea, diarrhea, heartburn, unusual taste in mouth, urinary  retention, increased appetite, restlessness, sexual dysfunction, sweating, weight gain, cardiac arrhythmias, seizures, and fall risk.  After discussion of these risks and benefits, patient voiced understanding and agreed to proceed.  Nate reviewed, UDS ordered, and controlled substance agreement signed & witnessed.    Medications:   New Medications Ordered This Visit   Medications    mirtazapine (REMERON) 7.5 MG tablet     Sig: Take 1 tablet by mouth Every Night.     Dispense:  30 tablet     Refill:  1    ALPRAZolam (Xanax) 0.25 MG tablet     Sig: Take 1 tablet by mouth Daily As Needed for Anxiety (Panic Attacks).     Dispense:  10 tablet     Refill:  0      NATE reviewed.   Discussed medication options and treatment plan of prescribed medication as well as the risks, benefits, and side effects.  Patient is agreeable to call the office with any worsening of symptoms or onset of side effects.   Patient is agreeable to call 911 or go to the nearest ER should he/she begin having SI/HI.   Patient acknowledged, is agreeable to continue with current treatment plan, and was educated on the importance of compliance with treatment and follow-up appointments.  Addressed all questions and concerns.     Psychotherapy:      Psychotherapy time 40 minutes.  This time is exclusive to the therapy session and separate from the time spent on activities used to meet the criteria for the E/M service (history, exam, medical decision-making).  Goal is to strengthen defenses, promote problems solving, restore adaptive functioning, and provide symptom relief. Esteem building was enhanced through praise, reassurance, normalizing and encouragement. Coping skills were enhanced to build distress tolerance skills and emotional regulation. Allowed patient to freely discuss issues without interruption or judgement with unconditional positive regard, active listening skills, and empathy. Provided a safe, confidential environment to facilitate  the development of a positive therapeutic relationship and encourage open, honest communication. Assisted patient in processing session content, acknowledged and normalized patient’s thoughts, feelings, and concerns by utilizing a person-centered approach in efforts to build appropriate rapport and a positive therapeutic relationship with open and honest communication. Plan to continue supportive psychotherapy in next appointment to provide symptom relief.    Functional status: Serious symptoms OR any serious impairment in social, occupational, or school functioning (41-50)  Prognosis: Fair with expectation for some response to treatment  Progress: worsening      Follow-up: Return in about 2 months (around 3/15/2025).     This document has been electronically signed by LETITIA Solis  January 29, 2025 16:43 EST  Please note that portions of this note were completed with a voice recognition program.  Copied text in this note has been reviewed and is accurate as of 01/29/25

## 2025-01-15 NOTE — PROGRESS NOTES
Patient presented to office for POC UDS per Parul Wolff. Results are entered into chart and provider is notified.

## 2025-01-16 ENCOUNTER — REFERRAL TRIAGE (OUTPATIENT)
Age: 60
End: 2025-01-16
Payer: MEDICAID

## 2025-01-16 ENCOUNTER — PATIENT OUTREACH (OUTPATIENT)
Age: 60
End: 2025-01-16
Payer: MEDICAID

## 2025-01-16 NOTE — OUTREACH NOTE
Social Work Assessment  Questions/Answers      Flowsheet Row Most Recent Value   Referral Source physician   Reason for Consult community resources, financial concerns   Preferred Language English   Advance Care Planning Reviewed no concerns identified, questions answered   Decision Making Considerations patient/family ability to make health care decisions   People in Home alone   Current Living Arrangements home   In the past 12 months has the electric, gas, oil, or water company threatened to shut off services in your home? No   Primary Care Provided by self   Family Caregiver if Needed friend(s)   Quality of Family Relationships helpful   Source of Income salary/wages   Application for Public Assistance obtained public assistance pending number          SDOH updated and reviewed with the patient during this program:  --     Disabilities: Not At Risk (5/2/2024)    Disabilities     Concentrating, Remembering, or Making Decisions Difficulty: no     Doing Errands Independently Difficulty: no      --     Employment: Not At Risk (5/2/2024)    Employment     Do you want help finding or keeping work or a job?: I do not need or want help      Financial Resource Strain: Low Risk  (5/2/2024)    Overall Financial Resource Strain (CARDIA)     Difficulty of Paying Living Expenses: Not hard at all      --     Food Insecurity: No Food Insecurity (5/2/2024)    Hunger Vital Sign     Worried About Running Out of Food in the Last Year: Never true     Ran Out of Food in the Last Year: Never true      --     Housing Stability: Not At Risk (1/16/2025)    Housing Stability     Current Living Arrangements: home     Potentially Unsafe Housing Conditions: none      --     Transportation Needs: No Transportation Needs (5/2/2024)    PRAPARE - Transportation     Lack of Transportation (Medical): No     Lack of Transportation (Non-Medical): No      --     Utilities: Not At Risk (1/16/2025)    [facility] Utilities     Threatened with loss of  utilities: No      Continuing Care   [facility] SOCIAL SECURITY DISABILITY OFFICES    [address] , [place] [address]    Phone: [phone]    Request Status: Pending - No Request Sent    Services: Disability Benefits    Resource for: Disabilities, Financial Resource Strain, Utilities     Karen HARVEY -   Ambulatory Case Management    1/16/2025, 14:02 EST

## 2025-01-29 NOTE — PATIENT INSTRUCTIONS
1.  Please return to clinic at your next scheduled visit.  Please contact the clinic ([phone]) at least 24 hours prior in the event you need to cancel.  2.  Do no harm to yourself or others.    3.  Avoid alcohol and drugs.    4.  Take all medications as prescribed.  Please contact the clinic with any concerns. If you are in need of medication refills, please call the clinic at [phone].    5. Should you want to get in touch with your provider, LETITIA Solis, please contact the office ([phone]), and staff will be able to page Parul directly.  6. In the event you have personal crisis, contact the following crisis numbers: Suicide Prevention Hotline 1-839.248.5653; [facility] Helpline 2-331-679-WXSF; [facility] [phone]; text HELLO to 660664; or 545.     SPECIFIC RECOMMENDATIONS:     1.      Medications discussed at this encounter:     New Medications Ordered This Visit   Medications    mirtazapine (REMERON) 7.5 MG tablet     Sig: Take 1 tablet by mouth Every Night.     Dispense:  30 tablet     Refill:  1    ALPRAZolam (Xanax) 0.25 MG tablet     Sig: Take 1 tablet by mouth Daily As Needed for Anxiety (Panic Attacks).     Dispense:  10 tablet     Refill:  0                       2.      Psychotherapy recommendations: We will continue therapy at future visits.     3.     Return to clinic: Return in about 2 months (around 3/15/2025).

## 2025-02-04 ENCOUNTER — TELEPHONE (OUTPATIENT)
Dept: PSYCHIATRY | Facility: CLINIC | Age: 60
End: 2025-02-04
Payer: MEDICAID

## 2025-02-04 DIAGNOSIS — F41.1 GENERALIZED ANXIETY DISORDER: ICD-10-CM

## 2025-02-04 DIAGNOSIS — F33.1 MODERATE EPISODE OF RECURRENT MAJOR DEPRESSIVE DISORDER: ICD-10-CM

## 2025-02-04 RX ORDER — MIRTAZAPINE 15 MG/1
15 TABLET, FILM COATED ORAL NIGHTLY
Qty: 30 TABLET | Refills: 1 | Status: SHIPPED | OUTPATIENT
Start: 2025-02-04

## 2025-02-04 NOTE — TELEPHONE ENCOUNTER
PT NOTIFIED OF PROVIDERS MESSAGE VERBATIM VIA TELEPHONE.    UNDERSTANDING EXPRESSED.     NO FURTHER ACTION NEEDED AT THIS TIME.

## 2025-02-04 NOTE — TELEPHONE ENCOUNTER
PATIENT SAYS THAT THE SLEEPING MEDICATION THAT SHE WAS GIVEN IS HAVING THE OPPOSITE EFFECT AND KEEPING HER AWAKE

## 2025-02-04 NOTE — TELEPHONE ENCOUNTER
Please have patient to increase mirtazapine to 15 mg at bedtime. May double up, we will send increase dosage to pharmacy.

## 2025-02-11 ENCOUNTER — HOSPITAL ENCOUNTER (OUTPATIENT)
Dept: GENERAL RADIOLOGY | Facility: HOSPITAL | Age: 60
Discharge: HOME | End: 2025-02-11
Payer: MEDICAID

## 2025-02-11 DIAGNOSIS — M25.561 CHRONIC PAIN OF BOTH KNEES: ICD-10-CM

## 2025-02-11 DIAGNOSIS — M25.562 CHRONIC PAIN OF BOTH KNEES: ICD-10-CM

## 2025-02-11 DIAGNOSIS — G89.29 CHRONIC PAIN OF BOTH KNEES: ICD-10-CM

## 2025-02-11 PROCEDURE — 73562 X-RAY EXAM OF KNEE 3: CPT

## 2025-02-13 ENCOUNTER — OFFICE VISIT (OUTPATIENT)
Dept: FAMILY MEDICINE CLINIC | Facility: CLINIC | Age: 60
End: 2025-02-13
Payer: MEDICAID

## 2025-02-13 VITALS
HEART RATE: 85 BPM | HEIGHT: 67 IN | TEMPERATURE: 97.8 F | WEIGHT: 134.5 LBS | OXYGEN SATURATION: 96 % | SYSTOLIC BLOOD PRESSURE: 180 MMHG | BODY MASS INDEX: 21.11 KG/M2 | DIASTOLIC BLOOD PRESSURE: 112 MMHG

## 2025-02-13 DIAGNOSIS — I10 PRIMARY HYPERTENSION: Primary | ICD-10-CM

## 2025-02-13 DIAGNOSIS — F33.1 MODERATE RECURRENT MAJOR DEPRESSION: ICD-10-CM

## 2025-02-13 DIAGNOSIS — F90.9 ATTENTION DEFICIT HYPERACTIVITY DISORDER (ADHD), UNSPECIFIED ADHD TYPE: ICD-10-CM

## 2025-02-13 DIAGNOSIS — Z56.6 STRESS AT WORK: ICD-10-CM

## 2025-02-13 DIAGNOSIS — J43.9 PULMONARY EMPHYSEMA, UNSPECIFIED EMPHYSEMA TYPE: ICD-10-CM

## 2025-02-13 PROCEDURE — 99214 OFFICE O/P EST MOD 30 MIN: CPT | Performed by: STUDENT IN AN ORGANIZED HEALTH CARE EDUCATION/TRAINING PROGRAM

## 2025-02-13 PROCEDURE — 1125F AMNT PAIN NOTED PAIN PRSNT: CPT | Performed by: STUDENT IN AN ORGANIZED HEALTH CARE EDUCATION/TRAINING PROGRAM

## 2025-02-13 RX ORDER — AMLODIPINE BESYLATE 5 MG/1
5 TABLET ORAL DAILY
Qty: 30 TABLET | Refills: 1 | Status: SHIPPED | OUTPATIENT
Start: 2025-02-13

## 2025-02-13 RX ORDER — ATOMOXETINE 10 MG/1
10 CAPSULE ORAL DAILY
Qty: 30 CAPSULE | Refills: 1 | Status: SHIPPED | OUTPATIENT
Start: 2025-02-13

## 2025-02-13 RX ORDER — AMLODIPINE BESYLATE 10 MG/1
10 TABLET ORAL DAILY
Qty: 30 TABLET | Refills: 1 | Status: CANCELLED | OUTPATIENT
Start: 2025-02-13

## 2025-02-13 SDOH — HEALTH STABILITY - MENTAL HEALTH: OTHER PHYSICAL AND MENTAL STRAIN RELATED TO WORK: Z56.6

## 2025-02-13 NOTE — PROGRESS NOTES
Chief Complaint  Follow-up (Blood pressure high //)    Subjective      Yenni Camacho is a 59 y.o. female who presents to [facility]     History of Present Illness  The patient is a 59-year-old female presenting with hypertension, ADHD, anxiety, chronic pain, and memory issues. Accompanied by her friend.    Hypertension  - Reports persistent elevated BP despite daily antihypertensive medication.  - Has 3 clonidine tablets remaining.  - Significant stress due to recent job loss, attributing it to elevated BP.  - Recalled an episode of extremely high BP normalized within an hour after taking 2 rescue Xanax tablets.  - Does not consume salt.    ADHD and Anxiety  - History of ADHD, previously prescribed Xanax, found beneficial.  - Current behavioral health provider, LETITIA Moss, reluctant to prescribe stimulant due to high BP.  - Tried mirtazapine, resulted in insomnia and increased agitation.  - Previously on Adderall, found helpful, but believes Xanax was more effective.  - Not tried Zyprexa or olanzapine.  - Poor response to Wellbutrin and Lexapro, reported suicidal ideation.  - No therapy sought.  - Suspects bipolar disorder due to mood swings.    Chronic Pain  - Experiencing chronic pain in neck and knee, attributed to arthritis.  - Under care of [facility], no communication for the past week.  -She went to first appointment although was told she was late and to reschedule.  She has not received a call since that first visit  - Constant headaches, believes related to neck issues.    Memory Issues  - Experiencing memory issues, often misplacing items like wallet, spending hours searching.  - Difficulty maintaining focus during conversations.  - Friend concerned about cognitive issues, suggests applying for disability due to oxygen dependency and arthritis.    Supplemental Information: Accompanied by her friend.    SOCIAL HISTORY  Resigned from job at  "[employer].    FAMILY HISTORY  Mother had bipolar disorder.    MEDICATIONS  Current: clonidine, Xanax, mirtazapine  Past: Wellbutrin, Paxil, Lexapro       Objective   Vital Signs:   Vitals:    02/13/25 1331   BP: (!) 180/112   BP Location: Right arm   Patient Position: Sitting   Pulse: 85   Temp: 97.8 °F (36.6 °C)   TempSrc: Oral   SpO2: 96%   Weight: 61 kg (134 lb 8 oz)   Height: 170.2 cm (67.01\")     Body mass index is 21.06 kg/m².    Wt Readings from Last 3 Encounters:   02/13/25 61 kg (134 lb 8 oz)   01/15/25 61.7 kg (136 lb)   12/05/24 61.7 kg (136 lb 1.6 oz)     BP Readings from Last 3 Encounters:   02/13/25 (!) 180/112   01/15/25 (!) 194/112   12/05/24 (!) 190/100       Health Maintenance   Topic Date Due    COLORECTAL CANCER SCREENING  Never done    DIABETIC EYE EXAM  Never done    URINE MICROALBUMIN-CREATININE RATIO (uACR)  Never done    Pneumococcal Vaccine 50+ (1 of 2 - PCV) Never done    TDAP/TD VACCINES (1 - Tdap) Never done    MAMMOGRAM  Never done    ZOSTER VACCINE (1 of 2) Never done    PAP SMEAR  Never done    ANNUAL PHYSICAL  04/24/2024    COVID-19 Vaccine (1 - 2024-25 season) Never done    HEMOGLOBIN A1C  04/10/2025    LUNG CANCER SCREENING  05/02/2025    INFLUENZA VACCINE  03/31/2025 (Originally 7/1/2024)    LIPID PANEL  10/10/2025    HEPATITIS C SCREENING  Completed       Physical Exam  Constitutional:       General: She is not in acute distress.  HENT:      Head: Normocephalic and atraumatic.      Mouth/Throat:      Mouth: Mucous membranes are moist.   Eyes:      Extraocular Movements: Extraocular movements intact.   Cardiovascular:      Rate and Rhythm: Normal rate and regular rhythm.      Heart sounds: No murmur heard.  Pulmonary:      Effort: No respiratory distress.      Breath sounds: No wheezing, rhonchi or rales.   Abdominal:      General: Abdomen is flat.   Musculoskeletal:      Cervical back: Neck supple.   Skin:     General: Skin is warm and dry.   Neurological:      General: No focal " deficit present.      Mental Status: She is alert and oriented to person, place, and time. Mental status is at baseline.   Psychiatric:         Thought Content: Thought content normal.         Judgment: Judgment normal.      Comments: Extremely tearful through the entire visit          Physical Exam      Result Review :  The following data was reviewed by: Tom Meehan DO on 02/13/2025:    XR Knee 3+ View With Sunrise Left    Result Date: 2/11/2025  1.Mild bilateral medial tibiofemoral and lateral patellofemoral joint space narrowing, most likely related to mild osteoarthritis. 2.No radiographic findings of acute osseous knee abnormality. Electronically Signed: Rad Nett  2/11/2025 4:53 PM EST  Workstation ID: [id number]    XR Knee 3+ View With [place] Right    Result Date: 2/11/2025  1.Mild bilateral medial tibiofemoral and lateral patellofemoral joint space narrowing, most likely related to mild osteoarthritis. 2.No radiographic findings of acute osseous knee abnormality. Electronically Signed: Rad Alvarado  2/11/2025 4:53 PM EST  Workstation ID: [id number]    XR Chest 1 View    Result Date: 11/8/2024  Impression: Emphysematous changes. Biapical scarring/fibrosis. Electronically Signed: Freedom Roblero MD  11/8/2024 5:18 PM EST  Workstation ID: [id number]       Results  PHQ-9 Depression Screening  Little interest or pleasure in doing things? Several days   Feeling down, depressed, or hopeless? Several days   PHQ-2 Total Score 2   Trouble falling or staying asleep, or sleeping too much? Almost all   Feeling tired or having little energy? Almost all   Poor appetite or overeating? Not at all   Feeling bad about yourself - or that you are a failure or have let yourself or your family down? Not at all   Trouble concentrating on things, such as reading the newspaper or watching television? Almost all   Moving or speaking so slowly that other people could have noticed? Or the opposite - being so fidgety or restless that  you have been moving around a lot more than usual? Almost all   Thoughts that you would be better off dead, or of hurting yourself in some way? Not at all   PHQ-9 Total Score 14   If you checked off any problems, how difficult have these problems made it for you to do your work, take care of things at home, or get along with other people? Very difficult         BEST-7  Feeling nervous, anxious or on edge: Nearly every day  Not being able to stop or control worrying: Nearly every day  Worrying too much about different things: Nearly every day  Trouble Relaxing: Nearly every day  Being so restless that it is hard to sit still: Nearly every day  Feeling afraid as if something awful might happen: Nearly every day  Becoming easily annoyed or irritable: Nearly every day  BEST 7 Total Score: 21  If you checked any problems, how difficult have these problems made it for you to do your work, take care of things at home, or get along with other people: Extremely difficult       Procedures          Diagnoses and all orders for this visit:    1. Primary hypertension (Primary)  -     amLODIPine (NORVASC) 5 MG tablet; Take 1 tablet by mouth Daily.  Dispense: 30 tablet; Refill: 1    2. Moderate recurrent major depression    3. Attention deficit hyperactivity disorder (ADHD), unspecified ADHD type  -     atomoxetine (Strattera) 10 MG capsule; Take 1 capsule by mouth Daily.  Dispense: 30 capsule; Refill: 1    4. Pulmonary emphysema, unspecified emphysema type    5. Stress at work         Assessment & Plan  1. Hypertension.  BP remains elevated despite daily antihypertensive medication. Discussed potential impact of anxiety on hypertension. Adding amlodipine 5 mg to current regimen. Advised to monitor BP regularly and report significant changes.  Patient blood pressure did drop when she was on the Xanax.  A significant portion of her blood pressure may be due to her anxiety.  Will also try to treat her anxiety which may decrease the  blood pressure as well.    2. ADHD.  History of ADHD, previously on Adderall, found helpful. Stimulant medications not recommended due to high BP. Initiating atomoxetine 10 mg for ADHD symptoms.  Patient was hesitant at first but agreed to try this for 2 weeks.  This may help her ADHD as well as her anxiety.  Advised to monitor for side effects and report promptly.    3. Anxiety.  Significant anxiety, may contribute to hypertension.  Patient did resign from her previous job which was causing her a lot of stress.  Starting atomoxetine, which can help with anxiety. Advised to use Xanax as rescue medication for severe anxiety episodes, avoid regular use due to dependency risk and side effects.  Consider olanzapine as mirtazapine has not helped.    4. Chronic pain.  Reports chronic pain in neck and knee, likely due to arthritis. Advised to follow up with pain management specialist at [facility]. Contact office to expedite appointment if scheduling issues.    5. Memory issues.  Experiencing memory issues, often misplacing items like wallet, spending hours searching. Difficulty maintaining focus during conversations.  Most likely due to ADHD and severe depression with anxiety.    Follow-up in 2 weeks.    BMI is within normal parameters. No other follow-up for BMI required.         FOLLOW UP  Return in about 2 weeks (around 2/27/2025) for adhd, .  Patient was given instructions and counseling regarding her condition or for health maintenance advice. Please see specific information pulled into the AVS if appropriate.     Patient or patient representative verbalized consent for the use of Ambient Listening during the visit with  Tom Meehan DO for chart documentation. 2/13/2025  16:48 EST    Tom Meehan DO  02/13/25  16:46 EST    CURRENT & DISCONTINUED MEDICATIONS  Current Outpatient Medications   Medication Instructions    albuterol sulfate  (90 Base) MCG/ACT inhaler 2 puffs, Inhalation, Every 4  Hours PRN    ALPRAZolam (XANAX) 0.25 mg, Oral, Daily PRN    amLODIPine (NORVASC) 5 mg, Oral, Daily    atomoxetine (STRATTERA) 10 mg, Oral, Daily    Blood Pressure Monitoring (Comfort Touch BP Cuff/Medium) misc 1 each, Not Applicable, Take As Directed    cloNIDine (CATAPRES) 0.3 mg, Oral, Nightly    fluticasone (FLONASE) 50 MCG/ACT nasal spray 2 sprays, Each Nare, Daily    Fluticasone-Umeclidin-Vilant (Trelegy Ellipta) 100-62.5-25 MCG/ACT inhaler 1 puff, Inhalation, Daily - RT    ipratropium-albuterol (DUO-NEB) 0.5-2.5 mg/3 ml nebulizer 3 mL, Nebulization, Every 4 Hours PRN    losartan (COZAAR) 100 mg, Oral, Daily    meloxicam (MOBIC) 15 mg, Oral, Daily    metFORMIN ER (GLUCOPHAGE-XR) 1,000 mg, Oral, Daily With Breakfast    metoprolol succinate XL (TOPROL-XL) 25 mg, Oral, Daily    mirtazapine (REMERON) 15 mg, Oral, Nightly    promethazine (PHENERGAN) 12.5 mg, Oral, Every 6 Hours PRN    tiZANidine (ZANAFLEX) 2 mg, Oral, Nightly PRN       There are no discontinued medications.

## 2025-02-14 DIAGNOSIS — G89.29 CHRONIC BILATERAL LOW BACK PAIN WITHOUT SCIATICA: ICD-10-CM

## 2025-02-14 DIAGNOSIS — M50.30 DDD (DEGENERATIVE DISC DISEASE), CERVICAL: ICD-10-CM

## 2025-02-14 DIAGNOSIS — M54.50 CHRONIC BILATERAL LOW BACK PAIN WITHOUT SCIATICA: ICD-10-CM

## 2025-02-14 DIAGNOSIS — M25.561 CHRONIC PAIN OF BOTH KNEES: Primary | ICD-10-CM

## 2025-02-14 DIAGNOSIS — M47.816 FACET ARTHROPATHY, LUMBAR: ICD-10-CM

## 2025-02-14 DIAGNOSIS — G89.29 CHRONIC PAIN OF BOTH KNEES: Primary | ICD-10-CM

## 2025-02-14 DIAGNOSIS — F41.0 PANIC ATTACKS: ICD-10-CM

## 2025-02-14 DIAGNOSIS — M25.562 CHRONIC PAIN OF BOTH KNEES: Primary | ICD-10-CM

## 2025-02-14 RX ORDER — DICLOFENAC SODIUM 75 MG/1
75 TABLET, DELAYED RELEASE ORAL 2 TIMES DAILY
Qty: 28 TABLET | Refills: 0 | Status: SHIPPED | OUTPATIENT
Start: 2025-02-14

## 2025-02-14 NOTE — TELEPHONE ENCOUNTER
REFILL REQUEST:     ALPRAZolam (Xanax) 0.25 MG tablet (01/15/2025)     F/UP- 03/05/2025.  LOV: 01/15/2025.    LAST UDS:  POC Medline 12 Panel Urine Drug Screen (01/15/2025 16:29)

## 2025-02-15 DIAGNOSIS — F41.0 PANIC ATTACKS: ICD-10-CM

## 2025-02-15 RX ORDER — ALPRAZOLAM 0.25 MG/1
TABLET ORAL
Qty: 10 TABLET | Refills: 0 | Status: SHIPPED | OUTPATIENT
Start: 2025-02-15

## 2025-02-17 NOTE — TELEPHONE ENCOUNTER
DUPLICATE REFILL REQUEST:     ALPRAZolam (XANAX) 0.25 MG tablet (02/15/2025)    -THIS WAS ALREADY SENT INTO THE PHARMACY ON 02/15/2025.

## 2025-02-18 RX ORDER — ALPRAZOLAM 0.25 MG/1
0.25 TABLET ORAL DAILY PRN
Qty: 10 TABLET | Refills: 0 | OUTPATIENT
Start: 2025-02-18 | End: 2026-02-18

## 2025-02-21 DIAGNOSIS — K27.9 PEPTIC ULCER DISEASE: Primary | ICD-10-CM

## 2025-02-21 DIAGNOSIS — M50.30 DDD (DEGENERATIVE DISC DISEASE), CERVICAL: ICD-10-CM

## 2025-02-21 DIAGNOSIS — M54.50 CHRONIC BILATERAL LOW BACK PAIN WITHOUT SCIATICA: ICD-10-CM

## 2025-02-21 DIAGNOSIS — G89.29 CHRONIC BILATERAL LOW BACK PAIN WITHOUT SCIATICA: ICD-10-CM

## 2025-02-21 RX ORDER — TRAMADOL HYDROCHLORIDE 100 MG/1
100 TABLET, COATED ORAL EVERY 6 HOURS PRN
Qty: 28 TABLET | Refills: 0 | Status: SHIPPED | OUTPATIENT
Start: 2025-02-21

## 2025-02-21 RX ORDER — OMEPRAZOLE 40 MG/1
40 CAPSULE, DELAYED RELEASE ORAL DAILY
Qty: 30 CAPSULE | Refills: 1 | Status: SHIPPED | OUTPATIENT
Start: 2025-02-21

## 2025-02-25 ENCOUNTER — OFFICE VISIT (OUTPATIENT)
Dept: PULMONOLOGY | Facility: CLINIC | Age: 60
End: 2025-02-25
Payer: MEDICAID

## 2025-02-25 VITALS
WEIGHT: 135 LBS | SYSTOLIC BLOOD PRESSURE: 191 MMHG | BODY MASS INDEX: 21.19 KG/M2 | RESPIRATION RATE: 16 BRPM | TEMPERATURE: 98.2 F | HEIGHT: 67 IN | OXYGEN SATURATION: 93 % | HEART RATE: 106 BPM | DIASTOLIC BLOOD PRESSURE: 107 MMHG

## 2025-02-25 DIAGNOSIS — J43.9 PULMONARY EMPHYSEMA, UNSPECIFIED EMPHYSEMA TYPE: ICD-10-CM

## 2025-02-25 DIAGNOSIS — J44.1 COPD WITH ACUTE EXACERBATION: Primary | ICD-10-CM

## 2025-02-25 RX ORDER — ALBUTEROL SULFATE 90 UG/1
2 INHALANT RESPIRATORY (INHALATION) EVERY 4 HOURS PRN
Qty: 18 G | Refills: 0 | Status: SHIPPED | OUTPATIENT
Start: 2025-02-25

## 2025-02-25 RX ORDER — AMOXICILLIN 500 MG/1
500 CAPSULE ORAL 3 TIMES DAILY
Qty: 21 CAPSULE | Refills: 0 | Status: SHIPPED | OUTPATIENT
Start: 2025-02-25 | End: 2025-03-04

## 2025-02-25 RX ORDER — PREDNISONE 10 MG/1
TABLET ORAL
Qty: 31 TABLET | Refills: 0 | Status: SHIPPED | OUTPATIENT
Start: 2025-02-25

## 2025-02-25 RX ORDER — MIRTAZAPINE 7.5 MG/1
7.5 TABLET, FILM COATED ORAL NIGHTLY
COMMUNITY
Start: 2025-02-18 | End: 2025-02-26

## 2025-02-25 NOTE — PROGRESS NOTES
Pulmonary Office Follow-up    Subjective     Yenni Camacho is seen today at the office for   Chief Complaint   Patient presents with    Follow-up     3 month    COPD    Shortness of Breath         HPI  Yenni Camacho is a 59 y.o. female with a PMH significant for COPD and tobacco abuse presents for follow-up patient is using her home oxygen she complains of cough with wheeze and chest congestion with thick mucoid expectoration patient denies any fever or hemoptysis she has already quit smoking she takes Trelegy daily along with her neb treatments      Tobacco use history:  Former smoker      Patient Active Problem List   Diagnosis    Respiratory failure    Severe malnutrition    Chronic systolic heart failure    Acute respiratory failure with hypoxia and hypercapnia    COPD with acute exacerbation    NICM (nonischemic cardiomyopathy)    Cervical spondylosis without myelopathy    Lumbosacral spondylosis without myelopathy    Hypoxia    Influenza A    Nasal congestion    Acute on chronic respiratory failure with hypoxia    Moderate recurrent major depression    DDD (degenerative disc disease), cervical    Coronary artery disease involving native coronary artery of native heart without angina pectoris    Pulmonary emphysema    Facet arthropathy, lumbar       Review of Systems  Review of Systems   Respiratory:  Positive for cough, shortness of breath and wheezing.    All other systems reviewed and are negative.    As described in the HPI. Otherwise, remainder of ROS (14 systems) were negative.    Medications, Allergies, Social, and Family Histories reviewed as per EMR.    Result Review :            Objective     Vitals:    02/25/25 1624   BP: (!) 191/107   Pulse: 106   Resp: 16   Temp: 98.2 °F (36.8 °C)   SpO2: 93%         02/25/25  1624   Weight: 61.2 kg (135 lb)       Physical Exam  Vitals and nursing note reviewed.   Constitutional:       Appearance: Normal appearance.   HENT:      Head:  Normocephalic and atraumatic.      Nose: Nose normal.      Mouth/Throat:      Mouth: Mucous membranes are moist.      Pharynx: Oropharynx is clear.   Eyes:      Extraocular Movements: Extraocular movements intact.      Conjunctiva/sclera: Conjunctivae normal.      Pupils: Pupils are equal, round, and reactive to light.   Cardiovascular:      Rate and Rhythm: Normal rate and regular rhythm.      Pulses: Normal pulses.      Heart sounds: Normal heart sounds.   Pulmonary:      Effort: Pulmonary effort is normal.      Breath sounds: Wheezing and rhonchi present.   Musculoskeletal:         General: Normal range of motion.      Cervical back: Normal range of motion and neck supple.   Skin:     General: Skin is warm.      Capillary Refill: Capillary refill takes 2 to 3 seconds.   Neurological:      Mental Status: She is alert and oriented to person, place, and time.   Psychiatric:         Mood and Affect: Mood normal.         Behavior: Behavior normal.         XR Knee 3+ View With Sunrise Left    Result Date: 2/11/2025  1.Mild bilateral medial tibiofemoral and lateral patellofemoral joint space narrowing, most likely related to mild osteoarthritis. 2.No radiographic findings of acute osseous knee abnormality. Electronically Signed: Rad Alvarado  2/11/2025 4:53 PM EST  Workstation ID: [id number]    XR Knee 3+ View With [place] Right    Result Date: 2/11/2025  1.Mild bilateral medial tibiofemoral and lateral patellofemoral joint space narrowing, most likely related to mild osteoarthritis. 2.No radiographic findings of acute osseous knee abnormality. Electronically Signed: Rad Christiano  2/11/2025 4:53 PM EST  Workstation ID: [id number]      Assessment & Plan     Diagnoses and all orders for this visit:    1. COPD with acute exacerbation (Primary)  -     albuterol sulfate  (90 Base) MCG/ACT inhaler; Inhale 2 puffs Every 4 (Four) Hours As Needed for Wheezing.  Dispense: 18 g; Refill: 0  -     XR Chest 2 View; Future    2.  Pulmonary emphysema, unspecified emphysema type  -     albuterol sulfate  (90 Base) MCG/ACT inhaler; Inhale 2 puffs Every 4 (Four) Hours As Needed for Wheezing.  Dispense: 18 g; Refill: 0  -     XR Chest 2 View; Future    Other orders  -     amoxicillin (AMOXIL) 500 MG capsule; Take 1 capsule by mouth 3 (Three) Times a Day for 7 days.  Dispense: 21 capsule; Refill: 0  -     predniSONE (DELTASONE) 10 MG tablet; Take 4 tabs daily x 3 days, then take 3 tabs daily x 3 days, then take 2 tabs daily x 3 days, then take 1 tab daily x 3 days  Dispense: 31 tablet; Refill: 0         Discussion/ Recommendations:   Will order chest x-ray  Will start her on Amoxil and prednisone for 1 week  Continue Trelegy  Continue albuterol inhaler and nebulizer treatments as needed  Patient is using and benefiting from home oxygen  Vaccinations discussed and recommended    BMI is within normal parameters. No other follow-up for BMI required.        Return in about 3 months (around 5/25/2025).          This document has been electronically signed by Omid Ball MD on February 25, 2025 16:28 EST

## 2025-02-26 ENCOUNTER — PRIOR AUTHORIZATION (OUTPATIENT)
Dept: FAMILY MEDICINE CLINIC | Facility: CLINIC | Age: 60
End: 2025-02-26
Payer: MEDICAID

## 2025-02-26 DIAGNOSIS — M54.50 CHRONIC BILATERAL LOW BACK PAIN WITHOUT SCIATICA: Primary | ICD-10-CM

## 2025-02-26 DIAGNOSIS — G89.29 CHRONIC BILATERAL LOW BACK PAIN WITHOUT SCIATICA: Primary | ICD-10-CM

## 2025-02-26 DIAGNOSIS — M50.30 DDD (DEGENERATIVE DISC DISEASE), CERVICAL: ICD-10-CM

## 2025-02-26 RX ORDER — TRAMADOL HYDROCHLORIDE 50 MG/1
100 TABLET ORAL EVERY 6 HOURS PRN
Qty: 40 TABLET | Refills: 0 | Status: SHIPPED | OUTPATIENT
Start: 2025-02-26

## 2025-02-26 NOTE — TELEPHONE ENCOUNTER
Denied today by [place] [employer] Med[employer]act 2017  This request has not been approved. Based on the information we received, you did not meet the specific rule(s) needed to approve this request. In some cases, the requested drug or alternatives offered may have other guideline rules that need to be met. Our guideline named SHORT-ACTING OPIOIDS requires the following rule(s) be met for approval:A. The member had at least 1-week trial and therapeutic failure [drug did not work], allergy, contraindication [harmful for] or intolerance [side effect] to 2 preferred agents: codeine/APAP, hydrocodone/APAP, hydrocodone/ibuprofen, hydromorphone tablets, morphine concentrate, solution, tablets, oxycodone solution, tablets, oxycodone/APAP, Tramadol 50 mg, Tramadol/APAPThis is why your request is denied. Please work with your doctor to use a different medication or get us more information if it will allow us to approve this request. A written notification letter will follow with additional details.

## 2025-03-05 ENCOUNTER — OFFICE VISIT (OUTPATIENT)
Dept: BEHAVIORAL HEALTH | Facility: CLINIC | Age: 60
End: 2025-03-05
Payer: MEDICAID

## 2025-03-05 ENCOUNTER — OFFICE VISIT (OUTPATIENT)
Dept: FAMILY MEDICINE CLINIC | Facility: CLINIC | Age: 60
End: 2025-03-05
Payer: MEDICAID

## 2025-03-05 VITALS
DIASTOLIC BLOOD PRESSURE: 100 MMHG | BODY MASS INDEX: 20.2 KG/M2 | TEMPERATURE: 98.7 F | OXYGEN SATURATION: 98 % | SYSTOLIC BLOOD PRESSURE: 190 MMHG | WEIGHT: 128.7 LBS | HEIGHT: 67 IN | HEART RATE: 84 BPM

## 2025-03-05 DIAGNOSIS — M50.30 DDD (DEGENERATIVE DISC DISEASE), CERVICAL: ICD-10-CM

## 2025-03-05 DIAGNOSIS — M18.12 ARTHRITIS OF CARPOMETACARPAL (CMC) JOINT OF LEFT THUMB: ICD-10-CM

## 2025-03-05 DIAGNOSIS — F33.1 MODERATE RECURRENT MAJOR DEPRESSION: ICD-10-CM

## 2025-03-05 DIAGNOSIS — I10 PRIMARY HYPERTENSION: Primary | ICD-10-CM

## 2025-03-05 DIAGNOSIS — F43.10 PTSD (POST-TRAUMATIC STRESS DISORDER): ICD-10-CM

## 2025-03-05 DIAGNOSIS — M19.042 PRIMARY OSTEOARTHRITIS OF BOTH HANDS: ICD-10-CM

## 2025-03-05 DIAGNOSIS — M19.041 PRIMARY OSTEOARTHRITIS OF BOTH HANDS: ICD-10-CM

## 2025-03-05 DIAGNOSIS — F41.0 PANIC ATTACKS: ICD-10-CM

## 2025-03-05 DIAGNOSIS — I25.10 CORONARY ARTERY DISEASE INVOLVING NATIVE CORONARY ARTERY OF NATIVE HEART WITHOUT ANGINA PECTORIS: ICD-10-CM

## 2025-03-05 DIAGNOSIS — F33.1 MODERATE EPISODE OF RECURRENT MAJOR DEPRESSIVE DISORDER: Primary | ICD-10-CM

## 2025-03-05 DIAGNOSIS — M17.0 PRIMARY OSTEOARTHRITIS OF BOTH KNEES: ICD-10-CM

## 2025-03-05 DIAGNOSIS — F90.9 ATTENTION DEFICIT HYPERACTIVITY DISORDER (ADHD), UNSPECIFIED ADHD TYPE: ICD-10-CM

## 2025-03-05 DIAGNOSIS — M54.50 CHRONIC BILATERAL LOW BACK PAIN WITHOUT SCIATICA: ICD-10-CM

## 2025-03-05 DIAGNOSIS — G89.29 CHRONIC BILATERAL LOW BACK PAIN WITHOUT SCIATICA: ICD-10-CM

## 2025-03-05 DIAGNOSIS — F41.1 GENERALIZED ANXIETY DISORDER: ICD-10-CM

## 2025-03-05 PROCEDURE — 99214 OFFICE O/P EST MOD 30 MIN: CPT | Performed by: STUDENT IN AN ORGANIZED HEALTH CARE EDUCATION/TRAINING PROGRAM

## 2025-03-05 PROCEDURE — 1125F AMNT PAIN NOTED PAIN PRSNT: CPT | Performed by: STUDENT IN AN ORGANIZED HEALTH CARE EDUCATION/TRAINING PROGRAM

## 2025-03-05 RX ORDER — CLONIDINE HYDROCHLORIDE 0.3 MG/1
0.3 TABLET ORAL 2 TIMES DAILY
Qty: 180 TABLET | Refills: 1 | Status: SHIPPED | OUTPATIENT
Start: 2025-03-05

## 2025-03-05 RX ORDER — ALPRAZOLAM 0.25 MG
0.25 TABLET ORAL DAILY PRN
Qty: 20 TABLET | Refills: 1 | Status: SHIPPED | OUTPATIENT
Start: 2025-03-05 | End: 2025-03-14

## 2025-03-05 RX ORDER — TRAMADOL HYDROCHLORIDE 50 MG/1
100 TABLET ORAL EVERY 6 HOURS PRN
Qty: 80 TABLET | Refills: 0 | Status: SHIPPED | OUTPATIENT
Start: 2025-03-05 | End: 2025-03-06 | Stop reason: SDUPTHER

## 2025-03-05 NOTE — PATIENT INSTRUCTIONS
1.  Please return to clinic at your next scheduled visit.  Please contact the clinic ([phone]) at least 24 hours prior in the event you need to cancel.  2.  Do no harm to yourself or others.    3.  Avoid alcohol and drugs.    4.  Take all medications as prescribed.  Please contact the clinic with any concerns. If you are in need of medication refills, please call the clinic at [phone].    5. Should you want to get in touch with your provider, LETITIA Solis, please contact the office ([phone]), and staff will be able to page Parul directly.  6. In the event you have personal crisis, contact the following crisis numbers: Suicide Prevention Hotline 1-402.548.2363; [facility] Helpline 4-460-851-TTBA; [facility] Emergency Room [phone]; text HELLO to 857469; or 923.     SPECIFIC RECOMMENDATIONS:     1.      Medications discussed at this encounter:     New Medications Ordered This Visit   Medications    Vortioxetine HBr (Trintellix) 5 MG tablet tablet     Sig: Take 1 tablet by mouth Daily With Breakfast.     Dispense:  30 tablet     Refill:  2    ALPRAZolam (XANAX) 0.25 MG tablet     Sig: Take 1 tablet by mouth Daily As Needed for Anxiety (Panic Attacks).     Dispense:  20 tablet     Refill:  1     Increase quantity. Thank you.                       2.      Psychotherapy recommendations: We will continue therapy at future visits.     3.     Return to clinic: Return in about 6 weeks (around 4/16/2025).

## 2025-03-05 NOTE — PROGRESS NOTES
Chief Complaint  Follow-up (States hurts all over and a bad headache)    Subjective      Yennichayo Camacho is a 59 y.o. female who presents to [facility]     History of Present Illness  The patient presents for evaluation of hypertension, pain management, anxiety, ADHD, and elevated cholesterol.    Hypertension  - Persistent hypertension  - Discontinued clonidine 2 weeks ago as she thought she was supposed to be taking 0.6 mg instead of 0.3 mg nightly.  - No significant difference noticed with amlodipine.  - Continues losartan 100 mg and metoprolol 25 mg.  - Blood pressure decreased significantly within 5 minutes of taking Xanax when she lost her job.  - Does have a headache today    Pain Management  - Describes pain as a toothache in her bones.  - Pain alleviated from 10/10 to 5-6/10 with tramadol 100 mg every 6 hours.  - Upcoming pain management appointment later this month.  - No drowsiness from tramadol or Xanax 0.25 mg.  - Prescribed prednisone and antibiotics by Dr. Ball for her lungs.  - Experienced severe coughing last night but declined additional medication.  - No wheezing, but decreased air movement.  - Occasional irregular heartbeats attributed to anxiety, no current irregularity.  - History of coronary artery disease and high cholesterol, despite a diet low in fried foods and high in salads.  - Family history of arthritis.    Anxiety  - Prescribed Xanax 0.25 mg for anxiety, finds it beneficial.  Reports decreased blood pressure on this as well  - No current therapist.  - Tried Klonopin and Valium without success.  - History of Xanax use from the late 1980s to early 1990s for ADHD.  - Recently applied for disability.    ADHD  - Xanax helps manage ADHD symptoms, allowing task completion.  - Reports forgetfulness, including misplacing wallet and keys.  - History of Xanax use from the late 1980s to early 1990s for ADHD.    Joint Pain  - Worsening pain in hands,  "particularly knuckles and thumb, interfering with tasks.  - More severe pain in left thumb.  - Difficulty bending fingers upon waking, alleviated by hot water.  - Frequent back cracking sounds.  - Knee pain, left more problematic than right.  - Diagnosed with joint space narrowing in left knee, indicative of arthritis.  - No improvement with prednisone.    Supplemental information: None.    FAMILY HISTORY  Mother had a severe reaction to medications in the 1980s, nearly resulting in death. Grandfather had severe arthritis.    MEDICATIONS  Current: Amlodipine, losartan, metoprolol, tramadol, Xanax, prednisone.  Discontinued: Strattera, clonidine, Klonopin, Valium.       Objective   Vital Signs:   Vitals:    03/05/25 1248   BP: (!) 190/100   BP Location: Right arm   Patient Position: Sitting   Pulse: 84   Temp: 98.7 °F (37.1 °C)   TempSrc: Oral   SpO2: 98%   Weight: 58.4 kg (128 lb 11.2 oz)   Height: 170.2 cm (67.01\")     Body mass index is 20.15 kg/m².    Wt Readings from Last 3 Encounters:   03/05/25 58.4 kg (128 lb 11.2 oz)   02/25/25 61.2 kg (135 lb)   02/13/25 61 kg (134 lb 8 oz)     BP Readings from Last 3 Encounters:   03/05/25 (!) 190/100   02/25/25 (!) 191/107   02/13/25 (!) 180/112       Health Maintenance   Topic Date Due    DIABETIC EYE EXAM  Never done    URINE MICROALBUMIN-CREATININE RATIO (uACR)  Never done    TDAP/TD VACCINES (1 - Tdap) Never done    MAMMOGRAM  Never done    COLORECTAL CANCER SCREENING  Never done    ZOSTER VACCINE (1 of 2) Never done    PAP SMEAR  Never done    ANNUAL PHYSICAL  04/24/2024    HEMOGLOBIN A1C  04/10/2025    LUNG CANCER SCREENING  05/02/2025    Pneumococcal Vaccine 50+ (1 of 2 - PCV) 03/12/2025 (Originally 7/17/1984)    INFLUENZA VACCINE  03/31/2025 (Originally 7/1/2024)    COVID-19 Vaccine (1 - 2024-25 season) 05/17/2025 (Originally 9/1/2024)    LIPID PANEL  10/10/2025    HEPATITIS C SCREENING  Completed       Physical Exam  Constitutional:       General: She is not in " acute distress.     Appearance: Normal appearance.   HENT:      Head: Normocephalic and atraumatic.      Mouth/Throat:      Mouth: Mucous membranes are moist.   Eyes:      Extraocular Movements: Extraocular movements intact.   Cardiovascular:      Rate and Rhythm: Normal rate and regular rhythm.      Heart sounds: No murmur heard.  Pulmonary:      Effort: No respiratory distress.      Breath sounds: No wheezing, rhonchi or rales.   Abdominal:      General: Abdomen is flat.   Musculoskeletal:      Cervical back: Neck supple.      Comments: Heberden's nodes noted on patient's PIP joints bilaterally.  Pain to palpation of the left CMC joint.   Skin:     General: Skin is warm and dry.   Neurological:      General: No focal deficit present.      Mental Status: She is alert and oriented to person, place, and time. Mental status is at baseline.   Psychiatric:         Mood and Affect: Mood normal.         Thought Content: Thought content normal.         Judgment: Judgment normal.          Physical Exam      Result Review :  The following data was reviewed by: Tom Meehan DO on 03/05/2025:    XR Knee 3+ View With [place] Left    Result Date: 2/11/2025  1.Mild bilateral medial tibiofemoral and lateral patellofemoral joint space narrowing, most likely related to mild osteoarthritis. 2.No radiographic findings of acute osseous knee abnormality. Electronically Signed: Rad Alvarado  2/11/2025 4:53 PM EST  Workstation ID: [id number]    XR Knee 3+ View With [place] Right    Result Date: 2/11/2025  1.Mild bilateral medial tibiofemoral and lateral patellofemoral joint space narrowing, most likely related to mild osteoarthritis. 2.No radiographic findings of acute osseous knee abnormality. Electronically Signed: Rad Alvarado  2/11/2025 4:53 PM EST  Workstation ID: [id number]    XR Chest 1 View    Result Date: 11/8/2024  Impression: Emphysematous changes. Biapical scarring/fibrosis. Electronically Signed: Freedom Roblero MD  11/8/2024  5:18 PM EST  Workstation ID: [id number]       Results  Laboratory Studies  Cholesterol levels elevated.    Imaging  X-ray of left knee shows minimal joint space narrowing, indicating arthritis.       Procedures          Diagnoses and all orders for this visit:    1. Chronic bilateral low back pain without sciatica (Primary)  -     traMADol (ULTRAM) 50 MG tablet; Take 2 tablets by mouth Every 6 (Six) Hours As Needed for Moderate Pain.  Dispense: 80 tablet; Refill: 0    2. DDD (degenerative disc disease), cervical  -     traMADol (ULTRAM) 50 MG tablet; Take 2 tablets by mouth Every 6 (Six) Hours As Needed for Moderate Pain.  Dispense: 80 tablet; Refill: 0    3. Chronic pain of both knees    4. Primary hypertension  -     cloNIDine (CATAPRES) 0.3 MG tablet; Take 1 tablet by mouth 2 (Two) Times a Day.  Dispense: 180 tablet; Refill: 1    5. Moderate recurrent major depression    6. Attention deficit hyperactivity disorder (ADHD), unspecified ADHD type    7. Coronary artery disease involving native coronary artery of native heart without angina pectoris    8. Arthritis of carpometacarpal (CMC) joint of left thumb    9. Primary osteoarthritis of both hands         Assessment & Plan  1. Hypertension.  Elevated BP likely due to discontinuation of clonidine as well as her pain and anxiety. Resume clonidine 0.3 mg, one tablet in the morning and one at night. Continue amlodipine 5 mg, metoprolol 25 mg and losartan 100 mg. Follow-up on Friday to monitor BP.  May consider increasing amlodipine and metoprolol next visit.  Patient is at a very high risk due to her cardiac history and her acutely elevated blood pressure.  Due to this, we will see her in 2 days so we can adequately control blood pressure.    2. Pain management.  Persistent pain managed with tramadol, reducing pain from 10/10 to 5-6/10. Prescribed 80 tablets of tramadol 100 mg, every 6 hours.  Patient is signed controlled subs agreement and undergo urine drug screen at  next appointment.    3. Anxiety.  Currently on Xanax 0.25mg, effective in managing anxiety but only on an as-needed basis.  Has a follow-up with LETITIA Moss today.  Follow-up with behavioral health specialist.  Patient also requests psychotherapy referral which I will place today.    4. ADHD.  Xanax helps manage ADHD symptoms, increasing productivity. Consider Adderall once BP is controlled.  Patient has tried Wellbutrin as well as atomoxetine in the past with bad side effects.    5. Hand pain.  Worsening pain in hands, particularly knuckles and thumb. Consider x-ray of hands.    6. Knee pain.  Minimal joint space narrowing in left knee, indicating arthritis. Steroid injection for left knee during next visit considered.    Follow-up  Follow up on Friday (2 days).    BMI is within normal parameters. No other follow-up for BMI required.         FOLLOW UP  Return in about 2 days (around 3/7/2025) for hypertension.  Patient was given instructions and counseling regarding her condition or for health maintenance advice. Please see specific information pulled into the AVS if appropriate.     Patient or patient representative verbalized consent for the use of Ambient Listening during the visit with  Tom Meehan DO for chart documentation. 3/5/2025  17:06 EST    Tom Meehan DO  03/05/25  16:53 EST    CURRENT & DISCONTINUED MEDICATIONS  Current Outpatient Medications   Medication Instructions    albuterol sulfate  (90 Base) MCG/ACT inhaler 2 puffs, Inhalation, Every 4 Hours PRN    ALPRAZolam (XANAX) 0.25 mg, Oral, Daily PRN    amLODIPine (NORVASC) 5 mg, Oral, Daily    Blood Pressure Monitoring (Comfort Touch BP Cuff/Medium) misc 1 each, Not Applicable, Take As Directed    cloNIDine (CATAPRES) 0.3 mg, Oral, 2 Times Daily    fluticasone (FLONASE) 50 MCG/ACT nasal spray 2 sprays, Each Nare, Daily    Fluticasone-Umeclidin-Vilant (Trelegy Ellipta) 100-62.5-25 MCG/ACT inhaler 1 puff, Inhalation, Daily - RT     ipratropium-albuterol (DUO-NEB) 0.5-2.5 mg/3 ml nebulizer 3 mL, Nebulization, Every 4 Hours PRN    losartan (COZAAR) 100 mg, Oral, Daily    metFORMIN ER (GLUCOPHAGE-XR) 1,000 mg, Oral, Daily With Breakfast    metoprolol succinate XL (TOPROL-XL) 25 mg, Oral, Daily    omeprazole (PRILOSEC) 40 mg, Oral, Daily    predniSONE (DELTASONE) 10 MG tablet Take 4 tabs daily x 3 days, then take 3 tabs daily x 3 days, then take 2 tabs daily x 3 days, then take 1 tab daily x 3 days    promethazine (PHENERGAN) 12.5 mg, Oral, Every 6 Hours PRN    tiZANidine (ZANAFLEX) 2 mg, Oral, Nightly PRN    traMADol (ULTRAM) 100 mg, Oral, Every 6 Hours PRN    Vortioxetine HBr (TRINTELLIX) 5 mg, Oral, Daily With Breakfast       Medications Discontinued During This Encounter   Medication Reason    atomoxetine (Strattera) 10 MG capsule     diclofenac (VOLTAREN) 75 MG EC tablet     cloNIDine (CATAPRES) 0.3 MG tablet Reorder    traMADol (ULTRAM) 50 MG tablet Reorder
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
## 2025-03-06 DIAGNOSIS — G89.29 CHRONIC BILATERAL LOW BACK PAIN WITHOUT SCIATICA: ICD-10-CM

## 2025-03-06 DIAGNOSIS — M54.50 CHRONIC BILATERAL LOW BACK PAIN WITHOUT SCIATICA: ICD-10-CM

## 2025-03-06 DIAGNOSIS — M50.30 DDD (DEGENERATIVE DISC DISEASE), CERVICAL: ICD-10-CM

## 2025-03-06 DIAGNOSIS — M54.2 NECK PAIN ON RIGHT SIDE: Primary | ICD-10-CM

## 2025-03-06 RX ORDER — TRAMADOL HYDROCHLORIDE 50 MG/1
100 TABLET ORAL EVERY 6 HOURS PRN
Qty: 40 TABLET | Refills: 0 | Status: SHIPPED | OUTPATIENT
Start: 2025-03-06

## 2025-03-07 ENCOUNTER — HOSPITAL ENCOUNTER (OUTPATIENT)
Dept: GENERAL RADIOLOGY | Facility: HOSPITAL | Age: 60
Discharge: HOME | End: 2025-03-07
Payer: MEDICAID

## 2025-03-07 ENCOUNTER — OFFICE VISIT (OUTPATIENT)
Dept: FAMILY MEDICINE CLINIC | Facility: CLINIC | Age: 60
End: 2025-03-07
Payer: MEDICAID

## 2025-03-07 VITALS
SYSTOLIC BLOOD PRESSURE: 180 MMHG | HEART RATE: 110 BPM | DIASTOLIC BLOOD PRESSURE: 100 MMHG | TEMPERATURE: 98.2 F | HEIGHT: 67 IN | BODY MASS INDEX: 20.09 KG/M2 | OXYGEN SATURATION: 98 % | WEIGHT: 128 LBS

## 2025-03-07 DIAGNOSIS — J44.1 COPD WITH ACUTE EXACERBATION: ICD-10-CM

## 2025-03-07 DIAGNOSIS — G89.29 CHRONIC BILATERAL LOW BACK PAIN WITHOUT SCIATICA: ICD-10-CM

## 2025-03-07 DIAGNOSIS — I10 PRIMARY HYPERTENSION: ICD-10-CM

## 2025-03-07 DIAGNOSIS — J43.9 PULMONARY EMPHYSEMA, UNSPECIFIED EMPHYSEMA TYPE: ICD-10-CM

## 2025-03-07 DIAGNOSIS — M50.30 DDD (DEGENERATIVE DISC DISEASE), CERVICAL: Primary | ICD-10-CM

## 2025-03-07 DIAGNOSIS — F43.10 PTSD (POST-TRAUMATIC STRESS DISORDER): ICD-10-CM

## 2025-03-07 DIAGNOSIS — M54.2 NECK PAIN ON RIGHT SIDE: ICD-10-CM

## 2025-03-07 DIAGNOSIS — M54.50 CHRONIC BILATERAL LOW BACK PAIN WITHOUT SCIATICA: ICD-10-CM

## 2025-03-07 DIAGNOSIS — M17.0 PRIMARY OSTEOARTHRITIS OF BOTH KNEES: ICD-10-CM

## 2025-03-07 PROCEDURE — 71046 X-RAY EXAM CHEST 2 VIEWS: CPT

## 2025-03-07 RX ORDER — AMLODIPINE BESYLATE 10 MG/1
10 TABLET ORAL DAILY
Qty: 90 TABLET | Refills: 1 | Status: SHIPPED | OUTPATIENT
Start: 2025-03-07

## 2025-03-07 NOTE — PROGRESS NOTES
"Chief Complaint  Follow-up    Subjective      Yenni Camacho is a 59 y.o. female who presents to [facility]     History of Present Illness  The patient is a 59-year-old female here for a 2-day follow-up on hypertension and anxiety.    Hypertension and Anxiety  - Reports significant anxiety attributed to elevated blood pressure.  - Has not started clonidine due to not receiving it from the pharmacy.  -Patient got a call from pharmacy and she will be picking this up today  - Monitors her blood pressure at home.  - Reports severe anxiety exacerbated by hypertension, with no improvement   - Prescribed Trintellix for anxiety and depression.  -Xanax was also refilled which patient takes as needed  - She feels like Xanax does more for her than any other medicine she has tried  - Follow-up appointment on 05/06/2025.    Knee Pain  - Reports knee pain, seeking relief through an injection.  - Is on steroids currently for her lung condition    Respiratory Issues  - Reports respiratory issues, requiring oxygen upon waking.  - Prescribed albuterol, steroids, and amoxicillin by her pulmonologist on 2/25/2025.  - An x-ray has been ordered although she has not gotten this done  - She still has respiratory congestion she is working through.  - Still finishing her prednisone        MEDICATIONS  Current: Trintellix, Xanax, losartan, metoprolol, amlodipine, albuterol, amoxicillin       Objective   Vital Signs:   Vitals:    03/07/25 1309   BP: (!) 220/100   BP Location: Right arm   Patient Position: Sitting   Pulse: 110   Temp: 98.2 °F (36.8 °C)   TempSrc: Oral   SpO2: 98%   Weight: 58.1 kg (128 lb)   Height: 170.2 cm (67.01\")     Body mass index is 20.04 kg/m².    Wt Readings from Last 3 Encounters:   03/07/25 58.1 kg (128 lb)   03/05/25 58.4 kg (128 lb 11.2 oz)   02/25/25 61.2 kg (135 lb)     BP Readings from Last 3 Encounters:   03/07/25 (!) 220/100   03/05/25 (!) 190/100   02/25/25 (!) 191/107 "       Health Maintenance   Topic Date Due    DIABETIC EYE EXAM  Never done    URINE MICROALBUMIN-CREATININE RATIO (uACR)  Never done    TDAP/TD VACCINES (1 - Tdap) Never done    MAMMOGRAM  Never done    COLORECTAL CANCER SCREENING  Never done    ZOSTER VACCINE (1 of 2) Never done    PAP SMEAR  Never done    ANNUAL PHYSICAL  04/24/2024    HEMOGLOBIN A1C  04/10/2025    LUNG CANCER SCREENING  05/02/2025    Pneumococcal Vaccine 50+ (1 of 2 - PCV) 03/12/2025 (Originally 7/17/1984)    INFLUENZA VACCINE  03/31/2025 (Originally 7/1/2024)    COVID-19 Vaccine (1 - 2024-25 season) 05/17/2025 (Originally 9/1/2024)    LIPID PANEL  10/10/2025    HEPATITIS C SCREENING  Completed       Physical Exam  Constitutional:       General: She is not in acute distress.     Appearance: Normal appearance.   HENT:      Head: Normocephalic and atraumatic.      Mouth/Throat:      Mouth: Mucous membranes are moist.   Eyes:      Extraocular Movements: Extraocular movements intact.   Cardiovascular:      Rate and Rhythm: Normal rate. Rhythm irregular.      Heart sounds: No murmur heard.  Pulmonary:      Effort: No respiratory distress.      Breath sounds: No wheezing, rhonchi or rales.      Comments: Decreased breath sounds bilaterally  Abdominal:      General: Abdomen is flat.   Musculoskeletal:      Cervical back: Neck supple.      Comments: Heberden's nodes noted on patient's PIP joints bilaterally.  Pain to palpation of the left CMC joint.   Skin:     General: Skin is warm and dry.   Neurological:      General: No focal deficit present.      Mental Status: She is alert and oriented to person, place, and time. Mental status is at baseline.   Psychiatric:         Mood and Affect: Mood normal.         Thought Content: Thought content normal.         Judgment: Judgment normal.          Physical Exam      Result Review :  The following data was reviewed by: Tom Meehan DO on 03/07/2025:    XR Knee 3+ View With [place] Left    Result Date:  2/11/2025  1.Mild bilateral medial tibiofemoral and lateral patellofemoral joint space narrowing, most likely related to mild osteoarthritis. 2.No radiographic findings of acute osseous knee abnormality. Electronically Signed: Rad Alvarado  2/11/2025 4:53 PM EST  Workstation ID: [id number]    XR Knee 3+ View With [place] Right    Result Date: 2/11/2025  1.Mild bilateral medial tibiofemoral and lateral patellofemoral joint space narrowing, most likely related to mild osteoarthritis. 2.No radiographic findings of acute osseous knee abnormality. Electronically Signed: Rad Alvarado  2/11/2025 4:53 PM EST  Workstation ID: [id number]    XR Chest 1 View    Result Date: 11/8/2024  Impression: Emphysematous changes. Biapical scarring/fibrosis. Electronically Signed: Freedom Roblero MD  11/8/2024 5:18 PM EST  Workstation ID: [id number]       Results         Procedures          Diagnoses and all orders for this visit:    1. DDD (degenerative disc disease), cervical (Primary)    2. Neck pain on right side    3. Chronic bilateral low back pain without sciatica    4. Primary hypertension  -     amLODIPine (NORVASC) 10 MG tablet; Take 1 tablet by mouth Daily.  Dispense: 90 tablet; Refill: 1    5. PTSD (post-traumatic stress disorder)    6. Primary osteoarthritis of both knees         Assessment & Plan  1. Hypertension.  Significantly elevated, likely due to anxiety and pain.  Recheck of blood pressure today is 180/100.  She has not started clonidine, contributing to rebound hypertension. Will commence clonidine 0.3 mg, one pill in the morning and one at night. Increase amlodipine to 10 mg daily. Monitor blood pressure at home over the weekend. If high, increase metoprolol to 50 mg daily. Recheck blood pressure today.  Follow-up next week    2. Anxiety.  Ongoing anxiety, prescribed Trintellix for severe depression and anxiety. Finds increased Xanax dosage more effective than Adderall. Continue current medication regimen, take Xanax as  needed.  Under the care of LETITIA Moss.  Referral for psychotherapy sent    3. Knee pain.  Ongoing knee pain, currently on steroids. Administer knee injection for arthritis next week to avoid doubling up on steroids.    4. Respiratory issues.  Reports lung congestion, using inhaler, steroids, and amoxicillin. Get x-ray    5.  Chronic pain  Patient has not received her tramadol 100 mg prescription yet.  She will go today to fill this.    Follow-up  Follow up next week.    BMI is within normal parameters. No other follow-up for BMI required.         FOLLOW UP  Return in about 1 week (around 3/14/2025) for hypertension, anxiety.  Patient was given instructions and counseling regarding her condition or for health maintenance advice. Please see specific information pulled into the AVS if appropriate.     Patient or patient representative verbalized consent for the use of Ambient Listening during the visit with  Tom Meehan DO for chart documentation. 3/7/2025  13:44 EST    Tom Meehan DO  03/07/25  13:36 EST    CURRENT & DISCONTINUED MEDICATIONS  Current Outpatient Medications   Medication Instructions    albuterol sulfate  (90 Base) MCG/ACT inhaler 2 puffs, Inhalation, Every 4 Hours PRN    ALPRAZolam (XANAX) 0.25 mg, Oral, Daily PRN    amLODIPine (NORVASC) 10 mg, Oral, Daily    Blood Pressure Monitoring (Comfort Touch BP Cuff/Medium) misc 1 each, Not Applicable, Take As Directed    cloNIDine (CATAPRES) 0.3 mg, Oral, 2 Times Daily    fluticasone (FLONASE) 50 MCG/ACT nasal spray 2 sprays, Each Nare, Daily    Fluticasone-Umeclidin-Vilant (Trelegy Ellipta) 100-62.5-25 MCG/ACT inhaler 1 puff, Inhalation, Daily - RT    ipratropium-albuterol (DUO-NEB) 0.5-2.5 mg/3 ml nebulizer 3 mL, Nebulization, Every 4 Hours PRN    losartan (COZAAR) 100 mg, Oral, Daily    metFORMIN ER (GLUCOPHAGE-XR) 1,000 mg, Oral, Daily With Breakfast    omeprazole (PRILOSEC) 40 mg, Oral, Daily    predniSONE (DELTASONE) 10 MG tablet  Take 4 tabs daily x 3 days, then take 3 tabs daily x 3 days, then take 2 tabs daily x 3 days, then take 1 tab daily x 3 days    promethazine (PHENERGAN) 12.5 mg, Oral, Every 6 Hours PRN    tiZANidine (ZANAFLEX) 2 mg, Oral, Nightly PRN    traMADol (ULTRAM) 100 mg, Oral, Every 6 Hours PRN    Vortioxetine HBr (TRINTELLIX) 5 mg, Oral, Daily With Breakfast       Medications Discontinued During This Encounter   Medication Reason    metoprolol succinate XL (TOPROL-XL) 25 MG 24 hr tablet     amLODIPine (NORVASC) 5 MG tablet Reorder

## 2025-03-14 ENCOUNTER — OFFICE VISIT (OUTPATIENT)
Dept: FAMILY MEDICINE CLINIC | Facility: CLINIC | Age: 60
End: 2025-03-14
Payer: MEDICAID

## 2025-03-14 VITALS
BODY MASS INDEX: 20.95 KG/M2 | DIASTOLIC BLOOD PRESSURE: 100 MMHG | TEMPERATURE: 98.3 F | HEIGHT: 67 IN | OXYGEN SATURATION: 93 % | HEART RATE: 90 BPM | WEIGHT: 133.5 LBS | SYSTOLIC BLOOD PRESSURE: 162 MMHG

## 2025-03-14 DIAGNOSIS — M54.50 CHRONIC BILATERAL LOW BACK PAIN WITHOUT SCIATICA: ICD-10-CM

## 2025-03-14 DIAGNOSIS — F41.0 PANIC ATTACK: ICD-10-CM

## 2025-03-14 DIAGNOSIS — M17.0 PRIMARY OSTEOARTHRITIS OF BOTH KNEES: ICD-10-CM

## 2025-03-14 DIAGNOSIS — F33.1 MODERATE RECURRENT MAJOR DEPRESSION: ICD-10-CM

## 2025-03-14 DIAGNOSIS — F41.1 GENERALIZED ANXIETY DISORDER: ICD-10-CM

## 2025-03-14 DIAGNOSIS — I10 PRIMARY HYPERTENSION: ICD-10-CM

## 2025-03-14 DIAGNOSIS — F43.10 PTSD (POST-TRAUMATIC STRESS DISORDER): ICD-10-CM

## 2025-03-14 DIAGNOSIS — G89.29 CHRONIC BILATERAL LOW BACK PAIN WITHOUT SCIATICA: ICD-10-CM

## 2025-03-14 DIAGNOSIS — M50.30 DDD (DEGENERATIVE DISC DISEASE), CERVICAL: Primary | ICD-10-CM

## 2025-03-14 LAB
AMPHET+METHAMPHET UR QL: NEGATIVE
AMPHETAMINE INTERNAL CONTROL: ABNORMAL
AMPHETAMINES UR QL: NEGATIVE
BARBITURATE INTERNAL CONTROL: ABNORMAL
BARBITURATES UR QL SCN: NEGATIVE
BENZODIAZ UR QL SCN: POSITIVE
BENZODIAZEPINE INTERNAL CONTROL: ABNORMAL
BUPRENORPHINE INTERNAL CONTROL: ABNORMAL
BUPRENORPHINE SERPL-MCNC: NEGATIVE NG/ML
CANNABINOIDS SERPL QL: POSITIVE
COCAINE INTERNAL CONTROL: ABNORMAL
COCAINE UR QL: NEGATIVE
EXPIRATION DATE: ABNORMAL
Lab: ABNORMAL
MDMA (ECSTASY) INTERNAL CONTROL: ABNORMAL
MDMA UR QL SCN: NEGATIVE
METHADONE INTERNAL CONTROL: ABNORMAL
METHADONE UR QL SCN: NEGATIVE
METHAMPHETAMINE INTERNAL CONTROL: ABNORMAL
MORPHINE INTERNAL CONTROL: ABNORMAL
MORPHINE/OPIATES SCREEN, URINE: NEGATIVE
OXYCODONE INTERNAL CONTROL: ABNORMAL
OXYCODONE UR QL SCN: NEGATIVE
PCP UR QL SCN: NEGATIVE
PHENCYCLIDINE INTERNAL CONTROL: ABNORMAL
THC INTERNAL CONTROL: ABNORMAL

## 2025-03-14 RX ORDER — ALPRAZOLAM 1 MG/1
1 TABLET, EXTENDED RELEASE ORAL EVERY MORNING
Qty: 7 TABLET | Refills: 0 | Status: SHIPPED | OUTPATIENT
Start: 2025-03-14

## 2025-03-14 RX ORDER — LIDOCAINE HYDROCHLORIDE 10 MG/ML
3 INJECTION, SOLUTION INFILTRATION; PERINEURAL ONCE
Status: COMPLETED | OUTPATIENT
Start: 2025-03-14 | End: 2025-03-14

## 2025-03-14 RX ORDER — TRIAMCINOLONE ACETONIDE 40 MG/ML
40 INJECTION, SUSPENSION INTRA-ARTICULAR; INTRAMUSCULAR ONCE
Status: COMPLETED | OUTPATIENT
Start: 2025-03-14 | End: 2025-03-14

## 2025-03-14 RX ORDER — TRAMADOL HYDROCHLORIDE 50 MG/1
100 TABLET ORAL EVERY 6 HOURS PRN
Qty: 40 TABLET | Refills: 0 | Status: SHIPPED | OUTPATIENT
Start: 2025-03-14

## 2025-03-14 RX ADMIN — TRIAMCINOLONE ACETONIDE 40 MG: 40 INJECTION, SUSPENSION INTRA-ARTICULAR; INTRAMUSCULAR at 16:40

## 2025-03-14 RX ADMIN — LIDOCAINE HYDROCHLORIDE 3 ML: 10 INJECTION, SOLUTION INFILTRATION; PERINEURAL at 16:39

## 2025-03-14 NOTE — PROGRESS NOTES
Chief Complaint  Follow-up (Blood pressure and anxiety )    Subjective      Yenni Camacho is a 59 y.o. female who presents to [facility]     History of Present Illness  The patient is a 59-year-old female presenting for evaluation of hypertension and anxiety.    Hypertension  - Reports stable nocturnal blood pressure due to Xanax use  - Systolic in the 100s to 110s and diastolic in the 80s or 90s  - Monitors blood pressure in the morning and takes additional Xanax if elevated  - Initially took 2 tablets, increased to 4 tablets due to persistent hypertension  - Significant blood pressure reduction around 7 PM after taking Xanax  - Feels better after taking Xanax and reports no headaches  - Exhausted Xanax supply and did not take any before this visit; last dose was yesterday  - Previous trials of Ativan, Klonopin, and Valium were ineffective  - On clonidine 0.3 mg twice daily, amlodipine 10 mg, metoprolol 25 mg, and losartan 100 mg for hypertension    Anxiety  - Reports no respiratory issues on Xanax and feels less tense  - She was prescribed 0.25 mg as needed daily although has been taking up to 6 a day  - Has tried Trintellix which made her stomach hurt  - Has also tried Lexapro and Wellbutrin in the past which did not help.  Has tried several other SSRIs which have caused adverse effects  - Currently seeing LETITIA Moss although is not pleased with the care she is receiving    Pain Management  - Takes tramadol 100 mg for pain management, typically every 6 hours  - Has a pain management appointment on 03/18/2025    Knee Pain  - Requests a knee injection today  - Left knee is worse than right knee although has bilateral knee pain  - Had x-rays before which confirmed arthritis    Stomach Pain  - Prilosec prescribed for stomach pain has been effective    MEDICATIONS  Current: Xanax, tramadol, clonidine, amlodipine, metoprolol, losartan, Prilosec  Past: Ativan, Klonopin,  "Valium, Trintellix       Objective   Vital Signs:   Vitals:    03/14/25 1418   BP: 162/100   BP Location: Left arm   Patient Position: Sitting   Pulse: 90   Temp: 98.3 °F (36.8 °C)   TempSrc: Oral   SpO2: 93%   Weight: 60.6 kg (133 lb 8 oz)   Height: 170.2 cm (67.01\")     Body mass index is 20.9 kg/m².    Wt Readings from Last 3 Encounters:   03/14/25 60.6 kg (133 lb 8 oz)   03/07/25 58.1 kg (128 lb)   03/05/25 58.4 kg (128 lb 11.2 oz)     BP Readings from Last 3 Encounters:   03/14/25 162/100   03/07/25 180/100   03/05/25 (!) 190/100       Health Maintenance   Topic Date Due    DIABETIC EYE EXAM  Never done    Pneumococcal Vaccine 50+ (1 of 2 - PCV) Never done    TDAP/TD VACCINES (1 - Tdap) Never done    MAMMOGRAM  Never done    COLORECTAL CANCER SCREENING  Never done    ZOSTER VACCINE (1 of 2) Never done    PAP SMEAR  Never done    ANNUAL PHYSICAL  04/24/2024    HEMOGLOBIN A1C  04/10/2025    LUNG CANCER SCREENING  05/02/2025    INFLUENZA VACCINE  03/31/2025 (Originally 7/1/2024)    COVID-19 Vaccine (1 - 2024-25 season) 05/17/2025 (Originally 9/1/2024)    LIPID PANEL  10/10/2025    URINE MICROALBUMIN-CREATININE RATIO (uACR)  10/10/2025    HEPATITIS C SCREENING  Completed       Physical Exam  Constitutional:       General: She is not in acute distress.     Appearance: Normal appearance.   HENT:      Head: Normocephalic and atraumatic.      Mouth/Throat:      Mouth: Mucous membranes are moist.   Eyes:      Extraocular Movements: Extraocular movements intact.   Cardiovascular:      Rate and Rhythm: Normal rate. Rhythm irregular.      Heart sounds: No murmur heard.  Pulmonary:      Effort: No respiratory distress.      Breath sounds: No wheezing, rhonchi or rales.      Comments: Decreased breath sounds bilaterally  Abdominal:      General: Abdomen is flat.   Musculoskeletal:      Cervical back: Neck supple.      Comments: Slight edema of the left knee noted   Skin:     General: Skin is warm and dry.   Neurological:     "  General: No focal deficit present.      Mental Status: She is alert and oriented to person, place, and time. Mental status is at baseline.   Psychiatric:         Mood and Affect: Mood normal.         Thought Content: Thought content normal.         Judgment: Judgment normal.          Physical Exam  Vital Signs  Blood pressure is 162/100.    Result Review :  The following data was reviewed by: Tom Meehan DO on 03/14/2025:    XR Chest 2 View  Result Date: 3/8/2025  Impression: Hyperexpanded lungs without acute cardiopulmonary abnormality. Electronically Signed: Edson Suresh MD  3/8/2025 12:49 PM EST  Workstation ID: [id number]    XR Knee 3+ View With [place] Left  Result Date: 2/11/2025  1.Mild bilateral medial tibiofemoral and lateral patellofemoral joint space narrowing, most likely related to mild osteoarthritis. 2.No radiographic findings of acute osseous knee abnormality. Electronically Signed: Rad Alvarado  2/11/2025 4:53 PM EST  Workstation ID: [id number]    XR Knee 3+ View With [place] Right  Result Date: 2/11/2025  1.Mild bilateral medial tibiofemoral and lateral patellofemoral joint space narrowing, most likely related to mild osteoarthritis. 2.No radiographic findings of acute osseous knee abnormality. Electronically Signed: Rad Alvarado  2/11/2025 4:53 PM EST  Workstation ID: [id number]       Results         Arthrocentesis    Date/Time: 3/14/2025 4:04 PM    Performed by: Tom Meehan DO  Authorized by: Tom Meehan DO  Indications: pain   Body area: knee  Joint: left knee  Local anesthesia used: no    Anesthesia:  Local anesthesia used: no  Comments: After informed consent was obtained, patient's left knee was prepped with Betadine followed by isopropyl alcohol swab.  3 mL of 1% lidocaine without epinephrine as well as 40 mg of triamcinolone were injected into the left knee joint.  Patient tolerated well.                Diagnoses and all orders for this visit:    1. DDD (degenerative disc  disease), cervical (Primary)  -     traMADol (ULTRAM) 50 MG tablet; Take 2 tablets by mouth Every 6 (Six) Hours As Needed for Moderate Pain.  Dispense: 40 tablet; Refill: 0    2. Moderate recurrent major depression    3. Primary osteoarthritis of both knees    4. Chronic bilateral low back pain without sciatica  -     traMADol (ULTRAM) 50 MG tablet; Take 2 tablets by mouth Every 6 (Six) Hours As Needed for Moderate Pain.  Dispense: 40 tablet; Refill: 0    5. Primary hypertension    6. PTSD (post-traumatic stress disorder)    7. Panic attack  -     POC Medline 12 Panel Urine Drug Screen    8. Generalized anxiety disorder  -     ALPRAZolam XR (Xanax XR) 1 MG 24 hr tablet; Take 1 tablet by mouth Every Morning.  Dispense: 7 tablet; Refill: 0  -     POC Medline 12 Panel Urine Drug Screen         Assessment & Plan  1. Hypertension.  Improved nocturnal blood pressure likely due to Xanax. Continue clonidine 0.3 mg twice daily, amlodipine 10 mg, metoprolol 25 mg, and losartan 100 mg. Monitor blood pressure regularly.  Hypertension may be due to severe anxiety    2. Anxiety.  Xanax has improved anxiety and blood pressure. Prescribed Xanax XR 1 mg daily in the morning. Prior authorization may be required. Conduct urine drug screen which is only positive for THC and benzodiazepines.  Patient was not taking her previous Xanax as prescribed and I discussed she must take the current prescription as prescribed otherwise this is breaking contract and I can no longer prescribe controlled substances.  Patient verbalized understanding.  Patient signed controlled substance agreement today.  Patient states she would like to follow-up with this provider for her mental health concerns instead of her previous behavioral health provider.    3. Pain management.  Refill tramadol 100 mg. Consult pain management specialist for more effective medication.    4. Knee pain.  Administer knee left injection today. Discussed risks and benefits,  including pain, infection, bleeding, and damage to surrounding structures.    Follow-up  Scheduled for follow-up in 1 week.    PROCEDURE  Administered knee injection today.    BMI is within normal parameters. No other follow-up for BMI required.         FOLLOW UP  Return in about 1 week (around 3/21/2025) for Anxiety.  Patient was given instructions and counseling regarding her condition or for health maintenance advice. Please see specific information pulled into the AVS if appropriate.     Patient or patient representative verbalized consent for the use of Ambient Listening during the visit with  Tom Meehan DO for chart documentation. 3/14/2025  16:05 EDT    Tom Meehan DO  03/14/25  16:02 EDT    CURRENT & DISCONTINUED MEDICATIONS  Current Outpatient Medications   Medication Instructions    albuterol sulfate  (90 Base) MCG/ACT inhaler 2 puffs, Inhalation, Every 4 Hours PRN    ALPRAZolam XR (XANAX XR) 1 mg, Oral, Every Morning    amLODIPine (NORVASC) 10 mg, Oral, Daily    Blood Pressure Monitoring (Comfort Touch BP Cuff/Medium) misc 1 each, Not Applicable, Take As Directed    cloNIDine (CATAPRES) 0.3 mg, Oral, 2 Times Daily    fluticasone (FLONASE) 50 MCG/ACT nasal spray 2 sprays, Each Nare, Daily    Fluticasone-Umeclidin-Vilant (Trelegy Ellipta) 100-62.5-25 MCG/ACT inhaler 1 puff, Inhalation, Daily - RT    ipratropium-albuterol (DUO-NEB) 0.5-2.5 mg/3 ml nebulizer 3 mL, Nebulization, Every 4 Hours PRN    losartan (COZAAR) 100 mg, Oral, Daily    metFORMIN ER (GLUCOPHAGE-XR) 1,000 mg, Oral, Daily With Breakfast    omeprazole (PRILOSEC) 40 mg, Oral, Daily    predniSONE (DELTASONE) 10 MG tablet Take 4 tabs daily x 3 days, then take 3 tabs daily x 3 days, then take 2 tabs daily x 3 days, then take 1 tab daily x 3 days    promethazine (PHENERGAN) 12.5 mg, Oral, Every 6 Hours PRN    tiZANidine (ZANAFLEX) 2 mg, Oral, Nightly PRN    traMADol (ULTRAM) 100 mg, Oral, Every 6 Hours PRN       Medications  Discontinued During This Encounter   Medication Reason    ALPRAZolam (XANAX) 0.25 MG tablet     Vortioxetine HBr (Trintellix) 5 MG tablet tablet     traMADol (ULTRAM) 50 MG tablet Reorder
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
## 2025-03-15 NOTE — TREATMENT PLAN
Multi-Disciplinary Problems (from Behavioral Health Treatment Plan)      Active Problems       Problem: Anxiety  Start Date: 03/05/25      Problem Details: The patient self-scales this problem as a 7 with 10 being the worst.          Goal Priority Start Date Expected End Date End Date    Patient will develop and implement behavioral and cognitive strategies to reduce anxiety and irrational fears. -- 03/05/25 09/13/25 --    Goal Details: Functional status: Moderate symptoms OR moderate difficulty in social occupational or social functioning (51-60)  Prognosis: Fair with expectation for some response to treatment  Progress: worsening          Goal Intervention Frequency Start Date End Date    Help patient explore past emotional issues in relation to present anxiety. Q Month 03/05/25 --    Intervention Details: Duration of treatment until remission of symptoms.        Goal Intervention Frequency Start Date End Date    Help patient develop an awareness of their cognitive and physical responses to anxiety. Q Month 03/05/25 --    Intervention Details: Duration of treatment until remission of symptoms.                        Reviewed By       Parul Wolff APRN 03/15/25 2365                     I have discussed and reviewed this treatment plan with the patient.

## 2025-03-21 ENCOUNTER — OFFICE VISIT (OUTPATIENT)
Dept: FAMILY MEDICINE CLINIC | Facility: CLINIC | Age: 60
End: 2025-03-21
Payer: MEDICAID

## 2025-03-21 VITALS
OXYGEN SATURATION: 96 % | DIASTOLIC BLOOD PRESSURE: 86 MMHG | WEIGHT: 133 LBS | HEART RATE: 95 BPM | HEIGHT: 67 IN | SYSTOLIC BLOOD PRESSURE: 162 MMHG | TEMPERATURE: 98.2 F | BODY MASS INDEX: 20.88 KG/M2

## 2025-03-21 DIAGNOSIS — G89.29 CHRONIC BILATERAL LOW BACK PAIN WITHOUT SCIATICA: ICD-10-CM

## 2025-03-21 DIAGNOSIS — F90.9 ATTENTION DEFICIT HYPERACTIVITY DISORDER (ADHD), UNSPECIFIED ADHD TYPE: ICD-10-CM

## 2025-03-21 DIAGNOSIS — I10 PRIMARY HYPERTENSION: ICD-10-CM

## 2025-03-21 DIAGNOSIS — M50.30 DDD (DEGENERATIVE DISC DISEASE), CERVICAL: ICD-10-CM

## 2025-03-21 DIAGNOSIS — M54.50 CHRONIC BILATERAL LOW BACK PAIN WITHOUT SCIATICA: ICD-10-CM

## 2025-03-21 DIAGNOSIS — F43.10 PTSD (POST-TRAUMATIC STRESS DISORDER): ICD-10-CM

## 2025-03-21 DIAGNOSIS — F33.1 MODERATE RECURRENT MAJOR DEPRESSION: ICD-10-CM

## 2025-03-21 DIAGNOSIS — M17.0 PRIMARY OSTEOARTHRITIS OF BOTH KNEES: ICD-10-CM

## 2025-03-21 DIAGNOSIS — F41.1 GENERALIZED ANXIETY DISORDER: Primary | ICD-10-CM

## 2025-03-21 PROCEDURE — 1125F AMNT PAIN NOTED PAIN PRSNT: CPT | Performed by: STUDENT IN AN ORGANIZED HEALTH CARE EDUCATION/TRAINING PROGRAM

## 2025-03-21 PROCEDURE — 99214 OFFICE O/P EST MOD 30 MIN: CPT | Performed by: STUDENT IN AN ORGANIZED HEALTH CARE EDUCATION/TRAINING PROGRAM

## 2025-03-21 RX ORDER — ALPRAZOLAM 0.5 MG
0.5 TABLET ORAL 2 TIMES DAILY PRN
Qty: 14 TABLET | Refills: 0 | Status: SHIPPED | OUTPATIENT
Start: 2025-03-21

## 2025-03-21 RX ORDER — NALOXONE HCL 8 MG/.1ML
SPRAY NASAL
COMMUNITY
Start: 2025-03-18

## 2025-03-21 RX ORDER — METOPROLOL SUCCINATE 50 MG/1
50 TABLET, EXTENDED RELEASE ORAL DAILY
Qty: 30 TABLET | Refills: 1 | Status: SHIPPED | OUTPATIENT
Start: 2025-03-21

## 2025-03-21 RX ORDER — HYDROCODONE BITARTRATE AND ACETAMINOPHEN 5; 325 MG/1; MG/1
1 TABLET ORAL 3 TIMES DAILY
COMMUNITY

## 2025-03-21 NOTE — PROGRESS NOTES
"Chief Complaint  Follow-up    Subjective      Yenni Camacho is a 59 y.o. female who presents to [facility]     History of Present Illness  The patient is a 59-year-old female presenting for follow-up on anxiety.    Anxiety  - Reports persistent anxiety with no improvement on Xanax XR 1 mg  - Adheres to Xanax 1 XR mg daily, paid for in cash as insurance did not cover the XR formulation  - Recalls some relief from immediate-release Xanax 0.25 mg, especially when doubling the dose  - used last Xanax this morning    Left Knee pain  - patient doing much better after steroid injection  - less pain with movement    Chronic Back pain  - Seeing [facility], most recent appointment on 3/18/25  - started on hydrocodone 5 mg/ tylenol 375 mg tablets TID  - has some tramadol left and is taking these in between hydrocodone if needed    Hypertension  - currently on clonidine 0.3 mg BID, metoprolol 25 mg daily, losartan 100 mg, amlodipine 10 mg    MEDICATIONS  Current: Xanax, metoprolol, losartan, clonidine, amlodipine, tramadol       Objective   Vital Signs:   Vitals:    03/21/25 1341   BP: 162/86   BP Location: Left arm   Patient Position: Sitting   Pulse: 95   Temp: 98.2 °F (36.8 °C)   TempSrc: Oral   SpO2: 96%   Weight: 60.3 kg (133 lb)   Height: 170.2 cm (67.01\")     Body mass index is 20.82 kg/m².    Wt Readings from Last 3 Encounters:   03/21/25 60.3 kg (133 lb)   03/14/25 60.6 kg (133 lb 8 oz)   03/07/25 58.1 kg (128 lb)     BP Readings from Last 3 Encounters:   03/21/25 162/86   03/14/25 162/100   03/07/25 180/100       Health Maintenance   Topic Date Due    DIABETIC EYE EXAM  Never done    Pneumococcal Vaccine 50+ (1 of 2 - PCV) Never done    TDAP/TD VACCINES (1 - Tdap) Never done    MAMMOGRAM  Never done    COLORECTAL CANCER SCREENING  Never done    ZOSTER VACCINE (1 of 2) Never done    PAP SMEAR  Never done    ANNUAL PHYSICAL  04/24/2024    HEMOGLOBIN A1C  04/10/2025    LUNG " CANCER SCREENING  05/02/2025    INFLUENZA VACCINE  03/31/2025 (Originally 7/1/2024)    COVID-19 Vaccine (1 - 2024-25 season) 05/17/2025 (Originally 9/1/2024)    LIPID PANEL  10/10/2025    URINE MICROALBUMIN-CREATININE RATIO (uACR)  10/10/2025    HEPATITIS C SCREENING  Completed       Physical Exam  Constitutional:       General: She is not in acute distress.     Appearance: Normal appearance.   HENT:      Head: Normocephalic and atraumatic.      Mouth/Throat:      Mouth: Mucous membranes are moist.   Eyes:      Extraocular Movements: Extraocular movements intact.   Cardiovascular:      Rate and Rhythm: Normal rate and regular rhythm.      Heart sounds: No murmur heard.     No friction rub. No gallop.   Pulmonary:      Effort: No respiratory distress.      Breath sounds: No wheezing, rhonchi or rales.      Comments: Decreased breath sounds bilaterally  Abdominal:      General: Abdomen is flat.   Musculoskeletal:      Cervical back: Neck supple.      Comments: No edema or pain to palpation of the left knee.    Skin:     General: Skin is warm and dry.   Neurological:      General: No focal deficit present.      Mental Status: She is alert and oriented to person, place, and time. Mental status is at baseline.   Psychiatric:         Mood and Affect: Mood normal.         Thought Content: Thought content normal.         Judgment: Judgment normal.          Physical Exam  Vital Signs: /82, HR 95.    Result Review :  The following data was reviewed by: Tom Meehan DO on 03/21/2025:    XR Chest 2 View  Result Date: 3/8/2025  Impression: Hyperexpanded lungs without acute cardiopulmonary abnormality. Electronically Signed: Edson Suresh MD  3/8/2025 12:49 PM EST  Workstation ID: [id number]    XR Knee 3+ View With [place] Left  Result Date: 2/11/2025  1.Mild bilateral medial tibiofemoral and lateral patellofemoral joint space narrowing, most likely related to mild osteoarthritis. 2.No radiographic findings of acute  osseous knee abnormality. Electronically Signed: Rad Alvarado  2/11/2025 4:53 PM EST  Workstation ID: [id number]    XR Knee 3+ View With [place] Right  Result Date: 2/11/2025  1.Mild bilateral medial tibiofemoral and lateral patellofemoral joint space narrowing, most likely related to mild osteoarthritis. 2.No radiographic findings of acute osseous knee abnormality. Electronically Signed: Rad Alvarado  2/11/2025 4:53 PM EST  Workstation ID: [id number]       Results         Procedures          Diagnoses and all orders for this visit:    1. Generalized anxiety disorder (Primary)  -     ALPRAZolam (Xanax) 0.5 MG tablet; Take 1 tablet by mouth 2 (Two) Times a Day As Needed for Anxiety.  Dispense: 14 tablet; Refill: 0    2. Moderate recurrent major depression    3. PTSD (post-traumatic stress disorder)    4. Attention deficit hyperactivity disorder (ADHD), unspecified ADHD type    5. DDD (degenerative disc disease), cervical    6. Chronic bilateral low back pain without sciatica    7. Primary hypertension  -     metoprolol succinate XL (Toprol XL) 50 MG 24 hr tablet; Take 1 tablet by mouth Daily.  Dispense: 30 tablet; Refill: 1    8. Primary osteoarthritis of both knees         Assessment & Plan  1. Anxiety: Persistent despite Xanax XR 1 mg. Immediate-release Xanax 0.25 mg had some efficacy. Prescribe immediate-release Xanax 0.5 mg BID, discontinue extended-release. Provide one-week supply, evaluate response next visit.    2. Hypertension: BP improved but suboptimal. Increase metoprolol to 50 mg. Continue losartan 100 mg, clonidine 0.3 mg BID, and amlodipine 10 mg. Reassess BP next visit for further adjustments. Anticipate better HTN control with anxiety control as well as increased BP medication as well.     3. Pain management: Currently with [facility]. Will continue hydrocodone 5 mg TID. Discussed only taking pain medicine that [facility] prescribes for pain management.     4. Left knee osteoarthritis: Patient has  good effect with steroid injection with much reduced pain. Consider steroid shot in right knee if has continued pain.     Follow-up: Scheduled in one week.    BMI is within normal parameters. No other follow-up for BMI required.         FOLLOW UP  Return in about 1 week (around 3/28/2025) for hypertension.  Patient was given instructions and counseling regarding her condition or for health maintenance advice. Please see specific information pulled into the AVS if appropriate.     Patient or patient representative verbalized consent for the use of Ambient Listening during the visit with  Tom Meehan DO for chart documentation. 3/22/2025  11:52 EDT    Tom Meehan DO  03/22/25  11:52 EDT    CURRENT & DISCONTINUED MEDICATIONS  Current Outpatient Medications   Medication Instructions    albuterol sulfate  (90 Base) MCG/ACT inhaler 2 puffs, Inhalation, Every 4 Hours PRN    ALPRAZolam (XANAX) 0.5 mg, Oral, 2 Times Daily PRN    amLODIPine (NORVASC) 10 mg, Oral, Daily    Blood Pressure Monitoring (Comfort Touch BP Cuff/Medium) misc 1 each, Not Applicable, Take As Directed    cloNIDine (CATAPRES) 0.3 mg, Oral, 2 Times Daily    fluticasone (FLONASE) 50 MCG/ACT nasal spray 2 sprays, Each Nare, Daily    Fluticasone-Umeclidin-Vilant (Trelegy Ellipta) 100-62.5-25 MCG/ACT inhaler 1 puff, Inhalation, Daily - RT    HYDROcodone-acetaminophen (NORCO) 5-325 MG per tablet 1 tablet, 3 Times Daily    ipratropium-albuterol (DUO-NEB) 0.5-2.5 mg/3 ml nebulizer 3 mL, Nebulization, Every 4 Hours PRN    Kloxxado 8 MG/0.1ML liquid as directed Nasally as needed for 30 days    losartan (COZAAR) 100 mg, Oral, Daily    metFORMIN ER (GLUCOPHAGE-XR) 1,000 mg, Oral, Daily With Breakfast    metoprolol succinate XL (TOPROL XL) 50 mg, Oral, Daily    omeprazole (PRILOSEC) 40 mg, Oral, Daily    promethazine (PHENERGAN) 12.5 mg, Oral, Every 6 Hours PRN    tiZANidine (ZANAFLEX) 2 mg, Oral, Nightly PRN       Medications Discontinued During This  Encounter   Medication Reason    ALPRAZolam XR (Xanax XR) 1 MG 24 hr tablet     predniSONE (DELTASONE) 10 MG tablet     traMADol (ULTRAM) 50 MG tablet

## 2025-03-28 ENCOUNTER — OFFICE VISIT (OUTPATIENT)
Dept: FAMILY MEDICINE CLINIC | Facility: CLINIC | Age: 60
End: 2025-03-28
Payer: MEDICAID

## 2025-03-28 VITALS
DIASTOLIC BLOOD PRESSURE: 72 MMHG | HEART RATE: 117 BPM | WEIGHT: 131 LBS | SYSTOLIC BLOOD PRESSURE: 132 MMHG | BODY MASS INDEX: 20.56 KG/M2 | OXYGEN SATURATION: 98 % | TEMPERATURE: 99.2 F | HEIGHT: 67 IN

## 2025-03-28 DIAGNOSIS — F90.9 ATTENTION DEFICIT HYPERACTIVITY DISORDER (ADHD), UNSPECIFIED ADHD TYPE: ICD-10-CM

## 2025-03-28 DIAGNOSIS — F41.1 GENERALIZED ANXIETY DISORDER: Primary | ICD-10-CM

## 2025-03-28 DIAGNOSIS — F43.10 PTSD (POST-TRAUMATIC STRESS DISORDER): ICD-10-CM

## 2025-03-28 DIAGNOSIS — J44.1 COPD WITH EXACERBATION: ICD-10-CM

## 2025-03-28 DIAGNOSIS — J43.9 PULMONARY EMPHYSEMA, UNSPECIFIED EMPHYSEMA TYPE: ICD-10-CM

## 2025-03-28 DIAGNOSIS — F33.1 MODERATE RECURRENT MAJOR DEPRESSION: ICD-10-CM

## 2025-03-28 RX ORDER — ALPRAZOLAM 1 MG/1
1 TABLET ORAL 2 TIMES DAILY PRN
Qty: 14 TABLET | Refills: 0 | Status: SHIPPED | OUTPATIENT
Start: 2025-03-28 | End: 2025-03-28

## 2025-03-28 RX ORDER — AZITHROMYCIN 500 MG/1
500 TABLET, FILM COATED ORAL DAILY
Qty: 3 TABLET | Refills: 0 | Status: SHIPPED | OUTPATIENT
Start: 2025-03-28 | End: 2025-03-31

## 2025-03-28 RX ORDER — ALPRAZOLAM 1 MG/1
1 TABLET ORAL 2 TIMES DAILY PRN
Qty: 20 TABLET | Refills: 0 | Status: SHIPPED | OUTPATIENT
Start: 2025-03-28

## 2025-03-28 RX ORDER — PREDNISONE 50 MG/1
50 TABLET ORAL DAILY
Qty: 5 TABLET | Refills: 0 | Status: SHIPPED | OUTPATIENT
Start: 2025-03-28

## 2025-03-28 NOTE — LETTER
March 28, 2025    Yenni Camacho  [address] [address]      To whom it may concern,     Due to patient's current medical condition, please allow patient to have her dog with her as he helps her improve her medical condition.      Sincerely,                      Tom Meehan, DO

## 2025-03-28 NOTE — PROGRESS NOTES
"Chief Complaint  Follow-up    Subjective      Yenni Camacho is a 59 y.o. female who presents to [facility]     History of Present Illness  The patient is a 59-year-old female presenting for evaluation of blood pressure, anxiety, and COPD.    Anxiety  - She has been self-adjusting her Xanax dosage, taking 2 tablets in the morning, which she reports as beneficial for managing anxiety.  - She did not take Xanax last night.  - She notes improved sleep quality and reduced headaches since starting this regimen.  - She has been taking Xanax 0.5 mg immediate release twice daily and most recently taking 2, 0.5 mg tablets just in the morning.  - Reports greatest anxiety relief when taking 1 mg of Xanax    Blood Pressure  - Her blood pressure has been well-controlled the last 2 days after taking her Xanax  - She has increased her metoprolol dosage to 50 mg and continues to take amlodipine 10 mg, clonidine 0.3 mg twice daily, losartan 100 mg.    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  - She uses supplemental oxygen and has received 3 large tanks, expected to last 1 to 2 weeks.  - She uses DuoNeb as needed and Trelegy once daily.  - She reports frequent lung infections and current coughing, wheezing, and shortness of breath upon exertion.  - Sees [facility] pulmonology and last appointment was on 2/2025  - Last appointment she received amoxicillin and prednisone.    Supplemental information: None    ALLERGIES  The patient has no known allergies.           Objective   Vital Signs:   Vitals:    03/28/25 1455   BP: 132/72   BP Location: Left arm   Patient Position: Sitting   Pulse: 117   Temp: 99.2 °F (37.3 °C)   TempSrc: Oral   SpO2: 98%   Weight: 59.4 kg (131 lb)   Height: 170.2 cm (67.01\")     Body mass index is 20.51 kg/m².    Wt Readings from Last 3 Encounters:   03/28/25 59.4 kg " (131 lb)   03/21/25 60.3 kg (133 lb)   03/14/25 60.6 kg (133 lb 8 oz)     BP Readings from Last 3 Encounters:   03/28/25 132/72   03/21/25 162/86   03/14/25 162/100       Health Maintenance   Topic Date Due    DIABETIC EYE EXAM  Never done    Pneumococcal Vaccine 50+ (1 of 2 - PCV) Never done    TDAP/TD VACCINES (1 - Tdap) Never done    PAP SMEAR  Never done    MAMMOGRAM  Never done    COLORECTAL CANCER SCREENING  Never done    ZOSTER VACCINE (1 of 2) Never done    ANNUAL PHYSICAL  04/24/2024    HEMOGLOBIN A1C  04/10/2025    LUNG CANCER SCREENING  05/02/2025    INFLUENZA VACCINE  03/31/2025 (Originally 7/1/2024)    COVID-19 Vaccine (1 - 2024-25 season) 05/17/2025 (Originally 9/1/2024)    LIPID PANEL  10/10/2025    URINE MICROALBUMIN-CREATININE RATIO (uACR)  10/10/2025    HEPATITIS C SCREENING  Completed       Physical Exam  Constitutional:       General: She is not in acute distress.     Appearance: Normal appearance.   HENT:      Head: Normocephalic and atraumatic.      Mouth/Throat:      Mouth: Mucous membranes are moist.   Eyes:      Extraocular Movements: Extraocular movements intact.   Cardiovascular:      Rate and Rhythm: Normal rate and regular rhythm.      Heart sounds: No murmur heard.     No friction rub. No gallop.   Pulmonary:      Effort: No respiratory distress.      Breath sounds: Wheezing present. No rhonchi or rales.      Comments: Decreased breath sounds bilaterally.  Difficult to auscultate due to have decreased breath sounds are today.  Abdominal:      General: Abdomen is flat.   Musculoskeletal:      Cervical back: Neck supple.      Comments: No edema or pain to palpation of the left knee.    Skin:     General: Skin is warm and dry.   Neurological:      General: No focal deficit present.      Mental Status: She is alert and oriented to person, place, and time. Mental status is at baseline.   Psychiatric:         Mood and Affect: Mood normal.         Thought Content: Thought content normal.          Judgment: Judgment normal.          Physical Exam      Vital Signs  Blood pressure: 132/76.    Result Review :  The following data was reviewed by: Tom Meehan DO on 03/28/2025:    XR Chest 2 View  Result Date: 3/8/2025  Impression: Hyperexpanded lungs without acute cardiopulmonary abnormality. Electronically Signed: Edson Suresh MD  3/8/2025 12:49 PM EST  Workstation ID: [id number]    XR Knee 3+ View With [place] Left  Result Date: 2/11/2025  1.Mild bilateral medial tibiofemoral and lateral patellofemoral joint space narrowing, most likely related to mild osteoarthritis. 2.No radiographic findings of acute osseous knee abnormality. Electronically Signed: Rad Christiano  2/11/2025 4:53 PM EST  Workstation ID: [id number]    XR Knee 3+ View With [place] Right  Result Date: 2/11/2025  1.Mild bilateral medial tibiofemoral and lateral patellofemoral joint space narrowing, most likely related to mild osteoarthritis. 2.No radiographic findings of acute osseous knee abnormality. Electronically Signed: Rad Christiano  2/11/2025 4:53 PM EST  Workstation ID: [id number]       Results         Procedures          Diagnoses and all orders for this visit:    1. Generalized anxiety disorder (Primary)  -     Discontinue: ALPRAZolam (Xanax) 1 MG tablet; Take 1 tablet by mouth 2 (Two) Times a Day As Needed for Anxiety.  Dispense: 14 tablet; Refill: 0  -     ALPRAZolam (Xanax) 1 MG tablet; Take 1 tablet by mouth 2 (Two) Times a Day As Needed for Anxiety.  Dispense: 20 tablet; Refill: 0    2. PTSD (post-traumatic stress disorder)    3. Moderate recurrent major depression    4. Attention deficit hyperactivity disorder (ADHD), unspecified ADHD type    5. Pulmonary emphysema, unspecified emphysema type    6. COPD with exacerbation  -     predniSONE (DELTASONE) 50 MG tablet; Take 1 tablet by mouth Daily.  Dispense: 5 tablet; Refill: 0  -     azithromycin (Zithromax) 500 MG tablet; Take 1 tablet by mouth Daily for 3 days.  Dispense: 3 tablet;  Refill: 0  -     XR Chest 2 View; Future         Assessment & Plan  1. Anxiety.  Anxiety well-managed with Xanax, but patient took 1 mg instead of 0.5 mg in the a.m. x 2 days.  Advised to adhere to prescribed dosage and only change frequency with permission from provider. Prescription for Xanax 1 mg twice daily as needed for anxiety, one dose in the morning and another before bedtime.  Patient verbalized understanding.  PDMP appropriate today.  Will provide enough until her next appointment in about 10 days.  Based on patient's experience with taking 1 mg of Xanax, anticipate this will be a consistent dose for her as she gets the best relief from her anxiety at this dose.    2. COPD.  COPD exacerbated, likely due to smoke exposure. Advised to continue DuoNeb as needed and Trelegy once daily. Prescription for azithromycin and prednisone 50 mg for 5 days. Chest x-ray ordered. If condition worsens over the weekend, seek immediate medical attention at the ER.  Patient to continue following with pulmonology June 2025 and continue oxygen at home.  Pulse oxygenation today was normal current respiratory distress.    3. Blood pressure management.  Blood pressure well-controlled on metoprolol 50 mg daily, losartan 100 mg daily, clonidine 0.3 mg twice daily and amlodipine 10 mg. Advised to continue medications.  It appears that once patient's anxiety is controlled with Xanax, her blood pressure is also in good control.    Follow-up in 1 week.    BMI is within normal parameters. No other follow-up for BMI required.         FOLLOW UP  Return in about 1 week (around 4/4/2025) for anxiety, hypertension, 30 min.  Patient was given instructions and counseling regarding her condition or for health maintenance advice. Please see specific information pulled into the AVS if appropriate.     Patient or patient representative verbalized consent for the use of Ambient Listening during the visit with  Tom Meehan DO for chart  documentation. 3/28/2025  17:18 EDT    Tomalberto MeehanDO  03/28/25  17:15 EDT    CURRENT & DISCONTINUED MEDICATIONS  Current Outpatient Medications   Medication Instructions    albuterol sulfate  (90 Base) MCG/ACT inhaler 2 puffs, Inhalation, Every 4 Hours PRN    ALPRAZolam (XANAX) 1 mg, Oral, 2 Times Daily PRN    amLODIPine (NORVASC) 10 mg, Oral, Daily    azithromycin (ZITHROMAX) 500 mg, Oral, Daily    Blood Pressure Monitoring (Comfort Touch BP Cuff/Medium) misc 1 each, Not Applicable, Take As Directed    cloNIDine (CATAPRES) 0.3 mg, Oral, 2 Times Daily    fluticasone (FLONASE) 50 MCG/ACT nasal spray 2 sprays, Each Nare, Daily    Fluticasone-Umeclidin-Vilant (Trelegy Ellipta) 100-62.5-25 MCG/ACT inhaler 1 puff, Inhalation, Daily - RT    HYDROcodone-acetaminophen (NORCO) 5-325 MG per tablet 1 tablet, 3 Times Daily    ipratropium-albuterol (DUO-NEB) 0.5-2.5 mg/3 ml nebulizer 3 mL, Nebulization, Every 4 Hours PRN    Kloxxado 8 MG/0.1ML liquid as directed Nasally as needed for 30 days    losartan (COZAAR) 100 mg, Oral, Daily    metFORMIN ER (GLUCOPHAGE-XR) 1,000 mg, Oral, Daily With Breakfast    metoprolol succinate XL (TOPROL XL) 50 mg, Oral, Daily    omeprazole (PRILOSEC) 40 mg, Oral, Daily    predniSONE (DELTASONE) 50 mg, Oral, Daily    promethazine (PHENERGAN) 12.5 mg, Oral, Every 6 Hours PRN    tiZANidine (ZANAFLEX) 2 mg, Oral, Nightly PRN       Medications Discontinued During This Encounter   Medication Reason    ALPRAZolam (Xanax) 0.5 MG tablet     ALPRAZolam (Xanax) 1 MG tablet

## 2025-03-29 ENCOUNTER — TELEMEDICINE (OUTPATIENT)
Dept: FAMILY MEDICINE CLINIC | Facility: TELEHEALTH | Age: 60
End: 2025-03-29
Payer: MEDICAID

## 2025-03-29 DIAGNOSIS — J44.1 COPD WITH ACUTE EXACERBATION: Primary | ICD-10-CM

## 2025-03-29 RX ORDER — BENZONATATE 200 MG/1
200 CAPSULE ORAL 3 TIMES DAILY PRN
Qty: 30 CAPSULE | Refills: 0 | Status: SHIPPED | OUTPATIENT
Start: 2025-03-29 | End: 2025-03-29

## 2025-03-29 RX ORDER — ATOMOXETINE 10 MG/1
1 CAPSULE ORAL DAILY
COMMUNITY
Start: 2025-03-22

## 2025-03-29 RX ORDER — METFORMIN HYDROCHLORIDE 625 MG/1
TABLET ORAL EVERY 24 HOURS
COMMUNITY

## 2025-03-29 RX ORDER — BENZONATATE 200 MG/1
200 CAPSULE ORAL 3 TIMES DAILY PRN
Qty: 30 CAPSULE | Refills: 0 | Status: SHIPPED | OUTPATIENT
Start: 2025-03-29 | End: 2025-04-08

## 2025-03-29 RX ORDER — HYDROCODONE BITARTRATE AND ACETAMINOPHEN 5; 325 MG/1; MG/1
TABLET ORAL EVERY 8 HOURS SCHEDULED
COMMUNITY
Start: 2025-03-18

## 2025-03-29 RX ORDER — IPRATROPIUM BROMIDE AND ALBUTEROL SULFATE 2.5; .5 MG/3ML; MG/3ML
3 SOLUTION RESPIRATORY (INHALATION) EVERY 4 HOURS PRN
Qty: 360 ML | Refills: 0 | Status: SHIPPED | OUTPATIENT
Start: 2025-03-29

## 2025-03-29 RX ORDER — AMLODIPINE BESYLATE 5 MG/1
1 TABLET ORAL DAILY
COMMUNITY
Start: 2025-03-23

## 2025-03-29 RX ORDER — IPRATROPIUM BROMIDE AND ALBUTEROL SULFATE 2.5; .5 MG/3ML; MG/3ML
3 SOLUTION RESPIRATORY (INHALATION) EVERY 4 HOURS PRN
Qty: 360 ML | Refills: 0 | Status: SHIPPED | OUTPATIENT
Start: 2025-03-29 | End: 2025-03-29

## 2025-03-29 NOTE — PROGRESS NOTES
No chief complaint on file.      Video Visit Reason:   Free Text Description: Cough congestion  Subjective   Yenni Camacho is a 59 y.o. female.     History of Present Illness  The patient presents via virtual visit for evaluation of cough and congestion. Her friend is with her for the visit and assisting her.    She reports a persistent, non-productive cough that has been unresponsive to treatment. She sought medical attention yesterday, where she was informed of inadequate air movement in her lungs, necessitating potential hospitalization. She was diagnosed with COPD and prescribed azithromycin and prednisone 50 mg. She was advised to seek immediate medical attention at the ER if her condition deteriorates over the weekend. She experiences intermittent coughing fits. She has not been using a nebulizer due to lack of medication, having only used it once with  medication. She has not yet collected her prescribed medications from the pharmacy, Azithromycin and Prednisone. [facility] did not get the medication ready before they closed last night. She reports episodes of severe coughing lasting 15 to 20 minutes, during which she experiences difficulty breathing and chest tightness. She expresses reluctance towards hospitalization. She has not previously taken any medication for her cough.            The following portions of the patient's history were reviewed and updated as appropriate: allergies, current medications, past medical history, and problem list.      Past Medical History:   Diagnosis Date    ADHD     Anxiety     Arthritis     Asthma, extrinsic     Bunion     Chronic pain disorder     Diabetes mellitus     Emphysema of lung     Foot pain, bilateral     Hypertension     Peptic ulceration     Pneumonia     PTSD (post-traumatic stress disorder)     Rheumatoid arthritis      Social History     Socioeconomic History    Marital status:    Tobacco Use    Smoking status:  Former     Current packs/day: 0.00     Average packs/day: 2.0 packs/day for 39.7 years (79.4 ttl pk-yrs)     Types: Cigarettes     Start date: 1978     Quit date: 2018     Years since quittin.9     Passive exposure: Past    Smokeless tobacco: Never    Tobacco comments:     Worse thing i ever did!   Vaping Use    Vaping status: Former    Substances: THC    Devices: [id number] tank   Substance and Sexual Activity    Alcohol use: Not Currently    Drug use: Yes     Frequency: 7.0 times per week     Types: Hydrocodone, Marijuana     Comment: I had a MM license in [place]./gummies    Sexual activity: Not Currently     Partners: Male     medication documentation: reviewed and updated with patient and   Current Outpatient Medications:     amLODIPine (NORVASC) 5 MG tablet, Take 1 tablet by mouth Daily., Disp: , Rfl:     atomoxetine (STRATTERA) 10 MG capsule, Take 1 capsule by mouth Daily., Disp: , Rfl:     benzonatate (TESSALON) 200 MG capsule, Take 1 capsule by mouth 3 (Three) Times a Day As Needed for Cough for up to 10 days., Disp: 30 capsule, Rfl: 0    HYDROcodone-acetaminophen (NORCO) 5-325 MG per tablet, Every 8 (Eight) Hours., Disp: , Rfl:     ipratropium-albuterol (DUO-NEB) 0.5-2.5 mg/3 ml nebulizer, Take 3 mL by nebulization Every 4 (Four) Hours As Needed for Wheezing., Disp: 360 mL, Rfl: 0    albuterol sulfate  (90 Base) MCG/ACT inhaler, Inhale 2 puffs Every 4 (Four) Hours As Needed for Wheezing., Disp: 18 g, Rfl: 0    ALPRAZolam (Xanax) 1 MG tablet, Take 1 tablet by mouth 2 (Two) Times a Day As Needed for Anxiety., Disp: 20 tablet, Rfl: 0    amLODIPine (NORVASC) 10 MG tablet, Take 1 tablet by mouth Daily., Disp: 90 tablet, Rfl: 1    azithromycin (Zithromax) 500 MG tablet, Take 1 tablet by mouth Daily for 3 days., Disp: 3 tablet, Rfl: 0    Blood Pressure Monitoring (Comfort Touch BP Cuff/Medium) misc, Use 1 each Take As Directed. (Patient not taking: Reported on 3/21/2025), Disp: 1 each, Rfl: 0     cloNIDine (CATAPRES) 0.3 MG tablet, Take 1 tablet by mouth 2 (Two) Times a Day., Disp: 180 tablet, Rfl: 1    fluticasone (FLONASE) 50 MCG/ACT nasal spray, 2 sprays by Each Nare route Daily., Disp: 16 g, Rfl: 1    Fluticasone-Umeclidin-Vilant (Trelegy Ellipta) 100-62.5-25 MCG/ACT inhaler, Inhale 1 puff Daily., Disp: 60 each, Rfl: 1    HYDROcodone-acetaminophen (NORCO) 5-325 MG per tablet, Take 1 tablet by mouth 3 (Three) Times a Day., Disp: , Rfl:     Kloxxado 8 MG/0.1ML liquid, as directed Nasally as needed for 30 days, Disp: , Rfl:     losartan (Cozaar) 100 MG tablet, Take 1 tablet by mouth Daily., Disp: 90 tablet, Rfl: 1    metFORMIN ER (GLUCOPHAGE-XR) 500 MG 24 hr tablet, Take 2 tablets by mouth Daily With Breakfast., Disp: 180 tablet, Rfl: 0    metFORMIN HCl 625 MG tablet, Daily., Disp: , Rfl:     metoprolol succinate XL (Toprol XL) 50 MG 24 hr tablet, Take 1 tablet by mouth Daily., Disp: 30 tablet, Rfl: 1    omeprazole (priLOSEC) 40 MG capsule, Take 1 capsule by mouth Daily., Disp: 30 capsule, Rfl: 1    predniSONE (DELTASONE) 50 MG tablet, Take 1 tablet by mouth Daily., Disp: 5 tablet, Rfl: 0    promethazine (PHENERGAN) 12.5 MG tablet, Take 1 tablet by mouth Every 6 (Six) Hours As Needed for Nausea or Vomiting. (Patient not taking: Reported on 2/25/2025), Disp: 15 tablet, Rfl: 0    tiZANidine (ZANAFLEX) 2 MG tablet, Take 1 tablet by mouth At Night As Needed for Muscle Spasms. (Patient not taking: Reported on 2/25/2025), Disp: 30 tablet, Rfl: 0  Review of Systems  See HPI  Objective   Physical Exam  Constitutional:       Appearance: She is ill-appearing.   Pulmonary:      Comments: Wearing O2 with frequent harsh cough, nonproductive        Assessment & Plan   Diagnoses and all orders for this visit:    1. COPD with acute exacerbation (Primary)  -     Discontinue: ipratropium-albuterol (DUO-NEB) 0.5-2.5 mg/3 ml nebulizer; Take 3 mL by nebulization Every 4 (Four) Hours As Needed for Wheezing.  Dispense: 360 mL;  Refill: 0  -     Discontinue: benzonatate (TESSALON) 200 MG capsule; Take 1 capsule by mouth 3 (Three) Times a Day As Needed for Cough for up to 10 days.  Dispense: 30 capsule; Refill: 0  -     benzonatate (TESSALON) 200 MG capsule; Take 1 capsule by mouth 3 (Three) Times a Day As Needed for Cough for up to 10 days.  Dispense: 30 capsule; Refill: 0  -     ipratropium-albuterol (DUO-NEB) 0.5-2.5 mg/3 ml nebulizer; Take 3 mL by nebulization Every 4 (Four) Hours As Needed for Wheezing.  Dispense: 360 mL; Refill: 0     She was recently seen by Dr. Tom Delgado, who noted that she is not moving air well through her lungs. She was prescribed azithromycin and 50 mg of prednisone but has not yet picked up these medications. A prescription for DuoNeb will be refilled to manage her symptoms. Additionally, Tessalon Perles will be prescribed, to be taken three times daily, to help control her coughing fits. If her condition worsens over the weekend, she should seek immediate medical attention at the ER.             Follow Up:  If your symptoms are not resolving by the completion of your treatment or are worsening, see your primary care provider for follow up. If you don't have a primary care provider, you may go to any Urgent Care for re-evaluation. If you develop any life threatening symptoms, go to the nearest Emergency Department immediately or call EMS.       Patient or patient representative verbalized consent for the use of Ambient Listening during the visit with  LETITIA Craven for chart documentation. 3/29/2025  11:32 EDT        The use of  Video Visit was utilized during this visit, using both [employer] and [email or web address]. The use of a video visit has been reviewed with the patient and verbal informed consent has been obtained. No technical difficulties occurred during the visit.    is located at [address] [place] [id number]  Provider is located at [place], [place]

## 2025-03-29 NOTE — PATIENT INSTRUCTIONS
A prescription for DuoNeb will be refilled to manage her symptoms. Additionally, Tessalon Perles will be prescribed, to be taken three times daily, to help control her coughing fits. If her condition worsens over the weekend, she should seek immediate medical attention at the ER.      Chronic Obstructive Pulmonary Disease Exacerbation    Chronic obstructive pulmonary disease (COPD) is a long-term (chronic) lung problem.  When you have COPD, it can feel harder to breathe in or out.  COPD exacerbation is a flare-up of symptoms when breathing gets worse and more treatment may be needed. Without treatment, flare-ups can be life-threatening. If they happen often, your lungs can become more damaged.  What are the causes?  Not taking your usual COPD medicines as told by your health care provider.  A cold or the flu, which can cause infection in your lungs.  Being exposed to things that make your breathing worse, such as:  Smoke.  Air pollution.  Fumes.  Dust.  Allergies.  Weather changes.  What are the signs or symptoms?  Symptoms do not get better or get worse even if you take your medicines as told by your provider. Symptoms may include:  More shortness of breath. You may only be able to speak one or two words at a time.  More coughing or mucus from your lungs.  More wheezing or chest tightness.  Being more tired and having less energy.  Confusion.  How is this diagnosed?  This condition is diagnosed based on:  Symptoms that get worse.  Your medical history.  A physical exam.  You may also have tests, including:  A chest X-ray.  Blood or mucus tests.  How is this treated?  You may be able to stay home or you may need to go to the hospital. Treatment may include:  Taking medicines. These may include:  Inhalers. These have medicines in them that you breathe in. These may be more of what you already take or they may be new.  Steroids. These reduce inflammation in the airways. These may be inhaled, taken by mouth, or given  in an IV.  Antibiotics. These treat infection.  Using oxygen.  Using a device to help you clear mucus.  Follow these instructions at home:  Medicines  Take your medicines only as told by your provider.  If you were given antibiotics or steroids, take them as told by your provider. Do not stop taking them even if you start to feel better.  Lifestyle  Several times a day, wash your hands with soap and water for at least 20 seconds.  If you cannot use soap and water, use hand .  This may help keep you from getting an infection.  Avoid being around crowds or people who are sick.  Do not smoke or use any products that contain nicotine or tobacco. If you need help quitting, ask your provider.  Return to your normal activities when your provider says that it's safe.  Use breathing methods to control your stress and catch your breath.  How is this prevented?  Follow your COPD action plan. The action plan tells you what to do if you're feeling good and what to do when you start feeling worse. Discuss the plan often with your provider.  Make sure you get all the shots, also called vaccines, that your provider recommends. Ask your provider about a flu shot and a pneumonia shot.  Use oxygen therapy if told by your provider. If you need home oxygen therapy, ask your provider how often to check your oxygen level with a device called an oximeter.  Keep all follow-up visits to review your COPD action plan. Your provider will want to check on your condition often to keep you healthy and out of the hospital.  Contact a health care provider if:  Your COPD symptoms get worse.  You have a fever or chills.  You have trouble doing daily activities.  You have trouble breathing even when you are resting.  Get help right away if:  You are short of breath and cannot:  Talk in full sentences.  Do normal activities.  You have chest pain.  You feel confused.  These symptoms may be an emergency. Call 911 right away.  Do not wait to see  if the symptoms will go away.  Do not drive yourself to the hospital.  This information is not intended to replace advice given to you by your health care provider. Make sure you discuss any questions you have with your health care provider.  Document Revised: 09/19/2024 Document Reviewed: 03/04/2024  Elsevier Patient Education © 2024 [employer]

## 2025-04-02 DIAGNOSIS — K27.9 PEPTIC ULCER DISEASE: ICD-10-CM

## 2025-04-03 RX ORDER — OMEPRAZOLE 40 MG/1
40 CAPSULE, DELAYED RELEASE ORAL DAILY
Qty: 30 CAPSULE | Refills: 1 | Status: SHIPPED | OUTPATIENT
Start: 2025-04-03

## 2025-04-04 ENCOUNTER — HOSPITAL ENCOUNTER (OUTPATIENT)
Dept: GENERAL RADIOLOGY | Facility: HOSPITAL | Age: 60
Discharge: HOME | End: 2025-04-04
Payer: MEDICAID

## 2025-04-04 DIAGNOSIS — J44.1 COPD WITH EXACERBATION: ICD-10-CM

## 2025-04-04 PROCEDURE — 71046 X-RAY EXAM CHEST 2 VIEWS: CPT

## 2025-04-07 ENCOUNTER — OFFICE VISIT (OUTPATIENT)
Dept: FAMILY MEDICINE CLINIC | Facility: CLINIC | Age: 60
End: 2025-04-07
Payer: MEDICAID

## 2025-04-07 VITALS
SYSTOLIC BLOOD PRESSURE: 160 MMHG | HEART RATE: 92 BPM | HEIGHT: 67 IN | WEIGHT: 134 LBS | DIASTOLIC BLOOD PRESSURE: 100 MMHG | OXYGEN SATURATION: 96 % | TEMPERATURE: 98 F | BODY MASS INDEX: 21.03 KG/M2

## 2025-04-07 DIAGNOSIS — I10 PRIMARY HYPERTENSION: ICD-10-CM

## 2025-04-07 DIAGNOSIS — J44.1 COPD WITH ACUTE EXACERBATION: Primary | ICD-10-CM

## 2025-04-07 DIAGNOSIS — F33.1 MODERATE RECURRENT MAJOR DEPRESSION: ICD-10-CM

## 2025-04-07 DIAGNOSIS — M17.11 PRIMARY OSTEOARTHRITIS OF RIGHT KNEE: ICD-10-CM

## 2025-04-07 DIAGNOSIS — F41.1 GENERALIZED ANXIETY DISORDER: ICD-10-CM

## 2025-04-07 DIAGNOSIS — F43.10 PTSD (POST-TRAUMATIC STRESS DISORDER): ICD-10-CM

## 2025-04-07 PROCEDURE — 99214 OFFICE O/P EST MOD 30 MIN: CPT | Performed by: STUDENT IN AN ORGANIZED HEALTH CARE EDUCATION/TRAINING PROGRAM

## 2025-04-07 PROCEDURE — 1125F AMNT PAIN NOTED PAIN PRSNT: CPT | Performed by: STUDENT IN AN ORGANIZED HEALTH CARE EDUCATION/TRAINING PROGRAM

## 2025-04-07 RX ORDER — ALPRAZOLAM 1 MG/1
1 TABLET ORAL 2 TIMES DAILY PRN
Qty: 60 TABLET | Refills: 0 | Status: SHIPPED | OUTPATIENT
Start: 2025-04-07
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
## 2025-04-17 DIAGNOSIS — F90.9 ATTENTION DEFICIT HYPERACTIVITY DISORDER (ADHD), UNSPECIFIED ADHD TYPE: ICD-10-CM

## 2025-04-18 RX ORDER — ATOMOXETINE 10 MG/1
10 CAPSULE ORAL DAILY
Qty: 30 CAPSULE | Refills: 1 | OUTPATIENT
Start: 2025-04-18

## 2025-04-21 ENCOUNTER — OFFICE VISIT (OUTPATIENT)
Dept: FAMILY MEDICINE CLINIC | Facility: CLINIC | Age: 60
End: 2025-04-21
Payer: MEDICAID

## 2025-04-21 VITALS
BODY MASS INDEX: 30.78 KG/M2 | SYSTOLIC BLOOD PRESSURE: 150 MMHG | DIASTOLIC BLOOD PRESSURE: 80 MMHG | WEIGHT: 133 LBS | HEIGHT: 55 IN | HEART RATE: 113 BPM | TEMPERATURE: 98 F | OXYGEN SATURATION: 97 %

## 2025-04-21 DIAGNOSIS — G89.29 CHRONIC PAIN OF BOTH KNEES: ICD-10-CM

## 2025-04-21 DIAGNOSIS — E11.65 TYPE 2 DIABETES MELLITUS WITH HYPERGLYCEMIA, WITHOUT LONG-TERM CURRENT USE OF INSULIN: ICD-10-CM

## 2025-04-21 DIAGNOSIS — F43.10 PTSD (POST-TRAUMATIC STRESS DISORDER): ICD-10-CM

## 2025-04-21 DIAGNOSIS — M25.561 CHRONIC PAIN OF BOTH KNEES: ICD-10-CM

## 2025-04-21 DIAGNOSIS — F33.1 MODERATE RECURRENT MAJOR DEPRESSION: ICD-10-CM

## 2025-04-21 DIAGNOSIS — M50.30 DDD (DEGENERATIVE DISC DISEASE), CERVICAL: ICD-10-CM

## 2025-04-21 DIAGNOSIS — F41.1 GENERALIZED ANXIETY DISORDER: ICD-10-CM

## 2025-04-21 DIAGNOSIS — E78.00 PURE HYPERCHOLESTEROLEMIA: ICD-10-CM

## 2025-04-21 DIAGNOSIS — M17.11 PRIMARY OSTEOARTHRITIS OF RIGHT KNEE: ICD-10-CM

## 2025-04-21 DIAGNOSIS — Z79.899 MEDICATION MANAGEMENT: ICD-10-CM

## 2025-04-21 DIAGNOSIS — I10 PRIMARY HYPERTENSION: Primary | ICD-10-CM

## 2025-04-21 DIAGNOSIS — M25.562 CHRONIC PAIN OF BOTH KNEES: ICD-10-CM

## 2025-04-21 LAB
ALBUMIN SERPL-MCNC: 4.6 G/DL (ref 3.5–5.2)
ALBUMIN/GLOB SERPL: 1.4 G/DL
ALP SERPL-CCNC: 139 U/L (ref 39–117)
ALT SERPL W P-5'-P-CCNC: 44 U/L (ref 1–33)
ANION GAP SERPL CALCULATED.3IONS-SCNC: 12 MMOL/L (ref 5–15)
AST SERPL-CCNC: 36 U/L (ref 1–32)
BILIRUB SERPL-MCNC: 0.2 MG/DL (ref 0–1.2)
BUN SERPL-MCNC: 10 MG/DL (ref 8–23)
BUN/CREAT SERPL: 14.7 (ref 7–25)
CALCIUM SPEC-SCNC: 10.1 MG/DL (ref 8.6–10.5)
CHLORIDE SERPL-SCNC: 101 MMOL/L (ref 98–107)
CHOLEST SERPL-MCNC: 344 MG/DL (ref 0–200)
CO2 SERPL-SCNC: 28 MMOL/L (ref 22–29)
CREAT SERPL-MCNC: 0.68 MG/DL (ref 0.57–1)
EGFRCR SERPLBLD CKD-EPI 2021: 99.8 ML/MIN/1.73
GLOBULIN UR ELPH-MCNC: 3.2 GM/DL
GLUCOSE SERPL-MCNC: 124 MG/DL (ref 65–99)
HDLC SERPL-MCNC: 106 MG/DL (ref 40–60)
LDLC SERPL CALC-MCNC: 222 MG/DL (ref 0–100)
LDLC/HDLC SERPL: 2.06 {RATIO}
POTASSIUM SERPL-SCNC: 4.2 MMOL/L (ref 3.5–5.2)
PROT SERPL-MCNC: 7.8 G/DL (ref 6–8.5)
SODIUM SERPL-SCNC: 141 MMOL/L (ref 136–145)
TRIGL SERPL-MCNC: 99 MG/DL (ref 0–150)
VLDLC SERPL-MCNC: 16 MG/DL (ref 5–40)

## 2025-04-21 PROCEDURE — 83036 HEMOGLOBIN GLYCOSYLATED A1C: CPT | Performed by: STUDENT IN AN ORGANIZED HEALTH CARE EDUCATION/TRAINING PROGRAM

## 2025-04-21 PROCEDURE — 80307 DRUG TEST PRSMV CHEM ANLYZR: CPT | Performed by: STUDENT IN AN ORGANIZED HEALTH CARE EDUCATION/TRAINING PROGRAM

## 2025-04-21 PROCEDURE — 80061 LIPID PANEL: CPT | Performed by: STUDENT IN AN ORGANIZED HEALTH CARE EDUCATION/TRAINING PROGRAM

## 2025-04-21 PROCEDURE — 96372 THER/PROPH/DIAG INJ SC/IM: CPT | Performed by: STUDENT IN AN ORGANIZED HEALTH CARE EDUCATION/TRAINING PROGRAM

## 2025-04-21 PROCEDURE — 80053 COMPREHEN METABOLIC PANEL: CPT | Performed by: STUDENT IN AN ORGANIZED HEALTH CARE EDUCATION/TRAINING PROGRAM

## 2025-04-21 PROCEDURE — 99214 OFFICE O/P EST MOD 30 MIN: CPT | Performed by: STUDENT IN AN ORGANIZED HEALTH CARE EDUCATION/TRAINING PROGRAM

## 2025-04-21 PROCEDURE — 1125F AMNT PAIN NOTED PAIN PRSNT: CPT | Performed by: STUDENT IN AN ORGANIZED HEALTH CARE EDUCATION/TRAINING PROGRAM

## 2025-04-21 RX ORDER — LIDOCAINE HYDROCHLORIDE 10 MG/ML
3 INJECTION, SOLUTION INFILTRATION; PERINEURAL ONCE
Status: COMPLETED | OUTPATIENT
Start: 2025-04-21 | End: 2025-04-21

## 2025-04-21 RX ORDER — LIDOCAINE HYDROCHLORIDE 10 MG/ML
3 INJECTION, SOLUTION INFILTRATION; PERINEURAL ONCE
Status: CANCELLED | OUTPATIENT
Start: 2025-04-21 | End: 2025-04-21

## 2025-04-21 RX ORDER — TRIAMCINOLONE ACETONIDE 40 MG/ML
40 INJECTION, SUSPENSION INTRA-ARTICULAR; INTRAMUSCULAR ONCE
Status: COMPLETED | OUTPATIENT
Start: 2025-04-21 | End: 2025-04-21

## 2025-04-21 RX ORDER — TRIAMCINOLONE ACETONIDE 40 MG/ML
40 INJECTION, SUSPENSION INTRA-ARTICULAR; INTRAMUSCULAR ONCE
Status: CANCELLED | OUTPATIENT
Start: 2025-04-21 | End: 2025-04-21

## 2025-04-21 RX ORDER — METOPROLOL SUCCINATE 100 MG/1
100 TABLET, EXTENDED RELEASE ORAL DAILY
Qty: 30 TABLET | Refills: 1 | Status: SHIPPED | OUTPATIENT
Start: 2025-04-21

## 2025-04-21 RX ADMIN — LIDOCAINE HYDROCHLORIDE 3 ML: 10 INJECTION, SOLUTION INFILTRATION; PERINEURAL at 15:05

## 2025-04-21 RX ADMIN — TRIAMCINOLONE ACETONIDE 40 MG: 40 INJECTION, SUSPENSION INTRA-ARTICULAR; INTRAMUSCULAR at 15:07

## 2025-04-21 NOTE — PROGRESS NOTES
"Chief Complaint  Follow-up    Subjective      Yenni Camacho is a 59 y.o. female who presents to [facility]     History of Present Illness  The patient is a 59-year-old female presenting for hypertension and anxiety follow-up.    Hypertension  - Home blood pressure readings are normal; today's elevated reading is due to emotional distress.  - She is compliant with antihypertensive medications: metoprolol 50 mg daily, amlodipine 10 mg, losartan 100 mg, and clonidine 0.3 mg twice daily.    Anxiety  - Xanax remains effective for anxiety management.    Back and Leg Pain  - Experiencing back and leg pain.  - Hydrocodone 5 mg three times daily and acetaminophen provided partial relief but are now exhausted.  - BC powder is also used.  - [facility]  -[facility] although last appointment she tested positive for methamphetamine and they discharged her from the practice.  - Patient has not taken in methamphetamine and is confused why she tested positive.  - She does take multiple other medications which she suspects may have resulted in false positive.  - Avoiding physical therapy for knee pain due to fear of exacerbation.    Sinus Issues  - Using Vicks inhaler, Sudafed, and Afrin spray for sinus issues.    Supplemental information: History of hypertension since her 20s.       Objective   Vital Signs:   Vitals:    04/21/25 1331   BP: 150/80   BP Location: Left arm   Patient Position: Sitting   Pulse: 113   Temp: 98 °F (36.7 °C)   TempSrc: Oral   SpO2: 97%   Weight: 60.3 kg (133 lb)   Height: 67 cm (26.38\")     Body mass index is 134.35 kg/m².    Wt Readings from Last 3 Encounters:   04/21/25 60.3 kg (133 lb)   04/07/25 60.8 kg (134 lb)   03/28/25 59.4 kg (131 lb)     BP Readings from Last 3 Encounters:   04/21/25 150/80   04/07/25 160/100   03/28/25 132/72       Health Maintenance   Topic Date Due    " DIABETIC EYE EXAM  Never done    Pneumococcal Vaccine 50+ (1 of 2 - PCV) Never done    TDAP/TD VACCINES (1 - Tdap) Never done    PAP SMEAR  Never done    MAMMOGRAM  Never done    COLORECTAL CANCER SCREENING  Never done    ZOSTER VACCINE (1 of 2) Never done    ANNUAL PHYSICAL  04/24/2024    HEMOGLOBIN A1C  04/10/2025    LUNG CANCER SCREENING  05/02/2025    COVID-19 Vaccine (1 - 2024-25 season) 05/17/2025 (Originally 9/1/2024)    INFLUENZA VACCINE  07/01/2025    LIPID PANEL  10/10/2025    URINE MICROALBUMIN-CREATININE RATIO (uACR)  10/10/2025    HEPATITIS C SCREENING  Completed       Physical Exam  Constitutional:       General: She is not in acute distress.     Appearance: Normal appearance.   HENT:      Head: Normocephalic and atraumatic.      Mouth/Throat:      Mouth: Mucous membranes are moist.   Eyes:      Extraocular Movements: Extraocular movements intact.   Cardiovascular:      Rate and Rhythm: Normal rate and regular rhythm.   Pulmonary:      Effort: No respiratory distress.   Abdominal:      General: Abdomen is flat.   Musculoskeletal:      Cervical back: Neck supple.   Skin:     General: Skin is warm and dry.   Neurological:      General: No focal deficit present.      Mental Status: She is alert and oriented to person, place, and time. Mental status is at baseline.   Psychiatric:         Thought Content: Thought content normal.         Judgment: Judgment normal.      Comments: Tearful          Physical Exam      Result Review :  The following data was reviewed by: Tom Meehan DO on 04/21/2025:    XR Chest 2 View  Result Date: 4/4/2025  Impression: 1. No acute process. 2. Emphysema with chronic scarring at the left apex. Electronically Signed: Neftaly Goncalves MD  4/4/2025 4:26 PM EDT  Workstation ID: [id number]    XR Chest 2 View  Result Date: 3/8/2025  Impression: Hyperexpanded lungs without acute cardiopulmonary abnormality. Electronically Signed: Edson Suresh MD  3/8/2025 12:49 PM EST  Workstation ID:  [id number]    XR Knee 3+ View With Sunrise Left  Result Date: 2/11/2025  1.Mild bilateral medial tibiofemoral and lateral patellofemoral joint space narrowing, most likely related to mild osteoarthritis. 2.No radiographic findings of acute osseous knee abnormality. Electronically Signed: Rad Alvarado  2/11/2025 4:53 PM EST  Workstation ID: [id number]    XR Knee 3+ View With [place] Right  Result Date: 2/11/2025  1.Mild bilateral medial tibiofemoral and lateral patellofemoral joint space narrowing, most likely related to mild osteoarthritis. 2.No radiographic findings of acute osseous knee abnormality. Electronically Signed: Rad Alvarado  2/11/2025 4:53 PM EST  Workstation ID: [id number]       Results         Procedures          Diagnoses and all orders for this visit:    1. Primary hypertension (Primary)  -     metoprolol succinate XL (Toprol XL) 100 MG 24 hr tablet; Take 1 tablet by mouth Daily.  Dispense: 30 tablet; Refill: 1    2. Primary osteoarthritis of right knee  -     Ambulatory Referral to Pain Management  -     triamcinolone acetonide (KENALOG-40) injection 40 mg  -     lidocaine (XYLOCAINE) 1 % injection 3 mL    3. Moderate recurrent major depression  -     Ambulatory Referral to Psychotherapy    4. PTSD (post-traumatic stress disorder)  -     Ambulatory Referral to Psychotherapy    5. Generalized anxiety disorder  -     Ambulatory Referral to Psychotherapy    6. Medication management  -     Drug Screen 10 With / Conf, WB    7. DDD (degenerative disc disease), cervical  -     Ambulatory Referral to Pain Management    8. Chronic pain of both knees  -     Ambulatory Referral to Pain Management    9. Pure hypercholesterolemia  -     Lipid Panel    10. Type 2 diabetes mellitus with hyperglycemia, without long-term current use of insulin  -     Hemoglobin A1c  -     Comprehensive Metabolic Panel         Assessment & Plan  1. Hypertension.  - Consistently elevated readings; increase metoprolol to 100 mg daily.  -  Blood test for A1c and cholesterol levels.    2. Anxiety.  - Xanax effective; PDMP reviewed and patient will run out on 5/8/2025  - Referral to [facility] behavioral therapy for talk therapy completed today  - Patient will be seen before she needs another refill of Xanax    3. Pain management.  - Significant back and leg pain; out of hydrocodone.  - Referral to new pain management clinic.  - Administer right knee injection today for her osteoarthritis.  - Left knee pain has returned since steroid injection about 1 month ago  - Patient's metoprolol has increased which may have caused a false positive for methamphetamines  - Her use of oxymetazoline may have also caused this  - repeat drug screen in blood today    4. Sinus issues.  - Using Vicks inhaler, Sudafed, and Afrin spray.  - Discussed potential false positives in urine tests due to medications and inhalers.          FOLLOW UP  Return in about 2 weeks (around 5/5/2025) for Anxiety, hypertension, pain management.  Patient was given instructions and counseling regarding her condition or for health maintenance advice. Please see specific information pulled into the AVS if appropriate.     Patient or patient representative verbalized consent for the use of Ambient Listening during the visit with  Tom Meehan DO for chart documentation. 4/21/2025  17:12 EDT    Tom Meehan DO  04/21/25  17:12 EDT    CURRENT & DISCONTINUED MEDICATIONS  Current Outpatient Medications   Medication Instructions    albuterol sulfate  (90 Base) MCG/ACT inhaler 2 puffs, Inhalation, Every 4 Hours PRN    ALPRAZolam (XANAX) 1 mg, Oral, 2 Times Daily PRN    amLODIPine (NORVASC) 10 mg, Oral, Daily    cloNIDine (CATAPRES) 0.3 mg, Oral, 2 Times Daily    fluticasone (FLONASE) 50 MCG/ACT nasal spray 2 sprays, Each Nare, Daily    Fluticasone-Umeclidin-Vilant (Trelegy Ellipta) 100-62.5-25 MCG/ACT inhaler 1 puff, Inhalation, Daily - RT    HYDROcodone-acetaminophen (NORCO) 5-325 MG per tablet  1 tablet, 3 Times Daily    ipratropium-albuterol (DUO-NEB) 0.5-2.5 mg/3 ml nebulizer 3 mL, Nebulization, Every 4 Hours PRN    Kloxxado 8 MG/0.1ML liquid as directed Nasally as needed for 30 days    losartan (COZAAR) 100 mg, Oral, Daily    metFORMIN ER (GLUCOPHAGE-XR) 1,000 mg, Oral, Daily With Breakfast    metFORMIN HCl 625 MG tablet Every 24 Hours    metoprolol succinate XL (TOPROL XL) 100 mg, Oral, Daily    omeprazole (PRILOSEC) 40 mg, Oral, Daily       Medications Discontinued During This Encounter   Medication Reason    metoprolol succinate XL (Toprol XL) 50 MG 24 hr tablet

## 2025-04-22 ENCOUNTER — PATIENT MESSAGE (OUTPATIENT)
Dept: FAMILY MEDICINE CLINIC | Facility: CLINIC | Age: 60
End: 2025-04-22
Payer: MEDICAID

## 2025-04-22 LAB — HBA1C MFR BLD: 6.4 % (ref 4.8–5.6)

## 2025-04-28 DIAGNOSIS — M54.50 CHRONIC BILATERAL LOW BACK PAIN WITHOUT SCIATICA: Primary | ICD-10-CM

## 2025-04-28 DIAGNOSIS — G89.29 CHRONIC BILATERAL LOW BACK PAIN WITHOUT SCIATICA: Primary | ICD-10-CM

## 2025-04-28 RX ORDER — HYDROCODONE BITARTRATE AND ACETAMINOPHEN 5; 325 MG/1; MG/1
1 TABLET ORAL 3 TIMES DAILY
Qty: 21 TABLET | Refills: 0 | Status: SHIPPED | OUTPATIENT
Start: 2025-04-28 | End: 2025-05-05 | Stop reason: SDUPTHER

## 2025-04-29 ENCOUNTER — TELEPHONE (OUTPATIENT)
Dept: FAMILY MEDICINE CLINIC | Facility: CLINIC | Age: 60
End: 2025-04-29
Payer: MEDICAID

## 2025-04-30 LAB
11OH-THC SPEC-MCNC: NEGATIVE NG/ML
7AMINOCLONAZEPAM SERPL CFM-MCNC: NEGATIVE NG/ML
ALPRAZ SPEC-MCNC: 46.1 NG/ML
AMPHETAMINES BLD QL SCN: NEGATIVE NG/ML
BARBITURATES SERPLBLD QL: NEGATIVE UG/ML
BENZODIAZ BLD QL: ABNORMAL NG/ML
BENZODIAZ SPEC QL: POSITIVE
CANNABIDIOL SERPLBLD CFM-MCNC: NEGATIVE NG/ML
CANNABINOIDS BLD QL SCN: ABNORMAL NG/ML
CANNABINOIDS SPEC QL CFM: POSITIVE
CARBOXYTHC SPEC-MCNC: 26.6 NG/ML
CHLORDIAZEP SPEC-MCNC: NEGATIVE UG/ML
CLONAZEPAM SERPL CFM-MCNC: NEGATIVE NG/ML
COCAINE+BZE SERPLBLD QL SCN: NEGATIVE NG/ML
DESALKYLFLURAZ SERPL CFM-MCNC: NEGATIVE NG/ML
DIAZEPAM SPEC-MCNC: NEGATIVE NG/ML
FLURAZEPAM SPEC-MCNC: NEGATIVE NG/ML
LORAZEPAM SPEC-MCNC: NEGATIVE NG/ML
METHADONE BLD QL SCN: NEGATIVE NG/ML
MIDAZOLAM SPEC-MCNC: NEGATIVE NG/ML
NORCHLORDIAZEP SERPL-MCNC: NEGATIVE UG/ML
NORDIAZEPAM SPEC-MCNC: NEGATIVE NG/ML
OPIATES BLD QL SCN: NEGATIVE NG/ML
OXAZEPAM SPEC-MCNC: NEGATIVE NG/ML
OXYCODONE BLD QL SCN: NEGATIVE NG/ML
PCP BLD QL SCN: NEGATIVE NG/ML
PROPOXYPH BLD QL SCN: NEGATIVE NG/ML
TEMAZEPAM SPEC-MCNC: NEGATIVE NG/ML
THC SERPLBLD CFM-MCNC: 1.8 NG/ML
TRIAZOLAM SPEC-MCNC: NEGATIVE NG/ML

## 2025-05-01 ENCOUNTER — DOCUMENTATION (OUTPATIENT)
Dept: FAMILY MEDICINE CLINIC | Facility: CLINIC | Age: 60
End: 2025-05-01
Payer: MEDICAID

## 2025-05-01 NOTE — PROGRESS NOTES
Telephone call to patient she is aware of her lab results.  She stated she call pain management today and told them about her results and they came back negative for Meth.  She stated they are supposed to call her back.

## 2025-05-05 ENCOUNTER — OFFICE VISIT (OUTPATIENT)
Dept: FAMILY MEDICINE CLINIC | Facility: CLINIC | Age: 60
End: 2025-05-05
Payer: MEDICAID

## 2025-05-05 VITALS
HEART RATE: 74 BPM | SYSTOLIC BLOOD PRESSURE: 162 MMHG | WEIGHT: 136.8 LBS | BODY MASS INDEX: 31.66 KG/M2 | HEIGHT: 55 IN | OXYGEN SATURATION: 96 % | TEMPERATURE: 98 F | DIASTOLIC BLOOD PRESSURE: 92 MMHG

## 2025-05-05 DIAGNOSIS — I10 PRIMARY HYPERTENSION: Primary | ICD-10-CM

## 2025-05-05 DIAGNOSIS — I25.10 CORONARY ARTERY DISEASE INVOLVING NATIVE CORONARY ARTERY OF NATIVE HEART WITHOUT ANGINA PECTORIS: ICD-10-CM

## 2025-05-05 DIAGNOSIS — M17.11 PRIMARY OSTEOARTHRITIS OF RIGHT KNEE: ICD-10-CM

## 2025-05-05 DIAGNOSIS — F33.1 MODERATE RECURRENT MAJOR DEPRESSION: ICD-10-CM

## 2025-05-05 DIAGNOSIS — J43.9 PULMONARY EMPHYSEMA, UNSPECIFIED EMPHYSEMA TYPE: ICD-10-CM

## 2025-05-05 DIAGNOSIS — M54.50 CHRONIC BILATERAL LOW BACK PAIN WITHOUT SCIATICA: ICD-10-CM

## 2025-05-05 DIAGNOSIS — Z12.2 ENCOUNTER FOR SCREENING FOR LUNG CANCER: ICD-10-CM

## 2025-05-05 DIAGNOSIS — E11.65 TYPE 2 DIABETES MELLITUS WITH HYPERGLYCEMIA, WITHOUT LONG-TERM CURRENT USE OF INSULIN: ICD-10-CM

## 2025-05-05 DIAGNOSIS — Z79.899 MEDICATION MANAGEMENT: ICD-10-CM

## 2025-05-05 DIAGNOSIS — F43.10 PTSD (POST-TRAUMATIC STRESS DISORDER): ICD-10-CM

## 2025-05-05 DIAGNOSIS — E78.2 HYPERLIPIDEMIA, MIXED: ICD-10-CM

## 2025-05-05 DIAGNOSIS — F41.1 GENERALIZED ANXIETY DISORDER: ICD-10-CM

## 2025-05-05 DIAGNOSIS — R74.01 TRANSAMINITIS: ICD-10-CM

## 2025-05-05 DIAGNOSIS — G89.29 CHRONIC BILATERAL LOW BACK PAIN WITHOUT SCIATICA: ICD-10-CM

## 2025-05-05 PROCEDURE — 99214 OFFICE O/P EST MOD 30 MIN: CPT | Performed by: STUDENT IN AN ORGANIZED HEALTH CARE EDUCATION/TRAINING PROGRAM

## 2025-05-05 PROCEDURE — 3044F HG A1C LEVEL LT 7.0%: CPT | Performed by: STUDENT IN AN ORGANIZED HEALTH CARE EDUCATION/TRAINING PROGRAM

## 2025-05-05 PROCEDURE — 1125F AMNT PAIN NOTED PAIN PRSNT: CPT | Performed by: STUDENT IN AN ORGANIZED HEALTH CARE EDUCATION/TRAINING PROGRAM

## 2025-05-05 RX ORDER — ASPIRIN 81 MG/1
81 TABLET ORAL DAILY
Qty: 90 TABLET | Refills: 1 | Status: SHIPPED | OUTPATIENT
Start: 2025-05-05

## 2025-05-05 RX ORDER — ALBUTEROL SULFATE 90 UG/1
2 INHALANT RESPIRATORY (INHALATION) EVERY 4 HOURS PRN
Qty: 18 G | Refills: 2 | Status: SHIPPED | OUTPATIENT
Start: 2025-05-05

## 2025-05-05 RX ORDER — HYDROCODONE BITARTRATE AND ACETAMINOPHEN 5; 325 MG/1; MG/1
1 TABLET ORAL 3 TIMES DAILY
Qty: 21 TABLET | Refills: 0 | Status: SHIPPED | OUTPATIENT
Start: 2025-05-05 | End: 2025-05-12

## 2025-05-05 RX ORDER — ALPRAZOLAM 1 MG/1
1 TABLET ORAL 2 TIMES DAILY PRN
Qty: 60 TABLET | Refills: 0 | Status: SHIPPED | OUTPATIENT
Start: 2025-05-05

## 2025-05-05 NOTE — PROGRESS NOTES
"Chief Complaint  Follow-up (States better.)    Subjective      Yenni Camacho is a 59 y.o. female who presents to [facility]     History of Present Illness  The patient is a 59-year-old female with a history of multiple health issues including left knee pain, anxiety, hypertension, diabetes, coronary artery disease, and COPD.    Left Knee Pain  - Reports significant left knee pain, described as a toothache, extending to shoulders and back  - Right knee, previously injected, is asymptomatic  - Left knee pain has worsened; she suspects wear  - Manages pain with ibuprofen and Goody's powder    Cholesterol Management  - On cholesterol medication for 1-2 weeks  - Diet rich in vegetables  - No chest pain    Diabetes  - Diabetes well-managed with metformin  - A1c levels good    Anxiety  - On Xanax for anxiety  - Needs refill    COPD  - Uses Trelegy and albuterol inhalers twice daily  - Anticipates humid weather may worsen symptoms  - Last pulmonologist visit 2025  - History of smoking and chronic lung scarring from pneumonia in  or     Hypertension  - On amlodipine 10 mg, clonidine 0.3 mg twice daily, losartan 100 mg once daily, and metoprolol 100 mg  - Blood pressure improved from last month    Supplemental information: The patient is a 59-year-old female with left knee pain, anxiety, hypertension, diabetes, coronary artery disease, and COPD.    SOCIAL HISTORY  The patient has a history of smoking.    FAMILY HISTORY  Grandfather had emphysema and arthritis. Father  of massive heart attack.       Objective   Vital Signs:   Vitals:    25 1409 25 1456   BP: 140/92 162/92   BP Location: Right arm Left arm   Patient Position: Sitting    Pulse: 74    Temp: 98 °F (36.7 °C)    TempSrc: Oral    SpO2: 96%    Weight: 62.1 kg (136 lb 12.8 oz)    Height: 67 cm (26.38\")      Body mass index is 138.23 kg/m².    Wt Readings from Last 3 Encounters:   25 62.1 kg " (136 lb 12.8 oz)   04/21/25 60.3 kg (133 lb)   04/07/25 60.8 kg (134 lb)     BP Readings from Last 3 Encounters:   05/05/25 162/92   04/21/25 150/80   04/07/25 160/100       Health Maintenance   Topic Date Due    DIABETIC FOOT EXAM  Never done    DIABETIC EYE EXAM  Never done    Pneumococcal Vaccine 50+ (1 of 2 - PCV) Never done    TDAP/TD VACCINES (1 - Tdap) Never done    PAP SMEAR  Never done    ZOSTER VACCINE (1 of 2) Never done    ANNUAL PHYSICAL  04/24/2024    LUNG CANCER SCREENING  05/02/2025    COVID-19 Vaccine (1 - 2024-25 season) 05/17/2025 (Originally 9/1/2024)    COLORECTAL CANCER SCREENING  06/21/2025 (Originally 7/17/2009)    MAMMOGRAM  07/21/2025 (Originally 7/17/2004)    INFLUENZA VACCINE  07/01/2025    URINE MICROALBUMIN-CREATININE RATIO (uACR)  10/10/2025    HEMOGLOBIN A1C  10/21/2025    LIPID PANEL  04/21/2026    HEPATITIS C SCREENING  Completed       Physical Exam  Constitutional:       General: She is not in acute distress.     Appearance: Normal appearance.   HENT:      Head: Normocephalic and atraumatic.      Mouth/Throat:      Mouth: Mucous membranes are moist.   Eyes:      Extraocular Movements: Extraocular movements intact.   Cardiovascular:      Rate and Rhythm: Normal rate and regular rhythm.      Heart sounds: No murmur heard.  Pulmonary:      Effort: No respiratory distress.      Breath sounds: Wheezing (Scattered end expiratory) present. No rhonchi or rales.      Comments: Reduce breath sounds bilaterally  Abdominal:      General: Abdomen is flat.   Musculoskeletal:      Cervical back: Neck supple.   Skin:     General: Skin is warm and dry.   Neurological:      General: No focal deficit present.      Mental Status: She is alert and oriented to person, place, and time. Mental status is at baseline.   Psychiatric:         Mood and Affect: Mood normal.         Thought Content: Thought content normal.         Judgment: Judgment normal.          Physical Exam      Result Review :  The  following data was reviewed by: Tom Meehan DO on 05/05/2025:    XR Chest 2 View  Result Date: 4/4/2025  Impression: 1. No acute process. 2. Emphysema with chronic scarring at the left apex. Electronically Signed: Neftaly Goncalves MD  4/4/2025 4:26 PM EDT  Workstation ID: [id number]    XR Chest 2 View  Result Date: 3/8/2025  Impression: Hyperexpanded lungs without acute cardiopulmonary abnormality. Electronically Signed: Edson Suresh MD  3/8/2025 12:49 PM EST  Workstation ID: [id number]    XR Knee 3+ View With Sunrise Left  Result Date: 2/11/2025  1.Mild bilateral medial tibiofemoral and lateral patellofemoral joint space narrowing, most likely related to mild osteoarthritis. 2.No radiographic findings of acute osseous knee abnormality. Electronically Signed: Rad Alvarado  2/11/2025 4:53 PM EST  Workstation ID: [id number]    XR Knee 3+ View With [place] Right  Result Date: 2/11/2025  1.Mild bilateral medial tibiofemoral and lateral patellofemoral joint space narrowing, most likely related to mild osteoarthritis. 2.No radiographic findings of acute osseous knee abnormality. Electronically Signed: Rad Christiano  2/11/2025 4:53 PM EST  Workstation ID: [id number]       Results  Labs   - A1c: Well-controlled   - Cholesterol: High    Imaging   - Heart CT: Calcifications   - Lung CT: Chronic scarring       Procedures          Diagnoses and all orders for this visit:    1. Primary hypertension (Primary)    2. Transaminitis    3. Hyperlipidemia, mixed    4. Coronary artery disease involving native coronary artery of native heart without angina pectoris  -     aspirin 81 MG EC tablet; Take 1 tablet by mouth Daily.  Dispense: 90 tablet; Refill: 1    5. Type 2 diabetes mellitus with hyperglycemia, without long-term current use of insulin    6. PTSD (post-traumatic stress disorder)    7. Moderate recurrent major depression    8. Primary osteoarthritis of right knee    9. Medication management    10. Generalized anxiety disorder  -      ALPRAZolam (Xanax) 1 MG tablet; Take 1 tablet by mouth 2 (Two) Times a Day As Needed for Anxiety.  Dispense: 60 tablet; Refill: 0    11. Chronic bilateral low back pain without sciatica  -     HYDROcodone-acetaminophen (NORCO) 5-325 MG per tablet; Take 1 tablet by mouth 3 (Three) Times a Day for 7 days.  Dispense: 21 tablet; Refill: 0    12. Pulmonary emphysema, unspecified emphysema type  -     albuterol sulfate  (90 Base) MCG/ACT inhaler; Inhale 2 puffs Every 4 (Four) Hours As Needed for Wheezing.  Dispense: 18 g; Refill: 2    13. Encounter for screening for lung cancer  -      CT Chest Low Dose Cancer Screening WO; Future         Assessment & Plan  1. Left knee pain.  - Worsened.  - Referral to [facility] made on 04/21/2025, no response yet.  - Can inject again after 3 months since last injection.  - Follow-up on referral. Prescription for Norco, 7-day supply, until seen by specialist.    2. Anxiety.  - Refill Xanax for another month.    3. Hypertension.  - Significant improvement over past month.  - Increased metoprolol to 100 mg last visit.  - On amlodipine 10 mg, clonidine 0.3 mg twice daily, losartan 100 mg once daily.  - Recheck blood pressure 162/92.  Consider increasing clonidine to 0.4 mg twice daily  - Consider cardiology consult as well due to uncontrolled blood pressure  - Consider renal ultrasound based on resistant hypertension.    4. Diabetes Mellitus.  - A1c well-controlled.  - On metformin.  - Continue    5. Coronary Artery Disease.  - On cholesterol medication.  - Heart CT a year ago showed calcifications.  - Prescription for baby aspirin 81 mg to prevent cholesterol buildup and heart attacks.  - Avoid Goody powders due to high aspirin content. Report chest, jaw, or shoulder pain immediately; stress test may be needed.    6. COPD.  - Using Trelegy, follow-up with nurse practitioner on 06/02/2025.  - Prescription for albuterol.  - Order chest CT for annual lung cancer  screening.    Follow-up in 2 weeks.          FOLLOW UP  Return in about 2 weeks (around 5/19/2025) for anxiety, pain,.  Patient was given instructions and counseling regarding her condition or for health maintenance advice. Please see specific information pulled into the AVS if appropriate.     Patient or patient representative verbalized consent for the use of Ambient Listening during the visit with  Tom Meehan DO for chart documentation. 5/5/2025  17:39 EDT    Tom Meehan DO  05/05/25  17:36 EDT    CURRENT & DISCONTINUED MEDICATIONS  Current Outpatient Medications   Medication Instructions    albuterol sulfate  (90 Base) MCG/ACT inhaler 2 puffs, Inhalation, Every 4 Hours PRN    ALPRAZolam (XANAX) 1 mg, Oral, 2 Times Daily PRN    amLODIPine (NORVASC) 10 mg, Oral, Daily    aspirin 81 mg, Oral, Daily    cloNIDine (CATAPRES) 0.3 mg, Oral, 2 Times Daily    fluticasone (FLONASE) 50 MCG/ACT nasal spray 2 sprays, Each Nare, Daily    Fluticasone-Umeclidin-Vilant (Trelegy Ellipta) 100-62.5-25 MCG/ACT inhaler 1 puff, Inhalation, Daily - RT    HYDROcodone-acetaminophen (NORCO) 5-325 MG per tablet 1 tablet, Oral, 3 Times Daily    ipratropium-albuterol (DUO-NEB) 0.5-2.5 mg/3 ml nebulizer 3 mL, Nebulization, Every 4 Hours PRN    Kloxxado 8 MG/0.1ML liquid as directed Nasally as needed for 30 days    losartan (COZAAR) 100 mg, Oral, Daily    metFORMIN ER (GLUCOPHAGE-XR) 1,000 mg, Oral, Daily With Breakfast    metFORMIN HCl 625 MG tablet Every 24 Hours    metoprolol succinate XL (TOPROL XL) 100 mg, Oral, Daily    omeprazole (PRILOSEC) 40 mg, Oral, Daily    rosuvastatin (CRESTOR) 10 mg, Oral, Daily       Medications Discontinued During This Encounter   Medication Reason    albuterol sulfate  (90 Base) MCG/ACT inhaler Reorder    ALPRAZolam (Xanax) 1 MG tablet Reorder    HYDROcodone-acetaminophen (NORCO) 5-325 MG per tablet Reorder

## 2025-05-06 ENCOUNTER — TELEPHONE (OUTPATIENT)
Dept: FAMILY MEDICINE CLINIC | Facility: CLINIC | Age: 60
End: 2025-05-06
Payer: MEDICAID

## 2025-05-06 DIAGNOSIS — H53.9 VISION CHANGES: Primary | ICD-10-CM

## 2025-05-06 NOTE — TELEPHONE ENCOUNTER
Called to discuss patient's repeat blood pressure before leaving last visit.  Unable to leave message in voicemail box is full.  Will discuss her high blood pressure at next appointment.  Consider increasing clonidine from 0.3 twice daily to 0.4 twice daily as well as a cardiology referral and ultrasound renal arteries.

## 2025-05-06 NOTE — TELEPHONE ENCOUNTER
"    Caller: Yenni Camacho \"Maryjane\"    Relationship: Self    Best call back number: [phone] CURT    What is the medical concern/diagnosis: N/A    What specialty or service is being requested: OPTHAMOLOGIST    What is the provider, practice or medical service name: N/A    What is the office location: [place], [place]    What is the office phone number: N/A    Any additional details: PATIENT NEEDS AN EYE EXAM. PLEASE SEND NEW REFERRAL AND CALL PATIENT TO SCHEDULE.      "

## 2025-05-06 NOTE — TELEPHONE ENCOUNTER
"  Caller: Yenni Camacho \"Maryjane\"    Relationship: Self    Best call back number: [phone] CURT    What form or medical record are you requesting: MEDICAL RECORDS FROM [facility]    Who is requesting this form or medical record from you: PATIENT    How would you like to receive the form or medical records (pick-up, mail, fax): FAX  If fax, what is the fax number: [phone]  If mail, what is the address: N/A  If pick-up, provide patient with address and location details    Timeframe paperwork needed: ASAP    Additional notes: PATIENT NEEDS HER RECORDS FROM [facility]. SHE IS NOT ALLOWED IN THE OFFICE. PLEASE FAX FOR THESE RECORDS AND CALL PATIENT WHEN THEY ARE RECEIVED. PLEASE CALL PATIENT IF SHE HAS TO SIGN A RELEASE FORM. THIS IS THE [facility] ON [address] IN [place].          "

## 2025-05-09 NOTE — TELEPHONE ENCOUNTER
Spoke with PT letting her know that she will need to come in and fill out a release form . She said that she will be in today.

## 2025-05-12 DIAGNOSIS — G89.29 CHRONIC BILATERAL LOW BACK PAIN WITHOUT SCIATICA: ICD-10-CM

## 2025-05-12 DIAGNOSIS — M54.50 CHRONIC BILATERAL LOW BACK PAIN WITHOUT SCIATICA: ICD-10-CM

## 2025-05-13 RX ORDER — HYDROCODONE BITARTRATE AND ACETAMINOPHEN 5; 325 MG/1; MG/1
1 TABLET ORAL 3 TIMES DAILY
Qty: 21 TABLET | Refills: 0 | Status: SHIPPED | OUTPATIENT
Start: 2025-05-13 | End: 2025-05-19 | Stop reason: SDUPTHER

## 2025-05-13 NOTE — TELEPHONE ENCOUNTER
Patient fired from pain management. Will give another 7 day supply until she can see another pain specialist.

## 2025-05-19 ENCOUNTER — OFFICE VISIT (OUTPATIENT)
Dept: FAMILY MEDICINE CLINIC | Facility: CLINIC | Age: 60
End: 2025-05-19
Payer: MEDICAID

## 2025-05-19 ENCOUNTER — HOSPITAL ENCOUNTER (OUTPATIENT)
Dept: CT IMAGING | Facility: HOSPITAL | Age: 60
Discharge: HOME | End: 2025-05-19
Admitting: STUDENT IN AN ORGANIZED HEALTH CARE EDUCATION/TRAINING PROGRAM
Payer: MEDICAID

## 2025-05-19 DIAGNOSIS — M47.816 FACET ARTHROPATHY, LUMBAR: ICD-10-CM

## 2025-05-19 DIAGNOSIS — M54.2 NECK PAIN ON RIGHT SIDE: ICD-10-CM

## 2025-05-19 DIAGNOSIS — M19.041 PRIMARY OSTEOARTHRITIS OF BOTH HANDS: ICD-10-CM

## 2025-05-19 DIAGNOSIS — M25.561 CHRONIC PAIN OF BOTH KNEES: ICD-10-CM

## 2025-05-19 DIAGNOSIS — J43.9 PULMONARY EMPHYSEMA, UNSPECIFIED EMPHYSEMA TYPE: ICD-10-CM

## 2025-05-19 DIAGNOSIS — M18.12 ARTHRITIS OF CARPOMETACARPAL (CMC) JOINT OF LEFT THUMB: ICD-10-CM

## 2025-05-19 DIAGNOSIS — M25.562 CHRONIC PAIN OF BOTH KNEES: ICD-10-CM

## 2025-05-19 DIAGNOSIS — G89.29 CHRONIC BILATERAL LOW BACK PAIN WITHOUT SCIATICA: ICD-10-CM

## 2025-05-19 DIAGNOSIS — I10 PRIMARY HYPERTENSION: Primary | ICD-10-CM

## 2025-05-19 DIAGNOSIS — Z12.2 ENCOUNTER FOR SCREENING FOR LUNG CANCER: ICD-10-CM

## 2025-05-19 DIAGNOSIS — M54.50 CHRONIC BILATERAL LOW BACK PAIN WITHOUT SCIATICA: ICD-10-CM

## 2025-05-19 DIAGNOSIS — G89.29 CHRONIC PAIN OF BOTH KNEES: ICD-10-CM

## 2025-05-19 DIAGNOSIS — M19.042 PRIMARY OSTEOARTHRITIS OF BOTH HANDS: ICD-10-CM

## 2025-05-19 DIAGNOSIS — M50.30 DDD (DEGENERATIVE DISC DISEASE), CERVICAL: ICD-10-CM

## 2025-05-19 PROCEDURE — 1125F AMNT PAIN NOTED PAIN PRSNT: CPT | Performed by: STUDENT IN AN ORGANIZED HEALTH CARE EDUCATION/TRAINING PROGRAM

## 2025-05-19 PROCEDURE — 71271 CT THORAX LUNG CANCER SCR C-: CPT

## 2025-05-19 PROCEDURE — 3044F HG A1C LEVEL LT 7.0%: CPT | Performed by: STUDENT IN AN ORGANIZED HEALTH CARE EDUCATION/TRAINING PROGRAM

## 2025-05-19 PROCEDURE — 99214 OFFICE O/P EST MOD 30 MIN: CPT | Performed by: STUDENT IN AN ORGANIZED HEALTH CARE EDUCATION/TRAINING PROGRAM

## 2025-05-19 RX ORDER — CELECOXIB 100 MG/1
100 CAPSULE ORAL DAILY
Qty: 30 CAPSULE | Refills: 1 | Status: SHIPPED | OUTPATIENT
Start: 2025-05-19

## 2025-05-19 RX ORDER — HYDROCODONE BITARTRATE AND ACETAMINOPHEN 5; 325 MG/1; MG/1
1 TABLET ORAL 3 TIMES DAILY
Qty: 90 TABLET | Refills: 0 | Status: SHIPPED | OUTPATIENT
Start: 2025-05-19 | End: 2025-06-18

## 2025-05-19 RX ORDER — ACETAMINOPHEN 500 MG
500 TABLET ORAL 2 TIMES DAILY PRN
Qty: 60 TABLET | Refills: 1 | Status: SHIPPED | OUTPATIENT
Start: 2025-05-19

## 2025-05-19 RX ORDER — LIDOCAINE 50 MG/G
1 PATCH TOPICAL EVERY 24 HOURS
Qty: 30 PATCH | Refills: 2 | Status: SHIPPED | OUTPATIENT
Start: 2025-05-19

## 2025-05-19 NOTE — PROGRESS NOTES
"Chief Complaint  Follow-up (Joint pain, Feet cramp in the middle to the night./)    Subjective      Yenni Camacho is a 59 y.o. female who presents to [facility]     History of Present Illness  The patient is a 59-year-old female presenting with chronic pain, anxiety, and pulmonary issues.    Chronic Pain  - Reports severe joint pain, particularly in her knees, neck, and back, similar to an abscessed tooth or exposed nerve.  - Unable to contact her new pain management specialist, she self-medicates with Norco, effective for 3-4 hours.  - Has tried Goody's powder and generic arthritis medication but cannot recall if it was Tylenol or Motrin.  - Has not used lidocaine patches before.  - Knee injections provided temporary relief for 2 weeks.  - Uses Theragesic, Icy Hot, and heat pads on her knees, feet, and neck as needed.    Anxiety  - Increased stress levels attributed to recent weather conditions, relieved by Xanax.    Pulmonary Issues  - Decline in lung function with rattling sound during respiration.  - Hospitalized 3-4 times over the past year.  - Tried Breztri at 1 time but gave her blisters in her mouth  - Uses DuoNebs at home, which are beneficial.  - Continues to use Trelegy  - Seeing pulmonology in June for follow-up appointment  - Continues to use oxygen at home    Supplemental information: None.       Objective   Vital Signs:   Vitals:    05/19/25 1335 05/19/25 1443   BP: 146/86 136/86   BP Location: Left arm Right arm   Patient Position: Sitting    Pulse: 72    Temp: 98.1 °F (36.7 °C)    TempSrc: Oral    SpO2: 95%    Weight: 61.6 kg (135 lb 12.8 oz)    Height: 67 cm (26.38\")      Body mass index is 137.22 kg/m².    Wt Readings from Last 3 Encounters:   05/19/25 61.6 kg (135 lb 12.8 oz)   05/05/25 62.1 kg (136 lb 12.8 oz)   04/21/25 60.3 kg (133 lb)     BP Readings from Last 3 Encounters:   05/19/25 136/86   05/05/25 162/92   04/21/25 150/80       Health Maintenance "   Topic Date Due    DIABETIC FOOT EXAM  Never done    DIABETIC EYE EXAM  Never done    Pneumococcal Vaccine 50+ (1 of 2 - PCV) Never done    TDAP/TD VACCINES (1 - Tdap) Never done    PAP SMEAR  Never done    ZOSTER VACCINE (1 of 2) Never done    ANNUAL PHYSICAL  04/24/2024    COVID-19 Vaccine (1 - 2024-25 season) Never done    LUNG CANCER SCREENING  05/02/2025    COLORECTAL CANCER SCREENING  06/21/2025 (Originally 7/17/2009)    MAMMOGRAM  07/21/2025 (Originally 7/17/2004)    INFLUENZA VACCINE  07/01/2025    URINE MICROALBUMIN-CREATININE RATIO (uACR)  10/10/2025    HEMOGLOBIN A1C  10/21/2025    LIPID PANEL  04/21/2026    HEPATITIS C SCREENING  Completed       Physical Exam  Constitutional:       General: She is not in acute distress.     Appearance: Normal appearance.   HENT:      Head: Normocephalic and atraumatic.      Mouth/Throat:      Mouth: Mucous membranes are moist.   Eyes:      Extraocular Movements: Extraocular movements intact.   Pulmonary:      Effort: No respiratory distress.      Breath sounds: Wheezing (Scattered end expiratory) present. No rhonchi or rales.      Comments: Reduce breath sounds bilaterally  Abdominal:      General: Abdomen is flat.   Musculoskeletal:      Cervical back: Neck supple.   Skin:     General: Skin is warm and dry.   Neurological:      General: No focal deficit present.      Mental Status: She is alert and oriented to person, place, and time. Mental status is at baseline.   Psychiatric:         Mood and Affect: Mood normal.         Thought Content: Thought content normal.         Judgment: Judgment normal.          Physical Exam      Result Review :  The following data was reviewed by: Tom Meehan DO on 05/19/2025:    XR Chest 2 View  Result Date: 4/4/2025  Impression: 1. No acute process. 2. Emphysema with chronic scarring at the left apex. Electronically Signed: Neftaly Goncalves MD  4/4/2025 4:26 PM EDT  Workstation ID: [id number]    XR Chest 2 View  Result Date:  3/8/2025  Impression: Hyperexpanded lungs without acute cardiopulmonary abnormality. Electronically Signed: Edson Suresh MD  3/8/2025 12:49 PM EST  Workstation ID: [id number]    XR Knee 3+ View With Sunrise Left  Result Date: 2/11/2025  1.Mild bilateral medial tibiofemoral and lateral patellofemoral joint space narrowing, most likely related to mild osteoarthritis. 2.No radiographic findings of acute osseous knee abnormality. Electronically Signed: Rad Alvarado  2/11/2025 4:53 PM EST  Workstation ID: [id number]    XR Knee 3+ View With [place] Right  Result Date: 2/11/2025  1.Mild bilateral medial tibiofemoral and lateral patellofemoral joint space narrowing, most likely related to mild osteoarthritis. 2.No radiographic findings of acute osseous knee abnormality. Electronically Signed: Rad Alvarado  2/11/2025 4:53 PM EST  Workstation ID: [id number]       Results  CT chest: Results pending review by radiologist.       Procedures          Diagnoses and all orders for this visit:    1. Primary hypertension (Primary)    2. Chronic bilateral low back pain without sciatica  -     HYDROcodone-acetaminophen (NORCO) 5-325 MG per tablet; Take 1 tablet by mouth 3 (Three) Times a Day for 30 days.  Dispense: 90 tablet; Refill: 0  -     lidocaine (LIDODERM) 5 %; Place 1 patch on the skin as directed by provider Daily. Remove & Discard patch within 12 hours or as directed by MD  Dispense: 30 patch; Refill: 2  -     acetaminophen (TYLENOL) 500 MG tablet; Take 1 tablet by mouth 2 (Two) Times a Day As Needed for Mild Pain.  Dispense: 60 tablet; Refill: 1  -     celecoxib (CeleBREX) 100 MG capsule; Take 1 capsule by mouth Daily.  Dispense: 30 capsule; Refill: 1    3. Pulmonary emphysema, unspecified emphysema type    4. DDD (degenerative disc disease), cervical  -     acetaminophen (TYLENOL) 500 MG tablet; Take 1 tablet by mouth 2 (Two) Times a Day As Needed for Mild Pain.  Dispense: 60 tablet; Refill: 1  -     celecoxib (CeleBREX) 100 MG  capsule; Take 1 capsule by mouth Daily.  Dispense: 30 capsule; Refill: 1    5. Chronic pain of both knees  -     lidocaine (LIDODERM) 5 %; Place 1 patch on the skin as directed by provider Daily. Remove & Discard patch within 12 hours or as directed by MD  Dispense: 30 patch; Refill: 2  -     acetaminophen (TYLENOL) 500 MG tablet; Take 1 tablet by mouth 2 (Two) Times a Day As Needed for Mild Pain.  Dispense: 60 tablet; Refill: 1  -     celecoxib (CeleBREX) 100 MG capsule; Take 1 capsule by mouth Daily.  Dispense: 30 capsule; Refill: 1    6. Arthritis of carpometacarpal (CMC) joint of left thumb  -     acetaminophen (TYLENOL) 500 MG tablet; Take 1 tablet by mouth 2 (Two) Times a Day As Needed for Mild Pain.  Dispense: 60 tablet; Refill: 1  -     celecoxib (CeleBREX) 100 MG capsule; Take 1 capsule by mouth Daily.  Dispense: 30 capsule; Refill: 1    7. Neck pain on right side  -     lidocaine (LIDODERM) 5 %; Place 1 patch on the skin as directed by provider Daily. Remove & Discard patch within 12 hours or as directed by MD  Dispense: 30 patch; Refill: 2  -     acetaminophen (TYLENOL) 500 MG tablet; Take 1 tablet by mouth 2 (Two) Times a Day As Needed for Mild Pain.  Dispense: 60 tablet; Refill: 1  -     celecoxib (CeleBREX) 100 MG capsule; Take 1 capsule by mouth Daily.  Dispense: 30 capsule; Refill: 1    8. Primary osteoarthritis of both hands  -     acetaminophen (TYLENOL) 500 MG tablet; Take 1 tablet by mouth 2 (Two) Times a Day As Needed for Mild Pain.  Dispense: 60 tablet; Refill: 1  -     celecoxib (CeleBREX) 100 MG capsule; Take 1 capsule by mouth Daily.  Dispense: 30 capsule; Refill: 1    9. Facet arthropathy, lumbar  -     acetaminophen (TYLENOL) 500 MG tablet; Take 1 tablet by mouth 2 (Two) Times a Day As Needed for Mild Pain.  Dispense: 60 tablet; Refill: 1  -     celecoxib (CeleBREX) 100 MG capsule; Take 1 capsule by mouth Daily.  Dispense: 30 capsule; Refill: 1         Assessment & Plan  1. Chronic  pain.  - Severe joint pain, especially with low pressure weather.  - Norco 5 mg/325 mg TID eases pain for 3-4 hours.  - Continue Norco 5, add Tylenol 1000 mg BID. Prescribed lidocaine patches, apply one patch for 24 hours. Celebrex 100 mg daily added; notify if insurance does not cover.    2. Anxiety.  - Xanax effective for stress management.  - No changes to Xanax prescription.    3.  COPD.  - Worsening lung symptoms, including rattling sounds.  - CT scan performed, results pending.  - Continue Trelegy. Discuss issues with pulmonologist if they arise.  - May consider nebulized medicines to help with patient's chronic COPD  - Patient recently completed CT scan chest, will await results    4. Blood pressure management.  - BP readings normal today.  - Continue clonidine 0.3 mg twice daily, amlodipine 10 mg, metoprolol  mg, losartan 100 mg  - Recheck BP today and monitor response to increased clonidine.    Follow-up in 2 weeks.          FOLLOW UP  Return in about 2 weeks (around 6/2/2025) for chronic pain, copd, blood pressure.  Patient was given instructions and counseling regarding her condition or for health maintenance advice. Please see specific information pulled into the AVS if appropriate.     Patient or patient representative verbalized consent for the use of Ambient Listening during the visit with  Tom Meehan DO for chart documentation. 5/19/2025  17:06 EDT    Tom Meehan DO  05/19/25  17:03 EDT    CURRENT & DISCONTINUED MEDICATIONS  Current Outpatient Medications   Medication Instructions    acetaminophen (TYLENOL) 500 mg, Oral, 2 Times Daily PRN    albuterol sulfate  (90 Base) MCG/ACT inhaler 2 puffs, Inhalation, Every 4 Hours PRN    ALPRAZolam (XANAX) 1 mg, Oral, 2 Times Daily PRN    amLODIPine (NORVASC) 10 mg, Oral, Daily    aspirin 81 mg, Oral, Daily    celecoxib (CELEBREX) 100 mg, Oral, Daily    cloNIDine (CATAPRES) 0.3 mg, Oral, 2 Times Daily    fluticasone (FLONASE) 50 MCG/ACT  nasal spray 2 sprays, Each Nare, Daily    Fluticasone-Umeclidin-Vilant (Trelegy Ellipta) 100-62.5-25 MCG/ACT inhaler 1 puff, Inhalation, Daily - RT    HYDROcodone-acetaminophen (NORCO) 5-325 MG per tablet 1 tablet, Oral, 3 Times Daily    ipratropium-albuterol (DUO-NEB) 0.5-2.5 mg/3 ml nebulizer 3 mL, Nebulization, Every 4 Hours PRN    Kloxxado 8 MG/0.1ML liquid as directed Nasally as needed for 30 days    lidocaine (LIDODERM) 5 % 1 patch, Transdermal, Every 24 Hours, Remove & Discard patch within 12 hours or as directed by MD    losartan (COZAAR) 100 mg, Oral, Daily    metFORMIN ER (GLUCOPHAGE-XR) 1,000 mg, Oral, Daily With Breakfast    metFORMIN HCl 625 MG tablet Every 24 Hours    metoprolol succinate XL (TOPROL XL) 100 mg, Oral, Daily    omeprazole (PRILOSEC) 40 mg, Oral, Daily    rosuvastatin (CRESTOR) 10 mg, Oral, Daily       Medications Discontinued During This Encounter   Medication Reason    HYDROcodone-acetaminophen (NORCO) 5-325 MG per tablet Reorder

## 2025-05-20 VITALS
DIASTOLIC BLOOD PRESSURE: 86 MMHG | SYSTOLIC BLOOD PRESSURE: 136 MMHG | TEMPERATURE: 98.1 F | HEIGHT: 67 IN | BODY MASS INDEX: 21.31 KG/M2 | WEIGHT: 135.8 LBS | OXYGEN SATURATION: 95 % | HEART RATE: 72 BPM

## 2025-05-21 ENCOUNTER — DOCUMENTATION (OUTPATIENT)
Dept: FAMILY MEDICINE CLINIC | Facility: CLINIC | Age: 60
End: 2025-05-21
Payer: MEDICAID

## 2025-05-21 DIAGNOSIS — I10 PRIMARY HYPERTENSION: ICD-10-CM

## 2025-05-21 NOTE — PROGRESS NOTES
Telephone call to patient an asked if she had called Pain Management.  She stated she would call them now.  Told patient I will call her tomorrow and confirm.

## 2025-05-22 ENCOUNTER — DOCUMENTATION (OUTPATIENT)
Dept: FAMILY MEDICINE CLINIC | Facility: CLINIC | Age: 60
End: 2025-05-22
Payer: MEDICAID

## 2025-05-22 ENCOUNTER — TELEPHONE (OUTPATIENT)
Dept: FAMILY MEDICINE CLINIC | Facility: CLINIC | Age: 60
End: 2025-05-22
Payer: MEDICAID

## 2025-05-22 DIAGNOSIS — I10 PRIMARY HYPERTENSION: ICD-10-CM

## 2025-05-22 DIAGNOSIS — J44.1 COPD WITH ACUTE EXACERBATION: ICD-10-CM

## 2025-05-22 RX ORDER — METOPROLOL SUCCINATE 50 MG/1
50 TABLET, EXTENDED RELEASE ORAL DAILY
Qty: 30 TABLET | Refills: 1 | OUTPATIENT
Start: 2025-05-22

## 2025-05-22 RX ORDER — BENZONATATE 200 MG/1
CAPSULE ORAL
Qty: 30 CAPSULE | Refills: 0 | Status: SHIPPED | OUTPATIENT
Start: 2025-05-22

## 2025-05-22 RX ORDER — METOPROLOL SUCCINATE 100 MG/1
100 TABLET, EXTENDED RELEASE ORAL DAILY
Qty: 30 TABLET | Refills: 1 | Status: SHIPPED | OUTPATIENT
Start: 2025-05-22

## 2025-05-22 NOTE — TELEPHONE ENCOUNTER
PATIENT CALLED TO CHECK STATUS OF THIS REQUEST AND ADVISE THIS MEDICATION WAS PRESCRIBED BY THE ER AND SHE DOES NOT HAVE ANY LEFT

## 2025-05-22 NOTE — PROGRESS NOTES
Telephone call to patient to see if she has an appointment for pain management.  She stated has an appointment in July with Dr. Hicks. Patient stated please fax records to drs office.  Faxed patients records to [phone]

## 2025-05-22 NOTE — TELEPHONE ENCOUNTER
"  Caller: Yenni Camacho \"Maryjane\"    Relationship: Self    Best call back number: [phone]    What is the best time to reach you: ANYTIME    Who are you requesting to speak with (clinical staff, provider,  specific staff member): JAVY     Do you know the name of the person who called:     What was the call regarding: PATIENT IS NEEDING A REFERRAL PAIN MANAGEMENT.  PLEASE CALL PATIENT BACK TO DISCUSS        "

## 2025-05-27 ENCOUNTER — TELEPHONE (OUTPATIENT)
Dept: FAMILY MEDICINE CLINIC | Facility: CLINIC | Age: 60
End: 2025-05-27
Payer: MEDICAID

## 2025-05-27 NOTE — TELEPHONE ENCOUNTER
"Caller: Yenni Camacho \"Maryjane\"    Relationship: Self    Best call back number: [phone]     What is the medical concern/diagnosis:CHRONIC PAIN     What specialty or service is being requested: PAIN MANAGEMENT    What is the provider, practice or medical service name: [facility]    What is the office location: [facility] [address] OFF OF [address]    Any additional details:   PATIENT WAS DISMISSED FROM [facility] AND NEEDS REFERRAL TO NEW PAIN MANAGEMENT    PLEASE ADVISE  "

## 2025-06-02 ENCOUNTER — OFFICE VISIT (OUTPATIENT)
Dept: PULMONOLOGY | Facility: CLINIC | Age: 60
End: 2025-06-02
Payer: MEDICAID

## 2025-06-02 VITALS
SYSTOLIC BLOOD PRESSURE: 195 MMHG | OXYGEN SATURATION: 97 % | HEART RATE: 63 BPM | TEMPERATURE: 98.4 F | DIASTOLIC BLOOD PRESSURE: 117 MMHG | WEIGHT: 135 LBS | RESPIRATION RATE: 16 BRPM | BODY MASS INDEX: 21.19 KG/M2 | HEIGHT: 67 IN

## 2025-06-02 DIAGNOSIS — J43.9 PULMONARY EMPHYSEMA, UNSPECIFIED EMPHYSEMA TYPE: Primary | ICD-10-CM

## 2025-06-02 DIAGNOSIS — F17.211 NICOTINE DEPENDENCE, CIGARETTES, IN REMISSION: ICD-10-CM

## 2025-06-02 PROCEDURE — 1160F RVW MEDS BY RX/DR IN RCRD: CPT | Performed by: NURSE PRACTITIONER

## 2025-06-02 PROCEDURE — 1159F MED LIST DOCD IN RCRD: CPT | Performed by: NURSE PRACTITIONER

## 2025-06-02 PROCEDURE — 99214 OFFICE O/P EST MOD 30 MIN: CPT | Performed by: NURSE PRACTITIONER

## 2025-06-02 NOTE — PROGRESS NOTES
Primary Care Provider  Tmo Meehan DO   Referring Provider  No ref. provider found    Patient Complaint  Follow-up (3 month), COPD, and Shortness of Breath    Patient or patient representative verbalized consent for the use of Ambient Listening during the visit with  LETITIA Franz for chart documentation. 6/2/2025  16:15 EDT      Subjective       History of Presenting Illness  Yenni Camacho is a pleasant 59 y.o. female  of  Dr. Ball who presents to [facility] with history of COPD here for followup appointment.  Patient was last seen 2/25/2025 by Dr. Ball.  She is a former smoker quit 2018 with a 79.4-pack-year history.  Her most recent CT low-dose lung cancer screening was 5/19/2025 which revealed some scarring in the upper lobes more pronounced on the left with underlying bronchiectasis.  Some  peribronchial thickening which could relate to bronchitis.  Small pulmonary nodules unchanged.  Recommend continue annual screening with low-dose CT.  History of Present Illness  The patient is a 59-year-old female who presents for a follow-up appointment.    She reports a progressive deterioration in her pulmonary function, necessitating the use of supplemental oxygen. She has been hospitalized four times in the past year due to respiratory issues. She requires near-constant oxygen support and experiences difficulty in breathing. Her primary care physician, Dr. Santos, has expressed concern over the severity of her condition, noting diminished airflow through her lungs. She was previously diagnosed with pneumonia and was placed under quarantine. She utilizes albuterol as a rescue inhaler, with frequency varying depending on her activity level. She was prescribed an antibiotic regimen by her general practitioner during her last visit.    She also reports experiencing significant pain, which she believes is contributing to her elevated blood pressure.  She refrained from taking her analgesics prior to this visit due to concerns about driving while medicated.    SOCIAL HISTORY  She is a former smoker, quit in 2018. She does not use meth, marijuana, cocaine, or heroin.    MEDICATIONS  Current: Albuterol, Trelegy       At present time patient denies coughing, wheezing, headaches, chest pain, weight loss or hemoptysis. Patient denies fevers, chills and night sweats. Yenni Camacho is able to perform ADLs.      I have personally reviewed the review of systems, past family, social, medical and surgical histories; and agree with their findings.      Review of Systems   Constitutional: Negative.    HENT: Negative.     Respiratory:  Positive for shortness of breath.    Cardiovascular: Negative.    Musculoskeletal: Negative.    Neurological: Negative.    Psychiatric/Behavioral: Negative.           Family History   Problem Relation Age of Onset    ADD / ADHD Mother     Anxiety disorder Mother     Emphysema Paternal Grandfather     Arthritis Maternal Grandfather         Had in his hands, knees and back.    Arthritis Paternal Grandmother         My grandfather had crippling arthritis.  He had in his back that made him bend to his toes.  He looked like a upside-down U.    COPD Paternal Grandmother         He smoked cigarettes        Social History     Socioeconomic History    Marital status:    Tobacco Use    Smoking status: Former     Current packs/day: 0.00     Average packs/day: 2.0 packs/day for 39.7 years (79.4 ttl pk-yrs)     Types: Cigarettes     Start date: 1978     Quit date: 2018     Years since quittin.1     Passive exposure: Past    Smokeless tobacco: Never    Tobacco comments:     Worse thing i ever did!   Vaping Use    Vaping status: Former    Substances: THC    Devices: Refillable tank   Substance and Sexual Activity    Alcohol use: Not Currently    Drug use: Not Currently     Frequency: 7.0 times per week     Types: Hydrocodone,  Marijuana     Comment: I had a MM license in [place].    Sexual activity: Not Currently     Partners: Male        Past Medical History:   Diagnosis Date    ADHD     Anxiety     Arthritis     Asthma, extrinsic 05/21    Bunion     Chronic pain disorder     Diabetes mellitus 11/22    Emphysema of lung 05/21    Foot pain, bilateral     Headache 5/86    Severe headaches.  Had MRI said they were caused by arthritis in my neck.    Hypertension     Low back pain 7/90    Arthritis in my back    Peptic ulceration     Pneumonia     PTSD (post-traumatic stress disorder)     Rheumatoid arthritis 8/86          There is no immunization history on file for this patient.    Allergies   Allergen Reactions    Lexapro [Escitalopram] Mental Status Change     Worsened depression     Breztri Aerosphere [Budeson-Glycopyrrol-Formoterol] Other (See Comments)     Blisters in mouth    Ondansetron Headache    Paxlovid [Nirmatrelvir-Ritonavir] Unknown - Low Severity    Wellbutrin [Bupropion] Unknown - Low Severity          Current Outpatient Medications:     acetaminophen (TYLENOL) 500 MG tablet, Take 1 tablet by mouth 2 (Two) Times a Day As Needed for Mild Pain., Disp: 60 tablet, Rfl: 1    albuterol sulfate  (90 Base) MCG/ACT inhaler, Inhale 2 puffs Every 4 (Four) Hours As Needed for Wheezing., Disp: 18 g, Rfl: 2    ALPRAZolam (Xanax) 1 MG tablet, Take 1 tablet by mouth 2 (Two) Times a Day As Needed for Anxiety., Disp: 60 tablet, Rfl: 0    amLODIPine (NORVASC) 10 MG tablet, Take 1 tablet by mouth Daily., Disp: 90 tablet, Rfl: 1    aspirin 81 MG EC tablet, Take 1 tablet by mouth Daily., Disp: 90 tablet, Rfl: 1    benzonatate (TESSALON) 200 MG capsule, TAKE 1 CAPSULE BY MOUTH THREE TIMES DAILY FOR UP TO 10 DAYS AS NEEDED FOR COUGH, Disp: 30 capsule, Rfl: 0    celecoxib (CeleBREX) 100 MG capsule, Take 1 capsule by mouth Daily., Disp: 30 capsule, Rfl: 1    cloNIDine (CATAPRES) 0.3 MG tablet, Take 1 tablet by mouth 2 (Two) Times a Day., Disp: 180  "tablet, Rfl: 1    fluticasone (FLONASE) 50 MCG/ACT nasal spray, 2 sprays by Each Nare route Daily., Disp: 16 g, Rfl: 1    Fluticasone-Umeclidin-Vilant (Trelegy Ellipta) 100-62.5-25 MCG/ACT inhaler, Inhale 1 puff Daily., Disp: 60 each, Rfl: 1    HYDROcodone-acetaminophen (NORCO) 5-325 MG per tablet, Take 1 tablet by mouth 3 (Three) Times a Day for 30 days., Disp: 90 tablet, Rfl: 0    ipratropium-albuterol (DUO-NEB) 0.5-2.5 mg/3 ml nebulizer, Take 3 mL by nebulization Every 4 (Four) Hours As Needed for Wheezing., Disp: 360 mL, Rfl: 0    Kloxxado 8 MG/0.1ML liquid, as directed Nasally as needed for 30 days, Disp: , Rfl:     lidocaine (LIDODERM) 5 %, Place 1 patch on the skin as directed by provider Daily. Remove & Discard patch within 12 hours or as directed by MD, Disp: 30 patch, Rfl: 2    losartan (Cozaar) 100 MG tablet, Take 1 tablet by mouth Daily., Disp: 90 tablet, Rfl: 1    metFORMIN HCl 625 MG tablet, Daily., Disp: , Rfl:     metoprolol succinate XL (TOPROL-XL) 100 MG 24 hr tablet, TAKE 1 TABLET BY MOUTH DAILY, Disp: 30 tablet, Rfl: 1    omeprazole (priLOSEC) 40 MG capsule, TAKE 1 CAPSULE BY MOUTH DAILY, Disp: 30 capsule, Rfl: 1    rosuvastatin (Crestor) 10 MG tablet, Take 1 tablet by mouth Daily., Disp: 90 tablet, Rfl: 1    metFORMIN ER (GLUCOPHAGE-XR) 500 MG 24 hr tablet, Take 2 tablets by mouth Daily With Breakfast. (Patient not taking: Reported on 6/2/2025), Disp: 180 tablet, Rfl: 0         Vital Signs   BP (!) 195/117 (BP Location: Left arm, Patient Position: Sitting, Cuff Size: Adult)   Pulse 63   Temp 98.4 °F (36.9 °C) (Temporal)   Resp 16   Ht 170.2 cm (67\")   Wt 61.2 kg (135 lb)   SpO2 97% Comment: room air  BMI 21.14 kg/m²       Objective     Physical Exam  Vitals reviewed.   Constitutional:       General: She is not in acute distress.     Appearance: Normal appearance. She is not ill-appearing.   HENT:      Head: Normocephalic and atraumatic.      Nose: Nose normal.      Mouth/Throat:      " Mouth: Mucous membranes are moist.      Pharynx: Oropharynx is clear.   Eyes:      Extraocular Movements: Extraocular movements intact.      Conjunctiva/sclera: Conjunctivae normal.      Pupils: Pupils are equal, round, and reactive to light.   Cardiovascular:      Rate and Rhythm: Normal rate and regular rhythm.      Pulses: Normal pulses.      Heart sounds: Normal heart sounds.   Pulmonary:      Effort: Pulmonary effort is normal. No respiratory distress.      Breath sounds: Normal breath sounds. No stridor. No wheezing, rhonchi or rales.   Abdominal:      General: Bowel sounds are normal.   Musculoskeletal:         General: Normal range of motion.      Cervical back: Normal range of motion and neck supple.   Skin:     General: Skin is warm and dry.   Neurological:      Mental Status: She is alert and oriented to person, place, and time.   Psychiatric:         Behavior: Behavior normal.         Physical Exam  Lungs are clear with good air exchange throughout.  Heart was examined.    Vital Signs  Blood pressure is very high. Oxygen saturation is at 97%.  Asymptomatic with no dizziness no blurry vision no chest pain       Results Review  I have personally reviewed the prior office notes, hospital records, labs, and diagnostics.  CT Chest Low Dose Cancer Screening WO [[id number]] (Order [id number])  Order  Status: Final result     Study Notes     Lynnette Tucker on 5/19/2025 12:08 PM EDT   First ct low dose lung screening. Former smoker. Wheezing.     Appointment Information    Display Notes    Out come and what's the info on it now                  PACS Images     Radiology Images      Study Result    Narrative & Impression   CT CHEST LOW DOSE CANCER SCREENING WO     Date of Exam: 5/19/2025 12:01 PM EDT     Indication: screening.     Comparison: Chest x-ray April 4, 2025, CT chest May 2, 2024     Technique: Low dose CT imaging of the chest was performed without intravenous contrast enhancement.  Automated exposure control  and iterative reconstruction methods were used.        Findings:  There is scarring in the upper lobes apical regions more pronounced on the left with underlying bronchiectasis. There is some scarring at the left lung base. There is some peribronchial thickening which could relate to bronchitis. There is a small   groundglass pulmonary nodule right upper lobe measuring 2.1 mm on image 50 series 3 unchanged an 2.8 mm pulmonary nodule left upper lobe on image 54 series 3 also unchanged.     There is coronary artery calcification. There is some fluid in the pericardial recess.     On lower slices through the upper abdomen there is a soft tissue structure in the upper abdomen just below the level of the pancreas in the left para-aortic area. This might relate to bowel.     Visualized osseous structures do not appear unusual.     IMPRESSION:  Impression:  1.There is some scarring in the upper lobes more pronounced on the left with underlying bronchiectasis.  2.There is some peribronchial thickening which could relate to bronchitis.  3.Small pulmonary nodules unchanged.  4.There is some fluid in the pericardial recess.  5.Coronary artery calcification.  6.Soft tissue structure in the upper abdomen just below the level of the pancreas in the left para-aortic area. This might relate to bowel. CT of the abdomen and pelvis could be obtained to reassess.     Recommendation:  Continue annual screening with LDCT     Lung Rads Assessment:  Lung-RADS L2 - Benign appearance or <1% chance of malignancy.            Electronically Signed: Fabio Damico MD    5/22/2025 2:13 PM EDT    Workstation ID: [id number]       Results  Imaging  CT scan shows some scarring in the upper lobes of the lungs, more pronounced on the left, likely from past infections.          Assessment         Patient Active Problem List   Diagnosis    Respiratory failure    Severe malnutrition    Chronic systolic heart failure    Acute respiratory failure with hypoxia  and hypercapnia    COPD with acute exacerbation    NICM (nonischemic cardiomyopathy)    Cervical spondylosis without myelopathy    Lumbosacral spondylosis without myelopathy    Hypoxia    Influenza A    Nasal congestion    Acute on chronic respiratory failure with hypoxia    Moderate recurrent major depression    DDD (degenerative disc disease), cervical    Coronary artery disease involving native coronary artery of native heart without angina pectoris    Pulmonary emphysema    Facet arthropathy, lumbar        Plan     There are no diagnoses linked to this encounter.       Assessment & Plan  1. Pulmonary function decline.  Her vital signs are suboptimal today, with a notably high blood pressure reading. She reports worsening lung function and increased difficulty breathing. A recent CT scan showed some scarring in the lungs, likely from past infections, but no acute issues. She has been on oxygen since last year, primarily for nighttime use, but now requires it almost all the time. A pulmonary function test and a walk test will be ordered to assess her current oxygen needs. She is currently using albuterol as a rescue inhaler and Trelegy as a maintenance inhaler, both prescribed by Dr. Galina Cameron in May 2025.    2. Hypertension.  Her blood pressure is very high today, which she attributes to being in pain and not taking her pain medication to avoid driving under its influence.      Smoking status:  reports that she quit smoking about 7 years ago. Her smoking use included cigarettes. She started smoking about 46 years ago. She has a 79.4 pack-year smoking history. She has been exposed to tobacco smoke. She has never used smokeless tobacco.    Vaccination status: Reviewed    There is no immunization history on file for this patient.     Medications personally reviewed    Follow Up  Return in about 3 months (around 9/2/2025).    Patient was given instructions and counseling regarding her condition or for health  maintenance advice. Please see specific information pulled into the AVS if appropriate.     I spent 15 minutes caring for Yenni Camacho on this date of service. This time includes time spent by me in the following activities:preparing for the visit, reviewing tests, obtaining and/or reviewing a separately obtained history, performing a medically appropriate examination and/or evaluation, counseling and educating the patient/family/caregiver, ordering medications, tests, or procedures, documenting information in the medical record, independently interpreting results and communicating that information with the patient/family/caregiver and answered questions family members, discuss medications.

## 2025-06-06 ENCOUNTER — OFFICE VISIT (OUTPATIENT)
Dept: FAMILY MEDICINE CLINIC | Facility: CLINIC | Age: 60
End: 2025-06-06
Payer: MEDICAID

## 2025-06-06 VITALS
OXYGEN SATURATION: 94 % | HEART RATE: 73 BPM | WEIGHT: 136 LBS | SYSTOLIC BLOOD PRESSURE: 166 MMHG | HEIGHT: 67 IN | DIASTOLIC BLOOD PRESSURE: 96 MMHG | TEMPERATURE: 97.9 F | BODY MASS INDEX: 21.35 KG/M2

## 2025-06-06 DIAGNOSIS — G89.29 CHRONIC BILATERAL LOW BACK PAIN WITHOUT SCIATICA: ICD-10-CM

## 2025-06-06 DIAGNOSIS — F41.1 GENERALIZED ANXIETY DISORDER: ICD-10-CM

## 2025-06-06 DIAGNOSIS — J43.9 PULMONARY EMPHYSEMA, UNSPECIFIED EMPHYSEMA TYPE: Primary | ICD-10-CM

## 2025-06-06 DIAGNOSIS — E11.69 TYPE 2 DIABETES MELLITUS WITH HYPERLIPIDEMIA: ICD-10-CM

## 2025-06-06 DIAGNOSIS — M54.50 CHRONIC BILATERAL LOW BACK PAIN WITHOUT SCIATICA: ICD-10-CM

## 2025-06-06 DIAGNOSIS — F33.1 MODERATE RECURRENT MAJOR DEPRESSION: ICD-10-CM

## 2025-06-06 DIAGNOSIS — M79.89 SOFT TISSUE MASS: ICD-10-CM

## 2025-06-06 DIAGNOSIS — I10 PRIMARY HYPERTENSION: ICD-10-CM

## 2025-06-06 DIAGNOSIS — M50.30 DDD (DEGENERATIVE DISC DISEASE), CERVICAL: ICD-10-CM

## 2025-06-06 DIAGNOSIS — E78.5 TYPE 2 DIABETES MELLITUS WITH HYPERLIPIDEMIA: ICD-10-CM

## 2025-06-06 DIAGNOSIS — F43.10 PTSD (POST-TRAUMATIC STRESS DISORDER): ICD-10-CM

## 2025-06-06 PROCEDURE — 99214 OFFICE O/P EST MOD 30 MIN: CPT | Performed by: STUDENT IN AN ORGANIZED HEALTH CARE EDUCATION/TRAINING PROGRAM

## 2025-06-06 PROCEDURE — 3044F HG A1C LEVEL LT 7.0%: CPT | Performed by: STUDENT IN AN ORGANIZED HEALTH CARE EDUCATION/TRAINING PROGRAM

## 2025-06-06 PROCEDURE — 1125F AMNT PAIN NOTED PAIN PRSNT: CPT | Performed by: STUDENT IN AN ORGANIZED HEALTH CARE EDUCATION/TRAINING PROGRAM

## 2025-06-06 RX ORDER — ALPRAZOLAM 1 MG/1
1 TABLET ORAL 2 TIMES DAILY PRN
Qty: 60 TABLET | Refills: 0 | Status: SHIPPED | OUTPATIENT
Start: 2025-06-06

## 2025-06-06 NOTE — PROGRESS NOTES
"Chief Complaint  Hypertension and Annual Exam    Subjective      Yenni Camacho is a 59 y.o. female who presents to [facility]     History of Present Illness  The patient is a 59-year-old female who presents for evaluation of hypertension, COPD, and pain management.    Hypertension  - Reports elevated BP today and during recent pulmonologist visit due to missed antihypertensive medication.    COPD  - Diagnosed with COPD, scheduled for PFTs and walking test.  - Concerned about lung congestion.    Pain Management, anxiety  - On Xanax, Norco, and Celebrex for pain management.  - Pain worsening.  - Significant stress.  - Attempting to schedule therapist appointment with Ernestina.  - Frustrated with paperwork process.  - Concerned about nervous breakdown.    Diabetes  - Referred to ophthalmologist for diabetes, appointment pending.  - Unable to renew 's license until eye check.    Supplemental information: Scheduled for abdominal CT scan on 07/24/2025 and cardiologist visit in a month.       Objective   Vital Signs:   Vitals:    06/06/25 1005 06/06/25 1010   BP: 162/96 166/96   Pulse: 73    Temp: 97.9 °F (36.6 °C)    SpO2: 94%    Weight: 61.7 kg (136 lb)    Height: 170.2 cm (67\")    PainSc: 4     PainLoc: Head      Body mass index is 21.3 kg/m².    Wt Readings from Last 3 Encounters:   06/06/25 61.7 kg (136 lb)   06/02/25 61.2 kg (135 lb)   05/19/25 61.6 kg (135 lb 12.8 oz)     BP Readings from Last 3 Encounters:   06/06/25 166/96   06/02/25 (!) 195/117   05/19/25 136/86       Health Maintenance   Topic Date Due    DIABETIC FOOT EXAM  Never done    DIABETIC EYE EXAM  Never done    PAP SMEAR  Never done    ANNUAL PHYSICAL  04/24/2024    COVID-19 Vaccine (1 - 2024-25 season) 06/16/2025 (Originally 9/1/2024)    Pneumococcal Vaccine 50+ (1 of 2 - PCV) 06/16/2025 (Originally 7/17/1983)    COLORECTAL CANCER SCREENING  06/21/2025 (Originally 7/17/2009)    MAMMOGRAM  07/21/2025 " (Originally 7/17/2004)    TDAP/TD VACCINES (1 - Tdap) 12/03/2025 (Originally 7/17/1983)    ZOSTER VACCINE (1 of 2) 12/03/2025 (Originally 7/17/2014)    INFLUENZA VACCINE  07/01/2025    URINE MICROALBUMIN-CREATININE RATIO (uACR)  10/10/2025    HEMOGLOBIN A1C  10/21/2025    LIPID PANEL  04/21/2026    LUNG CANCER SCREENING  05/19/2026    HEPATITIS C SCREENING  Completed       Physical Exam  Constitutional:       General: She is not in acute distress.     Appearance: Normal appearance.   HENT:      Head: Normocephalic and atraumatic.      Mouth/Throat:      Mouth: Mucous membranes are moist.   Eyes:      Extraocular Movements: Extraocular movements intact.   Cardiovascular:      Rate and Rhythm: Normal rate and regular rhythm.      Heart sounds: No murmur heard.  Pulmonary:      Effort: No respiratory distress.      Breath sounds: No wheezing, rhonchi or rales.   Abdominal:      General: Abdomen is flat.   Musculoskeletal:      Cervical back: Neck supple.   Skin:     General: Skin is warm and dry.   Neurological:      General: No focal deficit present.      Mental Status: She is alert and oriented to person, place, and time. Mental status is at baseline.   Psychiatric:         Mood and Affect: Mood normal.         Thought Content: Thought content normal.         Judgment: Judgment normal.          Physical Exam      Result Review :  The following data was reviewed by: Tom Meehan DO on 06/06/2025:    CT Chest Low Dose Cancer Screening WO  Result Date: 5/22/2025  Impression: 1.There is some scarring in the upper lobes more pronounced on the left with underlying bronchiectasis. 2.There is some peribronchial thickening which could relate to bronchitis. 3.Small pulmonary nodules unchanged. 4.There is some fluid in the pericardial recess. 5.Coronary artery calcification. 6.Soft tissue structure in the upper abdomen just below the level of the pancreas in the left para-aortic area. This might relate to bowel. CT of the  abdomen and pelvis could be obtained to reassess. Recommendation: Continue annual screening with LDCT Lung Rads Assessment: Lung-RADS L2 - Benign appearance or <1% chance of malignancy. Electronically Signed: Fabio Damico MD  5/22/2025 2:13 PM EDT  Workstation ID: [id number]    XR Chest 2 View  Result Date: 4/4/2025  Impression: 1. No acute process. 2. Emphysema with chronic scarring at the left apex. Electronically Signed: Neftaly Goncalves MD  4/4/2025 4:26 PM EDT  Workstation ID: [id number]    XR Chest 2 View  Result Date: 3/8/2025  Impression: Hyperexpanded lungs without acute cardiopulmonary abnormality. Electronically Signed: Edson Suresh MD  3/8/2025 12:49 PM EST  Workstation ID: [id number]    XR Knee 3+ View With Sunrise Left  Result Date: 2/11/2025  1.Mild bilateral medial tibiofemoral and lateral patellofemoral joint space narrowing, most likely related to mild osteoarthritis. 2.No radiographic findings of acute osseous knee abnormality. Electronically Signed: Rad Alvarado  2/11/2025 4:53 PM EST  Workstation ID: [id number]    XR Knee 3+ View With [place]oss Right  Result Date: 2/11/2025  1.Mild bilateral medial tibiofemoral and lateral patellofemoral joint space narrowing, most likely related to mild osteoarthritis. 2.No radiographic findings of acute osseous knee abnormality. Electronically Signed: Rad Alvarado  2/11/2025 4:53 PM EST  Workstation ID: [id number]       Results  Labs  - Confirmatory blood test for meth: Negative.           Procedures          Diagnoses and all orders for this visit:    1. Pulmonary emphysema, unspecified emphysema type (Primary)    2. Type 2 diabetes mellitus with hyperlipidemia  -     Cancel: Ambulatory Referral for Diabetic Eye Exam-Ophthalmology    3. DDD (degenerative disc disease), cervical  -     Ambulatory Referral to Pain Management    4. Chronic bilateral low back pain without sciatica  -     Ambulatory Referral to Pain Management    5. Moderate recurrent major depression    6.  PTSD (post-traumatic stress disorder)    7. Generalized anxiety disorder  -     ALPRAZolam (Xanax) 1 MG tablet; Take 1 tablet by mouth 2 (Two) Times a Day As Needed for Anxiety.  Dispense: 60 tablet; Refill: 0         Assessment & Plan  1. Hypertension.  - Elevated BP today due to missed antihypertensive medication.  - Continue current regimen, ensure consistent intake.  - Monitor BP.    2. COPD.  - Reports lung congestion, O2 saturation good today.  - Scheduled for PFTs and walking test with pulmonology.  - Continue current management, follow up with pulmonologist.    3. Pain management.  - Worsening pain, issues with previous referrals.  - Last referral was sent to [facility] pain management again instead of [facility]  - Patient is hesitant to go to [place]  - Referral to [facility] in [place] for further management.  - Continue Norco and Celebrex.  - Contact office if hydrocodone runs out before next appointment.  - PDMP reviewed today    4. Anxiety.  - On Xanax 1 mg, no pharmacy issues.  - Refill provided today.  - Monitor effectiveness and adherence.    5. Diabetes Mellitus.  - Needs eye care, referred to ophthalmologist in [place], contact info sent to [facility].  - Monitor for complications, ensure follow-up.    6. Abdominal mass.  - Soft tissue structure in upper abdomen seen on chest CT.  - Scheduled for abdominal CT on 07/24/2025.  - Monitor progression, evaluate further diagnostic results.    Follow-up  - Follow up in 1 month.    BMI is within normal parameters. No other follow-up for BMI required.         FOLLOW UP  Return in about 1 month (around 7/6/2025) for depression, .  Patient was given instructions and counseling regarding her condition or for health maintenance advice. Please see specific information pulled into the AVS if appropriate.     Patient or patient representative verbalized consent for the use of Ambient Listening during the visit with  Tom Meehan  DO for chart documentation. 6/6/2025  10:49 EDT    Tom Meehan DO  06/06/25  10:47 EDT    CURRENT & DISCONTINUED MEDICATIONS  Current Outpatient Medications   Medication Instructions    acetaminophen (TYLENOL) 500 mg, Oral, 2 Times Daily PRN    albuterol sulfate  (90 Base) MCG/ACT inhaler 2 puffs, Inhalation, Every 4 Hours PRN    ALPRAZolam (XANAX) 1 mg, Oral, 2 Times Daily PRN    amLODIPine (NORVASC) 10 mg, Oral, Daily    aspirin 81 mg, Oral, Daily    benzonatate (TESSALON) 200 MG capsule TAKE 1 CAPSULE BY MOUTH THREE TIMES DAILY FOR UP TO 10 DAYS AS NEEDED FOR COUGH    celecoxib (CELEBREX) 100 mg, Oral, Daily    cloNIDine (CATAPRES) 0.3 mg, Oral, 2 Times Daily    fluticasone (FLONASE) 50 MCG/ACT nasal spray 2 sprays, Each Nare, Daily    Fluticasone-Umeclidin-Vilant (Trelegy Ellipta) 100-62.5-25 MCG/ACT inhaler 1 puff, Inhalation, Daily - RT    HYDROcodone-acetaminophen (NORCO) 5-325 MG per tablet 1 tablet, Oral, 3 Times Daily    ipratropium-albuterol (DUO-NEB) 0.5-2.5 mg/3 ml nebulizer 3 mL, Nebulization, Every 4 Hours PRN    Kloxxado 8 MG/0.1ML liquid as directed Nasally as needed for 30 days    lidocaine (LIDODERM) 5 % 1 patch, Transdermal, Every 24 Hours, Remove & Discard patch within 12 hours or as directed by MD    losartan (COZAAR) 100 mg, Oral, Daily    metFORMIN ER (GLUCOPHAGE-XR) 1,000 mg, Oral, Daily With Breakfast    metoprolol succinate XL (TOPROL-XL) 100 mg, Oral, Daily    omeprazole (PRILOSEC) 40 mg, Oral, Daily    rosuvastatin (CRESTOR) 10 mg, Oral, Daily       Medications Discontinued During This Encounter   Medication Reason    metFORMIN HCl 625 MG tablet     ALPRAZolam (Xanax) 1 MG tablet Reorder

## 2025-06-07 DIAGNOSIS — K27.9 PEPTIC ULCER DISEASE: ICD-10-CM

## 2025-06-09 RX ORDER — OMEPRAZOLE 40 MG/1
40 CAPSULE, DELAYED RELEASE ORAL DAILY
Qty: 30 CAPSULE | Refills: 1 | Status: SHIPPED | OUTPATIENT
Start: 2025-06-09

## 2025-06-18 DIAGNOSIS — M54.50 CHRONIC BILATERAL LOW BACK PAIN WITHOUT SCIATICA: ICD-10-CM

## 2025-06-18 DIAGNOSIS — G89.29 CHRONIC BILATERAL LOW BACK PAIN WITHOUT SCIATICA: ICD-10-CM

## 2025-06-18 NOTE — TELEPHONE ENCOUNTER
"Caller: Yenni Camacho \"Maryjane\"    Relationship: Self    Best call back number: [phone]     Requested Prescriptions:   Requested Prescriptions     Pending Prescriptions Disp Refills    HYDROcodone-acetaminophen (NORCO) 5-325 MG per tablet 90 tablet 0     Sig: Take 1 tablet by mouth 3 (Three) Times a Day for 30 days.        Pharmacy where request should be sent: [facility] #[id number] - [place], [place] - [address] [facility] AT [address]/[facility] & [place] - [phone] [facility] [phone] FX     Last office visit with prescribing clinician: 6/6/2025   Last telemedicine visit with prescribing clinician: Visit date not found   Next office visit with prescribing clinician: 7/25/2025     Does the patient have less than a 3 day supply:  [x] Yes  [] No    Tia Baker Rep   06/18/25 14:16 EDT     "

## 2025-06-19 DIAGNOSIS — M54.50 CHRONIC BILATERAL LOW BACK PAIN WITHOUT SCIATICA: ICD-10-CM

## 2025-06-19 DIAGNOSIS — M54.2 NECK PAIN ON RIGHT SIDE: ICD-10-CM

## 2025-06-19 DIAGNOSIS — M47.816 FACET ARTHROPATHY, LUMBAR: ICD-10-CM

## 2025-06-19 DIAGNOSIS — G89.29 CHRONIC BILATERAL LOW BACK PAIN WITHOUT SCIATICA: ICD-10-CM

## 2025-06-19 DIAGNOSIS — M50.30 DDD (DEGENERATIVE DISC DISEASE), CERVICAL: ICD-10-CM

## 2025-06-19 DIAGNOSIS — M18.12 ARTHRITIS OF CARPOMETACARPAL (CMC) JOINT OF LEFT THUMB: ICD-10-CM

## 2025-06-19 DIAGNOSIS — M19.042 PRIMARY OSTEOARTHRITIS OF BOTH HANDS: ICD-10-CM

## 2025-06-19 DIAGNOSIS — M19.041 PRIMARY OSTEOARTHRITIS OF BOTH HANDS: ICD-10-CM

## 2025-06-19 DIAGNOSIS — M25.562 CHRONIC PAIN OF BOTH KNEES: ICD-10-CM

## 2025-06-19 DIAGNOSIS — G89.29 CHRONIC PAIN OF BOTH KNEES: ICD-10-CM

## 2025-06-19 DIAGNOSIS — M25.561 CHRONIC PAIN OF BOTH KNEES: ICD-10-CM

## 2025-06-19 RX ORDER — CELECOXIB 100 MG/1
100 CAPSULE ORAL DAILY
Qty: 30 CAPSULE | Refills: 1 | Status: SHIPPED | OUTPATIENT
Start: 2025-06-19

## 2025-06-19 RX ORDER — HYDROCODONE BITARTRATE AND ACETAMINOPHEN 5; 325 MG/1; MG/1
1 TABLET ORAL 3 TIMES DAILY
Qty: 90 TABLET | Refills: 0 | Status: SHIPPED | OUTPATIENT
Start: 2025-06-19 | End: 2025-06-20 | Stop reason: SDUPTHER

## 2025-06-19 NOTE — TELEPHONE ENCOUNTER
"    Caller: Yenni Camacho \"Maryjane\"    Relationship: Self    Best call back number: [phone]     Requested Prescriptions:   Requested Prescriptions     Pending Prescriptions Disp Refills    HYDROcodone-acetaminophen (NORCO) 5-325 MG per tablet 90 tablet 0     Sig: Take 1 tablet by mouth 3 (Three) Times a Day for 30 days.        Pharmacy where request should be sent: [facility] #[id number] - [facility] [place] - [address] AT [facility] [phone] [facility] [phone]      Last office visit with prescribing clinician: 6/6/2025   Last telemedicine visit with prescribing clinician: Visit date not found   Next office visit with prescribing clinician: 7/25/2025     Additional details provided by patient: PATIENT CALLED IN STATING THE PHARMACY THAT THIS WAS SENT TO IS OUT OF THIS MEDICATION BUT THE [facility] IN [place] HAS ENOUGH TO FILL THIS FOR HER.     PATIENT WOULD LIKE THIS PRESCRIPTION SENT TO THE [facility] PHARMACY ABOVE, PLEASE ADVISE     Does the patient have less than a 3 day supply:  [x] Yes  [] No      Tia Mancilla Rep   06/19/25 12:10 EDT     "

## 2025-06-19 NOTE — TELEPHONE ENCOUNTER
"    Caller: Yenni Camacho \"Maryjane\"    Relationship: Self    Best call back number: [phone]     Requested Prescriptions:   Requested Prescriptions     Pending Prescriptions Disp Refills    celecoxib (CeleBREX) 100 MG capsule 30 capsule 1     Sig: Take 1 capsule by mouth Daily.        Pharmacy where request should be sent: [facility] #[id number] - [place], [place] - [address] [facility] AT [facility] [address] & [place] - [phone] [facility] [phone] FX     Last office visit with prescribing clinician: 6/6/2025   Last telemedicine visit with prescribing clinician: Visit date not found   Next office visit with prescribing clinician: 7/25/2025     Additional details provided by patient:     Does the patient have less than a 3 day supply:  [] Yes  [] No    Would you like a call back once the refill request has been completed: [] Yes [] No    If the office needs to give you a call back, can they leave a voicemail: [] Yes [] No    Tia Gore Rep   06/19/25 14:53 EDT         "

## 2025-06-19 NOTE — TELEPHONE ENCOUNTER
"  Caller: Yenni Camacho \"Maryjane\"    Relationship to patient: Self    Best call back number: [phone]     Patient is needing:     PATIENT CALLING FOR STATUS UPDATE ON REFILL REQUEST.    PLEASE MAKE SURE SCRIPT TO [facility] IS CANCELLED AND SENT TO [facility].    PATIENT IS OUT OF MEDICATION.    CONTACT PATIENT TO ADVISE.         "

## 2025-06-19 NOTE — TELEPHONE ENCOUNTER
"Caller: Yenni Camacho \"Maryjane\"    Relationship to patient: Self    Best call back number:     [phone]      Patient is needing: PATIENT CALLED TO CHECK STATUS OF MEDICATION AS PATIENT IS CURRENTLY OUT OF MEDICATION.         "

## 2025-06-20 ENCOUNTER — DOCUMENTATION (OUTPATIENT)
Dept: FAMILY MEDICINE CLINIC | Facility: CLINIC | Age: 60
End: 2025-06-20
Payer: MEDICAID

## 2025-06-20 ENCOUNTER — TELEPHONE (OUTPATIENT)
Dept: FAMILY MEDICINE CLINIC | Facility: CLINIC | Age: 60
End: 2025-06-20
Payer: MEDICAID

## 2025-06-20 DIAGNOSIS — M54.50 CHRONIC BILATERAL LOW BACK PAIN WITHOUT SCIATICA: Primary | ICD-10-CM

## 2025-06-20 DIAGNOSIS — G89.29 CHRONIC BILATERAL LOW BACK PAIN WITHOUT SCIATICA: Primary | ICD-10-CM

## 2025-06-20 RX ORDER — HYDROCODONE BITARTRATE AND ACETAMINOPHEN 5; 325 MG/1; MG/1
1 TABLET ORAL 3 TIMES DAILY
Qty: 90 TABLET | Refills: 0 | OUTPATIENT
Start: 2025-06-20 | End: 2025-07-20

## 2025-06-20 RX ORDER — HYDROCODONE BITARTRATE AND ACETAMINOPHEN 5; 325 MG/1; MG/1
1 TABLET ORAL 3 TIMES DAILY
Qty: 90 TABLET | Refills: 0 | Status: SHIPPED | OUTPATIENT
Start: 2025-06-20 | End: 2025-07-20

## 2025-06-20 NOTE — TELEPHONE ENCOUNTER
PDMP reviewed and appropriate. [facility]

## 2025-06-20 NOTE — PROGRESS NOTES
Telephone call to patient to inform her Dr. Meehan called into the pharmacy [facility] could not leave a voice message.  Voice mail was full.

## 2025-06-20 NOTE — TELEPHONE ENCOUNTER
Caller: Karen Ashraf    Relationship: Emergency Contact    Best call back number: [phone]    Requested Prescriptions:   Requested Prescriptions      No prescriptions requested or ordered in this encounter        HYDROcodone-acetaminophen (NORCO) 5-325 MG per tablet       Pharmacy where request should be sent: [facility] #[id number] - [place], [place] - [address] AT [facility] [phone] [facility] [phone] FX     Last office visit with prescribing clinician: 6/6/2025   Last telemedicine visit with prescribing clinician: Visit date not found   Next office visit with prescribing clinician: 7/25/2025     Additional details provided by patient: PATIENT IS OUT OF THIS MEDICATION. [facility] [place] DO[facility] NOT HAVE ANY OF THIS MEDICATION IN STOCK. PHARMACY SAYS IN ORDER TO TRANSFER THE PRESCRIPTION TO THE [facility], HER PROVIDER WOULD HAVE TO HAVE THIS TRANSFERRED.     PLEASE TRANSFER TO [place] LOCATION THAT HAS THIS IN STOCK, WHOEVER IS COVERING FOR DR FORDE. PATIENT WOULD LIKE TO SPEAK TO JAVY. PLEASE HAVE JAVY CALL HER BACK.    Does the patient have less than a 3 day supply:  [x] Yes  [] No    Would you like a call back once the refill request has been completed: [] Yes [] No    If the office needs to give you a call back, can they leave a voicemail: [] Yes [] No    Tia Rock Rep   06/20/25 09:47 EDT

## 2025-07-01 ENCOUNTER — OFFICE VISIT (OUTPATIENT)
Dept: CARDIOLOGY | Facility: CLINIC | Age: 60
End: 2025-07-01
Payer: MEDICAID

## 2025-07-01 VITALS
OXYGEN SATURATION: 94 % | HEIGHT: 67 IN | SYSTOLIC BLOOD PRESSURE: 210 MMHG | DIASTOLIC BLOOD PRESSURE: 110 MMHG | BODY MASS INDEX: 21.39 KG/M2 | HEART RATE: 67 BPM | WEIGHT: 136.3 LBS

## 2025-07-01 DIAGNOSIS — I25.10 CORONARY ARTERY CALCIFICATION SEEN ON CT SCAN: ICD-10-CM

## 2025-07-01 DIAGNOSIS — I10 HTN (HYPERTENSION), MALIGNANT: ICD-10-CM

## 2025-07-01 DIAGNOSIS — R06.09 DOE (DYSPNEA ON EXERTION): Primary | ICD-10-CM

## 2025-07-01 DIAGNOSIS — R94.31 ABNORMAL EKG: ICD-10-CM

## 2025-07-01 DIAGNOSIS — E78.49 OTHER HYPERLIPIDEMIA: ICD-10-CM

## 2025-07-01 DIAGNOSIS — E78.2 HYPERLIPIDEMIA, MIXED: ICD-10-CM

## 2025-07-01 DIAGNOSIS — I51.89 MILD LEFT VENTRICULAR SYSTOLIC DYSFUNCTION: ICD-10-CM

## 2025-07-01 RX ORDER — ROSUVASTATIN CALCIUM 40 MG/1
40 TABLET, COATED ORAL DAILY
Qty: 90 TABLET | Refills: 2 | Status: SHIPPED | OUTPATIENT
Start: 2025-07-01

## 2025-07-01 RX ORDER — CHLORTHALIDONE 25 MG/1
25 TABLET ORAL DAILY
Qty: 90 TABLET | Refills: 3 | Status: SHIPPED | OUTPATIENT
Start: 2025-07-01

## 2025-07-01 NOTE — PROGRESS NOTES
[facility]  INTERVENTIONAL CARDIOLOGY NEW PATIENT OFFICE VISIT      Chief Complaint  Coronary Artery Disease, Primary Hypertension, Pericardial Effusion, and Establish Care    Subjective          Coronary Artery Disease        Yenni Camacho is a 59 y.o. female who presents to [facility] for new patient visit.       History of Present Illness  The patient presents for evaluation of high blood pressure.    She reports no chest pain, shortness of breath, fainting, or dizziness. She occasionally monitors her blood pressure at home and believes it is currently elevated. She attributes this to her constant pain. She recalls an incident where her blood pressure normalized within 20 to 30 minutes after receiving Percocet for pain management. She has a history of high blood pressure since her teenage years. She has a blood pressure machine at home. She is currently on clonidine 0.3 mg twice daily, losartan 100 mg daily, metoprolol succinate 100 mg daily, and amlodipine 10 mg daily for blood pressure management.    She is under significant stress and experiences constant pain due to degenerative disc disease and arthritis. She was previously under the care of Dr. Lazo, who prescribed hydrocodone with acetaminophen for her pain. However, she was dismissed from pain management due to a positive test result for methamphetamine, which she disputes. Her primary care physician conducted a blood test, which returned negative results. She is currently on pain medication and Xanax for anxiety but has not taken her afternoon dose today due to driving concerns. She has been informed that her pain will persist until her death.    SOCIAL HISTORY  Alcohol: The patient does not drink alcohol.  Tobacco: The patient does not smoke cigarettes.        Past History:  Past Medical History:   Diagnosis Date    ADHD     Anxiety     Arthritis     Asthma, extrinsic 05/21    Bunion     Chronic pain disorder      Congenital heart disease     Diabetes mellitus 11/22    Emphysema of lung 05/21    Foot pain, bilateral     Headache 5/86    Severe headaches.  Had MRI said they were caused by arthritis in my neck.    Hyperlipidemia     Hypertension     Low back pain 7/90    Arthritis in my back    Peptic ulceration     Pneumonia     PTSD (post-traumatic stress disorder)     Rheumatoid arthritis 8/86       Medical History:  Past Medical History:   Diagnosis Date    ADHD     Anxiety     Arthritis     Asthma, extrinsic 05/21    Bunion     Chronic pain disorder     Congenital heart disease     Diabetes mellitus 11/22    Emphysema of lung 05/21    Foot pain, bilateral     Headache 5/86    Severe headaches.  Had MRI said they were caused by arthritis in my neck.    Hyperlipidemia     Hypertension     Low back pain 7/90    Arthritis in my back    Peptic ulceration     Pneumonia     PTSD (post-traumatic stress disorder)     Rheumatoid arthritis 8/86       Surgical History:   has a past surgical history that includes Arm wound repair / closure (Right); Fracture surgery; Colonoscopy; and Bronchoscopy (N/A, 5/8/2024).     Family History:   family history includes ADD / ADHD in her mother; Anemia in her brother, father, and mother; Anxiety disorder in her mother; Arthritis in her maternal grandfather and paternal grandmother; COPD in her paternal grandmother; Emphysema in her paternal grandfather.     Social History:   reports that she quit smoking about 7 years ago. Her smoking use included cigarettes. She started smoking about 46 years ago. She has a 79.4 pack-year smoking history. She has been exposed to tobacco smoke. She has never used smokeless tobacco. She reports that she does not currently use alcohol. She reports that she does not currently use drugs after having used the following drugs: Hydrocodone and Marijuana. Frequency: 7.00 times per week.    Allergies:   Lexapro [escitalopram], Breztri aerosphere  [budeson-glycopyrrol-formoterol], Ondansetron, Paxlovid [nirmatrelvir-ritonavir], and Wellbutrin [bupropion]    Current Outpatient Medications on File Prior to Visit   Medication Sig    acetaminophen (TYLENOL) 500 MG tablet Take 1 tablet by mouth 2 (Two) Times a Day As Needed for Mild Pain.    albuterol sulfate  (90 Base) MCG/ACT inhaler Inhale 2 puffs Every 4 (Four) Hours As Needed for Wheezing.    ALPRAZolam (Xanax) 1 MG tablet Take 1 tablet by mouth 2 (Two) Times a Day As Needed for Anxiety.    amLODIPine (NORVASC) 10 MG tablet Take 1 tablet by mouth Daily.    aspirin 81 MG EC tablet Take 1 tablet by mouth Daily.    benzonatate (TESSALON) 200 MG capsule TAKE 1 CAPSULE BY MOUTH THREE TIMES DAILY FOR UP TO 10 DAYS AS NEEDED FOR COUGH    celecoxib (CeleBREX) 100 MG capsule Take 1 capsule by mouth Daily.    cloNIDine (CATAPRES) 0.3 MG tablet Take 1 tablet by mouth 2 (Two) Times a Day.    fluticasone (FLONASE) 50 MCG/ACT nasal spray 2 sprays by Each Nare route Daily.    Fluticasone-Umeclidin-Vilant (Trelegy Ellipta) 100-62.5-25 MCG/ACT inhaler Inhale 1 puff Daily.    HYDROcodone-acetaminophen (NORCO) 5-325 MG per tablet Take 1 tablet by mouth 3 (Three) Times a Day for 30 days.    ipratropium-albuterol (DUO-NEB) 0.5-2.5 mg/3 ml nebulizer Take 3 mL by nebulization Every 4 (Four) Hours As Needed for Wheezing.    Kloxxado 8 MG/0.1ML liquid as directed Nasally as needed for 30 days    lidocaine (LIDODERM) 5 % Place 1 patch on the skin as directed by provider Daily. Remove & Discard patch within 12 hours or as directed by MD    losartan (Cozaar) 100 MG tablet Take 1 tablet by mouth Daily.    metFORMIN ER (GLUCOPHAGE-XR) 500 MG 24 hr tablet Take 2 tablets by mouth Daily With Breakfast.    metoprolol succinate XL (TOPROL-XL) 100 MG 24 hr tablet TAKE 1 TABLET BY MOUTH DAILY    omeprazole (priLOSEC) 40 MG capsule TAKE 1 CAPSULE BY MOUTH DAILY     No current facility-administered medications on file prior to visit.     "      Review of Systems   Negative ROS except as mentioned in HPI above.     Objective     BP (!) 210/110   Pulse 67   Ht 170.2 cm (67\")   Wt 61.8 kg (136 lb 4.8 oz)   SpO2 94%   BMI 21.35 kg/m²       Physical Exam  General : Alert, awake, no acute distress  Neck : Supple, no carotid bruit, no jugular venous distention  CVS : Regular rate and rhythm, no murmur, rubs or gallops  Lungs: Clear to auscultation bilaterally, no crackles or rhonchi  Abdomen: Soft, nontender, bowel sounds heard in all 4 quadrants  Extremities: Warm, well-perfused, no pedal edema      Result Review :     The following data was reviewed by: Santiago Loco MD on 07/01/2025:    CMP          10/10/2024    16:20 11/8/2024    15:42 4/21/2025    14:51   CMP   Glucose 102  98  124    BUN 15  13  10    Creatinine 0.76  0.69  0.68    EGFR 89.8  99.5  99.8    Sodium 138  145  141    Potassium 4.1  3.9  4.2    Chloride 98  105  101    Calcium 9.8  9.5  10.1    Total Protein 6.8  6.9  7.8    Albumin 4.3  4.0  4.6    Globulin 2.5  2.9  3.2    Total Bilirubin 0.4  0.2  0.2    Alkaline Phosphatase 112  120  139    AST (SGOT) 23  20  36    ALT (SGPT) 17  15  44    Albumin/Globulin Ratio 1.7  1.4  1.4    BUN/Creatinine Ratio 19.7  18.8  14.7    Anion Gap 10.1  8.7  12.0      CBC          11/8/2024    15:42   CBC   WBC 5.61    RBC 4.63    Hemoglobin 12.8    Hematocrit 42.1    MCV 90.9    MCH 27.6    MCHC 30.4    RDW 14.3    Platelets 309        Lipid Panel          10/10/2024    16:20 4/21/2025    14:51   Lipid Panel   Total Cholesterol 242  344    Triglycerides 69  99    HDL Cholesterol 72  106    VLDL Cholesterol 12  16    LDL Cholesterol  158  222    LDL/HDL Ratio 2.17  2.06       A1C Last 3 Results          10/10/2024    16:20 4/21/2025    14:51   HGBA1C Last 3 Results   Hemoglobin A1C 6.90  6.40        Data reviewed: Cardiology studies    Results for orders placed during the hospital encounter of 04/12/22    Adult Transthoracic Echo Complete w/ " Color, Spectral and Contrast if necessary per protocol    Interpretation Summary  Mild diffuse hypokinesis of the left ankle with mildly reduced left ventricular systolic function ejection fraction around 45%.  Trace tricuspid regurgitation.  Trace mitral regurgitation.      No results found for this or any previous visit.            ECG 12 Lead    Date/Time: 7/1/2025 3:21 PM  Performed by: Santiago Loco MD    Authorized by: Santiago Loco MD  Comparison: compared with previous ECG   Similar to previous ECG  Rhythm: sinus rhythm  Rate: normal  QRS axis: normal    Clinical impression: abnormal EKG  Comments: Sinus rhythm with possible anteroseptal infarct versus secondary to LVH.          The ASCVD Risk score (Foreign DK, et al., 2019) failed to calculate for the following reasons:    The valid systolic blood pressure range is 90 to 200 mmHg    The valid HDL cholesterol range is 20 to 100 mg/dL    The valid total cholesterol range is 130 to 320 mg/dL         Assessment and Plan      Diagnoses and all orders for this visit:    1. MURPHY (dyspnea on exertion) (Primary)  -     Cancel: Stress Test With Myocardial Perfusion One Day; Future  -     ECG 12 Lead    2. Other hyperlipidemia  -     Cancel: Stress Test With Myocardial Perfusion One Day; Future  -     ECG 12 Lead    3. Abnormal EKG  -     Cancel: Stress Test With Myocardial Perfusion One Day; Future  -     ECG 12 Lead    4. Mild left ventricular systolic dysfunction  -     Cancel: Stress Test With Myocardial Perfusion One Day; Future  -     ECG 12 Lead    5. Coronary artery calcification seen on CT scan  -     Cancel: Stress Test With Myocardial Perfusion One Day; Future  -     ECG 12 Lead    6. Hyperlipidemia, mixed  -     Cancel: Stress Test With Myocardial Perfusion One Day; Future  -     rosuvastatin (Crestor) 40 MG tablet; Take 1 tablet by mouth Daily.  Dispense: 90 tablet; Refill: 2  -     ECG 12 Lead    7. HTN (hypertension), malignant  -     ECG 12  Lead    Other orders  -     chlorthalidone (HYGROTON) 25 MG tablet; Take 1 tablet by mouth Daily.  Dispense: 90 tablet; Refill: 3        Assessment & Plan  1. Hypertension:  - Blood pressure is significantly elevated at 207/96, likely exacerbated by chronic pain.  - Echocardiogram and renal duplex scheduled for 07/24/2025 to assess cardiac involvement.  - Prescription for chlorthalidone to be taken in addition to current antihypertensive regimen (clonidine 0.3 mg twice daily, losartan 100 mg daily, metoprolol succinate 100 mg daily, and amlodipine 10 mg daily).  - Monitor blood pressure at home twice daily for the next 2 weeks and record readings.  - Submit readings via [employer] or drop off at the office for review and further management.    2. Degenerative disk disease:  - Reports chronic pain contributing to elevated blood pressure.  - Currently taking hydrocodone with acetaminophen for pain management, providing minimal relief.  - Advised to continue current pain management regimen and follow up with general practitioner for any adjustments.    3. Rheumatoid arthritis:  - Reports chronic pain contributing to elevated blood pressure.  - Currently taking hydrocodone with acetaminophen for pain management, providing minimal relief.  - Advised to continue current pain management regimen and follow up with general practitioner for any adjustments.    Risks, benefits, and alternatives of treatment were discussed, including the addition of chlorthalidone to the current antihypertensive regimen to better control blood pressure. The potential for reducing the number of medications in the future was also discussed. The importance of managing chronic pain to help control blood pressure was emphasized.    Follow-up:  - A follow-up visit is scheduled in 4 months.      Patient was educated on cardiac diet, adequate exercise and achieving/maintaining optimal weight.    Yenni Camacho  reports that she quit smoking about 7  years ago. Her smoking use included cigarettes. She started smoking about 46 years ago. She has a 79.4 pack-year smoking history. She has been exposed to tobacco smoke. She has never used smokeless tobacco.       Follow Up     Return in about 4 months (around 11/1/2025) for Next scheduled follow up.             I spent 45 minutes caring for this patient on this date of service. This time includes time spent by me in the following activities:preparing for the visit, reviewing tests, obtaining and/or reviewing a separately obtained history, performing a medically appropriate examination and/or evaluation , counseling and educating the patient/family/caregiver, ordering medications, tests, or procedures, referring and communicating with other health care professionals , documenting information in the medical record, independently interpreting results and communicating that information with the patient/family/caregiver, and care coordination.     The patient was seen and examined. Work by the provider also included review and/or ordering of lab tests, review and/or ordering of radiology tests, review and/or ordering of medicine tests, discussion with other physicians or providers, independent review of data, obtaining old records, review/summation of old records, and/or other review.    I have reviewed the family history, social history, and past medical history for this patient. Previous information and data has been reviewed and updated as needed. I have reviewed and verified the chief complaint, history, and other documentation. The patient was interviewed and examined in the clinic and the chart reviewed. The previous observations, recommendations, and conclusions were reviewed including those of other providers.     The plan was discussed with the patient and/or family. The patient was given time to ask questions and these questions were answered. At the conclusion of their visit they had no additional questions or  concerns and all questions were answered to their satisfaction.     Patient was given instructions and counseling regarding her condition or for health maintenance advice. Please see specific information pulled into the AVS if appropriate.      Patient or patient representative verbalized consent for the use of Ambient Listening during the visit with  Santiago Loco MD for chart documentation.       Santiago Loco MD, FACC      Dictated Utilizing Dragon Dictation

## 2025-07-03 DIAGNOSIS — F41.1 GENERALIZED ANXIETY DISORDER: ICD-10-CM

## 2025-07-03 NOTE — TELEPHONE ENCOUNTER
Controlled Medication Refill Request:    1.  Last Office Visit:  6/06/2025     2.  Next Office Visit:  7/25/2025     3.  Last UDS Date:  3/14/2025    4.  Last Consent Signed:  3/14/2025    5.  Medication Requested: Xanax (Alprazolam)    Pharmacy:   [facility] #[id number] - [place], [place] - [address] [facility] AT [facility] [address] & [place] - [phone] PH - [phone] FX  [address] [facility] [place] [address]  Phone: [phone] Fax: [phone]    [id number] - [place], [place] - [address] - [phone] PH - [phone] FX  [address] [place] [address]  Phone: [phone] Fax: [phone]    [facility] - [address] - [phone] PH - [phone] FX  [address] [facility] [address]  Phone: [phone] Fax: [phone]    [id number] - [place] - [address] AT [facility] & [facility] [address] - [phone] PH - [phone] FX  [address] [place] [address]  Phone: [phone] Fax: [phone]

## 2025-07-07 RX ORDER — ALPRAZOLAM 1 MG/1
1 TABLET ORAL 2 TIMES DAILY PRN
Qty: 60 TABLET | Refills: 0 | Status: SHIPPED | OUTPATIENT
Start: 2025-07-07

## 2025-07-22 DIAGNOSIS — G89.29 CHRONIC BILATERAL LOW BACK PAIN WITHOUT SCIATICA: ICD-10-CM

## 2025-07-22 DIAGNOSIS — M54.50 CHRONIC BILATERAL LOW BACK PAIN WITHOUT SCIATICA: ICD-10-CM

## 2025-07-23 DIAGNOSIS — M79.89 SOFT TISSUE MASS: Primary | ICD-10-CM

## 2025-07-23 RX ORDER — HYDROCODONE BITARTRATE AND ACETAMINOPHEN 5; 325 MG/1; MG/1
1 TABLET ORAL 3 TIMES DAILY
Qty: 21 TABLET | Refills: 0 | Status: SHIPPED | OUTPATIENT
Start: 2025-07-23 | End: 2025-07-25

## 2025-07-24 ENCOUNTER — HOSPITAL ENCOUNTER (OUTPATIENT)
Dept: CT IMAGING | Facility: HOSPITAL | Age: 60
Discharge: HOME | End: 2025-07-24
Payer: MEDICAID

## 2025-07-24 ENCOUNTER — HOSPITAL ENCOUNTER (OUTPATIENT)
Dept: CARDIOLOGY | Facility: HOSPITAL | Age: 60
Discharge: HOME | End: 2025-07-24
Payer: MEDICAID

## 2025-07-24 ENCOUNTER — HOSPITAL ENCOUNTER (OUTPATIENT)
Facility: HOSPITAL | Age: 60
Discharge: HOME | End: 2025-07-24
Payer: MEDICAID

## 2025-07-24 DIAGNOSIS — M79.89 SOFT TISSUE MASS: ICD-10-CM

## 2025-07-24 DIAGNOSIS — I31.39 PERICARDIAL EFFUSION: ICD-10-CM

## 2025-07-24 DIAGNOSIS — I10 PRIMARY HYPERTENSION: ICD-10-CM

## 2025-07-24 DIAGNOSIS — E78.2 HYPERLIPIDEMIA, MIXED: ICD-10-CM

## 2025-07-24 LAB
BH CV ECHO MEAS - DIST REN A EDV LEFT: 12.4 CM/S
BH CV ECHO MEAS - DIST REN A PSV LEFT: 31.1 CM/S
BH CV ECHO MEAS - MID REN A EDV LEFT: 13.9 CM/S
BH CV ECHO MEAS - MID REN A PSV LEFT: 39.4 CM/S
BH CV ECHO MEAS - PROX REN A EDV LEFT: 9 CM/S
BH CV ECHO MEAS - PROX REN A PSV LEFT: 35.6 CM/S
BH CV VAS BP LEFT ARM: NORMAL MMHG
BH CV VAS BP RIGHT ARM: NORMAL MMHG
BH CV VAS KIDNEY HEIGHT LEFT: 4.4 CM
BH CV VAS RENAL AORTIC MID EDV: 12 CM/S
BH CV VAS RENAL AORTIC MID PSV: 83 CM/S
BH CV VAS RENAL HILUM LEFT EDV: 6 CM/S
BH CV VAS RENAL HILUM LEFT PSV: 21 CM/S
BH CV VAS RENAL HILUM RIGHT EDV: 9 CM/S
BH CV VAS RENAL HILUM RIGHT PSV: 26 CM/S
BH CV XLRA MEAS - KID L LEFT: 10.9 CM
BH CV XLRA MEAS DIST REN A EDV RIGHT: 18.1 CM/S
BH CV XLRA MEAS DIST REN A PSV RIGHT: 57.4 CM/S
BH CV XLRA MEAS KID H RIGHT: 3.4 CM
BH CV XLRA MEAS KID L RIGHT: 10.4 CM
BH CV XLRA MEAS KID W RIGHT: 3.5 CM
BH CV XLRA MEAS MID REN A EDV RIGHT: 12.1 CM/S
BH CV XLRA MEAS MID REN A PSV RIGHT: 50.8 CM/S
BH CV XLRA MEAS PROX REN A EDV RIGHT: 9.5 CM/S
BH CV XLRA MEAS PROX REN A PSV RIGHT: 40.7 CM/S
BH CV XLRA MEAS RAR LEFT: 0.42
BH CV XLRA MEAS RAR RIGHT: 0.48
BH CV XLRA MEAS RENAL A ORG EDV LEFT: 9.3 CM/S
BH CV XLRA MEAS RENAL A ORG EDV RIGHT: 9.5 CM/S
BH CV XLRA MEAS RENAL A ORG PSV LEFT: 34.9 CM/S
BH CV XLRA MEAS RENAL A ORG PSV RIGHT: 36.2 CM/S
LEFT KIDNEY WIDTH: 3.9 CM
LEFT RENAL UPPER PARENCHYMA MAX: 22 CM/S
LEFT RENAL UPPER PARENCHYMA MIN: 8 CM/S
LEFT RENAL UPPER PARENCHYMA RI: 0.64
RIGHT RENAL UPPER PARENCHYMA MAX: 24 CM/S
RIGHT RENAL UPPER PARENCHYMA MIN: 6 CM/S
RIGHT RENAL UPPER PARENCHYMA RI: 0.75

## 2025-07-24 PROCEDURE — 25510000001 IOPAMIDOL PER 1 ML: Performed by: STUDENT IN AN ORGANIZED HEALTH CARE EDUCATION/TRAINING PROGRAM

## 2025-07-24 PROCEDURE — 93306 TTE W/DOPPLER COMPLETE: CPT

## 2025-07-24 PROCEDURE — 74177 CT ABD & PELVIS W/CONTRAST: CPT

## 2025-07-24 PROCEDURE — 93975 VASCULAR STUDY: CPT

## 2025-07-24 RX ORDER — ROSUVASTATIN CALCIUM 10 MG/1
10 TABLET, COATED ORAL DAILY
Qty: 90 TABLET | Refills: 1 | OUTPATIENT
Start: 2025-07-24

## 2025-07-24 RX ORDER — IOPAMIDOL 755 MG/ML
100 INJECTION, SOLUTION INTRAVASCULAR
Status: COMPLETED | OUTPATIENT
Start: 2025-07-24 | End: 2025-07-24

## 2025-07-24 RX ADMIN — IOPAMIDOL 100 ML: 755 INJECTION, SOLUTION INTRAVENOUS at 11:53

## 2025-07-25 ENCOUNTER — OFFICE VISIT (OUTPATIENT)
Dept: FAMILY MEDICINE CLINIC | Facility: CLINIC | Age: 60
End: 2025-07-25
Payer: MEDICAID

## 2025-07-25 ENCOUNTER — HOSPITAL ENCOUNTER (OUTPATIENT)
Dept: RESPIRATORY THERAPY | Facility: HOSPITAL | Age: 60
Discharge: HOME | End: 2025-07-25
Payer: MEDICAID

## 2025-07-25 VITALS
WEIGHT: 136 LBS | OXYGEN SATURATION: 95 % | HEIGHT: 67 IN | BODY MASS INDEX: 21.35 KG/M2 | TEMPERATURE: 98.5 F | HEART RATE: 75 BPM

## 2025-07-25 DIAGNOSIS — M79.89 SOFT TISSUE MASS: ICD-10-CM

## 2025-07-25 DIAGNOSIS — G89.29 CHRONIC BILATERAL LOW BACK PAIN WITHOUT SCIATICA: ICD-10-CM

## 2025-07-25 DIAGNOSIS — M50.30 DDD (DEGENERATIVE DISC DISEASE), CERVICAL: ICD-10-CM

## 2025-07-25 DIAGNOSIS — J43.9 PULMONARY EMPHYSEMA, UNSPECIFIED EMPHYSEMA TYPE: ICD-10-CM

## 2025-07-25 DIAGNOSIS — M17.0 PRIMARY OSTEOARTHRITIS OF BOTH KNEES: ICD-10-CM

## 2025-07-25 DIAGNOSIS — F17.211 NICOTINE DEPENDENCE, CIGARETTES, IN REMISSION: ICD-10-CM

## 2025-07-25 DIAGNOSIS — M54.50 CHRONIC BILATERAL LOW BACK PAIN WITHOUT SCIATICA: ICD-10-CM

## 2025-07-25 DIAGNOSIS — I10 PRIMARY HYPERTENSION: ICD-10-CM

## 2025-07-25 DIAGNOSIS — Z59.819 HOUSING INSTABILITY: Primary | ICD-10-CM

## 2025-07-25 DIAGNOSIS — J96.11 CHRONIC RESPIRATORY FAILURE WITH HYPOXIA: Primary | ICD-10-CM

## 2025-07-25 PROCEDURE — 94618 PULMONARY STRESS TESTING: CPT

## 2025-07-25 PROCEDURE — 94060 EVALUATION OF WHEEZING: CPT

## 2025-07-25 PROCEDURE — 94729 DIFFUSING CAPACITY: CPT

## 2025-07-25 PROCEDURE — 94726 PLETHYSMOGRAPHY LUNG VOLUMES: CPT

## 2025-07-25 RX ORDER — HYDROCODONE BITARTRATE AND ACETAMINOPHEN 7.5; 325 MG/1; MG/1
1 TABLET ORAL EVERY 8 HOURS PRN
Qty: 90 TABLET | Refills: 0 | Status: SHIPPED | OUTPATIENT
Start: 2025-07-25 | End: 2025-08-24

## 2025-07-25 RX ORDER — ALBUTEROL SULFATE 0.83 MG/ML
2.5 SOLUTION RESPIRATORY (INHALATION) ONCE
Status: COMPLETED | OUTPATIENT
Start: 2025-07-25 | End: 2025-07-25

## 2025-07-25 RX ADMIN — ALBUTEROL SULFATE 2.5 MG: 2.5 SOLUTION RESPIRATORY (INHALATION) at 12:57

## 2025-07-25 SDOH — ECONOMIC STABILITY - HOUSING INSECURITY: HOUSING INSTABILITY UNSPECIFIED: Z59.819
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
## 2025-07-25 NOTE — PROGRESS NOTES
Exercise Oximetry    Patient Name:Yenni Camacho   MRN: [id number]   Date: 07/25/25             ROOM AIR BASELINE   SpO2% 92   Heart Rate 75        EXERCISE ON ROOM AIR SpO2% EXERCISE ON O2 @  LPM SpO2%   1 MINUTE 90 1 MINUTE 91 2 PD   2 MINUTES 85 2 MINUTES 90 3 PD   3 MINUTES  3 MINUTES 91 3 PD   4 MINUTES  4 MINUTES 89 3 PD   5 MINUTES  5 MINUTES 90 4 PD   6 MINUTES  6 MINUTES 93 4 PD              Distance Walked  180' Distance Walked  300'   Dyspnea (Kassi Scale)  Pre-0 Dyspnea (Kassi Scale)  Post 2   Fatigue (Kassi Scale)   Fatigue (Kassi Scale)   1   SpO2% Post Exercise   SpO2% Post Exercise   93 room air rest   HR Post Exercise   HR Post Exercise   75   Time to Recovery   Time to Recovery   2'     Comments: patient states use of O2 3LPM at home not currently using portables would like [employer] [facility]

## 2025-07-27 LAB
AORTIC DIMENSIONLESS INDEX: 0.82 (DI)
ASCENDING AORTA: 3.1 CM
AV MEAN PRESS GRAD SYS DOP V1V2: 1 MMHG
AV VMAX SYS DOP: 81.4 CM/SEC
BH CV ECHO MEAS - AO MAX PG: 2.7 MMHG
BH CV ECHO MEAS - AO ROOT DIAM: 3.2 CM
BH CV ECHO MEAS - AO V2 VTI: 19.7 CM
BH CV ECHO MEAS - AVA(I,D): 2.6 CM2
BH CV ECHO MEAS - EDV(CUBED): 46.7 ML
BH CV ECHO MEAS - EDV(MOD-SP2): 69 ML
BH CV ECHO MEAS - EDV(MOD-SP4): 54.8 ML
BH CV ECHO MEAS - EF(MOD-SP2): 59.1 %
BH CV ECHO MEAS - EF(MOD-SP4): 59.3 %
BH CV ECHO MEAS - ESV(CUBED): 15.6 ML
BH CV ECHO MEAS - ESV(MOD-SP2): 28.2 ML
BH CV ECHO MEAS - ESV(MOD-SP4): 22.3 ML
BH CV ECHO MEAS - FS: 30.6 %
BH CV ECHO MEAS - IVS/LVPW: 1.38 CM
BH CV ECHO MEAS - IVSD: 1.1 CM
BH CV ECHO MEAS - LA DIMENSION: 2.7 CM
BH CV ECHO MEAS - LAT PEAK E' VEL: 6.6 CM/SEC
BH CV ECHO MEAS - LV DIASTOLIC VOL/BSA (35-75): 31.9 CM2
BH CV ECHO MEAS - LV MASS(C)D: 100.2 GRAMS
BH CV ECHO MEAS - LV MAX PG: 1.55 MMHG
BH CV ECHO MEAS - LV MEAN PG: 1 MMHG
BH CV ECHO MEAS - LV SYSTOLIC VOL/BSA (12-30): 13 CM2
BH CV ECHO MEAS - LV V1 MAX: 62.3 CM/SEC
BH CV ECHO MEAS - LV V1 VTI: 16.1 CM
BH CV ECHO MEAS - LVIDD: 3.6 CM
BH CV ECHO MEAS - LVIDS: 2.5 CM
BH CV ECHO MEAS - LVOT AREA: 3.1 CM2
BH CV ECHO MEAS - LVOT DIAM: 2 CM
BH CV ECHO MEAS - LVPWD: 0.8 CM
BH CV ECHO MEAS - MED PEAK E' VEL: 5.3 CM/SEC
BH CV ECHO MEAS - MV A MAX VEL: 56.6 CM/SEC
BH CV ECHO MEAS - MV DEC TIME: 0.24 SEC
BH CV ECHO MEAS - MV E MAX VEL: 90.1 CM/SEC
BH CV ECHO MEAS - MV E/A: 1.59
BH CV ECHO MEAS - MV MEAN PG: 2 MMHG
BH CV ECHO MEAS - MV V2 VTI: 34.2 CM
BH CV ECHO MEAS - MVA(VTI): 1.48 CM2
BH CV ECHO MEAS - RVDD: 2.9 CM
BH CV ECHO MEAS - SV(LVOT): 50.6 ML
BH CV ECHO MEAS - SV(MOD-SP2): 40.8 ML
BH CV ECHO MEAS - SV(MOD-SP4): 32.5 ML
BH CV ECHO MEAS - SVI(LVOT): 29.5 ML/M2
BH CV ECHO MEAS - SVI(MOD-SP2): 23.8 ML/M2
BH CV ECHO MEAS - SVI(MOD-SP4): 18.9 ML/M2
BH CV ECHO MEAS - TAPSE (>1.6): 1.96 CM
BH CV ECHO MEASUREMENTS AVERAGE E/E' RATIO: 15.14
BH CV XLRA - TDI S': 10.4 CM/SEC
IVRT: 71 MS
LEFT ATRIUM VOLUME INDEX: 27.2 ML/M2
LV EF BIPLANE MOD: 59.5 %

## 2025-07-29 ENCOUNTER — PATIENT OUTREACH (OUTPATIENT)
Age: 60
End: 2025-07-29
Payer: MEDICAID

## 2025-07-29 NOTE — OUTREACH NOTE
SW attempted contact with [facility]. Mailbox was full, so SW could not leave a voicemail. SW will attempt again in a couple of business days.     Christopher MINER -   Ambulatory Case Management    7/29/2025, 13:13 EDT

## 2025-07-31 ENCOUNTER — PATIENT OUTREACH (OUTPATIENT)
Age: 60
End: 2025-07-31
Payer: MEDICAID

## 2025-07-31 ENCOUNTER — PRIOR AUTHORIZATION (OUTPATIENT)
Dept: FAMILY MEDICINE CLINIC | Facility: CLINIC | Age: 60
End: 2025-07-31
Payer: MEDICAID

## 2025-07-31 NOTE — OUTREACH NOTE
Social Work Assessment  Questions/Answers      Flowsheet Row Most Recent Value   Referral Source patient   Reason for Consult community resources, financial concerns   Preferred Language English   People in Home friend(s), other (see comments)   Current Living Arrangements homeless   Potentially Unsafe Housing Conditions mold   In the past 12 months has the electric, gas, oil, or water company threatened to shut off services in your home? Shut off   Primary Care Provided by self   Provides Primary Care For no one   Family Caregiver if Needed none   Quality of Family Relationships non-existent   Source of Income none   Financial/Environmental Concerns unable to afford rent/mortgage, other (see comments)   Application for Public Assistance obtained public assistance pending number   Medications independent   Meal Preparation independent   Housekeeping independent   Laundry independent   Shopping independent   If for any reason you need help with day-to-day activities such as bathing, preparing meals, shopping, managing finances, etc., do you get the help you need? I don't need any help        SDOH updated and reviewed with the patient during this program:  --     Alcohol Use: Not At Risk (7/23/2025)    AUDIT-C     Frequency of Alcohol Consumption: Never     Average Number of Drinks: Patient does not drink     Frequency of Binge Drinking: Never      --     Depression: At risk (7/25/2025)    PHQ-2     PHQ-2 Score: 20      --     Disabilities: At Risk (7/23/2025)    Disabilities     Concentrating, Remembering, or Making Decisions Difficulty: yes     Doing Errands Independently Difficulty: yes      --     Employment: Unknown (7/23/2025)    Employment     Do you want help finding or keeping work or a job?: Patient unable to answer      Financial Resource Strain: High Risk (7/23/2025)    Overall Financial Resource Strain (CARDIA)     Difficulty of Paying Living Expenses: Very hard      --     Food Insecurity: Food  Insecurity Present (7/23/2025)    Hunger Vital Sign     Worried About Running Out of Food in the Last Year: Often true     Ran Out of Food in the Last Year: Often true      --     Health Literacy: Unknown (7/31/2025)    Education     Preferred Language: English   Recent Concern: Health Literacy - Inadequate Health Literacy (7/23/2025)    Amb Case Mgmt     How often do you have someone help you read facts about your health?: always     How often do you have trouble learning about your health because you don't know what the written words mean?: often     How confident are you filling out forms by yourself?: sometimes      --     Housing Stability: At Risk (7/31/2025)    Housing Stability     Current Living Arrangements: homeless     Potentially Unsafe Housing Conditions: mold      --     Interpersonal Safety: Not At Risk (7/23/2025)    Humiliation, Afraid, Rape, and Kick questionnaire     Fear of Current or Ex-Partner: No     Emotionally Abused: No     Physically Abused: No     Sexually Abused: No      --     Physical Activity: Unknown (7/23/2025)    Exercise Vital Sign     Days of Exercise per Week: Patient declined     Minutes of Exercise per Session: 0 min      --     Social Connections: Unknown (7/31/2025)    Family and Community Support     Help with Day-to-Day Activities: I don't need any help   Recent Concern: Social Connections - Socially Isolated (7/23/2025)    Social Connection and Isolation Panel [NHANES]     Frequency of Communication with Friends and Family: Once a week     Frequency of Social Gatherings with Friends and Family: Never     Attends [employer] Services: More than 4 times per year     Active Member of Clubs or Organizations: No     Attends Club or Organization Meetings: Never     Marital Status:       --     Stress: Stress Concern Present (7/23/2025)    [place] [employer] - Occupational Stress Questionnaire     Feeling of Stress : Very much      --     Tobacco  Use: Medium Risk (7/25/2025)    Patient History     Smoking Tobacco Use: Former     Smokeless Tobacco Use: Never     Passive Exposure: Past      --     Transportation Needs: Unmet Transportation Needs (7/23/2025)    PRAPARE - Transportation     Lack of Transportation (Medical): Yes     Lack of Transportation (Non-Medical): Yes      --     Utilities: At Risk (7/31/2025)    [facility] Utilities     Threatened with loss of utilities: Already shut off      Continuing Care   Community & [facility] [place] [facility]    [address], [place] [address]    Phone: [phone]    Request Status: Accepted    Services: Job Insecurity Services    Resource for: Employment   Patient Outreach    SW spoke with pt re her SDOH indicators. SW and pt discussed her current needs for housing and other resources. SW offered to send a list of income-based housing options for [facility]. SW also sent a resource directory for [facility]. Pt expressed thanks. ANGELLA will continue to monitor for the next thirty days.     Christopher MINER -   Ambulatory Case Management    7/31/2025, 15:16 EDT

## 2025-08-01 ENCOUNTER — PATIENT MESSAGE (OUTPATIENT)
Dept: FAMILY MEDICINE CLINIC | Facility: CLINIC | Age: 60
End: 2025-08-01

## 2025-08-01 ENCOUNTER — OFFICE VISIT (OUTPATIENT)
Dept: FAMILY MEDICINE CLINIC | Facility: CLINIC | Age: 60
End: 2025-08-01
Payer: MEDICAID

## 2025-08-01 VITALS
HEIGHT: 67 IN | WEIGHT: 139 LBS | DIASTOLIC BLOOD PRESSURE: 90 MMHG | TEMPERATURE: 98.5 F | OXYGEN SATURATION: 98 % | BODY MASS INDEX: 21.82 KG/M2 | HEART RATE: 56 BPM | SYSTOLIC BLOOD PRESSURE: 132 MMHG

## 2025-08-01 DIAGNOSIS — M17.0 PRIMARY OSTEOARTHRITIS OF BOTH KNEES: ICD-10-CM

## 2025-08-01 DIAGNOSIS — M50.30 DDD (DEGENERATIVE DISC DISEASE), CERVICAL: ICD-10-CM

## 2025-08-01 DIAGNOSIS — I10 PRIMARY HYPERTENSION: ICD-10-CM

## 2025-08-01 DIAGNOSIS — R19.00 RETROPERITONEAL MASS: Primary | ICD-10-CM

## 2025-08-01 DIAGNOSIS — J43.9 PULMONARY EMPHYSEMA, UNSPECIFIED EMPHYSEMA TYPE: ICD-10-CM

## 2025-08-01 PROCEDURE — 99214 OFFICE O/P EST MOD 30 MIN: CPT | Performed by: STUDENT IN AN ORGANIZED HEALTH CARE EDUCATION/TRAINING PROGRAM

## 2025-08-01 PROCEDURE — 3044F HG A1C LEVEL LT 7.0%: CPT | Performed by: STUDENT IN AN ORGANIZED HEALTH CARE EDUCATION/TRAINING PROGRAM

## 2025-08-01 PROCEDURE — 1125F AMNT PAIN NOTED PAIN PRSNT: CPT | Performed by: STUDENT IN AN ORGANIZED HEALTH CARE EDUCATION/TRAINING PROGRAM
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
## 2025-08-06 ENCOUNTER — OFFICE VISIT (OUTPATIENT)
Dept: FAMILY MEDICINE CLINIC | Facility: CLINIC | Age: 60
End: 2025-08-06
Payer: MEDICAID

## 2025-08-06 VITALS
SYSTOLIC BLOOD PRESSURE: 170 MMHG | OXYGEN SATURATION: 99 % | DIASTOLIC BLOOD PRESSURE: 96 MMHG | BODY MASS INDEX: 21.5 KG/M2 | HEART RATE: 71 BPM | HEIGHT: 67 IN | TEMPERATURE: 99 F | WEIGHT: 137 LBS

## 2025-08-06 DIAGNOSIS — M17.12 PRIMARY OSTEOARTHRITIS OF LEFT KNEE: Primary | ICD-10-CM

## 2025-08-06 DIAGNOSIS — I10 PRIMARY HYPERTENSION: ICD-10-CM

## 2025-08-06 DIAGNOSIS — F41.1 GENERALIZED ANXIETY DISORDER: ICD-10-CM

## 2025-08-06 RX ORDER — TRIAMCINOLONE ACETONIDE 40 MG/ML
40 INJECTION, SUSPENSION INTRA-ARTICULAR; INTRAMUSCULAR ONCE
Status: COMPLETED | OUTPATIENT
Start: 2025-08-06 | End: 2025-08-06

## 2025-08-06 RX ORDER — ALPRAZOLAM 1 MG/1
1 TABLET ORAL 2 TIMES DAILY PRN
Qty: 60 TABLET | Refills: 0 | Status: SHIPPED | OUTPATIENT
Start: 2025-08-06

## 2025-08-06 RX ORDER — LIDOCAINE HYDROCHLORIDE 10 MG/ML
4 INJECTION, SOLUTION INFILTRATION; PERINEURAL ONCE
Status: COMPLETED | OUTPATIENT
Start: 2025-08-06 | End: 2025-08-06

## 2025-08-06 RX ADMIN — TRIAMCINOLONE ACETONIDE 40 MG: 40 INJECTION, SUSPENSION INTRA-ARTICULAR; INTRAMUSCULAR at 12:50

## 2025-08-06 RX ADMIN — LIDOCAINE HYDROCHLORIDE 4 ML: 10 INJECTION, SOLUTION INFILTRATION; PERINEURAL at 12:50

## 2025-08-13 DIAGNOSIS — G89.29 CHRONIC PAIN OF BOTH KNEES: ICD-10-CM

## 2025-08-13 DIAGNOSIS — M54.2 NECK PAIN ON RIGHT SIDE: ICD-10-CM

## 2025-08-13 DIAGNOSIS — G89.29 CHRONIC BILATERAL LOW BACK PAIN WITHOUT SCIATICA: ICD-10-CM

## 2025-08-13 DIAGNOSIS — M25.562 CHRONIC PAIN OF BOTH KNEES: ICD-10-CM

## 2025-08-13 DIAGNOSIS — M25.561 CHRONIC PAIN OF BOTH KNEES: ICD-10-CM

## 2025-08-13 DIAGNOSIS — M54.50 CHRONIC BILATERAL LOW BACK PAIN WITHOUT SCIATICA: ICD-10-CM

## 2025-08-13 RX ORDER — LIDOCAINE 50 MG/G
1 PATCH TOPICAL EVERY 24 HOURS
Qty: 30 PATCH | Refills: 2 | Status: SHIPPED | OUTPATIENT
Start: 2025-08-13

## 2025-08-14 ENCOUNTER — OFFICE VISIT (OUTPATIENT)
Dept: FAMILY MEDICINE CLINIC | Facility: CLINIC | Age: 60
End: 2025-08-14
Payer: MEDICAID

## 2025-08-14 VITALS
OXYGEN SATURATION: 100 % | HEART RATE: 78 BPM | HEIGHT: 67 IN | TEMPERATURE: 97.5 F | SYSTOLIC BLOOD PRESSURE: 170 MMHG | BODY MASS INDEX: 21.5 KG/M2 | WEIGHT: 137 LBS | DIASTOLIC BLOOD PRESSURE: 86 MMHG

## 2025-08-14 DIAGNOSIS — M54.50 CHRONIC BILATERAL LOW BACK PAIN WITHOUT SCIATICA: ICD-10-CM

## 2025-08-14 DIAGNOSIS — E78.00 PURE HYPERCHOLESTEROLEMIA: ICD-10-CM

## 2025-08-14 DIAGNOSIS — M50.30 DDD (DEGENERATIVE DISC DISEASE), CERVICAL: ICD-10-CM

## 2025-08-14 DIAGNOSIS — E11.69 TYPE 2 DIABETES MELLITUS WITH HYPERLIPIDEMIA: ICD-10-CM

## 2025-08-14 DIAGNOSIS — Z59.819 HOUSING INSTABILITY: ICD-10-CM

## 2025-08-14 DIAGNOSIS — J44.1 COPD WITH ACUTE EXACERBATION: ICD-10-CM

## 2025-08-14 DIAGNOSIS — I10 PRIMARY HYPERTENSION: Primary | ICD-10-CM

## 2025-08-14 DIAGNOSIS — Z79.899 MEDICATION MANAGEMENT: ICD-10-CM

## 2025-08-14 DIAGNOSIS — Z59.9 FINANCIAL DIFFICULTY: ICD-10-CM

## 2025-08-14 DIAGNOSIS — R20.2 PARESTHESIA OF BOTH FEET: ICD-10-CM

## 2025-08-14 DIAGNOSIS — G89.29 CHRONIC BILATERAL LOW BACK PAIN WITHOUT SCIATICA: ICD-10-CM

## 2025-08-14 DIAGNOSIS — E78.5 TYPE 2 DIABETES MELLITUS WITH HYPERLIPIDEMIA: ICD-10-CM

## 2025-08-14 DIAGNOSIS — M17.12 PRIMARY OSTEOARTHRITIS OF LEFT KNEE: ICD-10-CM

## 2025-08-14 DIAGNOSIS — R25.2 MUSCLE CRAMP: ICD-10-CM

## 2025-08-14 DIAGNOSIS — I25.10 CORONARY ARTERY DISEASE INVOLVING NATIVE CORONARY ARTERY OF NATIVE HEART WITHOUT ANGINA PECTORIS: ICD-10-CM

## 2025-08-14 DIAGNOSIS — F41.1 GENERALIZED ANXIETY DISORDER: ICD-10-CM

## 2025-08-14 LAB
ALBUMIN SERPL-MCNC: 4.2 G/DL (ref 3.5–5.2)
ALBUMIN UR-MCNC: 7.5 MG/DL
ALBUMIN/GLOB SERPL: 1.6 G/DL
ALP SERPL-CCNC: 129 U/L (ref 39–117)
ALT SERPL W P-5'-P-CCNC: 17 U/L (ref 1–33)
AMPHET+METHAMPHET UR QL: NEGATIVE
AMPHETAMINE INTERNAL CONTROL: ABNORMAL
AMPHETAMINES UR QL: NEGATIVE
ANION GAP SERPL CALCULATED.3IONS-SCNC: 11 MMOL/L (ref 5–15)
AST SERPL-CCNC: 25 U/L (ref 1–32)
BARBITURATE INTERNAL CONTROL: ABNORMAL
BARBITURATES UR QL SCN: NEGATIVE
BENZODIAZ UR QL SCN: NEGATIVE
BENZODIAZEPINE INTERNAL CONTROL: ABNORMAL
BILIRUB SERPL-MCNC: <0.2 MG/DL (ref 0–1.2)
BUN SERPL-MCNC: 15 MG/DL (ref 8–23)
BUN/CREAT SERPL: 22.1 (ref 7–25)
BUPRENORPHINE INTERNAL CONTROL: ABNORMAL
BUPRENORPHINE SERPL-MCNC: NEGATIVE NG/ML
CALCIUM SPEC-SCNC: 9.4 MG/DL (ref 8.6–10.5)
CANNABINOIDS SERPL QL: POSITIVE
CHLORIDE SERPL-SCNC: 106 MMOL/L (ref 98–107)
CHOLEST SERPL-MCNC: 256 MG/DL (ref 0–200)
CO2 SERPL-SCNC: 27 MMOL/L (ref 22–29)
COCAINE INTERNAL CONTROL: ABNORMAL
COCAINE UR QL: NEGATIVE
CREAT SERPL-MCNC: 0.68 MG/DL (ref 0.57–1)
CREAT UR-MCNC: 28.9 MG/DL
EGFRCR SERPLBLD CKD-EPI 2021: 99.2 ML/MIN/1.73
EXPIRATION DATE: ABNORMAL
FERRITIN SERPL-MCNC: 69.8 NG/ML (ref 13–150)
FOLATE SERPL-MCNC: 8.16 NG/ML (ref 4.78–24.2)
GLOBULIN UR ELPH-MCNC: 2.6 GM/DL
GLUCOSE SERPL-MCNC: 88 MG/DL (ref 65–99)
HBA1C MFR BLD: 6.1 % (ref 4.8–5.6)
HDLC SERPL-MCNC: 75 MG/DL (ref 40–60)
IRON 24H UR-MRATE: 65 MCG/DL (ref 37–145)
IRON SATN MFR SERPL: 17 % (ref 20–50)
LDLC SERPL CALC-MCNC: 160 MG/DL (ref 0–100)
LDLC/HDLC SERPL: 2.1 {RATIO}
Lab: ABNORMAL
MAGNESIUM SERPL-MCNC: 2.1 MG/DL (ref 1.6–2.4)
MDMA (ECSTASY) INTERNAL CONTROL: ABNORMAL
MDMA UR QL SCN: NEGATIVE
METHADONE INTERNAL CONTROL: ABNORMAL
METHADONE UR QL SCN: NEGATIVE
METHAMPHETAMINE INTERNAL CONTROL: ABNORMAL
MICROALBUMIN/CREAT UR: 259.5 MG/G (ref 0–29)
MORPHINE INTERNAL CONTROL: ABNORMAL
MORPHINE/OPIATES SCREEN, URINE: NEGATIVE
OXYCODONE INTERNAL CONTROL: ABNORMAL
OXYCODONE UR QL SCN: NEGATIVE
PCP UR QL SCN: NEGATIVE
PHENCYCLIDINE INTERNAL CONTROL: ABNORMAL
POTASSIUM SERPL-SCNC: 3.9 MMOL/L (ref 3.5–5.2)
PROT SERPL-MCNC: 6.8 G/DL (ref 6–8.5)
SODIUM SERPL-SCNC: 144 MMOL/L (ref 136–145)
THC INTERNAL CONTROL: ABNORMAL
TIBC SERPL-MCNC: 377 MCG/DL (ref 298–536)
TRANSFERRIN SERPL-MCNC: 253 MG/DL (ref 200–360)
TRIGL SERPL-MCNC: 119 MG/DL (ref 0–150)
VIT B12 BLD-MCNC: 475 PG/ML (ref 211–946)
VLDLC SERPL-MCNC: 21 MG/DL (ref 5–40)

## 2025-08-14 PROCEDURE — 82746 ASSAY OF FOLIC ACID SERUM: CPT | Performed by: STUDENT IN AN ORGANIZED HEALTH CARE EDUCATION/TRAINING PROGRAM

## 2025-08-14 PROCEDURE — 82570 ASSAY OF URINE CREATININE: CPT | Performed by: STUDENT IN AN ORGANIZED HEALTH CARE EDUCATION/TRAINING PROGRAM

## 2025-08-14 PROCEDURE — 83036 HEMOGLOBIN GLYCOSYLATED A1C: CPT | Performed by: STUDENT IN AN ORGANIZED HEALTH CARE EDUCATION/TRAINING PROGRAM

## 2025-08-14 PROCEDURE — 84466 ASSAY OF TRANSFERRIN: CPT | Performed by: STUDENT IN AN ORGANIZED HEALTH CARE EDUCATION/TRAINING PROGRAM

## 2025-08-14 PROCEDURE — 82607 VITAMIN B-12: CPT | Performed by: STUDENT IN AN ORGANIZED HEALTH CARE EDUCATION/TRAINING PROGRAM

## 2025-08-14 PROCEDURE — 82043 UR ALBUMIN QUANTITATIVE: CPT | Performed by: STUDENT IN AN ORGANIZED HEALTH CARE EDUCATION/TRAINING PROGRAM

## 2025-08-14 PROCEDURE — 83735 ASSAY OF MAGNESIUM: CPT | Performed by: STUDENT IN AN ORGANIZED HEALTH CARE EDUCATION/TRAINING PROGRAM

## 2025-08-14 PROCEDURE — 80061 LIPID PANEL: CPT | Performed by: STUDENT IN AN ORGANIZED HEALTH CARE EDUCATION/TRAINING PROGRAM

## 2025-08-14 PROCEDURE — 82728 ASSAY OF FERRITIN: CPT | Performed by: STUDENT IN AN ORGANIZED HEALTH CARE EDUCATION/TRAINING PROGRAM

## 2025-08-14 PROCEDURE — 83540 ASSAY OF IRON: CPT | Performed by: STUDENT IN AN ORGANIZED HEALTH CARE EDUCATION/TRAINING PROGRAM

## 2025-08-14 PROCEDURE — 80053 COMPREHEN METABOLIC PANEL: CPT | Performed by: STUDENT IN AN ORGANIZED HEALTH CARE EDUCATION/TRAINING PROGRAM

## 2025-08-14 RX ORDER — CELECOXIB 200 MG/1
200 CAPSULE ORAL DAILY
Qty: 30 CAPSULE | Refills: 1 | Status: SHIPPED | OUTPATIENT
Start: 2025-08-14

## 2025-08-14 SDOH — ECONOMIC STABILITY - HOUSING INSECURITY: HOUSING INSTABILITY UNSPECIFIED: Z59.819

## 2025-08-14 SDOH — ECONOMIC STABILITY - INCOME SECURITY: PROBLEM RELATED TO HOUSING AND ECONOMIC CIRCUMSTANCES, UNSPECIFIED: Z59.9

## 2025-08-22 DIAGNOSIS — M50.30 DDD (DEGENERATIVE DISC DISEASE), CERVICAL: ICD-10-CM

## 2025-08-22 DIAGNOSIS — G89.29 CHRONIC BILATERAL LOW BACK PAIN WITHOUT SCIATICA: ICD-10-CM

## 2025-08-22 DIAGNOSIS — M54.50 CHRONIC BILATERAL LOW BACK PAIN WITHOUT SCIATICA: ICD-10-CM

## 2025-08-22 RX ORDER — HYDROCODONE BITARTRATE AND ACETAMINOPHEN 7.5; 325 MG/1; MG/1
1 TABLET ORAL EVERY 8 HOURS PRN
Qty: 90 TABLET | Refills: 0 | Status: SHIPPED | OUTPATIENT
Start: 2025-08-22 | End: 2025-09-21

## 2025-08-28 DIAGNOSIS — I10 PRIMARY HYPERTENSION: ICD-10-CM

## 2025-08-28 RX ORDER — CLONIDINE HYDROCHLORIDE 0.3 MG/1
0.3 TABLET ORAL 2 TIMES DAILY
Qty: 180 TABLET | Refills: 1 | Status: SHIPPED | OUTPATIENT
Start: 2025-08-28

## (undated) DEVICE — SYR LUER SLPTP 50ML

## (undated) DEVICE — Device

## (undated) DEVICE — CONN JET HYDRA H20 AUXILIARY DISP

## (undated) DEVICE — SINGLE USE SUCTION VALVE MAJ-209: Brand: SINGLE USE SUCTION VALVE (STERILE)

## (undated) DEVICE — LINER SURG CANSTR SXN S/RIGD 1500CC

## (undated) DEVICE — TRAP,MUCUS SPECIMEN,40CC: Brand: MEDLINE

## (undated) DEVICE — BLCK/BITE BLOX WO/DENTL/RIM W/STRAP 54F

## (undated) DEVICE — CONTAINER,SPEC,PNEUM TUBE,4OZ,STRL PATH: Brand: MEDLINE

## (undated) DEVICE — SOLIDIFIER LIQLOC PLS 1500CC BT

## (undated) DEVICE — DEV ATOMIZATION MUCOSAL/NASALTRACH

## (undated) DEVICE — SINGLE USE BIOPSY VALVE MAJ-210: Brand: SINGLE USE BIOPSY VALVE (STERILE)